# Patient Record
Sex: MALE | Race: WHITE | Employment: OTHER | URBAN - METROPOLITAN AREA
[De-identification: names, ages, dates, MRNs, and addresses within clinical notes are randomized per-mention and may not be internally consistent; named-entity substitution may affect disease eponyms.]

---

## 2017-01-16 RX ORDER — CITALOPRAM 20 MG/1
TABLET ORAL
Qty: 20 TABLET | Refills: 1 | Status: SHIPPED | OUTPATIENT
Start: 2017-01-16 | End: 2017-03-24 | Stop reason: SDUPTHER

## 2017-01-16 RX ORDER — TRAZODONE HYDROCHLORIDE 100 MG/1
TABLET ORAL
Qty: 30 TABLET | Refills: 1 | Status: SHIPPED | OUTPATIENT
Start: 2017-01-16 | End: 2017-03-24 | Stop reason: SDUPTHER

## 2017-01-24 ENCOUNTER — OFFICE VISIT (OUTPATIENT)
Dept: INTERNAL MEDICINE | Age: 72
End: 2017-01-24

## 2017-01-24 VITALS
TEMPERATURE: 97.7 F | DIASTOLIC BLOOD PRESSURE: 88 MMHG | WEIGHT: 214.8 LBS | BODY MASS INDEX: 30.75 KG/M2 | RESPIRATION RATE: 12 BRPM | SYSTOLIC BLOOD PRESSURE: 142 MMHG | HEIGHT: 70 IN | HEART RATE: 67 BPM | OXYGEN SATURATION: 96 %

## 2017-01-24 DIAGNOSIS — Z01.818 PRE-OP EVALUATION: Primary | ICD-10-CM

## 2017-01-24 DIAGNOSIS — Z23 NEEDS FLU SHOT: ICD-10-CM

## 2017-01-24 DIAGNOSIS — I10 ESSENTIAL HYPERTENSION: ICD-10-CM

## 2017-01-24 DIAGNOSIS — Z72.0 TOBACCO ABUSE: ICD-10-CM

## 2017-01-24 PROCEDURE — G8427 DOCREV CUR MEDS BY ELIG CLIN: HCPCS | Performed by: FAMILY MEDICINE

## 2017-01-24 PROCEDURE — 3017F COLORECTAL CA SCREEN DOC REV: CPT | Performed by: FAMILY MEDICINE

## 2017-01-24 PROCEDURE — G8419 CALC BMI OUT NRM PARAM NOF/U: HCPCS | Performed by: FAMILY MEDICINE

## 2017-01-24 PROCEDURE — G8484 FLU IMMUNIZE NO ADMIN: HCPCS | Performed by: FAMILY MEDICINE

## 2017-01-24 PROCEDURE — 4004F PT TOBACCO SCREEN RCVD TLK: CPT | Performed by: FAMILY MEDICINE

## 2017-01-24 PROCEDURE — 90662 IIV NO PRSV INCREASED AG IM: CPT | Performed by: FAMILY MEDICINE

## 2017-01-24 PROCEDURE — G0008 ADMIN INFLUENZA VIRUS VAC: HCPCS | Performed by: FAMILY MEDICINE

## 2017-01-24 PROCEDURE — 99213 OFFICE O/P EST LOW 20 MIN: CPT | Performed by: FAMILY MEDICINE

## 2017-01-24 PROCEDURE — 4040F PNEUMOC VAC/ADMIN/RCVD: CPT | Performed by: FAMILY MEDICINE

## 2017-01-24 PROCEDURE — 1123F ACP DISCUSS/DSCN MKR DOCD: CPT | Performed by: FAMILY MEDICINE

## 2017-01-24 RX ORDER — LANOLIN ALCOHOL/MO/W.PET/CERES
2 CREAM (GRAM) TOPICAL
COMMUNITY
End: 2017-01-24 | Stop reason: DRUGHIGH

## 2017-01-24 RX ORDER — NITROGLYCERIN 2.5 MG/D
PATCH TRANSDERMAL
COMMUNITY
End: 2017-01-24 | Stop reason: DRUGHIGH

## 2017-03-08 DIAGNOSIS — N40.1 BENIGN PROSTATIC HYPERPLASIA WITH LOWER URINARY TRACT SYMPTOMS, UNSPECIFIED MORPHOLOGY: ICD-10-CM

## 2017-03-08 DIAGNOSIS — N40.1 BENIGN PROSTATIC HYPERPLASIA WITH LOWER URINARY TRACT SYMPTOMS, UNSPECIFIED MORPHOLOGY: Primary | ICD-10-CM

## 2017-03-08 LAB — PROSTATE SPECIFIC ANTIGEN: 7.32 NG/ML (ref 0–6.22)

## 2017-03-17 ENCOUNTER — OFFICE VISIT (OUTPATIENT)
Dept: UROLOGY | Age: 72
End: 2017-03-17

## 2017-03-17 VITALS
WEIGHT: 206 LBS | SYSTOLIC BLOOD PRESSURE: 122 MMHG | BODY MASS INDEX: 29.49 KG/M2 | HEART RATE: 61 BPM | DIASTOLIC BLOOD PRESSURE: 70 MMHG | HEIGHT: 70 IN

## 2017-03-17 DIAGNOSIS — R97.20 ELEVATED PSA: Primary | ICD-10-CM

## 2017-03-17 PROCEDURE — G8484 FLU IMMUNIZE NO ADMIN: HCPCS | Performed by: UROLOGY

## 2017-03-17 PROCEDURE — 99212 OFFICE O/P EST SF 10 MIN: CPT | Performed by: UROLOGY

## 2017-03-17 PROCEDURE — 4040F PNEUMOC VAC/ADMIN/RCVD: CPT | Performed by: UROLOGY

## 2017-03-17 PROCEDURE — 1123F ACP DISCUSS/DSCN MKR DOCD: CPT | Performed by: UROLOGY

## 2017-03-17 PROCEDURE — G8427 DOCREV CUR MEDS BY ELIG CLIN: HCPCS | Performed by: UROLOGY

## 2017-03-17 PROCEDURE — G8420 CALC BMI NORM PARAMETERS: HCPCS | Performed by: UROLOGY

## 2017-03-17 PROCEDURE — 3017F COLORECTAL CA SCREEN DOC REV: CPT | Performed by: UROLOGY

## 2017-03-17 PROCEDURE — 4004F PT TOBACCO SCREEN RCVD TLK: CPT | Performed by: UROLOGY

## 2017-03-17 RX ORDER — GABAPENTIN 300 MG/1
CAPSULE ORAL
COMMUNITY
Start: 2017-03-08 | End: 2017-08-08 | Stop reason: ALTCHOICE

## 2017-03-24 RX ORDER — TRAZODONE HYDROCHLORIDE 100 MG/1
100 TABLET ORAL NIGHTLY
Qty: 30 TABLET | Refills: 3 | Status: SHIPPED | OUTPATIENT
Start: 2017-03-24 | End: 2017-09-05 | Stop reason: SDUPTHER

## 2017-03-24 RX ORDER — CITALOPRAM 20 MG/1
20 TABLET ORAL DAILY
Qty: 30 TABLET | Refills: 3 | Status: SHIPPED | OUTPATIENT
Start: 2017-03-24 | End: 2017-09-05 | Stop reason: SDUPTHER

## 2017-04-25 PROBLEM — E61.1 IRON DEFICIENCY: Status: ACTIVE | Noted: 2017-04-25

## 2017-04-25 PROBLEM — I48.91 ATRIAL FIBRILLATION (HCC): Status: ACTIVE | Noted: 2017-04-25

## 2017-04-25 PROBLEM — N40.0 BENIGN PROSTATIC HYPERPLASIA WITHOUT LOWER URINARY TRACT SYMPTOMS: Status: ACTIVE | Noted: 2017-04-25

## 2017-04-25 PROBLEM — I10 ESSENTIAL HYPERTENSION: Status: ACTIVE | Noted: 2017-04-25

## 2017-06-10 ENCOUNTER — HOSPITAL ENCOUNTER (OUTPATIENT)
Dept: MRI IMAGING | Age: 72
Discharge: HOME | End: 2017-06-10
Payer: MEDICARE

## 2017-06-10 DIAGNOSIS — M51.36 DDD (DEGENERATIVE DISC DISEASE), LUMBAR: ICD-10-CM

## 2017-06-10 DIAGNOSIS — M54.16 LUMBAR RADICULOPATHY: ICD-10-CM

## 2017-06-10 DIAGNOSIS — R52 PAIN: ICD-10-CM

## 2017-06-10 LAB
GFR AFRICAN AMERICAN: >60
GFR NON-AFRICAN AMERICAN: >60
PERFORMED ON: NORMAL
POC CREATININE: 0.9 MG/DL (ref 0.8–1.3)
POC SAMPLE TYPE: NORMAL

## 2017-06-10 PROCEDURE — 72158 MRI LUMBAR SPINE W/O & W/DYE: CPT

## 2017-06-10 PROCEDURE — A9579 GAD-BASE MR CONTRAST NOS,1ML: HCPCS | Performed by: ORTHOPAEDIC SURGERY

## 2017-06-10 PROCEDURE — 6360000004 HC RX CONTRAST MEDICATION: Performed by: ORTHOPAEDIC SURGERY

## 2017-06-10 RX ADMIN — GADOVERSETAMIDE 20 ML: 0.5 INJECTION, SOLUTION INTRAVENOUS at 08:25

## 2017-06-12 DIAGNOSIS — R97.20 ELEVATED PSA: ICD-10-CM

## 2017-06-12 LAB — PROSTATE SPECIFIC ANTIGEN: 7.2 NG/ML (ref 0–6.22)

## 2017-06-19 ENCOUNTER — OFFICE VISIT (OUTPATIENT)
Dept: UROLOGY | Age: 72
End: 2017-06-19

## 2017-06-19 VITALS
SYSTOLIC BLOOD PRESSURE: 100 MMHG | HEART RATE: 75 BPM | DIASTOLIC BLOOD PRESSURE: 72 MMHG | HEIGHT: 71 IN | WEIGHT: 209 LBS | BODY MASS INDEX: 29.26 KG/M2

## 2017-06-19 DIAGNOSIS — N40.0 BENIGN NON-NODULAR PROSTATIC HYPERPLASIA WITHOUT LOWER URINARY TRACT SYMPTOMS: Primary | ICD-10-CM

## 2017-06-19 PROCEDURE — G8427 DOCREV CUR MEDS BY ELIG CLIN: HCPCS | Performed by: UROLOGY

## 2017-06-19 PROCEDURE — 3017F COLORECTAL CA SCREEN DOC REV: CPT | Performed by: UROLOGY

## 2017-06-19 PROCEDURE — G8417 CALC BMI ABV UP PARAM F/U: HCPCS | Performed by: UROLOGY

## 2017-06-19 PROCEDURE — 1123F ACP DISCUSS/DSCN MKR DOCD: CPT | Performed by: UROLOGY

## 2017-06-19 PROCEDURE — 99213 OFFICE O/P EST LOW 20 MIN: CPT | Performed by: UROLOGY

## 2017-06-19 PROCEDURE — 4040F PNEUMOC VAC/ADMIN/RCVD: CPT | Performed by: UROLOGY

## 2017-06-19 PROCEDURE — 4004F PT TOBACCO SCREEN RCVD TLK: CPT | Performed by: UROLOGY

## 2017-06-19 RX ORDER — FINASTERIDE 5 MG/1
5 TABLET, FILM COATED ORAL DAILY
Qty: 90 TABLET | Refills: 3 | Status: SHIPPED | OUTPATIENT
Start: 2017-06-19 | End: 2017-08-08 | Stop reason: SINTOL

## 2017-07-06 ENCOUNTER — TELEPHONE (OUTPATIENT)
Dept: UROLOGY | Age: 72
End: 2017-07-06

## 2017-08-08 ENCOUNTER — OFFICE VISIT (OUTPATIENT)
Dept: FAMILY MEDICINE CLINIC | Age: 72
End: 2017-08-08

## 2017-08-08 VITALS
SYSTOLIC BLOOD PRESSURE: 138 MMHG | OXYGEN SATURATION: 96 % | TEMPERATURE: 97.9 F | BODY MASS INDEX: 30.64 KG/M2 | WEIGHT: 214 LBS | DIASTOLIC BLOOD PRESSURE: 74 MMHG | HEART RATE: 80 BPM | HEIGHT: 70 IN | RESPIRATION RATE: 16 BRPM

## 2017-08-08 DIAGNOSIS — K43.9 ABDOMINAL WALL HERNIA: ICD-10-CM

## 2017-08-08 DIAGNOSIS — F33.0 MILD EPISODE OF RECURRENT MAJOR DEPRESSIVE DISORDER (HCC): Primary | ICD-10-CM

## 2017-08-08 PROCEDURE — G8417 CALC BMI ABV UP PARAM F/U: HCPCS | Performed by: FAMILY MEDICINE

## 2017-08-08 PROCEDURE — 3017F COLORECTAL CA SCREEN DOC REV: CPT | Performed by: FAMILY MEDICINE

## 2017-08-08 PROCEDURE — 4040F PNEUMOC VAC/ADMIN/RCVD: CPT | Performed by: FAMILY MEDICINE

## 2017-08-08 PROCEDURE — 4004F PT TOBACCO SCREEN RCVD TLK: CPT | Performed by: FAMILY MEDICINE

## 2017-08-08 PROCEDURE — 99213 OFFICE O/P EST LOW 20 MIN: CPT | Performed by: FAMILY MEDICINE

## 2017-08-08 PROCEDURE — 1123F ACP DISCUSS/DSCN MKR DOCD: CPT | Performed by: FAMILY MEDICINE

## 2017-08-08 PROCEDURE — G8427 DOCREV CUR MEDS BY ELIG CLIN: HCPCS | Performed by: FAMILY MEDICINE

## 2017-08-08 RX ORDER — CARBIDOPA, LEVODOPA AND ENTACAPONE 25; 200; 100 MG/1; MG/1; MG/1
TABLET, FILM COATED ORAL
Refills: 3 | COMMUNITY
Start: 2017-07-26 | End: 2020-01-08 | Stop reason: SDUPTHER

## 2017-08-08 RX ORDER — ISOSORBIDE MONONITRATE 60 MG/1
30 TABLET, EXTENDED RELEASE ORAL DAILY
COMMUNITY

## 2017-08-08 ASSESSMENT — ENCOUNTER SYMPTOMS
SHORTNESS OF BREATH: 0
ABDOMINAL PAIN: 0

## 2017-09-05 RX ORDER — CITALOPRAM 20 MG/1
20 TABLET ORAL DAILY
Qty: 30 TABLET | Refills: 3 | Status: SHIPPED | OUTPATIENT
Start: 2017-09-05 | End: 2018-01-08 | Stop reason: SDUPTHER

## 2017-09-05 RX ORDER — TRAZODONE HYDROCHLORIDE 100 MG/1
100 TABLET ORAL NIGHTLY
Qty: 30 TABLET | Refills: 3 | Status: SHIPPED | OUTPATIENT
Start: 2017-09-05 | End: 2018-01-08 | Stop reason: SDUPTHER

## 2017-09-19 ENCOUNTER — OFFICE VISIT (OUTPATIENT)
Dept: UROLOGY | Age: 72
End: 2017-09-19

## 2017-09-19 VITALS
HEART RATE: 71 BPM | HEIGHT: 71 IN | WEIGHT: 210.8 LBS | BODY MASS INDEX: 29.51 KG/M2 | SYSTOLIC BLOOD PRESSURE: 138 MMHG | DIASTOLIC BLOOD PRESSURE: 76 MMHG

## 2017-09-19 DIAGNOSIS — N40.0 BENIGN NON-NODULAR PROSTATIC HYPERPLASIA WITHOUT LOWER URINARY TRACT SYMPTOMS: Primary | ICD-10-CM

## 2017-09-19 PROCEDURE — 1123F ACP DISCUSS/DSCN MKR DOCD: CPT | Performed by: UROLOGY

## 2017-09-19 PROCEDURE — 99212 OFFICE O/P EST SF 10 MIN: CPT | Performed by: UROLOGY

## 2017-09-19 PROCEDURE — 4040F PNEUMOC VAC/ADMIN/RCVD: CPT | Performed by: UROLOGY

## 2017-09-19 PROCEDURE — 3017F COLORECTAL CA SCREEN DOC REV: CPT | Performed by: UROLOGY

## 2017-09-19 PROCEDURE — G8427 DOCREV CUR MEDS BY ELIG CLIN: HCPCS | Performed by: UROLOGY

## 2017-09-19 PROCEDURE — 4004F PT TOBACCO SCREEN RCVD TLK: CPT | Performed by: UROLOGY

## 2017-09-19 PROCEDURE — G8417 CALC BMI ABV UP PARAM F/U: HCPCS | Performed by: UROLOGY

## 2017-09-26 ENCOUNTER — TELEPHONE (OUTPATIENT)
Dept: FAMILY MEDICINE CLINIC | Age: 72
End: 2017-09-26

## 2017-10-04 ENCOUNTER — OFFICE VISIT (OUTPATIENT)
Dept: FAMILY MEDICINE CLINIC | Age: 72
End: 2017-10-04

## 2017-10-04 VITALS
BODY MASS INDEX: 29.49 KG/M2 | SYSTOLIC BLOOD PRESSURE: 146 MMHG | RESPIRATION RATE: 12 BRPM | HEIGHT: 70 IN | TEMPERATURE: 97.4 F | DIASTOLIC BLOOD PRESSURE: 90 MMHG | WEIGHT: 206 LBS | HEART RATE: 80 BPM

## 2017-10-04 DIAGNOSIS — Z23 NEEDS FLU SHOT: ICD-10-CM

## 2017-10-04 DIAGNOSIS — R19.7 DIARRHEA, UNSPECIFIED TYPE: Primary | ICD-10-CM

## 2017-10-04 PROCEDURE — 3017F COLORECTAL CA SCREEN DOC REV: CPT | Performed by: FAMILY MEDICINE

## 2017-10-04 PROCEDURE — 4040F PNEUMOC VAC/ADMIN/RCVD: CPT | Performed by: FAMILY MEDICINE

## 2017-10-04 PROCEDURE — 1123F ACP DISCUSS/DSCN MKR DOCD: CPT | Performed by: FAMILY MEDICINE

## 2017-10-04 PROCEDURE — G8484 FLU IMMUNIZE NO ADMIN: HCPCS | Performed by: FAMILY MEDICINE

## 2017-10-04 PROCEDURE — G8417 CALC BMI ABV UP PARAM F/U: HCPCS | Performed by: FAMILY MEDICINE

## 2017-10-04 PROCEDURE — G8427 DOCREV CUR MEDS BY ELIG CLIN: HCPCS | Performed by: FAMILY MEDICINE

## 2017-10-04 PROCEDURE — 4004F PT TOBACCO SCREEN RCVD TLK: CPT | Performed by: FAMILY MEDICINE

## 2017-10-04 PROCEDURE — G0008 ADMIN INFLUENZA VIRUS VAC: HCPCS | Performed by: FAMILY MEDICINE

## 2017-10-04 PROCEDURE — 90662 IIV NO PRSV INCREASED AG IM: CPT | Performed by: FAMILY MEDICINE

## 2017-10-04 PROCEDURE — 99213 OFFICE O/P EST LOW 20 MIN: CPT | Performed by: FAMILY MEDICINE

## 2017-10-04 RX ORDER — METRONIDAZOLE 500 MG/1
500 TABLET ORAL 4 TIMES DAILY
Qty: 40 TABLET | Refills: 0 | Status: SHIPPED | OUTPATIENT
Start: 2017-10-04 | End: 2017-10-04 | Stop reason: SDUPTHER

## 2017-10-04 RX ORDER — METRONIDAZOLE 500 MG/1
500 TABLET ORAL 4 TIMES DAILY
Qty: 40 TABLET | Refills: 0 | Status: SHIPPED | OUTPATIENT
Start: 2017-10-04 | End: 2017-10-14

## 2017-10-04 ASSESSMENT — ENCOUNTER SYMPTOMS
DIARRHEA: 1
BLOATING: 1
FLATUS: 1
COUGH: 0
VOMITING: 0
ABDOMINAL PAIN: 0

## 2017-10-04 NOTE — PROGRESS NOTES
INFLUENZA, HIGH DOSE, 65 YRS +, IM, PF, PREFILL SYR, 0.5ML (FLUZONE HD)    Pancreatic Elastase, Fecal     Standing Status:   Future     Standing Expiration Date:   10/4/2018     Orders Placed This Encounter   Medications    DISCONTD: metroNIDAZOLE (FLAGYL) 500 MG tablet     Sig: Take 1 tablet by mouth 4 times daily for 10 days     Dispense:  40 tablet     Refill:  0    metroNIDAZOLE (FLAGYL) 500 MG tablet     Sig: Take 1 tablet by mouth 4 times daily for 10 days     Dispense:  40 tablet     Refill:  0     Medications Discontinued During This Encounter   Medication Reason    metroNIDAZOLE (FLAGYL) 500 MG tablet Reorder   start metamucil 4-6 wafers daily    Ready to quit: Not Answered  Counseling given: Not Answered      Return if symptoms worsen or fail to improve.     Rey Tran, DO

## 2017-10-05 DIAGNOSIS — R19.7 DIARRHEA, UNSPECIFIED TYPE: ICD-10-CM

## 2017-10-06 LAB
CLOSTRIDIUM DIFFICILE DNA AMPLIFICATION: NORMAL
GI BACTERIAL PATHOGENS BY PCR: NORMAL

## 2017-10-08 LAB — PANCREATIC ELASTASE, FECAL: 406 UG/G

## 2017-10-10 LAB
OVA AND PARASITE TRICHROME: NEGATIVE
OVA AND PARASITE WET MOUNT: NEGATIVE

## 2017-10-23 ENCOUNTER — OFFICE VISIT (OUTPATIENT)
Dept: FAMILY MEDICINE CLINIC | Age: 72
End: 2017-10-23

## 2017-10-23 VITALS
OXYGEN SATURATION: 95 % | TEMPERATURE: 97.3 F | BODY MASS INDEX: 29.2 KG/M2 | HEIGHT: 70 IN | HEART RATE: 56 BPM | SYSTOLIC BLOOD PRESSURE: 112 MMHG | RESPIRATION RATE: 16 BRPM | WEIGHT: 204 LBS | DIASTOLIC BLOOD PRESSURE: 78 MMHG

## 2017-10-23 DIAGNOSIS — Z23 NEED FOR PNEUMOCOCCAL VACCINATION: ICD-10-CM

## 2017-10-23 DIAGNOSIS — R19.7 DIARRHEA, UNSPECIFIED TYPE: Primary | ICD-10-CM

## 2017-10-23 PROCEDURE — 4004F PT TOBACCO SCREEN RCVD TLK: CPT | Performed by: FAMILY MEDICINE

## 2017-10-23 PROCEDURE — 3017F COLORECTAL CA SCREEN DOC REV: CPT | Performed by: FAMILY MEDICINE

## 2017-10-23 PROCEDURE — 99213 OFFICE O/P EST LOW 20 MIN: CPT | Performed by: FAMILY MEDICINE

## 2017-10-23 PROCEDURE — G8427 DOCREV CUR MEDS BY ELIG CLIN: HCPCS | Performed by: FAMILY MEDICINE

## 2017-10-23 PROCEDURE — 4040F PNEUMOC VAC/ADMIN/RCVD: CPT | Performed by: FAMILY MEDICINE

## 2017-10-23 PROCEDURE — 1123F ACP DISCUSS/DSCN MKR DOCD: CPT | Performed by: FAMILY MEDICINE

## 2017-10-23 PROCEDURE — G8484 FLU IMMUNIZE NO ADMIN: HCPCS | Performed by: FAMILY MEDICINE

## 2017-10-23 PROCEDURE — G8417 CALC BMI ABV UP PARAM F/U: HCPCS | Performed by: FAMILY MEDICINE

## 2017-10-23 RX ORDER — DIPHENOXYLATE HYDROCHLORIDE AND ATROPINE SULFATE 2.5; .025 MG/1; MG/1
1 TABLET ORAL 4 TIMES DAILY PRN
Qty: 40 TABLET | Refills: 0 | Status: SHIPPED | OUTPATIENT
Start: 2017-10-23 | End: 2017-11-02

## 2017-10-23 ASSESSMENT — ENCOUNTER SYMPTOMS
VOMITING: 0
ABDOMINAL DISTENTION: 0
SHORTNESS OF BREATH: 0
BLOOD IN STOOL: 0
ABDOMINAL PAIN: 0
ALLERGIC/IMMUNOLOGIC NEGATIVE: 1
RESPIRATORY NEGATIVE: 1
NAUSEA: 1
EYES NEGATIVE: 1

## 2017-10-23 NOTE — PROGRESS NOTES
status: Current Every Day Smoker     Packs/day: 1.00     Years: 58.00     Types: Cigarettes    Smokeless tobacco: Never Used    Alcohol use 1.8 oz/week     3 Shots of liquor per week      Comment: once weekly     Drug use: No    Sexual activity: No     Other Topics Concern    None     Social History Narrative    None     Current Outpatient Prescriptions   Medication Sig Dispense Refill    diphenoxylate-atropine (DIPHENATOL) 2.5-0.025 MG per tablet Take 1 tablet by mouth 4 times daily as needed for Diarrhea 40 tablet 0    citalopram (CELEXA) 20 MG tablet Take 1 tablet by mouth daily 30 tablet 3    traZODone (DESYREL) 100 MG tablet Take 1 tablet by mouth nightly 30 tablet 3    carbidopa-levodopa-entacapone (STALEVO 100) -200 MG TABS TAKE 1 TABLET BY MOUTH at 3 (THREE) IN THE EVENING  3    isosorbide mononitrate (IMDUR) 60 MG extended release tablet Take 60 mg by mouth daily      Handicap Placard MISC by Does not apply route Good for 5 years. Good from 1/31/2017 through 1/31/2022. 1 each 0    XARELTO 20 MG TABS tablet       primidone (MYSOLINE) 250 MG tablet Take 250 mg by mouth 4 times daily      lisinopril (PRINIVIL;ZESTRIL) 20 MG tablet Take 20 mg by mouth daily.  metoprolol (LOPRESSOR) 50 MG tablet Take 50 mg by mouth 2 times daily.  atorvastatin (LIPITOR) 80 MG tablet Take 80 mg by mouth daily.  aspirin 81 MG tablet Take 81 mg by mouth daily.  Omega-3 Fatty Acids (FISH OIL) 1200 MG CPDR Take  by mouth.  nitroGLYCERIN (NITROSTAT) 0.4 MG SL tablet Place 0.4 mg under the tongue every 5 minutes as needed for Chest pain. No current facility-administered medications for this visit.       Current Outpatient Prescriptions on File Prior to Visit   Medication Sig Dispense Refill    citalopram (CELEXA) 20 MG tablet Take 1 tablet by mouth daily 30 tablet 3    traZODone (DESYREL) 100 MG tablet Take 1 tablet by mouth nightly 30 tablet 3    carbidopa-levodopa-entacapone Negative. Allergic/Immunologic: Negative. Neurological: Negative. Hematological: Negative. Psychiatric/Behavioral: Negative. Physical Exam  Vitals:    10/23/17 1326   BP: 112/78   Site: Left Arm   Position: Sitting   Cuff Size: Medium Adult   Pulse: 56   Resp: 16   Temp: 97.3 °F (36.3 °C)   TempSrc: Tympanic   SpO2: 95%   Weight: 204 lb (92.5 kg)   Height: 5' 10\" (1.778 m)       Physical Exam   Constitutional: He appears well-developed and well-nourished. HENT:   Right Ear: External ear normal.   Left Ear: External ear normal.   Eyes: Conjunctivae are normal. Pupils are equal, round, and reactive to light. Neck: Normal range of motion. Neck supple. No thyromegaly present. Cardiovascular: Normal rate, regular rhythm and normal heart sounds. No murmur heard. Pulmonary/Chest: Effort normal and breath sounds normal.   Abdominal: Soft. Bowel sounds are normal. He exhibits no distension and no mass. There is no tenderness. There is no rebound and no guarding. Musculoskeletal: Normal range of motion. He exhibits no edema or tenderness. Lymphadenopathy:     He has no cervical adenopathy. Assessment  1. Diarrhea, unspecified type     2. Need for pneumococcal vaccination  CANCELED: Pneumococcal polysaccharide vaccine 23-valent >= 3yo subcutaneous/IM (PNEUMOVAX 23)     Problem List     None          Plan  Will have him stop the fiber call if not better in three weeks. Orders Placed This Encounter   Medications    diphenoxylate-atropine (DIPHENATOL) 2.5-0.025 MG per tablet     Sig: Take 1 tablet by mouth 4 times daily as needed for Diarrhea     Dispense:  40 tablet     Refill:  0     No Follow-up on file.   Carole Brown MD

## 2017-11-09 ENCOUNTER — ANESTHESIA (OUTPATIENT)
Dept: ENDOSCOPY | Age: 72
End: 2017-11-09
Payer: MEDICARE

## 2017-11-09 ENCOUNTER — HOSPITAL ENCOUNTER (OUTPATIENT)
Age: 72
Setting detail: OUTPATIENT SURGERY
Discharge: HOME | End: 2017-11-09
Attending: SPECIALIST | Admitting: SPECIALIST
Payer: MEDICARE

## 2017-11-09 ENCOUNTER — ANESTHESIA EVENT (OUTPATIENT)
Dept: ENDOSCOPY | Age: 72
End: 2017-11-09
Payer: MEDICARE

## 2017-11-09 VITALS
DIASTOLIC BLOOD PRESSURE: 73 MMHG | HEART RATE: 70 BPM | WEIGHT: 204 LBS | OXYGEN SATURATION: 98 % | RESPIRATION RATE: 16 BRPM | HEIGHT: 70 IN | BODY MASS INDEX: 29.2 KG/M2 | SYSTOLIC BLOOD PRESSURE: 140 MMHG | TEMPERATURE: 98.1 F

## 2017-11-09 VITALS
OXYGEN SATURATION: 98 % | SYSTOLIC BLOOD PRESSURE: 126 MMHG | DIASTOLIC BLOOD PRESSURE: 62 MMHG | RESPIRATION RATE: 17 BRPM

## 2017-11-09 LAB
IGA: 142 MG/DL (ref 68–408)
TISSUE TRANSGLUTAMINASE IGA: 0 U/ML (ref 0–3)

## 2017-11-09 PROCEDURE — 6360000002 HC RX W HCPCS: Performed by: NURSE ANESTHETIST, CERTIFIED REGISTERED

## 2017-11-09 PROCEDURE — 3700000000 HC ANESTHESIA ATTENDED CARE: Performed by: SPECIALIST

## 2017-11-09 PROCEDURE — 3700000001 HC ADD 15 MINUTES (ANESTHESIA): Performed by: SPECIALIST

## 2017-11-09 PROCEDURE — 2580000003 HC RX 258: Performed by: SPECIALIST

## 2017-11-09 PROCEDURE — 3609027000 HC COLONOSCOPY: Performed by: SPECIALIST

## 2017-11-09 PROCEDURE — 88305 TISSUE EXAM BY PATHOLOGIST: CPT

## 2017-11-09 PROCEDURE — 7100000010 HC PHASE II RECOVERY - FIRST 15 MIN: Performed by: SPECIALIST

## 2017-11-09 RX ORDER — PROPOFOL 10 MG/ML
INJECTION, EMULSION INTRAVENOUS PRN
Status: DISCONTINUED | OUTPATIENT
Start: 2017-11-09 | End: 2017-11-09 | Stop reason: SDUPTHER

## 2017-11-09 RX ORDER — SODIUM CHLORIDE 0.9 % (FLUSH) 0.9 %
10 SYRINGE (ML) INJECTION EVERY 12 HOURS SCHEDULED
Status: DISCONTINUED | OUTPATIENT
Start: 2017-11-09 | End: 2017-11-09 | Stop reason: HOSPADM

## 2017-11-09 RX ORDER — SODIUM CHLORIDE 9 MG/ML
INJECTION, SOLUTION INTRAVENOUS CONTINUOUS
Status: DISCONTINUED | OUTPATIENT
Start: 2017-11-09 | End: 2017-11-09 | Stop reason: HOSPADM

## 2017-11-09 RX ORDER — ONDANSETRON 2 MG/ML
4 INJECTION INTRAMUSCULAR; INTRAVENOUS
Status: DISCONTINUED | OUTPATIENT
Start: 2017-11-09 | End: 2017-11-09 | Stop reason: HOSPADM

## 2017-11-09 RX ORDER — SODIUM CHLORIDE 0.9 % (FLUSH) 0.9 %
10 SYRINGE (ML) INJECTION PRN
Status: DISCONTINUED | OUTPATIENT
Start: 2017-11-09 | End: 2017-11-09 | Stop reason: HOSPADM

## 2017-11-09 RX ORDER — LIDOCAINE HYDROCHLORIDE 10 MG/ML
1 INJECTION, SOLUTION EPIDURAL; INFILTRATION; INTRACAUDAL; PERINEURAL
Status: DISCONTINUED | OUTPATIENT
Start: 2017-11-09 | End: 2017-11-09 | Stop reason: HOSPADM

## 2017-11-09 RX ADMIN — PROPOFOL 20 MG: 10 INJECTION, EMULSION INTRAVENOUS at 11:35

## 2017-11-09 RX ADMIN — PROPOFOL 20 MG: 10 INJECTION, EMULSION INTRAVENOUS at 11:45

## 2017-11-09 RX ADMIN — PROPOFOL 20 MG: 10 INJECTION, EMULSION INTRAVENOUS at 11:37

## 2017-11-09 RX ADMIN — SODIUM CHLORIDE: 900 INJECTION, SOLUTION INTRAVENOUS at 11:45

## 2017-11-09 RX ADMIN — PROPOFOL 20 MG: 10 INJECTION, EMULSION INTRAVENOUS at 11:43

## 2017-11-09 RX ADMIN — PROPOFOL 20 MG: 10 INJECTION, EMULSION INTRAVENOUS at 11:39

## 2017-11-09 RX ADMIN — PROPOFOL 20 MG: 10 INJECTION, EMULSION INTRAVENOUS at 11:33

## 2017-11-09 RX ADMIN — SODIUM CHLORIDE: 900 INJECTION, SOLUTION INTRAVENOUS at 10:47

## 2017-11-09 RX ADMIN — PROPOFOL 100 MG: 10 INJECTION, EMULSION INTRAVENOUS at 11:32

## 2017-11-09 RX ADMIN — PROPOFOL 20 MG: 10 INJECTION, EMULSION INTRAVENOUS at 11:41

## 2017-11-09 ASSESSMENT — PAIN - FUNCTIONAL ASSESSMENT: PAIN_FUNCTIONAL_ASSESSMENT: 0-10

## 2017-11-09 NOTE — ANESTHESIA PRE PROCEDURE
Department of Anesthesiology  Preprocedure Note       Name:  Author Oliver   Age:  67 y.o.  :  1945                                          MRN:  [id number]         Date:  2017      Surgeon: Jaylon Ledbetter):  Rodrigo Sarkar MD    Procedure: Procedure(s):  COLONOSCOPY    Medications prior to admission:   Prior to Admission medications    Medication Sig Start Date End Date Taking? Authorizing Provider   citalopram (CELEXA) 20 MG tablet Take 1 tablet by mouth daily 17  Yes Paramjit Arizmendi MD   traZODone (DESYREL) 100 MG tablet Take 1 tablet by mouth nightly 17  Yes Paramjit Arizmendi MD   carbidopa-levodopa-entacapone (STALEVO 100) -200 MG TABS TAKE 1 TABLET BY MOUTH at 3 (THREE) IN THE EVENING 17  Yes Historical Provider, MD   isosorbide mononitrate (IMDUR) 60 MG extended release tablet Take 60 mg by mouth daily   Yes Historical Provider, MD   primidone (MYSOLINE) 250 MG tablet Take 250 mg by mouth 4 times daily   Yes Historical Provider, MD   lisinopril (PRINIVIL;ZESTRIL) 20 MG tablet Take 20 mg by mouth daily. Yes Historical Provider, MD   metoprolol (LOPRESSOR) 50 MG tablet Take 50 mg by mouth 2 times daily. Yes Historical Provider, MD   atorvastatin (LIPITOR) 80 MG tablet Take 80 mg by mouth daily. Yes Historical Provider, MD   aspirin 81 MG tablet Take 81 mg by mouth daily. Yes Historical Provider, MD   Omega-3 Fatty Acids (FISH OIL) 1200 MG CPDR Take  by mouth. Yes Historical Provider, MD   Handicap Placard MISC by Does not apply route Good for 5 years. Good from 2017 through 2022. 17   [place] Figures, MD   XARELTO 20 MG TABS tablet  11/18/15   Historical Provider, MD   nitroGLYCERIN (NITROSTAT) 0.4 MG SL tablet Place 0.4 mg under the tongue every 5 minutes as needed for Chest pain.     Historical Provider, MD       Current medications:    Current Facility-Administered Medications   Medication Dose Route Frequency Provider Last Rate Last Dose    0.9 % sodium 1.4 09/09/2016    APTT 31.7 09/09/2016       HCG (If Applicable): No results found for: PREGTESTUR, PREGSERUM, HCG, HCGQUANT     ABGs: No results found for: PHART, PO2ART, TIX3QRW, JRC4JMF, BEART, K9DALRNT     Type & Screen (If Applicable):  No results found for: LABABO, [address]    Anesthesia Evaluation  Patient summary reviewed and Nursing notes reviewed  Airway: Mallampati: II  TM distance: >3 FB   Neck ROM: full  Mouth opening: > = 3 FB Dental: normal exam         Pulmonary:negative ROS and normal exam                               Cardiovascular:negative ROS    (+) hypertension:,                   Neuro/Psych:   {neg ROS              GI/Hepatic/Renal: neg ROS            Endo/Other: negative ROS                    Abdominal:           Vascular:                                      Anesthesia Plan      MAC     ASA 2             Anesthetic plan and risks discussed with patient. Plan discussed with CRNA.                   Say Neal CRNA   11/9/2017

## 2017-11-21 ENCOUNTER — OFFICE VISIT (OUTPATIENT)
Dept: FAMILY MEDICINE CLINIC | Age: 72
End: 2017-11-21

## 2017-11-21 VITALS
BODY MASS INDEX: 27.16 KG/M2 | RESPIRATION RATE: 16 BRPM | TEMPERATURE: 98.1 F | WEIGHT: 194 LBS | DIASTOLIC BLOOD PRESSURE: 70 MMHG | SYSTOLIC BLOOD PRESSURE: 138 MMHG | HEIGHT: 71 IN | HEART RATE: 78 BPM

## 2017-11-21 DIAGNOSIS — K52.832 LYMPHOCYTIC COLITIS: Primary | ICD-10-CM

## 2017-11-21 DIAGNOSIS — R19.7 DIARRHEA, UNSPECIFIED TYPE: ICD-10-CM

## 2017-11-21 PROCEDURE — G8484 FLU IMMUNIZE NO ADMIN: HCPCS | Performed by: FAMILY MEDICINE

## 2017-11-21 PROCEDURE — 99213 OFFICE O/P EST LOW 20 MIN: CPT | Performed by: FAMILY MEDICINE

## 2017-11-21 PROCEDURE — 4040F PNEUMOC VAC/ADMIN/RCVD: CPT | Performed by: FAMILY MEDICINE

## 2017-11-21 PROCEDURE — G8417 CALC BMI ABV UP PARAM F/U: HCPCS | Performed by: FAMILY MEDICINE

## 2017-11-21 PROCEDURE — 3017F COLORECTAL CA SCREEN DOC REV: CPT | Performed by: FAMILY MEDICINE

## 2017-11-21 PROCEDURE — G8427 DOCREV CUR MEDS BY ELIG CLIN: HCPCS | Performed by: FAMILY MEDICINE

## 2017-11-21 PROCEDURE — 4004F PT TOBACCO SCREEN RCVD TLK: CPT | Performed by: FAMILY MEDICINE

## 2017-11-21 PROCEDURE — 1123F ACP DISCUSS/DSCN MKR DOCD: CPT | Performed by: FAMILY MEDICINE

## 2017-11-21 ASSESSMENT — ENCOUNTER SYMPTOMS
VOMITING: 0
ABDOMINAL PAIN: 0

## 2018-01-19 LAB
ALBUMIN SERPL-MCNC: 4.5 G/DL (ref 3.9–4.9)
ALP BLD-CCNC: 111 U/L (ref 35–104)
ALT SERPL-CCNC: 13 U/L (ref 0–41)
ANION GAP SERPL CALCULATED.3IONS-SCNC: 14 MEQ/L (ref 7–13)
AST SERPL-CCNC: 23 U/L (ref 0–40)
BILIRUB SERPL-MCNC: 0.4 MG/DL (ref 0–1.2)
BUN BLDV-MCNC: 15 MG/DL (ref 8–23)
CALCIUM SERPL-MCNC: 9.7 MG/DL (ref 8.6–10.2)
CHLORIDE BLD-SCNC: 92 MEQ/L (ref 98–107)
CHOLESTEROL, TOTAL: 184 MG/DL (ref 0–199)
CO2: 29 MEQ/L (ref 22–29)
CREAT SERPL-MCNC: 1.02 MG/DL (ref 0.7–1.2)
GFR AFRICAN AMERICAN: >60
GFR NON-AFRICAN AMERICAN: >60
GLOBULIN: 2.9 G/DL (ref 2.3–3.5)
GLUCOSE BLD-MCNC: 99 MG/DL (ref 74–109)
HDLC SERPL-MCNC: 71 MG/DL (ref 40–59)
LDL CHOLESTEROL CALCULATED: 95 MG/DL (ref 0–129)
POTASSIUM SERPL-SCNC: 5 MEQ/L (ref 3.5–5.1)
SODIUM BLD-SCNC: 135 MEQ/L (ref 132–144)
TOTAL PROTEIN: 7.4 G/DL (ref 6.4–8.1)
TRIGL SERPL-MCNC: 90 MG/DL (ref 0–200)

## 2018-02-06 ENCOUNTER — HOSPITAL ENCOUNTER (OUTPATIENT)
Dept: MRI IMAGING | Age: 73
Discharge: HOME | End: 2018-02-08
Payer: MEDICARE

## 2018-02-06 DIAGNOSIS — M75.81 TENDINITIS OF RIGHT ROTATOR CUFF: ICD-10-CM

## 2018-02-06 DIAGNOSIS — M75.40 IMPINGEMENT SYNDROME, SHOULDER, UNSPECIFIED LATERALITY: ICD-10-CM

## 2018-02-06 DIAGNOSIS — M25.611 STIFFNESS OF RIGHT SHOULDER JOINT: ICD-10-CM

## 2018-02-06 PROCEDURE — 73221 MRI JOINT UPR EXTREM W/O DYE: CPT

## 2018-02-19 RX ORDER — TRAZODONE HYDROCHLORIDE 100 MG/1
TABLET ORAL
Qty: 90 TABLET | Refills: 0 | Status: SHIPPED | OUTPATIENT
Start: 2018-02-19 | End: 2018-05-21 | Stop reason: SDUPTHER

## 2018-02-19 RX ORDER — CITALOPRAM 20 MG/1
TABLET ORAL
Qty: 90 TABLET | Refills: 0 | Status: SHIPPED | OUTPATIENT
Start: 2018-02-19 | End: 2018-05-21 | Stop reason: SDUPTHER

## 2018-03-13 ENCOUNTER — OFFICE VISIT (OUTPATIENT)
Dept: FAMILY MEDICINE CLINIC | Age: 73
End: 2018-03-13
Payer: MEDICARE

## 2018-03-13 VITALS
DIASTOLIC BLOOD PRESSURE: 80 MMHG | HEART RATE: 60 BPM | RESPIRATION RATE: 15 BRPM | WEIGHT: 206 LBS | SYSTOLIC BLOOD PRESSURE: 132 MMHG | TEMPERATURE: 96.8 F | BODY MASS INDEX: 28.84 KG/M2 | HEIGHT: 71 IN

## 2018-03-13 DIAGNOSIS — F33.41 RECURRENT MAJOR DEPRESSIVE DISORDER, IN PARTIAL REMISSION (HCC): Primary | ICD-10-CM

## 2018-03-13 DIAGNOSIS — Z72.0 TOBACCO ABUSE: ICD-10-CM

## 2018-03-13 DIAGNOSIS — D64.9 ANEMIA, UNSPECIFIED TYPE: ICD-10-CM

## 2018-03-13 DIAGNOSIS — I48.0 PAF (PAROXYSMAL ATRIAL FIBRILLATION) (HCC): ICD-10-CM

## 2018-03-13 PROCEDURE — 3017F COLORECTAL CA SCREEN DOC REV: CPT | Performed by: FAMILY MEDICINE

## 2018-03-13 PROCEDURE — G8417 CALC BMI ABV UP PARAM F/U: HCPCS | Performed by: FAMILY MEDICINE

## 2018-03-13 PROCEDURE — G8482 FLU IMMUNIZE ORDER/ADMIN: HCPCS | Performed by: FAMILY MEDICINE

## 2018-03-13 PROCEDURE — 4004F PT TOBACCO SCREEN RCVD TLK: CPT | Performed by: FAMILY MEDICINE

## 2018-03-13 PROCEDURE — 1123F ACP DISCUSS/DSCN MKR DOCD: CPT | Performed by: FAMILY MEDICINE

## 2018-03-13 PROCEDURE — G8427 DOCREV CUR MEDS BY ELIG CLIN: HCPCS | Performed by: FAMILY MEDICINE

## 2018-03-13 PROCEDURE — 99213 OFFICE O/P EST LOW 20 MIN: CPT | Performed by: FAMILY MEDICINE

## 2018-03-13 PROCEDURE — 4040F PNEUMOC VAC/ADMIN/RCVD: CPT | Performed by: FAMILY MEDICINE

## 2018-03-13 NOTE — PROGRESS NOTES
Subjective:      Patient ID: Kaitlynn Chapman is a 67 y.o. male    HPI  Here in follow up for depression. Depression stable with current medications. Diarrhea doing much better since seeing gi. Stools more formed. Does see cardiology and a fib stable     Review of Systems  Reviewed allergy, medical, social, surgical, family and med list changes and updated   Files  Social History     Social History    Marital status:      Spouse name: N/A    Number of children: N/A    Years of education: N/A     Social History Main Topics    Smoking status: Current Every Day Smoker     Packs/day: 1.00     Years: 58.00     Types: Cigarettes    Smokeless tobacco: Never Used    Alcohol use 1.8 oz/week     3 Shots of liquor per week      Comment: once weekly     Drug use: No    Sexual activity: No     Other Topics Concern    None     Social History Narrative    None     Current Outpatient Prescriptions   Medication Sig Dispense Refill    citalopram (CELEXA) 20 MG tablet TAKE 1 TABLET DAILY 90 tablet 0    traZODone (DESYREL) 100 MG tablet TAKE 1 TABLET NIGHTLY 90 tablet 0    carbidopa-levodopa-entacapone (STALEVO 100) -200 MG TABS TAKE 1 TABLET BY MOUTH at 3 (THREE) IN THE EVENING  3    isosorbide mononitrate (IMDUR) 60 MG extended release tablet Take 60 mg by mouth daily      Handicap Placard MISC by Does not apply route Good for 5 years. Good from 1/31/2017 through 1/31/2022. 1 each 0    XARELTO 20 MG TABS tablet       primidone (MYSOLINE) 250 MG tablet Take 250 mg by mouth 4 times daily      lisinopril (PRINIVIL;ZESTRIL) 20 MG tablet Take 20 mg by mouth daily.  metoprolol (LOPRESSOR) 50 MG tablet Take 50 mg by mouth 2 times daily.  atorvastatin (LIPITOR) 80 MG tablet Take 80 mg by mouth daily.  aspirin 81 MG tablet Take 81 mg by mouth daily.  Omega-3 Fatty Acids (FISH OIL) 1200 MG CPDR Take  by mouth.       nitroGLYCERIN (NITROSTAT) 0.4 MG SL tablet Place 0.4 mg under the tongue

## 2018-04-04 LAB
CLOSTRIDIUM DIFFICILE DNA AMPLIFICATION: NORMAL
CRYPTOSPORIDIUM ANTIGEN STOOL: NORMAL
GI BACTERIAL PATHOGENS BY PCR: NORMAL
GIARDIA ANTIGEN STOOL: NORMAL

## 2018-04-05 LAB
FECAL NEUTRAL FAT: NORMAL
FECAL PH: 7 (ref 5–8.5)
FECAL SPLIT FATS: NORMAL
REDUCING SUBSTANCES, STOOL: NEGATIVE

## 2018-04-06 LAB — PANCREATIC ELASTASE, FECAL: >500 UG/G

## 2018-04-18 ENCOUNTER — TELEPHONE (OUTPATIENT)
Dept: UROLOGY | Age: 73
End: 2018-04-18

## 2018-04-18 ENCOUNTER — NURSE ONLY (OUTPATIENT)
Dept: UROLOGY | Age: 73
End: 2018-04-18
Payer: MEDICARE

## 2018-04-18 DIAGNOSIS — R31.0 GROSS HEMATURIA: Primary | ICD-10-CM

## 2018-04-18 LAB
BILIRUBIN, POC: ABNORMAL
BLOOD URINE, POC: ABNORMAL
CLARITY, POC: CLEAR
COLOR, POC: ABNORMAL
GLUCOSE URINE, POC: ABNORMAL
KETONES, POC: ABNORMAL
LEUKOCYTE EST, POC: ABNORMAL
NITRITE, POC: ABNORMAL
PH, POC: 5.5
PROTEIN, POC: ABNORMAL
SPECIFIC GRAVITY, POC: 1.01
UROBILINOGEN, POC: 0.2

## 2018-04-18 PROCEDURE — 81003 URINALYSIS AUTO W/O SCOPE: CPT | Performed by: UROLOGY

## 2018-04-22 LAB
ORGANISM: ABNORMAL
URINE CULTURE, ROUTINE: ABNORMAL
URINE CULTURE, ROUTINE: ABNORMAL

## 2018-04-23 RX ORDER — SULFAMETHOXAZOLE AND TRIMETHOPRIM 800; 160 MG/1; MG/1
1 TABLET ORAL 2 TIMES DAILY
Qty: 20 TABLET | Refills: 0 | Status: SHIPPED | OUTPATIENT
Start: 2018-04-23 | End: 2018-05-03

## 2018-05-21 RX ORDER — CITALOPRAM 20 MG/1
TABLET ORAL
Qty: 90 TABLET | Refills: 0 | Status: SHIPPED | OUTPATIENT
Start: 2018-05-21 | End: 2018-08-18 | Stop reason: SDUPTHER

## 2018-05-21 RX ORDER — TRAZODONE HYDROCHLORIDE 100 MG/1
TABLET ORAL
Qty: 90 TABLET | Refills: 0 | Status: SHIPPED | OUTPATIENT
Start: 2018-05-21 | End: 2018-08-18 | Stop reason: SDUPTHER

## 2018-07-21 LAB
ALBUMIN SERPL-MCNC: 4.3 G/DL (ref 3.9–4.9)
ALP BLD-CCNC: 100 U/L (ref 35–104)
ALT SERPL-CCNC: 14 U/L (ref 0–41)
ANION GAP SERPL CALCULATED.3IONS-SCNC: 14 MEQ/L (ref 7–13)
AST SERPL-CCNC: 20 U/L (ref 0–40)
BILIRUB SERPL-MCNC: 0.4 MG/DL (ref 0–1.2)
BUN BLDV-MCNC: 18 MG/DL (ref 8–23)
CALCIUM SERPL-MCNC: 9.4 MG/DL (ref 8.6–10.2)
CHLORIDE BLD-SCNC: 99 MEQ/L (ref 98–107)
CHOLESTEROL, TOTAL: 143 MG/DL (ref 0–199)
CO2: 28 MEQ/L (ref 22–29)
CREAT SERPL-MCNC: 0.93 MG/DL (ref 0.7–1.2)
GFR AFRICAN AMERICAN: >60
GFR NON-AFRICAN AMERICAN: >60
GLOBULIN: 2.8 G/DL (ref 2.3–3.5)
GLUCOSE BLD-MCNC: 88 MG/DL (ref 74–109)
HDLC SERPL-MCNC: 53 MG/DL (ref 40–59)
LDL CHOLESTEROL CALCULATED: 76 MG/DL (ref 0–129)
POTASSIUM SERPL-SCNC: 4.8 MEQ/L (ref 3.5–5.1)
SODIUM BLD-SCNC: 141 MEQ/L (ref 132–144)
TOTAL PROTEIN: 7.1 G/DL (ref 6.4–8.1)
TRIGL SERPL-MCNC: 68 MG/DL (ref 0–200)

## 2018-08-21 RX ORDER — TRAZODONE HYDROCHLORIDE 100 MG/1
TABLET ORAL
Qty: 90 TABLET | Refills: 0 | Status: SHIPPED | OUTPATIENT
Start: 2018-08-21 | End: 2018-11-17 | Stop reason: SDUPTHER

## 2018-08-21 RX ORDER — CITALOPRAM 20 MG/1
TABLET ORAL
Qty: 90 TABLET | Refills: 0 | Status: SHIPPED | OUTPATIENT
Start: 2018-08-21 | End: 2018-11-17 | Stop reason: SDUPTHER

## 2018-09-20 ENCOUNTER — OFFICE VISIT (OUTPATIENT)
Dept: FAMILY MEDICINE CLINIC | Age: 73
End: 2018-09-20
Payer: MEDICARE

## 2018-09-20 VITALS
HEART RATE: 81 BPM | BODY MASS INDEX: 28.8 KG/M2 | TEMPERATURE: 97.9 F | WEIGHT: 205.7 LBS | RESPIRATION RATE: 14 BRPM | SYSTOLIC BLOOD PRESSURE: 144 MMHG | OXYGEN SATURATION: 98 % | DIASTOLIC BLOOD PRESSURE: 82 MMHG | HEIGHT: 71 IN

## 2018-09-20 DIAGNOSIS — F33.41 RECURRENT MAJOR DEPRESSIVE DISORDER, IN PARTIAL REMISSION (HCC): Primary | ICD-10-CM

## 2018-09-20 PROCEDURE — 1101F PT FALLS ASSESS-DOCD LE1/YR: CPT | Performed by: FAMILY MEDICINE

## 2018-09-20 PROCEDURE — 90662 IIV NO PRSV INCREASED AG IM: CPT | Performed by: FAMILY MEDICINE

## 2018-09-20 PROCEDURE — G8427 DOCREV CUR MEDS BY ELIG CLIN: HCPCS | Performed by: FAMILY MEDICINE

## 2018-09-20 PROCEDURE — G0008 ADMIN INFLUENZA VIRUS VAC: HCPCS | Performed by: FAMILY MEDICINE

## 2018-09-20 PROCEDURE — 99213 OFFICE O/P EST LOW 20 MIN: CPT | Performed by: FAMILY MEDICINE

## 2018-09-20 PROCEDURE — 1123F ACP DISCUSS/DSCN MKR DOCD: CPT | Performed by: FAMILY MEDICINE

## 2018-09-20 PROCEDURE — 3017F COLORECTAL CA SCREEN DOC REV: CPT | Performed by: FAMILY MEDICINE

## 2018-09-20 PROCEDURE — G8510 SCR DEP NEG, NO PLAN REQD: HCPCS | Performed by: FAMILY MEDICINE

## 2018-09-20 PROCEDURE — 4004F PT TOBACCO SCREEN RCVD TLK: CPT | Performed by: FAMILY MEDICINE

## 2018-09-20 PROCEDURE — 4040F PNEUMOC VAC/ADMIN/RCVD: CPT | Performed by: FAMILY MEDICINE

## 2018-09-20 PROCEDURE — G8417 CALC BMI ABV UP PARAM F/U: HCPCS | Performed by: FAMILY MEDICINE

## 2018-09-20 ASSESSMENT — PATIENT HEALTH QUESTIONNAIRE - PHQ9
SUM OF ALL RESPONSES TO PHQ QUESTIONS 1-9: 0
SUM OF ALL RESPONSES TO PHQ9 QUESTIONS 1 & 2: 0
1. LITTLE INTEREST OR PLEASURE IN DOING THINGS: 0
2. FEELING DOWN, DEPRESSED OR HOPELESS: 0
SUM OF ALL RESPONSES TO PHQ QUESTIONS 1-9: 0

## 2018-09-20 ASSESSMENT — ENCOUNTER SYMPTOMS
SHORTNESS OF BREATH: 0
ABDOMINAL PAIN: 0

## 2018-09-20 NOTE — PROGRESS NOTES
Subjective:      Patient ID: Artemisa Collet is a 75 y.o. male    HPI  Here in follow up for depression which is stable with medication. Going for surgery next week for carotid surgery at [facility]. Has already seen cardiology and cleared for surgery. Review of Systems   Constitutional: Negative for activity change, appetite change and unexpected weight change. Respiratory: Negative for shortness of breath. Cardiovascular: Negative for chest pain and leg swelling. Gastrointestinal: Negative for abdominal pain. Neurological: Negative for dizziness. Reviewed allergy, medical, social, surgical, family and med list changes and updated   Files  Social History     Social History    Marital status:      Spouse name: N/A    Number of children: N/A    Years of education: N/A     Social History Main Topics    Smoking status: Current Every Day Smoker     Packs/day: 1.00     Years: 58.00     Types: Cigarettes    Smokeless tobacco: Never Used    Alcohol use 1.8 oz/week     3 Shots of liquor per week      Comment: once weekly     Drug use: No    Sexual activity: No     Other Topics Concern    None     Social History Narrative    None     Current Outpatient Prescriptions   Medication Sig Dispense Refill    traZODone (DESYREL) 100 MG tablet TAKE 1 TABLET NIGHTLY 90 tablet 0    citalopram (CELEXA) 20 MG tablet TAKE 1 TABLET DAILY (NO FURTHER REFILLS UNTIL FOLLOW UP DONE) 90 tablet 0    carbidopa-levodopa-entacapone (STALEVO 100) -200 MG TABS TAKE 1 TABLET BY MOUTH at 3 (THREE) IN THE EVENING  3    isosorbide mononitrate (IMDUR) 60 MG extended release tablet Take 60 mg by mouth daily      Handicap Placard MISC by Does not apply route Good for 5 years.   Good from 1/31/2017 through 1/31/2022. 1 each 0    XARELTO 20 MG TABS tablet 20 mg daily (with breakfast)       primidone (MYSOLINE) 250 MG tablet Take 250 mg by mouth 4 times daily      lisinopril (PRINIVIL;ZESTRIL) 20 MG tablet Take 20 mg by

## 2018-09-21 ENCOUNTER — HOSPITAL ENCOUNTER (OUTPATIENT)
Dept: PREADMISSION TESTING | Age: 73
Discharge: HOME | End: 2018-09-25
Payer: MEDICARE

## 2018-09-21 VITALS
WEIGHT: 200.2 LBS | HEART RATE: 57 BPM | OXYGEN SATURATION: 95 % | TEMPERATURE: 97.9 F | HEIGHT: 70 IN | SYSTOLIC BLOOD PRESSURE: 144 MMHG | RESPIRATION RATE: 16 BRPM | DIASTOLIC BLOOD PRESSURE: 70 MMHG | BODY MASS INDEX: 28.66 KG/M2

## 2018-09-21 PROBLEM — I65.29 CAROTID STENOSIS: Status: ACTIVE | Noted: 2018-09-21

## 2018-09-21 LAB
ABO/RH: NORMAL
ALBUMIN SERPL-MCNC: 4.1 G/DL (ref 3.9–4.9)
ALP BLD-CCNC: 101 U/L (ref 35–104)
ALT SERPL-CCNC: 19 U/L (ref 0–41)
ANION GAP SERPL CALCULATED.3IONS-SCNC: 12 MEQ/L (ref 7–13)
ANTIBODY SCREEN: NORMAL
AST SERPL-CCNC: 22 U/L (ref 0–40)
BASE EXCESS ARTERIAL: 6 (ref -3–3)
BILIRUB SERPL-MCNC: 0.4 MG/DL (ref 0–1.2)
BUN BLDV-MCNC: 13 MG/DL (ref 8–23)
CALCIUM IONIZED: 1.24 MMOL/L (ref 1.12–1.32)
CALCIUM SERPL-MCNC: 9.6 MG/DL (ref 8.6–10.2)
CHLORIDE BLD-SCNC: 97 MEQ/L (ref 98–107)
CO2: 31 MEQ/L (ref 22–29)
CREAT SERPL-MCNC: 0.83 MG/DL (ref 0.7–1.2)
EKG ATRIAL RATE: 58 BPM
EKG P AXIS: 12 DEGREES
EKG P-R INTERVAL: 154 MS
EKG Q-T INTERVAL: 450 MS
EKG QRS DURATION: 80 MS
EKG QTC CALCULATION (BAZETT): 441 MS
EKG R AXIS: -53 DEGREES
EKG T AXIS: -87 DEGREES
EKG VENTRICULAR RATE: 58 BPM
GFR AFRICAN AMERICAN: >60
GFR AFRICAN AMERICAN: >60
GFR NON-AFRICAN AMERICAN: >60
GFR NON-AFRICAN AMERICAN: >60
GLOBULIN: 2.8 G/DL (ref 2.3–3.5)
GLUCOSE BLD-MCNC: 92 MG/DL (ref 74–109)
GLUCOSE BLD-MCNC: 95 MG/DL (ref 60–115)
HCO3 ARTERIAL: 30.7 MMOL/L (ref 21–29)
HCT VFR BLD CALC: 44.4 % (ref 42–52)
HEMOGLOBIN: 14.1 GM/DL (ref 13.5–17.5)
HEMOGLOBIN: 14.9 G/DL (ref 14–18)
LACTATE: 0.79 MMOL/L (ref 0.4–2)
MCH RBC QN AUTO: 28.9 PG (ref 27–31.3)
MCHC RBC AUTO-ENTMCNC: 33.5 % (ref 33–37)
MCV RBC AUTO: 86.3 FL (ref 80–100)
O2 SAT, ARTERIAL: 94 % (ref 93–100)
PCO2 ARTERIAL: 52 MM HG (ref 35–45)
PDW BLD-RTO: 16.2 % (ref 11.5–14.5)
PERFORMED ON: ABNORMAL
PH ARTERIAL: 7.38 (ref 7.35–7.45)
PLATELET # BLD: 128 K/UL (ref 130–400)
PO2 ARTERIAL: 73 MM HG (ref 75–108)
POC CHLORIDE: 100 MEQ/L (ref 99–110)
POC CREATININE: 1 MG/DL (ref 0.8–1.3)
POC HEMATOCRIT: 41 % (ref 41–53)
POC POTASSIUM: 4.1 MEQ/L (ref 3.5–5.1)
POC SAMPLE TYPE: ABNORMAL
POC SODIUM: 138 MEQ/L (ref 136–145)
POTASSIUM SERPL-SCNC: 4.4 MEQ/L (ref 3.5–5.1)
RBC # BLD: 5.15 M/UL (ref 4.7–6.1)
SODIUM BLD-SCNC: 140 MEQ/L (ref 132–144)
TCO2 ARTERIAL: 32 (ref 22–29)
TOTAL PROTEIN: 6.9 G/DL (ref 6.4–8.1)
WBC # BLD: 7.8 K/UL (ref 4.8–10.8)

## 2018-09-21 PROCEDURE — 85027 COMPLETE CBC AUTOMATED: CPT

## 2018-09-21 PROCEDURE — 86850 RBC ANTIBODY SCREEN: CPT

## 2018-09-21 PROCEDURE — 84132 ASSAY OF SERUM POTASSIUM: CPT

## 2018-09-21 PROCEDURE — 85014 HEMATOCRIT: CPT

## 2018-09-21 PROCEDURE — 36600 WITHDRAWAL OF ARTERIAL BLOOD: CPT

## 2018-09-21 PROCEDURE — 82435 ASSAY OF BLOOD CHLORIDE: CPT

## 2018-09-21 PROCEDURE — 86901 BLOOD TYPING SEROLOGIC RH(D): CPT

## 2018-09-21 PROCEDURE — 83605 ASSAY OF LACTIC ACID: CPT

## 2018-09-21 PROCEDURE — 80053 COMPREHEN METABOLIC PANEL: CPT

## 2018-09-21 PROCEDURE — 82565 ASSAY OF CREATININE: CPT

## 2018-09-21 PROCEDURE — 82803 BLOOD GASES ANY COMBINATION: CPT

## 2018-09-21 PROCEDURE — 84295 ASSAY OF SERUM SODIUM: CPT

## 2018-09-21 PROCEDURE — 93005 ELECTROCARDIOGRAM TRACING: CPT

## 2018-09-21 PROCEDURE — 86900 BLOOD TYPING SEROLOGIC ABO: CPT

## 2018-09-21 PROCEDURE — 82330 ASSAY OF CALCIUM: CPT

## 2018-09-21 RX ORDER — LIDOCAINE HYDROCHLORIDE 10 MG/ML
1 INJECTION, SOLUTION EPIDURAL; INFILTRATION; INTRACAUDAL; PERINEURAL
Status: CANCELLED | OUTPATIENT
Start: 2018-09-25 | End: 2018-09-25

## 2018-09-21 RX ORDER — DEXAMETHASONE SODIUM PHOSPHATE 4 MG/ML
4 INJECTION, SOLUTION INTRA-ARTICULAR; INTRALESIONAL; INTRAMUSCULAR; INTRAVENOUS; SOFT TISSUE ONCE
Status: CANCELLED | OUTPATIENT
Start: 2018-09-25 | End: 2018-09-25

## 2018-09-21 RX ORDER — SODIUM CHLORIDE, SODIUM LACTATE, POTASSIUM CHLORIDE, CALCIUM CHLORIDE 600; 310; 30; 20 MG/100ML; MG/100ML; MG/100ML; MG/100ML
INJECTION, SOLUTION INTRAVENOUS CONTINUOUS
Status: CANCELLED | OUTPATIENT
Start: 2018-09-25

## 2018-09-21 RX ORDER — LOSARTAN POTASSIUM 50 MG/1
75 TABLET ORAL DAILY
Status: ON HOLD | COMMUNITY
End: 2020-06-19

## 2018-09-21 RX ORDER — BUDESONIDE 3 MG/1
6 CAPSULE, COATED PELLETS ORAL EVERY MORNING
COMMUNITY
End: 2019-03-11 | Stop reason: SDUPTHER

## 2018-09-21 RX ORDER — SODIUM CHLORIDE 0.9 % (FLUSH) 0.9 %
10 SYRINGE (ML) INJECTION PRN
Status: CANCELLED | OUTPATIENT
Start: 2018-09-25

## 2018-09-21 RX ORDER — SODIUM CHLORIDE 0.9 % (FLUSH) 0.9 %
10 SYRINGE (ML) INJECTION EVERY 12 HOURS SCHEDULED
Status: CANCELLED | OUTPATIENT
Start: 2018-09-25

## 2018-09-24 ENCOUNTER — OFFICE VISIT (OUTPATIENT)
Dept: UROLOGY | Age: 73
End: 2018-09-24
Payer: MEDICARE

## 2018-09-24 ENCOUNTER — ANESTHESIA EVENT (OUTPATIENT)
Dept: OPERATING ROOM | Age: 73
DRG: 039 | End: 2018-09-24
Payer: MEDICARE

## 2018-09-24 VITALS
WEIGHT: 202 LBS | DIASTOLIC BLOOD PRESSURE: 58 MMHG | SYSTOLIC BLOOD PRESSURE: 128 MMHG | HEART RATE: 78 BPM | BODY MASS INDEX: 28.92 KG/M2 | HEIGHT: 70 IN

## 2018-09-24 DIAGNOSIS — R97.20 RISING PSA LEVEL: Primary | ICD-10-CM

## 2018-09-24 PROCEDURE — 99214 OFFICE O/P EST MOD 30 MIN: CPT | Performed by: UROLOGY

## 2018-09-24 PROCEDURE — G8598 ASA/ANTIPLAT THER USED: HCPCS | Performed by: UROLOGY

## 2018-09-24 PROCEDURE — G8428 CUR MEDS NOT DOCUMENT: HCPCS | Performed by: UROLOGY

## 2018-09-24 PROCEDURE — 4040F PNEUMOC VAC/ADMIN/RCVD: CPT | Performed by: UROLOGY

## 2018-09-24 PROCEDURE — 4004F PT TOBACCO SCREEN RCVD TLK: CPT | Performed by: UROLOGY

## 2018-09-24 PROCEDURE — 1101F PT FALLS ASSESS-DOCD LE1/YR: CPT | Performed by: UROLOGY

## 2018-09-24 PROCEDURE — G8417 CALC BMI ABV UP PARAM F/U: HCPCS | Performed by: UROLOGY

## 2018-09-24 PROCEDURE — 3017F COLORECTAL CA SCREEN DOC REV: CPT | Performed by: UROLOGY

## 2018-09-24 PROCEDURE — 1123F ACP DISCUSS/DSCN MKR DOCD: CPT | Performed by: UROLOGY

## 2018-09-24 ASSESSMENT — LIFESTYLE VARIABLES: SMOKING_STATUS: 1

## 2018-09-24 NOTE — ANESTHESIA PRE PROCEDURE
pain.   Yes Historical Provider, MD       Current medications:    Current Facility-Administered Medications   Medication Dose Route Frequency Provider Last Rate Last Dose    lactated ringers infusion   Intravenous Continuous LAYA De - CNP        lidocaine PF 1 % injection 1 mL  1 mL Intradermal Once PRN Opal Abbott, APRN - CNP        sodium chloride flush 0.9 % injection 10 mL  10 mL Intravenous 2 times per day [employer], APRN - CNP        sodium chloride flush 0.9 % injection 10 mL  10 mL Intravenous PRN Opal Abbott, APRN - CNP        dexamethasone (DECADRON) injection 4 mg  4 mg Intravenous Once [employer], APRN - CNP        vancomycin 1000 mg IVPB in 250 mL D5W addavial  1,000 mg Intravenous Once [employer], APRN - CNP        0.9 % sodium chloride bolus  500 mL Intravenous Once Hieu Lee, DO           Allergies:     Allergies   Allergen Reactions    Adhesive Tape Other (See Comments)     Bandaids, water blisters    Finasteride Swelling    Mom [Magnesium Hydroxide] Swelling    Pcn [Penicillins] Swelling       Problem List:    Patient Active Problem List   Diagnosis Code    Benign prostatic hyperplasia without lower urinary tract symptoms N40.0    Essential hypertension I10    Atrial fibrillation (HCC) I48.91    Iron deficiency E61.1    Carotid stenosis I65.29       Past Medical History:        Diagnosis Date    CAD (coronary artery disease)     has 16 cardiac stents    Encephalopathy     Gross hematuria     History of heart attack     has had 4 heart attacks in the past     Hydrocephalus     Hyperlipidemia     on meds > 20 yrs    Hypertension     on meds > 20 yrs    Seizures ([facility] [address]     Tremors of nervous system        Past Surgical History:        Procedure Laterality Date    APPENDECTOMY  2008    ARM SURGERY Right 1997    pins input     BACK SURGERY  02/2017    lumbar disckectomy 2009   [address]  2008,
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
## 2018-09-25 ENCOUNTER — HOSPITAL ENCOUNTER (INPATIENT)
Age: 73
LOS: 1 days | Discharge: HOME | DRG: 039 | End: 2018-09-26
Attending: THORACIC SURGERY (CARDIOTHORACIC VASCULAR SURGERY) | Admitting: THORACIC SURGERY (CARDIOTHORACIC VASCULAR SURGERY)
Payer: MEDICARE

## 2018-09-25 ENCOUNTER — ANESTHESIA (OUTPATIENT)
Dept: OPERATING ROOM | Age: 73
DRG: 039 | End: 2018-09-25
Payer: MEDICARE

## 2018-09-25 ENCOUNTER — APPOINTMENT (OUTPATIENT)
Dept: GENERAL RADIOLOGY | Age: 73
DRG: 039 | End: 2018-09-25
Attending: THORACIC SURGERY (CARDIOTHORACIC VASCULAR SURGERY)
Payer: MEDICARE

## 2018-09-25 VITALS — SYSTOLIC BLOOD PRESSURE: 161 MMHG | DIASTOLIC BLOOD PRESSURE: 80 MMHG | TEMPERATURE: 97.5 F | OXYGEN SATURATION: 100 %

## 2018-09-25 PROBLEM — I65.21 CAROTID STENOSIS, RIGHT: Status: ACTIVE | Noted: 2018-09-25

## 2018-09-25 LAB
BASE EXCESS ARTERIAL: 5 (ref -3–3)
GLUCOSE BLD-MCNC: 122 MG/DL (ref 60–115)
HCO3 ARTERIAL: 29.8 MMOL/L (ref 21–29)
HEMOGLOBIN: 13.1 GM/DL (ref 13.5–17.5)
O2 SAT, ARTERIAL: 100 % (ref 93–100)
PCO2 ARTERIAL: 50 MM HG (ref 35–45)
PERFORMED ON: ABNORMAL
PERFORMED ON: ABNORMAL
PH ARTERIAL: 7.38 (ref 7.35–7.45)
PO2 ARTERIAL: 585 MM HG (ref 75–108)
POC HEMATOCRIT: 38 % (ref 41–53)
POC SAMPLE TYPE: ABNORMAL
TCO2 ARTERIAL: 31 (ref 22–29)

## 2018-09-25 PROCEDURE — 2580000003 HC RX 258: Performed by: STUDENT IN AN ORGANIZED HEALTH CARE EDUCATION/TRAINING PROGRAM

## 2018-09-25 PROCEDURE — 82803 BLOOD GASES ANY COMBINATION: CPT

## 2018-09-25 PROCEDURE — 82948 REAGENT STRIP/BLOOD GLUCOSE: CPT

## 2018-09-25 PROCEDURE — 6360000002 HC RX W HCPCS: Performed by: NURSE PRACTITIONER

## 2018-09-25 PROCEDURE — 2709999900 HC NON-CHARGEABLE SUPPLY: Performed by: THORACIC SURGERY (CARDIOTHORACIC VASCULAR SURGERY)

## 2018-09-25 PROCEDURE — 2000000000 HC ICU R&B

## 2018-09-25 PROCEDURE — 3700000000 HC ANESTHESIA ATTENDED CARE: Performed by: THORACIC SURGERY (CARDIOTHORACIC VASCULAR SURGERY)

## 2018-09-25 PROCEDURE — 2580000003 HC RX 258: Performed by: NURSE ANESTHETIST, CERTIFIED REGISTERED

## 2018-09-25 PROCEDURE — 03UH0JZ SUPPLEMENT RIGHT COMMON CAROTID ARTERY WITH SYNTHETIC SUBSTITUTE, OPEN APPROACH: ICD-10-PCS | Performed by: THORACIC SURGERY (CARDIOTHORACIC VASCULAR SURGERY)

## 2018-09-25 PROCEDURE — 2580000003 HC RX 258: Performed by: THORACIC SURGERY (CARDIOTHORACIC VASCULAR SURGERY)

## 2018-09-25 PROCEDURE — 6370000000 HC RX 637 (ALT 250 FOR IP): Performed by: THORACIC SURGERY (CARDIOTHORACIC VASCULAR SURGERY)

## 2018-09-25 PROCEDURE — 3600000005 HC SURGERY LEVEL 5 BASE: Performed by: THORACIC SURGERY (CARDIOTHORACIC VASCULAR SURGERY)

## 2018-09-25 PROCEDURE — 6360000002 HC RX W HCPCS: Performed by: NURSE ANESTHETIST, CERTIFIED REGISTERED

## 2018-09-25 PROCEDURE — 3700000001 HC ADD 15 MINUTES (ANESTHESIA): Performed by: THORACIC SURGERY (CARDIOTHORACIC VASCULAR SURGERY)

## 2018-09-25 PROCEDURE — 6370000000 HC RX 637 (ALT 250 FOR IP): Performed by: NURSE PRACTITIONER

## 2018-09-25 PROCEDURE — 3600000015 HC SURGERY LEVEL 5 ADDTL 15MIN: Performed by: THORACIC SURGERY (CARDIOTHORACIC VASCULAR SURGERY)

## 2018-09-25 PROCEDURE — 7100000001 HC PACU RECOVERY - ADDTL 15 MIN: Performed by: THORACIC SURGERY (CARDIOTHORACIC VASCULAR SURGERY)

## 2018-09-25 PROCEDURE — 2500000003 HC RX 250 WO HCPCS: Performed by: THORACIC SURGERY (CARDIOTHORACIC VASCULAR SURGERY)

## 2018-09-25 PROCEDURE — 6360000002 HC RX W HCPCS: Performed by: THORACIC SURGERY (CARDIOTHORACIC VASCULAR SURGERY)

## 2018-09-25 PROCEDURE — 88311 DECALCIFY TISSUE: CPT

## 2018-09-25 PROCEDURE — L8670 VASCULAR GRAFT, SYNTHETIC: HCPCS | Performed by: THORACIC SURGERY (CARDIOTHORACIC VASCULAR SURGERY)

## 2018-09-25 PROCEDURE — 2580000003 HC RX 258: Performed by: NURSE PRACTITIONER

## 2018-09-25 PROCEDURE — 76000 FLUOROSCOPY <1 HR PHYS/QHP: CPT

## 2018-09-25 PROCEDURE — 6360000004 HC RX CONTRAST MEDICATION: Performed by: THORACIC SURGERY (CARDIOTHORACIC VASCULAR SURGERY)

## 2018-09-25 PROCEDURE — 2500000003 HC RX 250 WO HCPCS: Performed by: NURSE ANESTHETIST, CERTIFIED REGISTERED

## 2018-09-25 PROCEDURE — A4648 IMPLANTABLE TISSUE MARKER: HCPCS | Performed by: THORACIC SURGERY (CARDIOTHORACIC VASCULAR SURGERY)

## 2018-09-25 PROCEDURE — 2720000010 HC SURG SUPPLY STERILE: Performed by: THORACIC SURGERY (CARDIOTHORACIC VASCULAR SURGERY)

## 2018-09-25 PROCEDURE — 85014 HEMATOCRIT: CPT

## 2018-09-25 PROCEDURE — 88304 TISSUE EXAM BY PATHOLOGIST: CPT

## 2018-09-25 PROCEDURE — 03CH0ZZ EXTIRPATION OF MATTER FROM RIGHT COMMON CAROTID ARTERY, OPEN APPROACH: ICD-10-PCS | Performed by: THORACIC SURGERY (CARDIOTHORACIC VASCULAR SURGERY)

## 2018-09-25 PROCEDURE — 7100000000 HC PACU RECOVERY - FIRST 15 MIN: Performed by: THORACIC SURGERY (CARDIOTHORACIC VASCULAR SURGERY)

## 2018-09-25 DEVICE — GRAFT VSCLR L3XW1N THCKNSS 76MM TPRD END KNTTD DBLE VLR ST I: Type: IMPLANTABLE DEVICE | Site: NECK | Status: FUNCTIONAL

## 2018-09-25 RX ORDER — NITROGLYCERIN 20 MG/100ML
INJECTION INTRAVENOUS PRN
Status: DISCONTINUED | OUTPATIENT
Start: 2018-09-25 | End: 2018-09-25 | Stop reason: SDUPTHER

## 2018-09-25 RX ORDER — DEXAMETHASONE SODIUM PHOSPHATE 4 MG/ML
4 INJECTION, SOLUTION INTRA-ARTICULAR; INTRALESIONAL; INTRAMUSCULAR; INTRAVENOUS; SOFT TISSUE ONCE
Status: COMPLETED | OUTPATIENT
Start: 2018-09-25 | End: 2018-09-25

## 2018-09-25 RX ORDER — NITROGLYCERIN 20 MG/100ML
INJECTION INTRAVENOUS CONTINUOUS PRN
Status: DISCONTINUED | OUTPATIENT
Start: 2018-09-25 | End: 2018-09-25 | Stop reason: SDUPTHER

## 2018-09-25 RX ORDER — BUDESONIDE 3 MG/1
6 CAPSULE, COATED PELLETS ORAL EVERY MORNING
Status: DISCONTINUED | OUTPATIENT
Start: 2018-09-26 | End: 2018-09-26 | Stop reason: HOSPADM

## 2018-09-25 RX ORDER — HYDROCODONE BITARTRATE AND ACETAMINOPHEN 5; 325 MG/1; MG/1
1 TABLET ORAL PRN
Status: DISCONTINUED | OUTPATIENT
Start: 2018-09-25 | End: 2018-09-25 | Stop reason: HOSPADM

## 2018-09-25 RX ORDER — HEPARIN SODIUM 5000 [USP'U]/ML
INJECTION, SOLUTION INTRAVENOUS; SUBCUTANEOUS PRN
Status: DISCONTINUED | OUTPATIENT
Start: 2018-09-25 | End: 2018-09-25 | Stop reason: SDUPTHER

## 2018-09-25 RX ORDER — ONDANSETRON 2 MG/ML
4 INJECTION INTRAMUSCULAR; INTRAVENOUS
Status: DISCONTINUED | OUTPATIENT
Start: 2018-09-25 | End: 2018-09-25 | Stop reason: HOSPADM

## 2018-09-25 RX ORDER — TRAMADOL HYDROCHLORIDE 50 MG/1
50 TABLET ORAL EVERY 6 HOURS PRN
Status: DISCONTINUED | OUTPATIENT
Start: 2018-09-25 | End: 2018-09-26 | Stop reason: HOSPADM

## 2018-09-25 RX ORDER — MEPERIDINE HYDROCHLORIDE 25 MG/ML
12.5 INJECTION INTRAMUSCULAR; INTRAVENOUS; SUBCUTANEOUS EVERY 5 MIN PRN
Status: DISCONTINUED | OUTPATIENT
Start: 2018-09-25 | End: 2018-09-25 | Stop reason: HOSPADM

## 2018-09-25 RX ORDER — ISOSORBIDE MONONITRATE 60 MG/1
60 TABLET, EXTENDED RELEASE ORAL DAILY
Status: DISCONTINUED | OUTPATIENT
Start: 2018-09-26 | End: 2018-09-26 | Stop reason: HOSPADM

## 2018-09-25 RX ORDER — GLYCOPYRROLATE 1 MG/5 ML
SYRINGE (ML) INTRAVENOUS PRN
Status: DISCONTINUED | OUTPATIENT
Start: 2018-09-25 | End: 2018-09-25 | Stop reason: SDUPTHER

## 2018-09-25 RX ORDER — CARBIDOPA, LEVODOPA AND ENTACAPONE 25; 200; 100 MG/1; MG/1; MG/1
1 TABLET, FILM COATED ORAL DAILY
Status: DISCONTINUED | OUTPATIENT
Start: 2018-09-25 | End: 2018-09-26 | Stop reason: HOSPADM

## 2018-09-25 RX ORDER — TRAZODONE HYDROCHLORIDE 100 MG/1
100 TABLET ORAL NIGHTLY
Status: DISCONTINUED | OUTPATIENT
Start: 2018-09-25 | End: 2018-09-26 | Stop reason: HOSPADM

## 2018-09-25 RX ORDER — ATORVASTATIN CALCIUM 80 MG/1
80 TABLET, FILM COATED ORAL NIGHTLY
Status: DISCONTINUED | OUTPATIENT
Start: 2018-09-25 | End: 2018-09-26 | Stop reason: HOSPADM

## 2018-09-25 RX ORDER — SODIUM CHLORIDE, SODIUM LACTATE, POTASSIUM CHLORIDE, CALCIUM CHLORIDE 600; 310; 30; 20 MG/100ML; MG/100ML; MG/100ML; MG/100ML
INJECTION, SOLUTION INTRAVENOUS CONTINUOUS
Status: CANCELLED | OUTPATIENT
Start: 2018-09-25

## 2018-09-25 RX ORDER — FENTANYL CITRATE 50 UG/ML
INJECTION, SOLUTION INTRAMUSCULAR; INTRAVENOUS PRN
Status: DISCONTINUED | OUTPATIENT
Start: 2018-09-25 | End: 2018-09-25 | Stop reason: SDUPTHER

## 2018-09-25 RX ORDER — SODIUM CHLORIDE 0.9 % (FLUSH) 0.9 %
10 SYRINGE (ML) INJECTION PRN
Status: DISCONTINUED | OUTPATIENT
Start: 2018-09-25 | End: 2018-09-25 | Stop reason: HOSPADM

## 2018-09-25 RX ORDER — MAGNESIUM HYDROXIDE 1200 MG/15ML
LIQUID ORAL CONTINUOUS PRN
Status: COMPLETED | OUTPATIENT
Start: 2018-09-25 | End: 2018-09-25

## 2018-09-25 RX ORDER — ASPIRIN 81 MG/1
81 TABLET ORAL DAILY
Status: DISCONTINUED | OUTPATIENT
Start: 2018-09-25 | End: 2018-09-26 | Stop reason: HOSPADM

## 2018-09-25 RX ORDER — METOPROLOL TARTRATE 50 MG/1
50 TABLET, FILM COATED ORAL 2 TIMES DAILY
Status: DISCONTINUED | OUTPATIENT
Start: 2018-09-25 | End: 2018-09-26 | Stop reason: HOSPADM

## 2018-09-25 RX ORDER — CITALOPRAM 20 MG/1
20 TABLET ORAL DAILY
Status: DISCONTINUED | OUTPATIENT
Start: 2018-09-25 | End: 2018-09-26 | Stop reason: HOSPADM

## 2018-09-25 RX ORDER — METOCLOPRAMIDE HYDROCHLORIDE 5 MG/ML
10 INJECTION INTRAMUSCULAR; INTRAVENOUS
Status: DISCONTINUED | OUTPATIENT
Start: 2018-09-25 | End: 2018-09-25 | Stop reason: HOSPADM

## 2018-09-25 RX ORDER — ROCURONIUM BROMIDE 10 MG/ML
INJECTION, SOLUTION INTRAVENOUS PRN
Status: DISCONTINUED | OUTPATIENT
Start: 2018-09-25 | End: 2018-09-25 | Stop reason: SDUPTHER

## 2018-09-25 RX ORDER — HYDROCODONE BITARTRATE AND ACETAMINOPHEN 5; 325 MG/1; MG/1
2 TABLET ORAL PRN
Status: DISCONTINUED | OUTPATIENT
Start: 2018-09-25 | End: 2018-09-25 | Stop reason: HOSPADM

## 2018-09-25 RX ORDER — PROPOFOL 10 MG/ML
INJECTION, EMULSION INTRAVENOUS PRN
Status: DISCONTINUED | OUTPATIENT
Start: 2018-09-25 | End: 2018-09-25 | Stop reason: SDUPTHER

## 2018-09-25 RX ORDER — KETOROLAC TROMETHAMINE 15 MG/ML
15 INJECTION, SOLUTION INTRAMUSCULAR; INTRAVENOUS EVERY 6 HOURS
Status: DISCONTINUED | OUTPATIENT
Start: 2018-09-25 | End: 2018-09-26 | Stop reason: HOSPADM

## 2018-09-25 RX ORDER — SODIUM CHLORIDE 0.9 % (FLUSH) 0.9 %
10 SYRINGE (ML) INJECTION EVERY 12 HOURS SCHEDULED
Status: DISCONTINUED | OUTPATIENT
Start: 2018-09-25 | End: 2018-09-25 | Stop reason: HOSPADM

## 2018-09-25 RX ORDER — PRIMIDONE 250 MG/1
250 TABLET ORAL 4 TIMES DAILY
Status: DISCONTINUED | OUTPATIENT
Start: 2018-09-25 | End: 2018-09-26 | Stop reason: HOSPADM

## 2018-09-25 RX ORDER — 0.9 % SODIUM CHLORIDE 0.9 %
500 INTRAVENOUS SOLUTION INTRAVENOUS ONCE
Status: COMPLETED | OUTPATIENT
Start: 2018-09-25 | End: 2018-09-25

## 2018-09-25 RX ORDER — ONDANSETRON 2 MG/ML
INJECTION INTRAMUSCULAR; INTRAVENOUS PRN
Status: DISCONTINUED | OUTPATIENT
Start: 2018-09-25 | End: 2018-09-25 | Stop reason: SDUPTHER

## 2018-09-25 RX ORDER — TRAMADOL HYDROCHLORIDE 50 MG/1
100 TABLET ORAL EVERY 6 HOURS PRN
Status: DISCONTINUED | OUTPATIENT
Start: 2018-09-25 | End: 2018-09-26 | Stop reason: HOSPADM

## 2018-09-25 RX ORDER — SODIUM CHLORIDE, SODIUM LACTATE, POTASSIUM CHLORIDE, CALCIUM CHLORIDE 600; 310; 30; 20 MG/100ML; MG/100ML; MG/100ML; MG/100ML
INJECTION, SOLUTION INTRAVENOUS CONTINUOUS
Status: DISCONTINUED | OUTPATIENT
Start: 2018-09-25 | End: 2018-09-25

## 2018-09-25 RX ORDER — LIDOCAINE HYDROCHLORIDE 10 MG/ML
1 INJECTION, SOLUTION EPIDURAL; INFILTRATION; INTRACAUDAL; PERINEURAL
Status: DISCONTINUED | OUTPATIENT
Start: 2018-09-25 | End: 2018-09-25 | Stop reason: HOSPADM

## 2018-09-25 RX ORDER — SODIUM CHLORIDE 450 MG/100ML
INJECTION, SOLUTION INTRAVENOUS CONTINUOUS
Status: DISPENSED | OUTPATIENT
Start: 2018-09-25 | End: 2018-09-26

## 2018-09-25 RX ORDER — LOSARTAN POTASSIUM 50 MG/1
50 TABLET ORAL DAILY
Status: DISCONTINUED | OUTPATIENT
Start: 2018-09-26 | End: 2018-09-26 | Stop reason: HOSPADM

## 2018-09-25 RX ORDER — HYDRALAZINE HYDROCHLORIDE 20 MG/ML
INJECTION INTRAMUSCULAR; INTRAVENOUS PRN
Status: DISCONTINUED | OUTPATIENT
Start: 2018-09-25 | End: 2018-09-25 | Stop reason: SDUPTHER

## 2018-09-25 RX ORDER — DIPHENHYDRAMINE HYDROCHLORIDE 50 MG/ML
12.5 INJECTION INTRAMUSCULAR; INTRAVENOUS
Status: DISCONTINUED | OUTPATIENT
Start: 2018-09-25 | End: 2018-09-25 | Stop reason: HOSPADM

## 2018-09-25 RX ORDER — FENTANYL CITRATE 50 UG/ML
50 INJECTION, SOLUTION INTRAMUSCULAR; INTRAVENOUS EVERY 10 MIN PRN
Status: DISCONTINUED | OUTPATIENT
Start: 2018-09-25 | End: 2018-09-25 | Stop reason: HOSPADM

## 2018-09-25 RX ORDER — LIDOCAINE HYDROCHLORIDE 20 MG/ML
INJECTION, SOLUTION INFILTRATION; PERINEURAL PRN
Status: DISCONTINUED | OUTPATIENT
Start: 2018-09-25 | End: 2018-09-25 | Stop reason: SDUPTHER

## 2018-09-25 RX ORDER — SODIUM CHLORIDE, SODIUM LACTATE, POTASSIUM CHLORIDE, CALCIUM CHLORIDE 600; 310; 30; 20 MG/100ML; MG/100ML; MG/100ML; MG/100ML
INJECTION, SOLUTION INTRAVENOUS CONTINUOUS PRN
Status: DISCONTINUED | OUTPATIENT
Start: 2018-09-25 | End: 2018-09-25 | Stop reason: SDUPTHER

## 2018-09-25 RX ADMIN — Medication 0.2 MG: at 08:44

## 2018-09-25 RX ADMIN — TRAMADOL HYDROCHLORIDE 50 MG: 50 TABLET, FILM COATED ORAL at 15:01

## 2018-09-25 RX ADMIN — DEXAMETHASONE SODIUM PHOSPHATE 4 MG: 4 INJECTION, SOLUTION INTRAMUSCULAR; INTRAVENOUS at 08:12

## 2018-09-25 RX ADMIN — FENTANYL CITRATE 25 MCG: 50 INJECTION, SOLUTION INTRAMUSCULAR; INTRAVENOUS at 10:49

## 2018-09-25 RX ADMIN — SUGAMMADEX 200 MG: 100 INJECTION, SOLUTION INTRAVENOUS at 10:41

## 2018-09-25 RX ADMIN — NITROGLYCERIN 100 MCG: 20 INJECTION INTRAVENOUS at 08:53

## 2018-09-25 RX ADMIN — PHENYLEPHRINE HYDROCHLORIDE 100 MCG: 10 INJECTION INTRAVENOUS at 09:32

## 2018-09-25 RX ADMIN — FENTANYL CITRATE 50 MCG: 50 INJECTION, SOLUTION INTRAMUSCULAR; INTRAVENOUS at 09:40

## 2018-09-25 RX ADMIN — PROPOFOL 150 MG: 10 INJECTION, EMULSION INTRAVENOUS at 08:36

## 2018-09-25 RX ADMIN — HYDRALAZINE HYDROCHLORIDE 10 MG: 20 INJECTION INTRAMUSCULAR; INTRAVENOUS at 08:59

## 2018-09-25 RX ADMIN — SODIUM CHLORIDE, POTASSIUM CHLORIDE, SODIUM LACTATE AND CALCIUM CHLORIDE: 600; 310; 30; 20 INJECTION, SOLUTION INTRAVENOUS at 10:23

## 2018-09-25 RX ADMIN — PHENYLEPHRINE HYDROCHLORIDE 30 MCG/MIN: 10 INJECTION INTRAVENOUS at 09:32

## 2018-09-25 RX ADMIN — HEPARIN SODIUM 7500 UNITS: 5000 INJECTION, SOLUTION INTRAVENOUS; SUBCUTANEOUS at 09:17

## 2018-09-25 RX ADMIN — FENTANYL CITRATE 50 MCG: 50 INJECTION, SOLUTION INTRAMUSCULAR; INTRAVENOUS at 08:36

## 2018-09-25 RX ADMIN — ROCURONIUM BROMIDE 50 MG: 10 INJECTION INTRAVENOUS at 08:36

## 2018-09-25 RX ADMIN — CITALOPRAM HYDROBROMIDE 20 MG: 20 TABLET ORAL at 17:37

## 2018-09-25 RX ADMIN — ATORVASTATIN CALCIUM 80 MG: 80 TABLET, FILM COATED ORAL at 22:10

## 2018-09-25 RX ADMIN — ONDANSETRON HYDROCHLORIDE 4 MG: 2 INJECTION, SOLUTION INTRAMUSCULAR; INTRAVENOUS at 10:16

## 2018-09-25 RX ADMIN — FENTANYL CITRATE 25 MCG: 50 INJECTION, SOLUTION INTRAMUSCULAR; INTRAVENOUS at 10:26

## 2018-09-25 RX ADMIN — SODIUM CHLORIDE: 4.5 INJECTION, SOLUTION INTRAVENOUS at 13:38

## 2018-09-25 RX ADMIN — SODIUM CHLORIDE, POTASSIUM CHLORIDE, SODIUM LACTATE AND CALCIUM CHLORIDE 1000 ML: 600; 310; 30; 20 INJECTION, SOLUTION INTRAVENOUS at 07:34

## 2018-09-25 RX ADMIN — CARBIDOPA, LEVODOPA, AND ENTACAPONE 1 TABLET: 25; 100; 200 TABLET, FILM COATED ORAL at 22:10

## 2018-09-25 RX ADMIN — Medication 0.3 MG: at 09:33

## 2018-09-25 RX ADMIN — SODIUM CHLORIDE 500 ML: 9 INJECTION, SOLUTION INTRAVENOUS at 08:17

## 2018-09-25 RX ADMIN — LIDOCAINE HYDROCHLORIDE 80 MG: 20 INJECTION, SOLUTION INFILTRATION; PERINEURAL at 08:36

## 2018-09-25 RX ADMIN — NITROGLYCERIN 50 MCG: 20 INJECTION INTRAVENOUS at 08:59

## 2018-09-25 RX ADMIN — ASPIRIN 81 MG: 81 TABLET, COATED ORAL at 17:01

## 2018-09-25 RX ADMIN — PRIMIDONE 250 MG: 250 TABLET ORAL at 22:10

## 2018-09-25 RX ADMIN — SODIUM CHLORIDE, POTASSIUM CHLORIDE, SODIUM LACTATE AND CALCIUM CHLORIDE: 600; 310; 30; 20 INJECTION, SOLUTION INTRAVENOUS at 07:34

## 2018-09-25 RX ADMIN — SODIUM CHLORIDE, POTASSIUM CHLORIDE, SODIUM LACTATE AND CALCIUM CHLORIDE 1000 ML: 600; 310; 30; 20 INJECTION, SOLUTION INTRAVENOUS at 07:37

## 2018-09-25 RX ADMIN — PHENYLEPHRINE HYDROCHLORIDE 100 MCG: 10 INJECTION INTRAVENOUS at 10:18

## 2018-09-25 RX ADMIN — TRAZODONE HYDROCHLORIDE 100 MG: 100 TABLET ORAL at 22:10

## 2018-09-25 RX ADMIN — FENTANYL CITRATE 50 MCG: 50 INJECTION, SOLUTION INTRAMUSCULAR; INTRAVENOUS at 08:32

## 2018-09-25 RX ADMIN — ROCURONIUM BROMIDE 20 MG: 10 INJECTION INTRAVENOUS at 09:32

## 2018-09-25 RX ADMIN — NITROGLYCERIN 100 MCG: 20 INJECTION INTRAVENOUS at 08:44

## 2018-09-25 RX ADMIN — NITROGLYCERIN 30 MCG/MIN: 20 INJECTION INTRAVENOUS at 08:44

## 2018-09-25 RX ADMIN — KETOROLAC TROMETHAMINE 15 MG: 15 INJECTION, SOLUTION INTRAMUSCULAR; INTRAVENOUS at 21:10

## 2018-09-25 RX ADMIN — TRAMADOL HYDROCHLORIDE 50 MG: 50 TABLET, FILM COATED ORAL at 22:15

## 2018-09-25 RX ADMIN — METOPROLOL TARTRATE 50 MG: 50 TABLET ORAL at 22:10

## 2018-09-25 RX ADMIN — KETOROLAC TROMETHAMINE 15 MG: 15 INJECTION, SOLUTION INTRAMUSCULAR; INTRAVENOUS at 14:30

## 2018-09-25 RX ADMIN — VANCOMYCIN HYDROCHLORIDE 1 G: 1 INJECTION, POWDER, LYOPHILIZED, FOR SOLUTION INTRAVENOUS at 08:10

## 2018-09-25 ASSESSMENT — PULMONARY FUNCTION TESTS
PIF_VALUE: 24
PIF_VALUE: 28
PIF_VALUE: 27
PIF_VALUE: 29
PIF_VALUE: 24
PIF_VALUE: 21
PIF_VALUE: 24
PIF_VALUE: 25
PIF_VALUE: 29
PIF_VALUE: 25
PIF_VALUE: 29
PIF_VALUE: 25
PIF_VALUE: 27
PIF_VALUE: 24
PIF_VALUE: 2
PIF_VALUE: 24
PIF_VALUE: 25
PIF_VALUE: 29
PIF_VALUE: 24
PIF_VALUE: 26
PIF_VALUE: 24
PIF_VALUE: 25
PIF_VALUE: 24
PIF_VALUE: 1
PIF_VALUE: 25
PIF_VALUE: 2
PIF_VALUE: 24
PIF_VALUE: 28
PIF_VALUE: 24
PIF_VALUE: 25
PIF_VALUE: 24
PIF_VALUE: 25
PIF_VALUE: 24
PIF_VALUE: 28
PIF_VALUE: 27
PIF_VALUE: 0
PIF_VALUE: 27
PIF_VALUE: 20
PIF_VALUE: 24
PIF_VALUE: 25
PIF_VALUE: 28
PIF_VALUE: 24
PIF_VALUE: 22
PIF_VALUE: 26
PIF_VALUE: 25
PIF_VALUE: 24
PIF_VALUE: 29
PIF_VALUE: 24
PIF_VALUE: 25
PIF_VALUE: 1
PIF_VALUE: 24
PIF_VALUE: 28
PIF_VALUE: 24
PIF_VALUE: 28
PIF_VALUE: 0
PIF_VALUE: 26
PIF_VALUE: 27
PIF_VALUE: 24
PIF_VALUE: 25
PIF_VALUE: 24
PIF_VALUE: 25
PIF_VALUE: 30
PIF_VALUE: 23
PIF_VALUE: 24
PIF_VALUE: 27
PIF_VALUE: 24
PIF_VALUE: 25
PIF_VALUE: 24
PIF_VALUE: 25
PIF_VALUE: 24
PIF_VALUE: 25
PIF_VALUE: 25
PIF_VALUE: 24
PIF_VALUE: 28
PIF_VALUE: 25
PIF_VALUE: 24
PIF_VALUE: 24
PIF_VALUE: 32
PIF_VALUE: 25
PIF_VALUE: 24
PIF_VALUE: 24
PIF_VALUE: 25
PIF_VALUE: 24
PIF_VALUE: 24
PIF_VALUE: 25
PIF_VALUE: 24
PIF_VALUE: 24
PIF_VALUE: 28
PIF_VALUE: 25
PIF_VALUE: 24
PIF_VALUE: 25
PIF_VALUE: 24
PIF_VALUE: 26
PIF_VALUE: 29
PIF_VALUE: 24
PIF_VALUE: 9
PIF_VALUE: 24
PIF_VALUE: 24
PIF_VALUE: 28

## 2018-09-25 ASSESSMENT — PAIN SCALES - GENERAL
PAINLEVEL_OUTOF10: 4
PAINLEVEL_OUTOF10: 4
PAINLEVEL_OUTOF10: 5
PAINLEVEL_OUTOF10: 0
PAINLEVEL_OUTOF10: 5

## 2018-09-25 NOTE — H&P
has allergies to PENICILLIN and FINASTERIDE, as well as MILK  OF MAGNESIA. FAMILY HISTORY:  He has a negative history of CVA in his parents. SOCIAL HISTORY:  He is , lives with his wife. No biological  children. He is retired from [employer]. He has smoked one-pack of cigarettes a  day for 60 years. We have discussed tobacco cessation. He denies illicit  drug use and he occasionally uses ethanol socially. PHYSICAL EXAMINATION:  VITAL SIGNS:  He is 5 feet 10 inches tall, 204 pounds with a BMI of over  30. His pulse is 60. He is afebrile. Right arm sitting blood pressure is  124/68, left is 130/70. GENERAL:  He is alert and oriented to person, place and time. He does not  appear to be in any acute distress and is breathing comfortably. He is  cooperative and pleasant for the examination. HEENT:  He is normocephalic. NECK:  He has a supple neck with no significant jugular venous distention. He has a right soft carotid bruit. He has no audible bruit on the left. CHEST:  His thorax, his breath sounds are clear from apex to base  bilaterally. CARDIOVASCULAR:  S1, S2 intact. No murmur. ABDOMEN:  Soft to manual exam.  GENITALIA:  Normal for age and sex. RECTAL:  Exam is deferred to Dr. Freddie Oliveira, his FMD.  EXTREMITIES:  He has no ankle edema. No finger clubbing. NEURO:  The patient's neuro evaluation features appropriate mentation and  there are no motor deficits to the upper or lower extremities. We have reviewed with the patient and his wife the involvement of carotid  endarterectomy surgery and the fact that he is not a candidate for stenting  because of the severe calcium. We have readied him for surgery which will  be done today, Tuesday, 09/25/2018 and he has requested first case in the  morning. We have indicated his perioperative risk of CVA is 2%. His  additional risks include, but are not limited to hemorrhage and infection  with all preparation placed.   In an effort to protect his privacy and  comply with HIPAA regulations, he has authorized us to discuss the outcome  of surgery with his wife, Gerhard Melendez         Quang Vance MD    D: 09/24/2018 17:46:03       T: 09/24/2018 17:49:38     [id number]  Job#: [id number]     Doc#: [id number]    CC:

## 2018-09-25 NOTE — ANESTHESIA PROCEDURE NOTES
Arterial Line:    An arterial line was placed using ultrasound guidance and surface landmarks, in the pre-op for the following indication(s): continuous blood pressure monitoring and blood sampling needed. A 20 gauge (size), 10 cm (length), Arrow (type) catheter was placed, Seldinger technique used, into the left brachial artery, secured by tape. Anesthesia type: Local  Local infiltration: Injection    Events:  patient tolerated procedure well with no complications.   9/25/2018 7:56 AM9/25/2018 8:04 AM  Anesthesiologist: Kalani Jason  Performed: Anesthesiologist   Preanesthetic Checklist  Completed: patient identified, site marked, surgical consent, pre-op evaluation, timeout performed, IV checked, risks and benefits discussed, monitors and equipment checked, anesthesia consent given, oxygen available and patient being monitored

## 2018-09-25 NOTE — CONSULTS
portion of small intestine    UPPER GASTROINTESTINAL ENDOSCOPY  4/29/13    DR. CAPPS    VENTRAL HERNIA REPAIR N/A 11/17/2016    LAPAROSCOPIC VENTRAL HERNIA REPAIR WITH MESH POSS OPEN , PAT [facility]  performed by Roberta Dietrich MD at [facility]       Prior to Admission medications    Medication Sig Start Date End Date Taking? Authorizing Provider   budesonide (ENTOCORT EC) 3 MG extended release capsule Take 6 mg by mouth every morning   Yes Historical Provider, MD   losartan (COZAAR) 50 MG tablet Take 50 mg by mouth daily   Yes Historical Provider, MD   traZODone (DESYREL) 100 MG tablet TAKE 1 TABLET NIGHTLY 8/21/18  Yes Aimee Chaparro MD   citalopram (CELEXA) 20 MG tablet TAKE 1 TABLET DAILY (NO FURTHER REFILLS UNTIL FOLLOW UP DONE) 8/21/18  Yes Aimee Chaparro MD   carbidopa-levodopa-entacapone (STALEVO 100) -200 MG TABS TAKE 1 TABLET BY MOUTH at 3 (THREE) IN THE EVENING 7/26/17  Yes Historical Provider, MD   isosorbide mononitrate (IMDUR) 60 MG extended release tablet Take 60 mg by mouth daily   Yes Historical Provider, MD   Handicap Placard MISC by Does not apply route Good for 5 years. Good from 1/31/2017 through 1/31/2022. 1/30/17  Yes Nelia Charles MD   XARELTO 20 MG TABS tablet 20 mg daily (with breakfast)  11/18/15  Yes Historical Provider, MD   primidone (MYSOLINE) 250 MG tablet Take 250 mg by mouth 4 times daily   Yes Historical Provider, MD   metoprolol (LOPRESSOR) 50 MG tablet Take 50 mg by mouth 2 times daily. Yes Historical Provider, MD   atorvastatin (LIPITOR) 80 MG tablet Take 80 mg by mouth daily. Yes Historical Provider, MD   aspirin 81 MG tablet Take 81 mg by mouth daily. Yes Historical Provider, MD   Omega-3 Fatty Acids (FISH OIL) 1200 MG CPDR Take  by mouth. Yes Historical Provider, MD   nitroGLYCERIN (NITROSTAT) 0.4 MG SL tablet Place 0.4 mg under the tongue every 5 minutes as needed for Chest pain.    Yes Historical Provider, MD       Scheduled Meds:   ketorolac  15 pain  GASTROINTESTINAL:  negative for nausea, vomiting and diarrhea  GENITOURINARY:  negative for frequency and dysuria  INTEGUMENT/BREAST:  negative for rash  MUSCULOSKELETAL:  negative for  myalgias  NEUROLOGICAL:  negative for headaches and dizziness    Physical Exam:  Vitals:    09/25/18 1300 09/25/18 1330 09/25/18 1400 09/25/18 1600   BP: (!) 162/61 (!) 152/78 (!) 176/85 (!) 149/65   Pulse: 64 78 85 64   Resp: 15 18 20 14   Temp: 97.9 °F (36.6 °C)      TempSrc: Oral      SpO2:    97%   Weight:       Height:           CONSTITUTIONAL:  awake, alert, cooperative, no apparent distress, and appears stated age  EYES:  Lids and lashes normal, pupils equal, round and reactive to light, extra ocular muscles intact, sclera clear, conjunctiva normal  ENT:  Normocephalic, without obvious abnormality, atraumatic, sinuses nontender on palpation, external ears without lesions, oral pharynx with moist mucus membranes, tonsils without erythema or exudates, gums normal and good dentition. NECK:  Incision to right side of neck with penrose drain in place with large amount of bloody drainage to dressing   LUNGS:  No increased work of breathing, good air exchange, clear to auscultation bilaterally, no crackles or wheezing  CARDIOVASCULAR:  Normal apical impulse, regular rate and rhythm, normal S1 and S2, no S3 or S4, and no murmur noted  ABDOMEN:  No scars, normal bowel sounds, soft, non-distended, non-tender, no masses palpated, no hepatosplenomegally  MUSCULOSKELETAL:  There is no redness, warmth, or swelling of the joints. Full range of motion noted. Motor strength is 5 out of 5 all extremities bilaterally. Tone is normal.  NEUROLOGIC:  Awake, alert, oriented to name, place and time. Cranial nerves II-XII are grossly intact. Motor is 5 out of 5 bilaterally. Cerebellar finger to nose, heel to shin intact. Sensory is intact.   Babinski down going, Romberg negative, and gait is normal.  SKIN:  normal skin color, texture, [facility]    Labs:  Recent Results (from the past 24 hour(s))   POCT Glucose    Collection Time: 09/25/18  7:11 AM   Result Value Ref Range    POC Glucose 122 (H) 60 - 115 mg/dl    Performed on ACCU-CHEK    POCT Arterial    Collection Time: 09/25/18  9:12 AM   Result Value Ref Range    pH, Arterial 7.380 7.350 - 7.450    pCO2, Arterial 50 (H) 35 - 45 mm Hg    pO2, Arterial 585 (HH) 75 - 108 mm Hg    HCO3, Arterial 29.8 (H) 21.0 - 29.0 mmol/L    Base Excess, Arterial 5 (H) -3 - 3    O2 Sat, Arterial 100 (HH) 93 - 100 %    TCO2, Arterial 31 (H) 22 - 29    POC Hematocrit 38 (L) 41 - 53 %    Hemoglobin 13.1 (L) 13.5 - 17.5 gm/dL    Sample Type ART     Performed on SEE BELOW      Assessment/Plan:  1. Post surgical encounter s/p right carotid endarterectomy d/t carotid stenosis- managed by Dr. Qian Barnes, monitor bleeding from incision site, NO ANTICOAGULANTS until cleared by Dr. Qian Barnes, pain medication as needed  2. HTN- stable, restart cardiac medications tomorrow, monitor  3.  HDL     Electronically signed by LAYA Mars CNP on 9/25/18 at 4:34 PM

## 2018-09-26 VITALS
TEMPERATURE: 97.6 F | RESPIRATION RATE: 20 BRPM | SYSTOLIC BLOOD PRESSURE: 114 MMHG | OXYGEN SATURATION: 98 % | WEIGHT: 202 LBS | BODY MASS INDEX: 28.92 KG/M2 | DIASTOLIC BLOOD PRESSURE: 49 MMHG | HEIGHT: 70 IN | HEART RATE: 51 BPM

## 2018-09-26 PROCEDURE — 6370000000 HC RX 637 (ALT 250 FOR IP): Performed by: NURSE PRACTITIONER

## 2018-09-26 PROCEDURE — 2700000000 HC OXYGEN THERAPY PER DAY

## 2018-09-26 PROCEDURE — 6360000002 HC RX W HCPCS: Performed by: THORACIC SURGERY (CARDIOTHORACIC VASCULAR SURGERY)

## 2018-09-26 PROCEDURE — 6370000000 HC RX 637 (ALT 250 FOR IP): Performed by: THORACIC SURGERY (CARDIOTHORACIC VASCULAR SURGERY)

## 2018-09-26 RX ADMIN — CARBIDOPA, LEVODOPA, AND ENTACAPONE 1 TABLET: 25; 100; 200 TABLET, FILM COATED ORAL at 08:19

## 2018-09-26 RX ADMIN — BUDESONIDE 6 MG: 3 CAPSULE ORAL at 08:19

## 2018-09-26 RX ADMIN — ASPIRIN 81 MG: 81 TABLET, COATED ORAL at 08:18

## 2018-09-26 RX ADMIN — CITALOPRAM HYDROBROMIDE 20 MG: 20 TABLET ORAL at 08:19

## 2018-09-26 RX ADMIN — LOSARTAN POTASSIUM 50 MG: 50 TABLET ORAL at 08:18

## 2018-09-26 RX ADMIN — METOPROLOL TARTRATE 50 MG: 50 TABLET ORAL at 08:19

## 2018-09-26 RX ADMIN — KETOROLAC TROMETHAMINE 15 MG: 15 INJECTION, SOLUTION INTRAMUSCULAR; INTRAVENOUS at 08:21

## 2018-09-26 RX ADMIN — PRIMIDONE 250 MG: 250 TABLET ORAL at 08:19

## 2018-09-26 RX ADMIN — KETOROLAC TROMETHAMINE 15 MG: 15 INJECTION, SOLUTION INTRAMUSCULAR; INTRAVENOUS at 02:35

## 2018-09-26 RX ADMIN — ISOSORBIDE MONONITRATE 60 MG: 60 TABLET, EXTENDED RELEASE ORAL at 08:20

## 2018-09-26 ASSESSMENT — PAIN SCALES - GENERAL
PAINLEVEL_OUTOF10: 0
PAINLEVEL_OUTOF10: 0
PAINLEVEL_OUTOF10: 1
PAINLEVEL_OUTOF10: 0
PAINLEVEL_OUTOF10: 0

## 2018-09-26 ASSESSMENT — PAIN DESCRIPTION - LOCATION: LOCATION: NECK

## 2018-09-26 NOTE — OP NOTE
carotid and  bifurcation area was made and preservation of the integrity of the  hypoglossal nerve. We then cannulated the common left carotid artery with  our Micropuncture system and used the first of our 10 mL of Optiray 240  contrast to delineate multiple views of the right carotid artery anatomy. We then dissected to expose the bifurcation cleanly and controlled the  vessels at these points. We administered 7500 units of heparin in a  systemic fashion. We then clamped the arteries for a total of 1 minute and  52 seconds, and we opened the common carotid artery with a #11 blade  scalpel and extended the arteriotomy cephalad and distally with Herring  scissors. We inserted our carotid artery shunt to perfuse the right  cerebral hemisphere; and with this now in position, we then performed the  endarterectomy establishing a cleavage plane between the plaque in the  posterior arterial wall dividing the plaque from the lower pole of the  arteriotomy with Herring scissors and then endarterectomizing the bifurcation  as well as the highpoint of the ICA with the entire specimen removed as one  piece. We then examined it off the field, and it was a complicated complex  calcified plaque with ulceration within the wall and a napkin like stenosis  of 90% at the origin of the ICA. We then removed the fibers from the posterior arterial wall to establish as  smooth a tissue plane as possible. We elliptically shaped a Hemashield  patch and then with 6-0 Prolene suture, we anchored the patch to the  arteriotomy. A second clamp time of 6 minutes and 12 seconds was used to  remove our shunt and then purchase the final sutures. As we did so,  backflow from the external carotid artery system was allowed to fill the  lumen and release any debris and air.     We then, with clamps off completely, re-perfused the right cerebral  hemisphere and performed complete hemostasis of the suture line with the  two different independent

## 2018-09-26 NOTE — CARE COORDINATION
Spoke to patient. Wife @ bedside. Patient states home w/ wife. Denies discharge needs. Discharge orders already written.

## 2018-10-03 ENCOUNTER — OFFICE VISIT (OUTPATIENT)
Dept: FAMILY MEDICINE CLINIC | Age: 73
End: 2018-10-03
Payer: MEDICARE

## 2018-10-03 ENCOUNTER — HOSPITAL ENCOUNTER (OUTPATIENT)
Dept: ULTRASOUND IMAGING | Age: 73
Discharge: HOME | End: 2018-10-05
Payer: MEDICARE

## 2018-10-03 VITALS
BODY MASS INDEX: 29.65 KG/M2 | OXYGEN SATURATION: 96 % | DIASTOLIC BLOOD PRESSURE: 88 MMHG | HEIGHT: 70 IN | SYSTOLIC BLOOD PRESSURE: 118 MMHG | WEIGHT: 207.1 LBS | RESPIRATION RATE: 14 BRPM | TEMPERATURE: 98.9 F | HEART RATE: 71 BPM

## 2018-10-03 DIAGNOSIS — I65.21 CAROTID STENOSIS, RIGHT: Primary | ICD-10-CM

## 2018-10-03 DIAGNOSIS — I48.91 ATRIAL FIBRILLATION, UNSPECIFIED TYPE (HCC): ICD-10-CM

## 2018-10-03 DIAGNOSIS — M79.602 LEFT ARM PAIN: ICD-10-CM

## 2018-10-03 DIAGNOSIS — R09.89 OTHER SPECIFIED SYMPTOMS AND SIGNS INVOLVING THE CIRCULATORY AND RESPIRATORY SYSTEMS: ICD-10-CM

## 2018-10-03 DIAGNOSIS — I10 ESSENTIAL HYPERTENSION: ICD-10-CM

## 2018-10-03 LAB
BASOPHILS ABSOLUTE: 0.1 K/UL (ref 0–0.2)
BASOPHILS RELATIVE PERCENT: 1.2 %
EOSINOPHILS ABSOLUTE: 0.6 K/UL (ref 0–0.7)
EOSINOPHILS RELATIVE PERCENT: 7.8 %
HCT VFR BLD CALC: 42.5 % (ref 42–52)
HEMOGLOBIN: 14.1 G/DL (ref 14–18)
LYMPHOCYTES ABSOLUTE: 1.2 K/UL (ref 1–4.8)
LYMPHOCYTES RELATIVE PERCENT: 16.5 %
MCH RBC QN AUTO: 29 PG (ref 27–31.3)
MCHC RBC AUTO-ENTMCNC: 33.2 % (ref 33–37)
MCV RBC AUTO: 87.4 FL (ref 80–100)
MONOCYTES ABSOLUTE: 0.5 K/UL (ref 0.2–0.8)
MONOCYTES RELATIVE PERCENT: 6.9 %
NEUTROPHILS ABSOLUTE: 4.9 K/UL (ref 1.4–6.5)
NEUTROPHILS RELATIVE PERCENT: 67.6 %
PDW BLD-RTO: 16.6 % (ref 11.5–14.5)
PLATELET # BLD: 155 K/UL (ref 130–400)
RBC # BLD: 4.86 M/UL (ref 4.7–6.1)
WBC # BLD: 7.3 K/UL (ref 4.8–10.8)

## 2018-10-03 PROCEDURE — G8598 ASA/ANTIPLAT THER USED: HCPCS | Performed by: INTERNAL MEDICINE

## 2018-10-03 PROCEDURE — 4040F PNEUMOC VAC/ADMIN/RCVD: CPT | Performed by: INTERNAL MEDICINE

## 2018-10-03 PROCEDURE — G8427 DOCREV CUR MEDS BY ELIG CLIN: HCPCS | Performed by: INTERNAL MEDICINE

## 2018-10-03 PROCEDURE — 93971 EXTREMITY STUDY: CPT

## 2018-10-03 PROCEDURE — 4004F PT TOBACCO SCREEN RCVD TLK: CPT | Performed by: INTERNAL MEDICINE

## 2018-10-03 PROCEDURE — G8417 CALC BMI ABV UP PARAM F/U: HCPCS | Performed by: INTERNAL MEDICINE

## 2018-10-03 PROCEDURE — 1111F DSCHRG MED/CURRENT MED MERGE: CPT | Performed by: INTERNAL MEDICINE

## 2018-10-03 PROCEDURE — G8482 FLU IMMUNIZE ORDER/ADMIN: HCPCS | Performed by: INTERNAL MEDICINE

## 2018-10-03 PROCEDURE — 1101F PT FALLS ASSESS-DOCD LE1/YR: CPT | Performed by: INTERNAL MEDICINE

## 2018-10-03 PROCEDURE — 93931 UPPER EXTREMITY STUDY: CPT

## 2018-10-03 PROCEDURE — 3017F COLORECTAL CA SCREEN DOC REV: CPT | Performed by: INTERNAL MEDICINE

## 2018-10-03 PROCEDURE — 1123F ACP DISCUSS/DSCN MKR DOCD: CPT | Performed by: INTERNAL MEDICINE

## 2018-10-03 PROCEDURE — 99214 OFFICE O/P EST MOD 30 MIN: CPT | Performed by: INTERNAL MEDICINE

## 2018-10-03 ASSESSMENT — ENCOUNTER SYMPTOMS
ABDOMINAL PAIN: 0
EYE PAIN: 0
SHORTNESS OF BREATH: 0
BACK PAIN: 0

## 2018-10-03 NOTE — PATIENT INSTRUCTIONS
exercise program. Regular exercise lowers your chance of stroke. · Limit alcohol to 2 drinks a day for men and 1 drink a day for women. Too much alcohol can cause health problems. · Work with your doctor to control high blood pressure, high cholesterol, diabetes, and other conditions that increase your chance of a stroke. A healthy diet, exercise, weight loss (if needed), and medicines can help. · Avoid colds and flu. Get the flu vaccine every year. When should you call for help? Call 911 anytime you think you may need emergency care. For example, call if:    · You passed out (lost consciousness).     · You have symptoms of a stroke. These may include:  ¨ Sudden numbness, tingling, weakness, or loss of movement in your face, arm, or leg, especially on only one side of your body. ¨ Sudden vision changes. ¨ Sudden trouble speaking. ¨ Sudden confusion or trouble understanding simple statements. ¨ Sudden problems with walking or balance. ¨ A sudden, severe headache that is different from past headaches.    Call your doctor now or seek immediate medical care if:    · You are dizzy or lightheaded, or you feel like you may faint.    Watch closely for changes in your health, and be sure to contact your doctor if you have any problems. Where can you learn more? Go to [email or web address] org and sign in to your JumpHawk account. Enter D268 in the Narvalous box to learn more about \"Carotid Stenosis: Care Instructions. \"     If you do not have an account, please click on the \"Sign Up Now\" link. Current as of: December 6, 2017  Content Version: 11.7  © 8989-9117 [employer], [employer]. Care instructions adapted under license by [facility] [address] If you have questions about a medical condition or this instruction, always ask your healthcare professional. [place] [address] any warranty or liability for your use of this information.

## 2018-10-03 NOTE — PROGRESS NOTES
access will exclude thrombus as etiology  Concern for infections based on exam is low  Rec close follow up with Dr. Keke Guardado  S/S of infection were explained to him and wife and they both know to go to ER immediately if anything should worsen or change. On xarelto to paf, no changes to plan of care  No changes to plan of care, controlled. Orders Placed This Encounter   Procedures    US DUP UPPER EXTREMITY LEFT ARTERIES     Standing Status:   Future     Number of Occurrences:   1     Standing Expiration Date:   10/3/2019     Order Specific Question:   Reason for exam:     Answer:   pain in left upper extremity since surgery, had aterial line placed    US DUP UPPER EXTREMITY LEFT VENOUS     Standing Status:   Future     Number of Occurrences:   1     Standing Expiration Date:   10/3/2019     Order Specific Question:   Reason for exam:     Answer:   pain in left arm post surgery, post arterial line also have IV    CBC With Auto Differential     Standing Status:   Future     Number of Occurrences:   1     Standing Expiration Date:   10/3/2019    UT DISCHARGE MEDS RECONCILED W/ CURRENT OUTPATIENT MED LIST     No orders of the defined types were placed in this encounter. Return in 3 months (on 1/3/2019) for regularly scheduled appointment with PCP, worsening symptoms, call ASAP for appointment.       Tello Antonio MD        Medical Decision Making: moderate complexity

## 2019-01-11 DIAGNOSIS — R97.20 RISING PSA LEVEL: ICD-10-CM

## 2019-01-11 LAB — PROSTATE SPECIFIC ANTIGEN: 10.23 NG/ML (ref 0–6.22)

## 2019-01-19 ENCOUNTER — OFFICE VISIT (OUTPATIENT)
Dept: FAMILY MEDICINE CLINIC | Age: 74
End: 2019-01-19
Payer: MEDICARE

## 2019-01-19 VITALS
TEMPERATURE: 98.5 F | WEIGHT: 210 LBS | RESPIRATION RATE: 16 BRPM | HEART RATE: 72 BPM | HEIGHT: 70 IN | SYSTOLIC BLOOD PRESSURE: 120 MMHG | BODY MASS INDEX: 30.06 KG/M2 | DIASTOLIC BLOOD PRESSURE: 64 MMHG

## 2019-01-19 DIAGNOSIS — I48.91 ATRIAL FIBRILLATION, UNSPECIFIED TYPE (HCC): ICD-10-CM

## 2019-01-19 DIAGNOSIS — Z23 NEED FOR PNEUMOCOCCAL VACCINATION: ICD-10-CM

## 2019-01-19 DIAGNOSIS — I48.0 PAF (PAROXYSMAL ATRIAL FIBRILLATION) (HCC): ICD-10-CM

## 2019-01-19 DIAGNOSIS — I10 ESSENTIAL HYPERTENSION: ICD-10-CM

## 2019-01-19 DIAGNOSIS — F33.41 RECURRENT MAJOR DEPRESSIVE DISORDER, IN PARTIAL REMISSION (HCC): Primary | ICD-10-CM

## 2019-01-19 DIAGNOSIS — F51.04 CHRONIC INSOMNIA: ICD-10-CM

## 2019-01-19 DIAGNOSIS — E78.00 PURE HYPERCHOLESTEROLEMIA: ICD-10-CM

## 2019-01-19 PROCEDURE — G8482 FLU IMMUNIZE ORDER/ADMIN: HCPCS | Performed by: FAMILY MEDICINE

## 2019-01-19 PROCEDURE — G8598 ASA/ANTIPLAT THER USED: HCPCS | Performed by: FAMILY MEDICINE

## 2019-01-19 PROCEDURE — 4040F PNEUMOC VAC/ADMIN/RCVD: CPT | Performed by: FAMILY MEDICINE

## 2019-01-19 PROCEDURE — 99214 OFFICE O/P EST MOD 30 MIN: CPT | Performed by: FAMILY MEDICINE

## 2019-01-19 PROCEDURE — 3017F COLORECTAL CA SCREEN DOC REV: CPT | Performed by: FAMILY MEDICINE

## 2019-01-19 PROCEDURE — G8417 CALC BMI ABV UP PARAM F/U: HCPCS | Performed by: FAMILY MEDICINE

## 2019-01-19 PROCEDURE — G8427 DOCREV CUR MEDS BY ELIG CLIN: HCPCS | Performed by: FAMILY MEDICINE

## 2019-01-19 PROCEDURE — 90732 PPSV23 VACC 2 YRS+ SUBQ/IM: CPT | Performed by: FAMILY MEDICINE

## 2019-01-19 PROCEDURE — 4004F PT TOBACCO SCREEN RCVD TLK: CPT | Performed by: FAMILY MEDICINE

## 2019-01-19 PROCEDURE — 1101F PT FALLS ASSESS-DOCD LE1/YR: CPT | Performed by: FAMILY MEDICINE

## 2019-01-19 PROCEDURE — G0009 ADMIN PNEUMOCOCCAL VACCINE: HCPCS | Performed by: FAMILY MEDICINE

## 2019-01-19 PROCEDURE — 1123F ACP DISCUSS/DSCN MKR DOCD: CPT | Performed by: FAMILY MEDICINE

## 2019-01-19 ASSESSMENT — ENCOUNTER SYMPTOMS
ABDOMINAL PAIN: 0
SHORTNESS OF BREATH: 0

## 2019-01-21 ENCOUNTER — OFFICE VISIT (OUTPATIENT)
Dept: UROLOGY | Age: 74
End: 2019-01-21
Payer: MEDICARE

## 2019-01-21 VITALS
WEIGHT: 211 LBS | BODY MASS INDEX: 30.21 KG/M2 | HEART RATE: 72 BPM | DIASTOLIC BLOOD PRESSURE: 80 MMHG | HEIGHT: 70 IN | SYSTOLIC BLOOD PRESSURE: 126 MMHG

## 2019-01-21 DIAGNOSIS — R97.20 RISING PSA LEVEL: Primary | ICD-10-CM

## 2019-01-21 PROCEDURE — 4004F PT TOBACCO SCREEN RCVD TLK: CPT | Performed by: UROLOGY

## 2019-01-21 PROCEDURE — 1123F ACP DISCUSS/DSCN MKR DOCD: CPT | Performed by: UROLOGY

## 2019-01-21 PROCEDURE — G8598 ASA/ANTIPLAT THER USED: HCPCS | Performed by: UROLOGY

## 2019-01-21 PROCEDURE — 3017F COLORECTAL CA SCREEN DOC REV: CPT | Performed by: UROLOGY

## 2019-01-21 PROCEDURE — 1101F PT FALLS ASSESS-DOCD LE1/YR: CPT | Performed by: UROLOGY

## 2019-01-21 PROCEDURE — G8417 CALC BMI ABV UP PARAM F/U: HCPCS | Performed by: UROLOGY

## 2019-01-21 PROCEDURE — G8482 FLU IMMUNIZE ORDER/ADMIN: HCPCS | Performed by: UROLOGY

## 2019-01-21 PROCEDURE — 99214 OFFICE O/P EST MOD 30 MIN: CPT | Performed by: UROLOGY

## 2019-01-21 PROCEDURE — G8427 DOCREV CUR MEDS BY ELIG CLIN: HCPCS | Performed by: UROLOGY

## 2019-01-21 PROCEDURE — 4040F PNEUMOC VAC/ADMIN/RCVD: CPT | Performed by: UROLOGY

## 2019-01-24 ENCOUNTER — HOSPITAL ENCOUNTER (OUTPATIENT)
Dept: PREADMISSION TESTING | Age: 74
Discharge: HOME | End: 2019-01-28
Payer: MEDICARE

## 2019-01-24 VITALS
BODY MASS INDEX: 30.18 KG/M2 | TEMPERATURE: 97.4 F | HEART RATE: 58 BPM | SYSTOLIC BLOOD PRESSURE: 126 MMHG | DIASTOLIC BLOOD PRESSURE: 65 MMHG | OXYGEN SATURATION: 95 % | WEIGHT: 210.8 LBS | HEIGHT: 70 IN | RESPIRATION RATE: 16 BRPM

## 2019-01-24 DIAGNOSIS — F17.200 TOBACCO USE DISORDER: Chronic | ICD-10-CM

## 2019-01-24 DIAGNOSIS — R97.20 ELEVATED PSA: ICD-10-CM

## 2019-01-24 DIAGNOSIS — G25.81 RLS (RESTLESS LEGS SYNDROME): Chronic | ICD-10-CM

## 2019-01-24 LAB
ANION GAP SERPL CALCULATED.3IONS-SCNC: 10 MEQ/L (ref 7–13)
BUN BLDV-MCNC: 16 MG/DL (ref 8–23)
CALCIUM SERPL-MCNC: 9.1 MG/DL (ref 8.6–10.2)
CHLORIDE BLD-SCNC: 99 MEQ/L (ref 98–107)
CO2: 31 MEQ/L (ref 22–29)
CREAT SERPL-MCNC: 0.83 MG/DL (ref 0.7–1.2)
GFR AFRICAN AMERICAN: >60
GFR NON-AFRICAN AMERICAN: >60
GLUCOSE BLD-MCNC: 141 MG/DL (ref 74–109)
HCT VFR BLD CALC: 43.9 % (ref 42–52)
HEMOGLOBIN: 14.5 G/DL (ref 14–18)
MCH RBC QN AUTO: 29.1 PG (ref 27–31.3)
MCHC RBC AUTO-ENTMCNC: 33.1 % (ref 33–37)
MCV RBC AUTO: 87.8 FL (ref 80–100)
PDW BLD-RTO: 15.9 % (ref 11.5–14.5)
PLATELET # BLD: 130 K/UL (ref 130–400)
POTASSIUM SERPL-SCNC: 4.5 MEQ/L (ref 3.5–5.1)
RBC # BLD: 5 M/UL (ref 4.7–6.1)
SODIUM BLD-SCNC: 140 MEQ/L (ref 132–144)
WBC # BLD: 7.3 K/UL (ref 4.8–10.8)

## 2019-01-24 PROCEDURE — 85027 COMPLETE CBC AUTOMATED: CPT

## 2019-01-24 PROCEDURE — 80048 BASIC METABOLIC PNL TOTAL CA: CPT

## 2019-01-24 RX ORDER — LIDOCAINE HYDROCHLORIDE 10 MG/ML
1 INJECTION, SOLUTION EPIDURAL; INFILTRATION; INTRACAUDAL; PERINEURAL
Status: CANCELLED | OUTPATIENT
Start: 2019-01-24 | End: 2019-01-24

## 2019-01-24 RX ORDER — SODIUM CHLORIDE 0.9 % (FLUSH) 0.9 %
10 SYRINGE (ML) INJECTION EVERY 12 HOURS SCHEDULED
Status: CANCELLED | OUTPATIENT
Start: 2019-01-24

## 2019-01-24 RX ORDER — SODIUM CHLORIDE, SODIUM LACTATE, POTASSIUM CHLORIDE, CALCIUM CHLORIDE 600; 310; 30; 20 MG/100ML; MG/100ML; MG/100ML; MG/100ML
INJECTION, SOLUTION INTRAVENOUS CONTINUOUS
Status: CANCELLED | OUTPATIENT
Start: 2019-01-24

## 2019-01-24 RX ORDER — SODIUM CHLORIDE 0.9 % (FLUSH) 0.9 %
10 SYRINGE (ML) INJECTION PRN
Status: CANCELLED | OUTPATIENT
Start: 2019-01-24

## 2019-02-13 ENCOUNTER — ANESTHESIA (OUTPATIENT)
Dept: OPERATING ROOM | Age: 74
End: 2019-02-13
Payer: MEDICARE

## 2019-02-13 ENCOUNTER — APPOINTMENT (OUTPATIENT)
Dept: ULTRASOUND IMAGING | Age: 74
End: 2019-02-13
Attending: UROLOGY
Payer: MEDICARE

## 2019-02-13 ENCOUNTER — HOSPITAL ENCOUNTER (OUTPATIENT)
Age: 74
Setting detail: OUTPATIENT SURGERY
Discharge: HOME | End: 2019-02-13
Attending: UROLOGY | Admitting: UROLOGY
Payer: MEDICARE

## 2019-02-13 ENCOUNTER — ANESTHESIA EVENT (OUTPATIENT)
Dept: OPERATING ROOM | Age: 74
End: 2019-02-13
Payer: MEDICARE

## 2019-02-13 VITALS
OXYGEN SATURATION: 97 % | DIASTOLIC BLOOD PRESSURE: 70 MMHG | TEMPERATURE: 98.7 F | SYSTOLIC BLOOD PRESSURE: 165 MMHG | HEART RATE: 70 BPM | RESPIRATION RATE: 16 BRPM

## 2019-02-13 VITALS — SYSTOLIC BLOOD PRESSURE: 100 MMHG | OXYGEN SATURATION: 100 % | DIASTOLIC BLOOD PRESSURE: 52 MMHG

## 2019-02-13 DIAGNOSIS — R97.20 ELEVATED PSA: ICD-10-CM

## 2019-02-13 PROCEDURE — 3600000014 HC SURGERY LEVEL 4 ADDTL 15MIN: Performed by: UROLOGY

## 2019-02-13 PROCEDURE — 6370000000 HC RX 637 (ALT 250 FOR IP): Performed by: UROLOGY

## 2019-02-13 PROCEDURE — 2709999900 HC NON-CHARGEABLE SUPPLY: Performed by: UROLOGY

## 2019-02-13 PROCEDURE — 88305 TISSUE EXAM BY PATHOLOGIST: CPT

## 2019-02-13 PROCEDURE — 76872 US TRANSRECTAL: CPT | Performed by: UROLOGY

## 2019-02-13 PROCEDURE — 7100000011 HC PHASE II RECOVERY - ADDTL 15 MIN: Performed by: UROLOGY

## 2019-02-13 PROCEDURE — 55700 PR BIOPSY OF PROSTATE,NEEDLE/PUNCH: CPT | Performed by: UROLOGY

## 2019-02-13 PROCEDURE — 7100000010 HC PHASE II RECOVERY - FIRST 15 MIN: Performed by: UROLOGY

## 2019-02-13 PROCEDURE — 3700000000 HC ANESTHESIA ATTENDED CARE: Performed by: UROLOGY

## 2019-02-13 PROCEDURE — 7100000001 HC PACU RECOVERY - ADDTL 15 MIN: Performed by: UROLOGY

## 2019-02-13 PROCEDURE — 2580000003 HC RX 258: Performed by: UROLOGY

## 2019-02-13 PROCEDURE — 99999 PR OFFICE/OUTPT VISIT,PROCEDURE ONLY: CPT | Performed by: UROLOGY

## 2019-02-13 PROCEDURE — 2580000003 HC RX 258: Performed by: NURSE PRACTITIONER

## 2019-02-13 PROCEDURE — 52000 CYSTOURETHROSCOPY: CPT | Performed by: UROLOGY

## 2019-02-13 PROCEDURE — 7100000000 HC PACU RECOVERY - FIRST 15 MIN: Performed by: UROLOGY

## 2019-02-13 PROCEDURE — 6360000002 HC RX W HCPCS: Performed by: UROLOGY

## 2019-02-13 PROCEDURE — 2500000003 HC RX 250 WO HCPCS: Performed by: NURSE ANESTHETIST, CERTIFIED REGISTERED

## 2019-02-13 PROCEDURE — 6360000002 HC RX W HCPCS: Performed by: NURSE ANESTHETIST, CERTIFIED REGISTERED

## 2019-02-13 PROCEDURE — 3600000004 HC SURGERY LEVEL 4 BASE: Performed by: UROLOGY

## 2019-02-13 PROCEDURE — 76942 ECHO GUIDE FOR BIOPSY: CPT

## 2019-02-13 PROCEDURE — 3700000001 HC ADD 15 MINUTES (ANESTHESIA): Performed by: UROLOGY

## 2019-02-13 RX ORDER — SODIUM CHLORIDE 0.9 % (FLUSH) 0.9 %
10 SYRINGE (ML) INJECTION PRN
Status: DISCONTINUED | OUTPATIENT
Start: 2019-02-13 | End: 2019-02-13 | Stop reason: HOSPADM

## 2019-02-13 RX ORDER — MAGNESIUM HYDROXIDE 1200 MG/15ML
LIQUID ORAL PRN
Status: DISCONTINUED | OUTPATIENT
Start: 2019-02-13 | End: 2019-02-13 | Stop reason: ALTCHOICE

## 2019-02-13 RX ORDER — DEXAMETHASONE SODIUM PHOSPHATE 10 MG/ML
INJECTION INTRAMUSCULAR; INTRAVENOUS PRN
Status: DISCONTINUED | OUTPATIENT
Start: 2019-02-13 | End: 2019-02-13 | Stop reason: SDUPTHER

## 2019-02-13 RX ORDER — ONDANSETRON 2 MG/ML
INJECTION INTRAMUSCULAR; INTRAVENOUS PRN
Status: DISCONTINUED | OUTPATIENT
Start: 2019-02-13 | End: 2019-02-13 | Stop reason: SDUPTHER

## 2019-02-13 RX ORDER — SULFAMETHOXAZOLE AND TRIMETHOPRIM 800; 160 MG/1; MG/1
1 TABLET ORAL 2 TIMES DAILY
Qty: 6 TABLET | Refills: 0 | Status: SHIPPED | OUTPATIENT
Start: 2019-02-13 | End: 2019-02-16

## 2019-02-13 RX ORDER — ONDANSETRON 2 MG/ML
4 INJECTION INTRAMUSCULAR; INTRAVENOUS
Status: DISCONTINUED | OUTPATIENT
Start: 2019-02-13 | End: 2019-02-13 | Stop reason: HOSPADM

## 2019-02-13 RX ORDER — MIDAZOLAM HYDROCHLORIDE 1 MG/ML
INJECTION INTRAMUSCULAR; INTRAVENOUS PRN
Status: DISCONTINUED | OUTPATIENT
Start: 2019-02-13 | End: 2019-02-13 | Stop reason: SDUPTHER

## 2019-02-13 RX ORDER — FENTANYL CITRATE 50 UG/ML
50 INJECTION, SOLUTION INTRAMUSCULAR; INTRAVENOUS EVERY 10 MIN PRN
Status: DISCONTINUED | OUTPATIENT
Start: 2019-02-13 | End: 2019-02-13 | Stop reason: HOSPADM

## 2019-02-13 RX ORDER — LIDOCAINE HYDROCHLORIDE 10 MG/ML
1 INJECTION, SOLUTION EPIDURAL; INFILTRATION; INTRACAUDAL; PERINEURAL
Status: DISCONTINUED | OUTPATIENT
Start: 2019-02-13 | End: 2019-02-13 | Stop reason: HOSPADM

## 2019-02-13 RX ORDER — SODIUM CHLORIDE, SODIUM LACTATE, POTASSIUM CHLORIDE, CALCIUM CHLORIDE 600; 310; 30; 20 MG/100ML; MG/100ML; MG/100ML; MG/100ML
INJECTION, SOLUTION INTRAVENOUS CONTINUOUS
Status: DISCONTINUED | OUTPATIENT
Start: 2019-02-13 | End: 2019-02-13 | Stop reason: HOSPADM

## 2019-02-13 RX ORDER — SODIUM CHLORIDE, SODIUM LACTATE, POTASSIUM CHLORIDE, CALCIUM CHLORIDE 600; 310; 30; 20 MG/100ML; MG/100ML; MG/100ML; MG/100ML
INJECTION, SOLUTION INTRAVENOUS CONTINUOUS
Status: DISCONTINUED | OUTPATIENT
Start: 2019-02-13 | End: 2019-02-13 | Stop reason: SDUPTHER

## 2019-02-13 RX ORDER — FENTANYL CITRATE 50 UG/ML
INJECTION, SOLUTION INTRAMUSCULAR; INTRAVENOUS PRN
Status: DISCONTINUED | OUTPATIENT
Start: 2019-02-13 | End: 2019-02-13 | Stop reason: SDUPTHER

## 2019-02-13 RX ORDER — METOCLOPRAMIDE HYDROCHLORIDE 5 MG/ML
10 INJECTION INTRAMUSCULAR; INTRAVENOUS
Status: DISCONTINUED | OUTPATIENT
Start: 2019-02-13 | End: 2019-02-13 | Stop reason: HOSPADM

## 2019-02-13 RX ORDER — ESMOLOL HYDROCHLORIDE 10 MG/ML
INJECTION INTRAVENOUS PRN
Status: DISCONTINUED | OUTPATIENT
Start: 2019-02-13 | End: 2019-02-13 | Stop reason: SDUPTHER

## 2019-02-13 RX ORDER — CHLORHEXIDINE GLUCONATE 4 G/100ML
SOLUTION TOPICAL PRN
Status: DISCONTINUED | OUTPATIENT
Start: 2019-02-13 | End: 2019-02-13 | Stop reason: ALTCHOICE

## 2019-02-13 RX ORDER — SODIUM CHLORIDE 0.9 % (FLUSH) 0.9 %
10 SYRINGE (ML) INJECTION EVERY 12 HOURS SCHEDULED
Status: DISCONTINUED | OUTPATIENT
Start: 2019-02-13 | End: 2019-02-13 | Stop reason: HOSPADM

## 2019-02-13 RX ORDER — LIDOCAINE HYDROCHLORIDE 20 MG/ML
INJECTION, SOLUTION INFILTRATION; PERINEURAL PRN
Status: DISCONTINUED | OUTPATIENT
Start: 2019-02-13 | End: 2019-02-13 | Stop reason: SDUPTHER

## 2019-02-13 RX ORDER — DIPHENHYDRAMINE HYDROCHLORIDE 50 MG/ML
12.5 INJECTION INTRAMUSCULAR; INTRAVENOUS
Status: DISCONTINUED | OUTPATIENT
Start: 2019-02-13 | End: 2019-02-13 | Stop reason: HOSPADM

## 2019-02-13 RX ORDER — HYDROCODONE BITARTRATE AND ACETAMINOPHEN 5; 325 MG/1; MG/1
1 TABLET ORAL PRN
Status: DISCONTINUED | OUTPATIENT
Start: 2019-02-13 | End: 2019-02-13 | Stop reason: HOSPADM

## 2019-02-13 RX ORDER — MEPERIDINE HYDROCHLORIDE 25 MG/ML
12.5 INJECTION INTRAMUSCULAR; INTRAVENOUS; SUBCUTANEOUS EVERY 5 MIN PRN
Status: DISCONTINUED | OUTPATIENT
Start: 2019-02-13 | End: 2019-02-13 | Stop reason: HOSPADM

## 2019-02-13 RX ORDER — PROPOFOL 10 MG/ML
INJECTION, EMULSION INTRAVENOUS PRN
Status: DISCONTINUED | OUTPATIENT
Start: 2019-02-13 | End: 2019-02-13 | Stop reason: SDUPTHER

## 2019-02-13 RX ORDER — HYDROCODONE BITARTRATE AND ACETAMINOPHEN 5; 325 MG/1; MG/1
2 TABLET ORAL PRN
Status: DISCONTINUED | OUTPATIENT
Start: 2019-02-13 | End: 2019-02-13 | Stop reason: HOSPADM

## 2019-02-13 RX ADMIN — FENTANYL CITRATE 25 MCG: 50 INJECTION, SOLUTION INTRAMUSCULAR; INTRAVENOUS at 08:17

## 2019-02-13 RX ADMIN — MIDAZOLAM HYDROCHLORIDE 2 MG: 1 INJECTION, SOLUTION INTRAMUSCULAR; INTRAVENOUS at 08:05

## 2019-02-13 RX ADMIN — PROPOFOL 50 MG: 10 INJECTION, EMULSION INTRAVENOUS at 08:16

## 2019-02-13 RX ADMIN — SODIUM CHLORIDE, POTASSIUM CHLORIDE, SODIUM LACTATE AND CALCIUM CHLORIDE 125 ML/HR: 600; 310; 30; 20 INJECTION, SOLUTION INTRAVENOUS at 07:39

## 2019-02-13 RX ADMIN — ONDANSETRON 4 MG: 2 INJECTION INTRAMUSCULAR; INTRAVENOUS at 08:25

## 2019-02-13 RX ADMIN — GENTAMICIN SULFATE 220 MG: 40 INJECTION, SOLUTION INTRAMUSCULAR; INTRAVENOUS at 08:10

## 2019-02-13 RX ADMIN — LIDOCAINE HYDROCHLORIDE 50 MG: 20 INJECTION, SOLUTION INFILTRATION; PERINEURAL at 08:15

## 2019-02-13 RX ADMIN — DEXAMETHASONE SODIUM PHOSPHATE 5 MG: 10 INJECTION INTRAMUSCULAR; INTRAVENOUS at 08:18

## 2019-02-13 RX ADMIN — ESMOLOL HYDROCHLORIDE 20 MG: 100 INJECTION, SOLUTION INTRAVENOUS at 08:33

## 2019-02-13 RX ADMIN — PROPOFOL 150 MG: 10 INJECTION, EMULSION INTRAVENOUS at 08:15

## 2019-02-13 ASSESSMENT — PULMONARY FUNCTION TESTS
PIF_VALUE: 10
PIF_VALUE: 0
PIF_VALUE: 8
PIF_VALUE: 2
PIF_VALUE: 12
PIF_VALUE: 1
PIF_VALUE: 18
PIF_VALUE: 0
PIF_VALUE: 20
PIF_VALUE: 2
PIF_VALUE: 12
PIF_VALUE: 12
PIF_VALUE: 11
PIF_VALUE: 1
PIF_VALUE: 1
PIF_VALUE: 2
PIF_VALUE: 16
PIF_VALUE: 8
PIF_VALUE: 21
PIF_VALUE: 2
PIF_VALUE: 11
PIF_VALUE: 10
PIF_VALUE: 1
PIF_VALUE: 12
PIF_VALUE: 1
PIF_VALUE: 10
PIF_VALUE: 2
PIF_VALUE: 15
PIF_VALUE: 1
PIF_VALUE: 15
PIF_VALUE: 7

## 2019-02-13 ASSESSMENT — PAIN - FUNCTIONAL ASSESSMENT: PAIN_FUNCTIONAL_ASSESSMENT: 0-10

## 2019-02-13 ASSESSMENT — PAIN SCALES - GENERAL: PAINLEVEL_OUTOF10: 0

## 2019-02-15 RX ORDER — TRAZODONE HYDROCHLORIDE 100 MG/1
TABLET ORAL
Qty: 90 TABLET | Refills: 0 | Status: SHIPPED | OUTPATIENT
Start: 2019-02-15 | End: 2019-05-18 | Stop reason: SDUPTHER

## 2019-02-18 RX ORDER — CITALOPRAM 20 MG/1
TABLET ORAL
Qty: 90 TABLET | Refills: 0 | Status: SHIPPED | OUTPATIENT
Start: 2019-02-18 | End: 2019-05-16 | Stop reason: SDUPTHER

## 2019-02-19 ENCOUNTER — OFFICE VISIT (OUTPATIENT)
Dept: UROLOGY | Age: 74
End: 2019-02-19
Payer: MEDICARE

## 2019-02-19 VITALS
HEIGHT: 70 IN | WEIGHT: 210 LBS | BODY MASS INDEX: 30.06 KG/M2 | DIASTOLIC BLOOD PRESSURE: 68 MMHG | SYSTOLIC BLOOD PRESSURE: 120 MMHG | HEART RATE: 61 BPM

## 2019-02-19 DIAGNOSIS — C61 PROSTATE CA (HCC): Primary | ICD-10-CM

## 2019-02-19 PROCEDURE — 3017F COLORECTAL CA SCREEN DOC REV: CPT | Performed by: UROLOGY

## 2019-02-19 PROCEDURE — G8417 CALC BMI ABV UP PARAM F/U: HCPCS | Performed by: UROLOGY

## 2019-02-19 PROCEDURE — 4040F PNEUMOC VAC/ADMIN/RCVD: CPT | Performed by: UROLOGY

## 2019-02-19 PROCEDURE — G8482 FLU IMMUNIZE ORDER/ADMIN: HCPCS | Performed by: UROLOGY

## 2019-02-19 PROCEDURE — G8427 DOCREV CUR MEDS BY ELIG CLIN: HCPCS | Performed by: UROLOGY

## 2019-02-19 PROCEDURE — 99214 OFFICE O/P EST MOD 30 MIN: CPT | Performed by: UROLOGY

## 2019-02-19 PROCEDURE — 1101F PT FALLS ASSESS-DOCD LE1/YR: CPT | Performed by: UROLOGY

## 2019-02-19 PROCEDURE — G8598 ASA/ANTIPLAT THER USED: HCPCS | Performed by: UROLOGY

## 2019-02-19 PROCEDURE — 1123F ACP DISCUSS/DSCN MKR DOCD: CPT | Performed by: UROLOGY

## 2019-02-19 PROCEDURE — 4004F PT TOBACCO SCREEN RCVD TLK: CPT | Performed by: UROLOGY

## 2019-02-21 ENCOUNTER — HOSPITAL ENCOUNTER (OUTPATIENT)
Dept: NUCLEAR MEDICINE | Age: 74
Discharge: HOME | End: 2019-02-23
Payer: MEDICARE

## 2019-02-21 ENCOUNTER — HOSPITAL ENCOUNTER (OUTPATIENT)
Dept: CT IMAGING | Age: 74
Discharge: HOME | End: 2019-02-23
Payer: MEDICARE

## 2019-02-21 VITALS
SYSTOLIC BLOOD PRESSURE: 140 MMHG | HEIGHT: 70 IN | DIASTOLIC BLOOD PRESSURE: 80 MMHG | HEART RATE: 74 BPM | RESPIRATION RATE: 16 BRPM | WEIGHT: 210 LBS | BODY MASS INDEX: 30.06 KG/M2

## 2019-02-21 DIAGNOSIS — C61 PROSTATE CA (HCC): ICD-10-CM

## 2019-02-21 PROCEDURE — 3430000000 HC RX DIAGNOSTIC RADIOPHARMACEUTICAL: Performed by: UROLOGY

## 2019-02-21 PROCEDURE — A9503 TC99M MEDRONATE: HCPCS | Performed by: UROLOGY

## 2019-02-21 PROCEDURE — 72192 CT PELVIS W/O DYE: CPT

## 2019-02-21 PROCEDURE — 78306 BONE IMAGING WHOLE BODY: CPT

## 2019-02-21 RX ORDER — TC 99M MEDRONATE 20 MG/10ML
25 INJECTION, POWDER, LYOPHILIZED, FOR SOLUTION INTRAVENOUS
Status: COMPLETED | OUTPATIENT
Start: 2019-02-21 | End: 2019-02-21

## 2019-02-21 RX ADMIN — TC 99M MEDRONATE 29.9 MILLICURIE: 20 INJECTION, POWDER, LYOPHILIZED, FOR SOLUTION INTRAVENOUS at 10:23

## 2019-02-28 ENCOUNTER — OFFICE VISIT (OUTPATIENT)
Dept: UROLOGY | Age: 74
End: 2019-02-28
Payer: MEDICARE

## 2019-02-28 VITALS
HEART RATE: 64 BPM | HEIGHT: 70 IN | BODY MASS INDEX: 30.06 KG/M2 | SYSTOLIC BLOOD PRESSURE: 122 MMHG | WEIGHT: 210 LBS | DIASTOLIC BLOOD PRESSURE: 64 MMHG

## 2019-02-28 DIAGNOSIS — C61 PROSTATE CA (HCC): Primary | ICD-10-CM

## 2019-02-28 PROCEDURE — G8598 ASA/ANTIPLAT THER USED: HCPCS | Performed by: UROLOGY

## 2019-02-28 PROCEDURE — 4004F PT TOBACCO SCREEN RCVD TLK: CPT | Performed by: UROLOGY

## 2019-02-28 PROCEDURE — G8427 DOCREV CUR MEDS BY ELIG CLIN: HCPCS | Performed by: UROLOGY

## 2019-02-28 PROCEDURE — G8417 CALC BMI ABV UP PARAM F/U: HCPCS | Performed by: UROLOGY

## 2019-02-28 PROCEDURE — G8482 FLU IMMUNIZE ORDER/ADMIN: HCPCS | Performed by: UROLOGY

## 2019-02-28 PROCEDURE — 4040F PNEUMOC VAC/ADMIN/RCVD: CPT | Performed by: UROLOGY

## 2019-02-28 PROCEDURE — 3017F COLORECTAL CA SCREEN DOC REV: CPT | Performed by: UROLOGY

## 2019-02-28 PROCEDURE — 1101F PT FALLS ASSESS-DOCD LE1/YR: CPT | Performed by: UROLOGY

## 2019-02-28 PROCEDURE — 99214 OFFICE O/P EST MOD 30 MIN: CPT | Performed by: UROLOGY

## 2019-02-28 PROCEDURE — 1123F ACP DISCUSS/DSCN MKR DOCD: CPT | Performed by: UROLOGY

## 2019-03-11 ENCOUNTER — NURSE ONLY (OUTPATIENT)
Dept: UROLOGY | Age: 74
End: 2019-03-11

## 2019-03-11 ENCOUNTER — TELEPHONE (OUTPATIENT)
Dept: UROLOGY | Age: 74
End: 2019-03-11

## 2019-03-11 DIAGNOSIS — R31.0 GROSS HEMATURIA: Primary | ICD-10-CM

## 2019-03-11 DIAGNOSIS — R31.0 GROSS HEMATURIA: ICD-10-CM

## 2019-03-11 RX ORDER — BUDESONIDE 3 MG/1
6 CAPSULE, COATED PELLETS ORAL EVERY MORNING
Qty: 180 CAPSULE | Refills: 2 | Status: SHIPPED | OUTPATIENT
Start: 2019-03-11 | End: 2019-11-19 | Stop reason: SDUPTHER

## 2019-03-11 RX ORDER — SULFAMETHOXAZOLE AND TRIMETHOPRIM 800; 160 MG/1; MG/1
1 TABLET ORAL 2 TIMES DAILY
Qty: 14 TABLET | Refills: 0 | Status: SHIPPED | OUTPATIENT
Start: 2019-03-11 | End: 2019-03-18

## 2019-03-13 LAB — URINE CULTURE, ROUTINE: NORMAL

## 2019-03-20 ENCOUNTER — TELEPHONE (OUTPATIENT)
Dept: UROLOGY | Age: 74
End: 2019-03-20

## 2019-04-04 ENCOUNTER — OFFICE VISIT (OUTPATIENT)
Dept: UROLOGY | Age: 74
End: 2019-04-04
Payer: MEDICARE

## 2019-04-04 VITALS
SYSTOLIC BLOOD PRESSURE: 136 MMHG | BODY MASS INDEX: 30.92 KG/M2 | HEART RATE: 85 BPM | WEIGHT: 216 LBS | DIASTOLIC BLOOD PRESSURE: 82 MMHG | HEIGHT: 70 IN

## 2019-04-04 DIAGNOSIS — C61 PROSTATE CANCER (HCC): Primary | ICD-10-CM

## 2019-04-04 PROCEDURE — 4040F PNEUMOC VAC/ADMIN/RCVD: CPT | Performed by: UROLOGY

## 2019-04-04 PROCEDURE — G8598 ASA/ANTIPLAT THER USED: HCPCS | Performed by: UROLOGY

## 2019-04-04 PROCEDURE — 3017F COLORECTAL CA SCREEN DOC REV: CPT | Performed by: UROLOGY

## 2019-04-04 PROCEDURE — 4004F PT TOBACCO SCREEN RCVD TLK: CPT | Performed by: UROLOGY

## 2019-04-04 PROCEDURE — 99214 OFFICE O/P EST MOD 30 MIN: CPT | Performed by: UROLOGY

## 2019-04-04 PROCEDURE — G8417 CALC BMI ABV UP PARAM F/U: HCPCS | Performed by: UROLOGY

## 2019-04-04 PROCEDURE — G8427 DOCREV CUR MEDS BY ELIG CLIN: HCPCS | Performed by: UROLOGY

## 2019-04-04 PROCEDURE — 1123F ACP DISCUSS/DSCN MKR DOCD: CPT | Performed by: UROLOGY
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
## 2019-04-17 ENCOUNTER — HOSPITAL ENCOUNTER (OUTPATIENT)
Dept: RADIATION ONCOLOGY | Age: 74
Discharge: HOME | End: 2019-04-17
Payer: MEDICARE

## 2019-04-17 VITALS
TEMPERATURE: 97.3 F | OXYGEN SATURATION: 94 % | HEART RATE: 64 BPM | HEIGHT: 70 IN | SYSTOLIC BLOOD PRESSURE: 160 MMHG | WEIGHT: 216 LBS | DIASTOLIC BLOOD PRESSURE: 87 MMHG | BODY MASS INDEX: 30.92 KG/M2 | RESPIRATION RATE: 16 BRPM

## 2019-04-17 DIAGNOSIS — C61 PROSTATE CANCER (HCC): ICD-10-CM

## 2019-04-17 PROCEDURE — 99214 OFFICE O/P EST MOD 30 MIN: CPT | Performed by: RADIOLOGY

## 2019-04-17 RX ORDER — BICALUTAMIDE 50 MG/1
50 TABLET, FILM COATED ORAL DAILY
Qty: 120 TABLET | Refills: 0 | Status: SHIPPED | OUTPATIENT
Start: 2019-04-17 | End: 2019-11-22 | Stop reason: ALTCHOICE
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
## 2019-04-22 LAB
ALBUMIN SERPL-MCNC: 4.1 G/DL (ref 3.5–4.6)
ALP BLD-CCNC: 101 U/L (ref 35–104)
ALT SERPL-CCNC: 10 U/L (ref 0–41)
AST SERPL-CCNC: 19 U/L (ref 0–40)
BASOPHILS ABSOLUTE: 0.1 K/UL (ref 0–0.2)
BASOPHILS RELATIVE PERCENT: 1.1 %
BILIRUB SERPL-MCNC: 0.3 MG/DL (ref 0.2–0.7)
BILIRUBIN DIRECT: <0.2 MG/DL (ref 0–0.4)
BILIRUBIN, INDIRECT: NORMAL MG/DL (ref 0–0.6)
EOSINOPHILS ABSOLUTE: 0.6 K/UL (ref 0–0.7)
EOSINOPHILS RELATIVE PERCENT: 8 %
HCT VFR BLD CALC: 46 % (ref 42–52)
HEMOGLOBIN: 15.2 G/DL (ref 14–18)
LYMPHOCYTES ABSOLUTE: 1.2 K/UL (ref 1–4.8)
LYMPHOCYTES RELATIVE PERCENT: 16.8 %
MCH RBC QN AUTO: 29.4 PG (ref 27–31.3)
MCHC RBC AUTO-ENTMCNC: 33.1 % (ref 33–37)
MCV RBC AUTO: 88.9 FL (ref 80–100)
MONOCYTES ABSOLUTE: 0.4 K/UL (ref 0.2–0.8)
MONOCYTES RELATIVE PERCENT: 5.8 %
NEUTROPHILS ABSOLUTE: 5 K/UL (ref 1.4–6.5)
NEUTROPHILS RELATIVE PERCENT: 68.3 %
PDW BLD-RTO: 15.2 % (ref 11.5–14.5)
PLATELET # BLD: 130 K/UL (ref 130–400)
PROSTATE SPECIFIC ANTIGEN: 12.07 NG/ML (ref 0–6.22)
RBC # BLD: 5.18 M/UL (ref 4.7–6.1)
TOTAL PROTEIN: 7.1 G/DL (ref 6.3–8)
WBC # BLD: 7.4 K/UL (ref 4.8–10.8)

## 2019-04-23 ENCOUNTER — OFFICE VISIT (OUTPATIENT)
Dept: UROLOGY | Age: 74
End: 2019-04-23

## 2019-04-23 DIAGNOSIS — C61 PROSTATE CANCER (HCC): Primary | ICD-10-CM

## 2019-04-23 LAB — TESTOSTERONE TOTAL-MALE: 503 NG/DL (ref 300–720)

## 2019-04-23 PROCEDURE — 99024 POSTOP FOLLOW-UP VISIT: CPT | Performed by: UROLOGY

## 2019-04-30 ENCOUNTER — OFFICE VISIT (OUTPATIENT)
Dept: UROLOGY | Age: 74
End: 2019-04-30
Payer: MEDICARE

## 2019-04-30 VITALS
BODY MASS INDEX: 30.77 KG/M2 | WEIGHT: 214.9 LBS | DIASTOLIC BLOOD PRESSURE: 50 MMHG | SYSTOLIC BLOOD PRESSURE: 100 MMHG | HEART RATE: 64 BPM | HEIGHT: 70 IN

## 2019-04-30 DIAGNOSIS — C61 PROSTATE CANCER (HCC): Primary | ICD-10-CM

## 2019-04-30 PROCEDURE — G8427 DOCREV CUR MEDS BY ELIG CLIN: HCPCS | Performed by: UROLOGY

## 2019-04-30 PROCEDURE — 4004F PT TOBACCO SCREEN RCVD TLK: CPT | Performed by: UROLOGY

## 2019-04-30 PROCEDURE — 96402 CHEMO HORMON ANTINEOPL SQ/IM: CPT | Performed by: UROLOGY

## 2019-04-30 PROCEDURE — G8417 CALC BMI ABV UP PARAM F/U: HCPCS | Performed by: UROLOGY

## 2019-04-30 PROCEDURE — 4040F PNEUMOC VAC/ADMIN/RCVD: CPT | Performed by: UROLOGY

## 2019-04-30 PROCEDURE — 1123F ACP DISCUSS/DSCN MKR DOCD: CPT | Performed by: UROLOGY

## 2019-04-30 PROCEDURE — 99213 OFFICE O/P EST LOW 20 MIN: CPT | Performed by: UROLOGY

## 2019-04-30 PROCEDURE — 3017F COLORECTAL CA SCREEN DOC REV: CPT | Performed by: UROLOGY

## 2019-04-30 PROCEDURE — G8598 ASA/ANTIPLAT THER USED: HCPCS | Performed by: UROLOGY
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
## 2019-05-09 ENCOUNTER — OFFICE VISIT (OUTPATIENT)
Dept: UROLOGY | Age: 74
End: 2019-05-09

## 2019-05-09 VITALS
WEIGHT: 212 LBS | BODY MASS INDEX: 30.35 KG/M2 | HEIGHT: 70 IN | HEART RATE: 61 BPM | DIASTOLIC BLOOD PRESSURE: 74 MMHG | SYSTOLIC BLOOD PRESSURE: 128 MMHG

## 2019-05-09 DIAGNOSIS — C61 PROSTATE CANCER (HCC): Primary | ICD-10-CM

## 2019-05-09 PROCEDURE — 99024 POSTOP FOLLOW-UP VISIT: CPT | Performed by: UROLOGY

## 2019-05-09 NOTE — PROGRESS NOTES
Urology  Patient received Lupron injection last week  No issues with injection site  Normal postop check  Follow-up 6 months with no PSA planned at this time  Patient has follow-up for 6 month injection of Lupron  Dr Corinna Perez

## 2019-05-16 RX ORDER — CITALOPRAM 20 MG/1
TABLET ORAL
Qty: 90 TABLET | Refills: 0 | Status: SHIPPED | OUTPATIENT
Start: 2019-05-16 | End: 2019-09-09 | Stop reason: SDUPTHER

## 2019-05-20 RX ORDER — TRAZODONE HYDROCHLORIDE 100 MG/1
TABLET ORAL
Qty: 90 TABLET | Refills: 0 | Status: SHIPPED | OUTPATIENT
Start: 2019-05-20 | End: 2019-08-14 | Stop reason: SDUPTHER

## 2019-06-07 ENCOUNTER — HOSPITAL ENCOUNTER (OUTPATIENT)
Dept: RADIATION ONCOLOGY | Age: 74
Discharge: HOME | End: 2019-06-07
Payer: MEDICARE

## 2019-06-07 DIAGNOSIS — C61 PROSTATE CANCER (HCC): Primary | ICD-10-CM

## 2019-06-07 PROCEDURE — 99213 OFFICE O/P EST LOW 20 MIN: CPT | Performed by: RADIOLOGY

## 2019-06-07 PROCEDURE — 55876 PLACE RT DEVICE/MARKER PROS: CPT | Performed by: RADIOLOGY

## 2019-06-07 PROCEDURE — 55874 TPRNL PLMT BIODEGRDABL MATRL: CPT | Performed by: RADIOLOGY

## 2019-06-07 PROCEDURE — 76942 ECHO GUIDE FOR BIOPSY: CPT | Performed by: RADIOLOGY

## 2019-06-07 PROCEDURE — A4648 IMPLANTABLE TISSUE MARKER: HCPCS

## 2019-06-07 RX ORDER — CIPROFLOXACIN 500 MG/1
500 TABLET, FILM COATED ORAL 2 TIMES DAILY
COMMUNITY
Start: 2019-06-07 | End: 2019-06-14

## 2019-06-07 NOTE — ONCOLOGY
6/7/2019        Referring MD: Carl Matos MD                [facility]      RADIATION ONCOLOGY PROCEDURE NOTE: Prostate fiducial marker and [id number] placement    Patient Name:  Ray Bonilla Record #: [id number]  YOB: 1945    DIAGNOSIS: High risk prostate cancer, [id number], [place] 4+5=9 with PNI, 6/12 cores+ all from left side, PSA 12.07. Highest grade & PNI at L apex, L mid. 80% percentages at those locations also. 78cc volume at biopsy. Hx green light prostate procedure for BPH in past.    PROCEDURE: Xeralto and ASA halted 1 week prior to procedure. The patient was placed in the dorsal lithotomy position on the examination table. The genitalia were mobilized out of the way, and I placed topical Emla cream over the perineum to reduce skin sensitivity. We then placed a rectal probe and positioned it so the prostate was easily visualized. Multiple calcifications and cysts were seen. Volume was consistent with the 78cc biopsy estimate. The perineum was then sterilized with Betadine. I anesthetized the perineum with 2% Lidocaine for injection with Epinephrine injecting a total of 20cc of that solution along the expected needle tracts and near the pudendal nerves. I then placed a total of four fiducial markers in the prostate using stranded fiducial sets (2 markers in a single needle joined with a 2cm absorbable suture). One strand was placed on the left (markers at the apex and base), and similarly 2 markers were placed on the right. An effort was made to place them at least 2cm apart as allowed by prostate anatomy, and not near the capsule or urethra. At this point a needle was positioned in the enma-prostatic fat between the prostate and rectum, and 7-8cc of sterile normal saline was injected to separate the tissue planes, done under sagittal and axial imaging.   After verifying positioning of the needle tip at the midpoint of the prostate, 10cc of SpaceOAR reagents were injected over a 10 second time span, while observing under sagittal ultrasound. Fiducial marker placement and the distribution of gel was inspected by ultrasound after completion. The patient had no difficulties with the procedure, minimal if any pain. . When finished, the ultrasound probe was removed and we cleaned the perineum and released the patient. I placed him on Cipro prophylactically. Jeraline Mooring and ASA to be resumed tomorrow. In one week we plan to do the CT simulation and MRI, allowing sufficient time for the fiducials to scar in place and avoid potential migration between the time of simulation and treatment.         THIS REPORT HAS BEEN ELECTRONICALLY SIGNED:  6/7/2019  3:59 PM    Mayo Renner MD

## 2019-06-20 ENCOUNTER — HOSPITAL ENCOUNTER (OUTPATIENT)
Dept: MRI IMAGING | Age: 74
Discharge: HOME | End: 2019-06-22
Payer: MEDICARE

## 2019-06-20 DIAGNOSIS — C61 PROSTATE CANCER (HCC): ICD-10-CM

## 2019-06-20 LAB
GFR AFRICAN AMERICAN: >60
GFR NON-AFRICAN AMERICAN: >60
PERFORMED ON: NORMAL
POC CREATININE: 1 MG/DL (ref 0.8–1.3)
POC SAMPLE TYPE: NORMAL

## 2019-06-20 PROCEDURE — 77399 UNLISTED PX MED RADJ PHYSICS: CPT | Performed by: RADIOLOGY

## 2019-06-20 PROCEDURE — 77290 THER RAD SIMULAJ FIELD CPLX: CPT | Performed by: RADIOLOGY

## 2019-06-20 PROCEDURE — 72197 MRI PELVIS W/O & W/DYE: CPT

## 2019-06-20 PROCEDURE — 99214 OFFICE O/P EST MOD 30 MIN: CPT | Performed by: RADIOLOGY

## 2019-06-20 PROCEDURE — 6360000004 HC RX CONTRAST MEDICATION: Performed by: RADIOLOGY

## 2019-06-20 PROCEDURE — A9579 GAD-BASE MR CONTRAST NOS,1ML: HCPCS | Performed by: RADIOLOGY

## 2019-06-20 PROCEDURE — 77334 RADIATION TREATMENT AID(S): CPT | Performed by: RADIOLOGY

## 2019-06-20 RX ORDER — 0.9 % SODIUM CHLORIDE 0.9 %
10 VIAL (ML) INJECTION ONCE
Status: DISCONTINUED | OUTPATIENT
Start: 2019-06-20 | End: 2019-06-23 | Stop reason: HOSPADM

## 2019-06-20 RX ADMIN — GADOTERIDOL 20 ML: 279.3 INJECTION, SOLUTION INTRAVENOUS at 12:55

## 2019-06-27 PROCEDURE — 77300 RADIATION THERAPY DOSE PLAN: CPT | Performed by: RADIOLOGY

## 2019-06-27 PROCEDURE — 77338 DESIGN MLC DEVICE FOR IMRT: CPT | Performed by: RADIOLOGY

## 2019-06-27 PROCEDURE — 77301 RADIOTHERAPY DOSE PLAN IMRT: CPT | Performed by: RADIOLOGY

## 2019-07-01 ENCOUNTER — HOSPITAL ENCOUNTER (OUTPATIENT)
Dept: RADIATION ONCOLOGY | Age: 74
Discharge: HOME | End: 2019-07-01
Payer: MEDICARE

## 2019-07-01 DIAGNOSIS — C61 PROSTATE CANCER (HCC): Primary | ICD-10-CM

## 2019-07-01 PROCEDURE — 77280 THER RAD SIMULAJ FIELD SMPL: CPT

## 2019-07-01 PROCEDURE — 77387 GUIDANCE FOR RADJ TX DLVR: CPT | Performed by: RADIOLOGY

## 2019-07-02 PROCEDURE — 99211 OFF/OP EST MAY X REQ PHY/QHP: CPT | Performed by: RADIOLOGY

## 2019-07-02 PROCEDURE — 77385 HC NTSTY MODUL RAD TX DLVR SMPL: CPT | Performed by: RADIOLOGY

## 2019-07-03 PROCEDURE — 77385 HC NTSTY MODUL RAD TX DLVR SMPL: CPT | Performed by: RADIOLOGY

## 2019-07-05 PROCEDURE — 77385 HC NTSTY MODUL RAD TX DLVR SMPL: CPT | Performed by: RADIOLOGY

## 2019-07-05 PROCEDURE — 77300 RADIATION THERAPY DOSE PLAN: CPT | Performed by: RADIOLOGY

## 2019-07-08 PROCEDURE — 77385 HC NTSTY MODUL RAD TX DLVR SMPL: CPT | Performed by: RADIOLOGY

## 2019-07-09 PROCEDURE — 77385 HC NTSTY MODUL RAD TX DLVR SMPL: CPT | Performed by: RADIOLOGY

## 2019-07-09 PROCEDURE — 77336 RADIATION PHYSICS CONSULT: CPT | Performed by: RADIOLOGY

## 2019-07-09 PROCEDURE — 99212 OFFICE O/P EST SF 10 MIN: CPT | Performed by: RADIOLOGY

## 2019-07-10 PROCEDURE — 77385 HC NTSTY MODUL RAD TX DLVR SMPL: CPT | Performed by: RADIOLOGY

## 2019-07-11 DIAGNOSIS — I10 ESSENTIAL HYPERTENSION: ICD-10-CM

## 2019-07-11 DIAGNOSIS — E78.00 PURE HYPERCHOLESTEROLEMIA: ICD-10-CM

## 2019-07-11 LAB
ALBUMIN SERPL-MCNC: 4.3 G/DL (ref 3.5–4.6)
ALP BLD-CCNC: 93 U/L (ref 35–104)
ALT SERPL-CCNC: 17 U/L (ref 0–41)
ANION GAP SERPL CALCULATED.3IONS-SCNC: 14 MEQ/L (ref 9–15)
AST SERPL-CCNC: 21 U/L (ref 0–40)
BASOPHILS ABSOLUTE: 0.1 K/UL (ref 0–0.2)
BASOPHILS RELATIVE PERCENT: 1 %
BILIRUB SERPL-MCNC: 0.4 MG/DL (ref 0.2–0.7)
BUN BLDV-MCNC: 11 MG/DL (ref 8–23)
CALCIUM SERPL-MCNC: 9.2 MG/DL (ref 8.5–9.9)
CHLORIDE BLD-SCNC: 100 MEQ/L (ref 95–107)
CHOLESTEROL, TOTAL: 124 MG/DL (ref 0–199)
CO2: 28 MEQ/L (ref 20–31)
CREAT SERPL-MCNC: 0.8 MG/DL (ref 0.7–1.2)
EOSINOPHILS ABSOLUTE: 0.3 K/UL (ref 0–0.7)
EOSINOPHILS RELATIVE PERCENT: 5.1 %
GFR AFRICAN AMERICAN: >60
GFR NON-AFRICAN AMERICAN: >60
GLOBULIN: 2.6 G/DL (ref 2.3–3.5)
GLUCOSE BLD-MCNC: 128 MG/DL (ref 70–99)
HCT VFR BLD CALC: 41.3 % (ref 42–52)
HDLC SERPL-MCNC: 45 MG/DL (ref 40–59)
HEMOGLOBIN: 14.3 G/DL (ref 14–18)
LDL CHOLESTEROL CALCULATED: 64 MG/DL (ref 0–129)
LYMPHOCYTES ABSOLUTE: 0.5 K/UL (ref 1–4.8)
LYMPHOCYTES RELATIVE PERCENT: 10.5 %
MCH RBC QN AUTO: 31 PG (ref 27–31.3)
MCHC RBC AUTO-ENTMCNC: 34.7 % (ref 33–37)
MCV RBC AUTO: 89.2 FL (ref 80–100)
MONOCYTES ABSOLUTE: 0.3 K/UL (ref 0.2–0.8)
MONOCYTES RELATIVE PERCENT: 6.6 %
NEUTROPHILS ABSOLUTE: 3.8 K/UL (ref 1.4–6.5)
NEUTROPHILS RELATIVE PERCENT: 76.8 %
PDW BLD-RTO: 14.8 % (ref 11.5–14.5)
PLATELET # BLD: 126 K/UL (ref 130–400)
POTASSIUM SERPL-SCNC: 4.2 MEQ/L (ref 3.4–4.9)
RBC # BLD: 4.62 M/UL (ref 4.7–6.1)
SODIUM BLD-SCNC: 142 MEQ/L (ref 135–144)
TOTAL PROTEIN: 6.9 G/DL (ref 6.3–8)
TRIGL SERPL-MCNC: 74 MG/DL (ref 0–150)
WBC # BLD: 4.9 K/UL (ref 4.8–10.8)

## 2019-07-11 PROCEDURE — 77385 HC NTSTY MODUL RAD TX DLVR SMPL: CPT | Performed by: RADIOLOGY

## 2019-07-12 PROCEDURE — 77385 HC NTSTY MODUL RAD TX DLVR SMPL: CPT | Performed by: RADIOLOGY

## 2019-07-15 PROCEDURE — 77385 HC NTSTY MODUL RAD TX DLVR SMPL: CPT | Performed by: RADIOLOGY

## 2019-07-16 PROCEDURE — 77385 HC NTSTY MODUL RAD TX DLVR SMPL: CPT | Performed by: RADIOLOGY

## 2019-07-16 PROCEDURE — 99212 OFFICE O/P EST SF 10 MIN: CPT | Performed by: RADIOLOGY

## 2019-07-16 PROCEDURE — 77336 RADIATION PHYSICS CONSULT: CPT | Performed by: RADIOLOGY

## 2019-07-17 PROCEDURE — 77385 HC NTSTY MODUL RAD TX DLVR SMPL: CPT | Performed by: RADIOLOGY

## 2019-07-18 PROCEDURE — 77385 HC NTSTY MODUL RAD TX DLVR SMPL: CPT | Performed by: RADIOLOGY

## 2019-07-19 PROCEDURE — 77385 HC NTSTY MODUL RAD TX DLVR SMPL: CPT | Performed by: RADIOLOGY

## 2019-07-20 DIAGNOSIS — R73.09 ABNORMAL BLOOD SUGAR: ICD-10-CM

## 2019-07-20 LAB — HBA1C MFR BLD: 6.7 % (ref 4.8–5.9)

## 2019-07-22 PROCEDURE — 77385 HC NTSTY MODUL RAD TX DLVR SMPL: CPT | Performed by: RADIOLOGY

## 2019-07-23 VITALS
HEART RATE: 63 BPM | OXYGEN SATURATION: 96 % | DIASTOLIC BLOOD PRESSURE: 65 MMHG | WEIGHT: 205 LBS | SYSTOLIC BLOOD PRESSURE: 137 MMHG | RESPIRATION RATE: 16 BRPM | TEMPERATURE: 97.3 F | BODY MASS INDEX: 29.41 KG/M2

## 2019-07-23 PROCEDURE — 77336 RADIATION PHYSICS CONSULT: CPT | Performed by: RADIOLOGY

## 2019-07-23 PROCEDURE — 77385 HC NTSTY MODUL RAD TX DLVR SMPL: CPT | Performed by: RADIOLOGY

## 2019-07-23 PROCEDURE — 99212 OFFICE O/P EST SF 10 MIN: CPT | Performed by: RADIOLOGY

## 2019-07-24 PROCEDURE — 77385 HC NTSTY MODUL RAD TX DLVR SMPL: CPT | Performed by: RADIOLOGY

## 2019-07-25 PROCEDURE — 77385 HC NTSTY MODUL RAD TX DLVR SMPL: CPT | Performed by: RADIOLOGY

## 2019-07-26 ENCOUNTER — OFFICE VISIT (OUTPATIENT)
Dept: FAMILY MEDICINE CLINIC | Age: 74
End: 2019-07-26
Payer: MEDICARE

## 2019-07-26 VITALS
HEART RATE: 56 BPM | WEIGHT: 203 LBS | HEIGHT: 70 IN | OXYGEN SATURATION: 96 % | RESPIRATION RATE: 10 BRPM | BODY MASS INDEX: 29.06 KG/M2 | DIASTOLIC BLOOD PRESSURE: 60 MMHG | TEMPERATURE: 95.4 F | SYSTOLIC BLOOD PRESSURE: 118 MMHG

## 2019-07-26 DIAGNOSIS — F51.04 CHRONIC INSOMNIA: ICD-10-CM

## 2019-07-26 DIAGNOSIS — E11.9 NEW ONSET TYPE 2 DIABETES MELLITUS (HCC): ICD-10-CM

## 2019-07-26 DIAGNOSIS — F33.41 RECURRENT MAJOR DEPRESSIVE DISORDER, IN PARTIAL REMISSION (HCC): Primary | ICD-10-CM

## 2019-07-26 DIAGNOSIS — T16.2XXA EAR FOREIGN BODY, LEFT, INITIAL ENCOUNTER: ICD-10-CM

## 2019-07-26 DIAGNOSIS — E78.00 PURE HYPERCHOLESTEROLEMIA: ICD-10-CM

## 2019-07-26 DIAGNOSIS — S90.861A INSECT BITE OF RIGHT FOOT, INITIAL ENCOUNTER: ICD-10-CM
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
## 2019-07-26 PROCEDURE — 77385 HC NTSTY MODUL RAD TX DLVR SMPL: CPT | Performed by: RADIOLOGY

## 2019-07-26 PROCEDURE — G8427 DOCREV CUR MEDS BY ELIG CLIN: HCPCS | Performed by: FAMILY MEDICINE

## 2019-07-26 PROCEDURE — 2022F DILAT RTA XM EVC RTNOPTHY: CPT | Performed by: FAMILY MEDICINE

## 2019-07-26 PROCEDURE — G8417 CALC BMI ABV UP PARAM F/U: HCPCS | Performed by: FAMILY MEDICINE

## 2019-07-26 PROCEDURE — G8598 ASA/ANTIPLAT THER USED: HCPCS | Performed by: FAMILY MEDICINE

## 2019-07-26 PROCEDURE — 4004F PT TOBACCO SCREEN RCVD TLK: CPT | Performed by: FAMILY MEDICINE

## 2019-07-26 PROCEDURE — 3017F COLORECTAL CA SCREEN DOC REV: CPT | Performed by: FAMILY MEDICINE

## 2019-07-26 PROCEDURE — 99214 OFFICE O/P EST MOD 30 MIN: CPT | Performed by: FAMILY MEDICINE

## 2019-07-26 PROCEDURE — 1123F ACP DISCUSS/DSCN MKR DOCD: CPT | Performed by: FAMILY MEDICINE

## 2019-07-26 PROCEDURE — 4040F PNEUMOC VAC/ADMIN/RCVD: CPT | Performed by: FAMILY MEDICINE

## 2019-07-26 PROCEDURE — 3044F HG A1C LEVEL LT 7.0%: CPT | Performed by: FAMILY MEDICINE

## 2019-07-26 RX ORDER — LANCETS 30 GAUGE
1 EACH MISCELLANEOUS DAILY
Qty: 300 EACH | Refills: 1 | Status: SHIPPED | OUTPATIENT
Start: 2019-07-26

## 2019-07-26 RX ORDER — GLUCOSAMINE HCL/CHONDROITIN SU 500-400 MG
CAPSULE ORAL
Qty: 300 STRIP | Refills: 0 | Status: SHIPPED | OUTPATIENT
Start: 2019-07-26 | End: 2019-08-02 | Stop reason: SDUPTHER

## 2019-07-26 ASSESSMENT — ENCOUNTER SYMPTOMS: ABDOMINAL PAIN: 0

## 2019-07-26 NOTE — PROGRESS NOTES
foot, initial encounter     6. Ear foreign body, left, initial encounter           Plan:         Orders Placed This Encounter   Procedures   [facility] Diabetic Education, [place]     Referral Priority:   Routine     Referral Type:   Eval and Treat     Referral Reason:   Specialty Services Required     Number of Visits Requested:   1     Orders Placed This Encounter   Medications    blood glucose monitor kit and supplies     Sig: Test one time a day & as needed for symptoms of irregular blood glucose. Dispense:  1 kit     Refill:  0     Brand per patient preference. May round up to next available package size.  blood glucose monitor strips     Sig: Test one time a day & as needed for symptoms of irregular blood glucose. Dispense:  300 strip     Refill:  0     Brand per patient preference. May round up to next available package size.  Lancets MISC     Si each by Does not apply route daily     Dispense:  300 each     Refill:  1   will work on diet and exercise    Observation for ear as few tiny hairs in canal   claritin daily x 2 weeks.    Continue current meds   Non fasting blood work in 3 months and f/u after done --sooner if needed

## 2019-07-29 PROCEDURE — 77385 HC NTSTY MODUL RAD TX DLVR SMPL: CPT | Performed by: RADIOLOGY

## 2019-07-30 PROCEDURE — 99212 OFFICE O/P EST SF 10 MIN: CPT | Performed by: RADIOLOGY

## 2019-07-30 PROCEDURE — 77336 RADIATION PHYSICS CONSULT: CPT | Performed by: RADIOLOGY

## 2019-07-30 PROCEDURE — 77385 HC NTSTY MODUL RAD TX DLVR SMPL: CPT | Performed by: RADIOLOGY

## 2019-07-31 PROCEDURE — 77385 HC NTSTY MODUL RAD TX DLVR SMPL: CPT | Performed by: RADIOLOGY

## 2019-08-01 ENCOUNTER — HOSPITAL ENCOUNTER (OUTPATIENT)
Dept: RADIATION ONCOLOGY | Age: 74
Discharge: HOME | End: 2019-08-01
Payer: MEDICARE

## 2019-08-01 DIAGNOSIS — R31.0 GROSS HEMATURIA: Primary | ICD-10-CM

## 2019-08-01 LAB
BACTERIA: NEGATIVE /HPF
BILIRUBIN URINE: ABNORMAL
BLOOD, URINE: ABNORMAL
CASTS: ABNORMAL /LPF
CLARITY: ABNORMAL
COLOR: ABNORMAL
EPITHELIAL CELLS, UA: ABNORMAL /HPF (ref 0–5)
GLUCOSE URINE: NEGATIVE MG/DL
HYALINE CASTS: ABNORMAL /HPF (ref 0–5)
KETONES, URINE: NEGATIVE MG/DL
LEUKOCYTE ESTERASE, URINE: ABNORMAL
NITRITE, URINE: POSITIVE
PH UA: 5 (ref 5–9)
PROTEIN UA: >=300 MG/DL
RBC UA: >100 /HPF (ref 0–5)
SPECIFIC GRAVITY UA: 1.01 (ref 1–1.03)
URINE REFLEX TO CULTURE: YES
UROBILINOGEN, URINE: 0.2 E.U./DL
WBC UA: ABNORMAL /HPF (ref 0–5)

## 2019-08-01 RX ORDER — CIPROFLOXACIN 500 MG/1
500 TABLET, FILM COATED ORAL 2 TIMES DAILY
COMMUNITY
End: 2019-08-20 | Stop reason: ALTCHOICE

## 2019-08-02 RX ORDER — GLUCOSAMINE HCL/CHONDROITIN SU 500-400 MG
CAPSULE ORAL
Qty: 300 STRIP | Refills: 0 | Status: SHIPPED | OUTPATIENT
Start: 2019-08-02 | End: 2020-10-27 | Stop reason: SDUPTHER

## 2019-08-03 LAB — URINE CULTURE, ROUTINE: NORMAL

## 2019-08-05 PROCEDURE — 77385 HC NTSTY MODUL RAD TX DLVR SMPL: CPT | Performed by: RADIOLOGY

## 2019-08-05 PROCEDURE — 99213 OFFICE O/P EST LOW 20 MIN: CPT | Performed by: RADIOLOGY

## 2019-08-06 LAB
HCT VFR BLD CALC: 38.7 % (ref 42–52)
HEMOGLOBIN: 13.5 G/DL (ref 14–18)
MCH RBC QN AUTO: 31.3 PG (ref 27–31.3)
MCHC RBC AUTO-ENTMCNC: 34.9 % (ref 33–37)
MCV RBC AUTO: 89.6 FL (ref 80–100)
PDW BLD-RTO: 15.8 % (ref 11.5–14.5)
PLATELET # BLD: 133 K/UL (ref 130–400)
RBC # BLD: 4.32 M/UL (ref 4.7–6.1)
WBC # BLD: 7.5 K/UL (ref 4.8–10.8)

## 2019-08-06 PROCEDURE — 77385 HC NTSTY MODUL RAD TX DLVR SMPL: CPT | Performed by: RADIOLOGY

## 2019-08-07 ENCOUNTER — TELEPHONE (OUTPATIENT)
Dept: FAMILY MEDICINE CLINIC | Age: 74
End: 2019-08-07

## 2019-08-07 DIAGNOSIS — E11.9 NEW ONSET TYPE 2 DIABETES MELLITUS (HCC): Primary | ICD-10-CM

## 2019-08-07 PROCEDURE — 77385 HC NTSTY MODUL RAD TX DLVR SMPL: CPT | Performed by: RADIOLOGY

## 2019-08-07 RX ORDER — LANCETS
EACH MISCELLANEOUS
Qty: 102 EACH | Refills: 5 | Status: ON HOLD | OUTPATIENT
Start: 2019-08-07 | End: 2020-06-19

## 2019-08-08 PROCEDURE — 77336 RADIATION PHYSICS CONSULT: CPT | Performed by: RADIOLOGY

## 2019-08-08 PROCEDURE — 77385 HC NTSTY MODUL RAD TX DLVR SMPL: CPT | Performed by: RADIOLOGY

## 2019-08-09 PROCEDURE — 77385 HC NTSTY MODUL RAD TX DLVR SMPL: CPT | Performed by: RADIOLOGY

## 2019-08-12 PROCEDURE — 77385 HC NTSTY MODUL RAD TX DLVR SMPL: CPT | Performed by: RADIOLOGY

## 2019-08-13 PROCEDURE — 77385 HC NTSTY MODUL RAD TX DLVR SMPL: CPT | Performed by: RADIOLOGY

## 2019-08-13 PROCEDURE — 99212 OFFICE O/P EST SF 10 MIN: CPT | Performed by: RADIOLOGY

## 2019-08-14 PROCEDURE — 77385 HC NTSTY MODUL RAD TX DLVR SMPL: CPT | Performed by: RADIOLOGY

## 2019-08-14 RX ORDER — TRAZODONE HYDROCHLORIDE 100 MG/1
TABLET ORAL
Qty: 90 TABLET | Refills: 0 | Status: SHIPPED | OUTPATIENT
Start: 2019-08-14 | End: 2019-12-12 | Stop reason: SDUPTHER

## 2019-08-15 PROCEDURE — 77385 HC NTSTY MODUL RAD TX DLVR SMPL: CPT | Performed by: RADIOLOGY

## 2019-08-15 PROCEDURE — 77336 RADIATION PHYSICS CONSULT: CPT | Performed by: RADIOLOGY

## 2019-08-16 PROCEDURE — 77385 HC NTSTY MODUL RAD TX DLVR SMPL: CPT | Performed by: RADIOLOGY

## 2019-08-19 PROCEDURE — 77385 HC NTSTY MODUL RAD TX DLVR SMPL: CPT | Performed by: RADIOLOGY

## 2019-08-20 PROCEDURE — 99212 OFFICE O/P EST SF 10 MIN: CPT | Performed by: RADIOLOGY

## 2019-08-20 PROCEDURE — 77385 HC NTSTY MODUL RAD TX DLVR SMPL: CPT | Performed by: RADIOLOGY

## 2019-08-21 PROCEDURE — 77385 HC NTSTY MODUL RAD TX DLVR SMPL: CPT | Performed by: RADIOLOGY

## 2019-08-22 PROCEDURE — 77336 RADIATION PHYSICS CONSULT: CPT | Performed by: RADIOLOGY

## 2019-08-22 PROCEDURE — 77385 HC NTSTY MODUL RAD TX DLVR SMPL: CPT | Performed by: RADIOLOGY

## 2019-08-23 PROCEDURE — 77385 HC NTSTY MODUL RAD TX DLVR SMPL: CPT | Performed by: RADIOLOGY

## 2019-08-26 PROCEDURE — 77385 HC NTSTY MODUL RAD TX DLVR SMPL: CPT | Performed by: RADIOLOGY

## 2019-08-27 PROCEDURE — 99212 OFFICE O/P EST SF 10 MIN: CPT | Performed by: RADIOLOGY

## 2019-08-27 PROCEDURE — 77385 HC NTSTY MODUL RAD TX DLVR SMPL: CPT | Performed by: RADIOLOGY

## 2019-08-28 PROCEDURE — 77385 HC NTSTY MODUL RAD TX DLVR SMPL: CPT | Performed by: RADIOLOGY

## 2019-09-05 LAB
ALBUMIN SERPL-MCNC: 4.1 G/DL (ref 3.5–4.6)
ALP BLD-CCNC: 105 U/L (ref 35–104)
ALT SERPL-CCNC: 6 U/L (ref 0–41)
ANION GAP SERPL CALCULATED.3IONS-SCNC: 14 MEQ/L (ref 9–15)
AST SERPL-CCNC: 17 U/L (ref 0–40)
BILIRUB SERPL-MCNC: 0.3 MG/DL (ref 0.2–0.7)
BUN BLDV-MCNC: 18 MG/DL (ref 8–23)
CALCIUM SERPL-MCNC: 9.3 MG/DL (ref 8.5–9.9)
CHLORIDE BLD-SCNC: 103 MEQ/L (ref 95–107)
CHOLESTEROL, FASTING: 140 MG/DL (ref 0–199)
CO2: 26 MEQ/L (ref 20–31)
CREAT SERPL-MCNC: 0.9 MG/DL (ref 0.7–1.2)
GFR AFRICAN AMERICAN: >60
GFR NON-AFRICAN AMERICAN: >60
GLOBULIN: 3.1 G/DL (ref 2.3–3.5)
GLUCOSE FASTING: 146 MG/DL (ref 70–99)
HDLC SERPL-MCNC: 47 MG/DL (ref 40–59)
LDL CHOLESTEROL CALCULATED: 73 MG/DL (ref 0–129)
POTASSIUM SERPL-SCNC: 4.3 MEQ/L (ref 3.4–4.9)
SODIUM BLD-SCNC: 143 MEQ/L (ref 135–144)
TOTAL PROTEIN: 7.2 G/DL (ref 6.3–8)
TRIGLYCERIDE, FASTING: 100 MG/DL (ref 0–150)

## 2019-09-09 RX ORDER — CITALOPRAM 20 MG/1
TABLET ORAL
Qty: 90 TABLET | Refills: 0 | Status: SHIPPED | OUTPATIENT
Start: 2019-09-09 | End: 2019-12-08 | Stop reason: SDUPTHER

## 2019-10-18 DIAGNOSIS — E11.9 NEW ONSET TYPE 2 DIABETES MELLITUS (HCC): ICD-10-CM

## 2019-10-18 LAB
GLUCOSE BLD-MCNC: 126 MG/DL (ref 70–99)
HBA1C MFR BLD: 6.2 % (ref 4.8–5.9)

## 2019-10-28 ENCOUNTER — OFFICE VISIT (OUTPATIENT)
Dept: FAMILY MEDICINE CLINIC | Age: 74
End: 2019-10-28
Payer: MEDICARE

## 2019-10-28 VITALS
OXYGEN SATURATION: 96 % | RESPIRATION RATE: 15 BRPM | BODY MASS INDEX: 28.92 KG/M2 | HEART RATE: 65 BPM | DIASTOLIC BLOOD PRESSURE: 60 MMHG | SYSTOLIC BLOOD PRESSURE: 112 MMHG | TEMPERATURE: 97.6 F | HEIGHT: 70 IN | WEIGHT: 202 LBS

## 2019-10-28 DIAGNOSIS — E11.9 NEW ONSET TYPE 2 DIABETES MELLITUS (HCC): Primary | ICD-10-CM

## 2019-10-28 DIAGNOSIS — Z23 NEED FOR INFLUENZA VACCINATION: ICD-10-CM

## 2019-10-28 DIAGNOSIS — F33.41 RECURRENT MAJOR DEPRESSIVE DISORDER, IN PARTIAL REMISSION (HCC): ICD-10-CM

## 2019-10-28 DIAGNOSIS — F51.04 CHRONIC INSOMNIA: ICD-10-CM

## 2019-10-28 PROCEDURE — 4004F PT TOBACCO SCREEN RCVD TLK: CPT | Performed by: FAMILY MEDICINE

## 2019-10-28 PROCEDURE — 90653 IIV ADJUVANT VACCINE IM: CPT | Performed by: FAMILY MEDICINE

## 2019-10-28 PROCEDURE — 2022F DILAT RTA XM EVC RTNOPTHY: CPT | Performed by: FAMILY MEDICINE

## 2019-10-28 PROCEDURE — G8427 DOCREV CUR MEDS BY ELIG CLIN: HCPCS | Performed by: FAMILY MEDICINE

## 2019-10-28 PROCEDURE — 3044F HG A1C LEVEL LT 7.0%: CPT | Performed by: FAMILY MEDICINE

## 2019-10-28 PROCEDURE — G0008 ADMIN INFLUENZA VIRUS VAC: HCPCS | Performed by: FAMILY MEDICINE

## 2019-10-28 PROCEDURE — 99213 OFFICE O/P EST LOW 20 MIN: CPT | Performed by: FAMILY MEDICINE

## 2019-10-28 PROCEDURE — G8417 CALC BMI ABV UP PARAM F/U: HCPCS | Performed by: FAMILY MEDICINE

## 2019-10-28 PROCEDURE — G8482 FLU IMMUNIZE ORDER/ADMIN: HCPCS | Performed by: FAMILY MEDICINE

## 2019-10-28 PROCEDURE — 4040F PNEUMOC VAC/ADMIN/RCVD: CPT | Performed by: FAMILY MEDICINE

## 2019-10-28 PROCEDURE — G8598 ASA/ANTIPLAT THER USED: HCPCS | Performed by: FAMILY MEDICINE

## 2019-10-28 PROCEDURE — 3017F COLORECTAL CA SCREEN DOC REV: CPT | Performed by: FAMILY MEDICINE

## 2019-10-28 PROCEDURE — 1123F ACP DISCUSS/DSCN MKR DOCD: CPT | Performed by: FAMILY MEDICINE

## 2019-10-31 ENCOUNTER — OFFICE VISIT (OUTPATIENT)
Dept: UROLOGY | Age: 74
End: 2019-10-31
Payer: MEDICARE

## 2019-10-31 VITALS
DIASTOLIC BLOOD PRESSURE: 60 MMHG | BODY MASS INDEX: 28.92 KG/M2 | WEIGHT: 202 LBS | HEIGHT: 70 IN | HEART RATE: 64 BPM | SYSTOLIC BLOOD PRESSURE: 108 MMHG

## 2019-10-31 DIAGNOSIS — C61 PROSTATE CANCER (HCC): Primary | ICD-10-CM

## 2019-10-31 DIAGNOSIS — R33.9 URINARY RETENTION: ICD-10-CM

## 2019-10-31 LAB — POST VOID RESIDUAL (PVR): 34 ML

## 2019-10-31 PROCEDURE — 3017F COLORECTAL CA SCREEN DOC REV: CPT | Performed by: UROLOGY

## 2019-10-31 PROCEDURE — G8598 ASA/ANTIPLAT THER USED: HCPCS | Performed by: UROLOGY

## 2019-10-31 PROCEDURE — 1123F ACP DISCUSS/DSCN MKR DOCD: CPT | Performed by: UROLOGY

## 2019-10-31 PROCEDURE — 51798 US URINE CAPACITY MEASURE: CPT | Performed by: UROLOGY

## 2019-10-31 PROCEDURE — 99214 OFFICE O/P EST MOD 30 MIN: CPT | Performed by: UROLOGY

## 2019-10-31 PROCEDURE — 96402 CHEMO HORMON ANTINEOPL SQ/IM: CPT | Performed by: UROLOGY

## 2019-10-31 PROCEDURE — G8417 CALC BMI ABV UP PARAM F/U: HCPCS | Performed by: UROLOGY

## 2019-10-31 PROCEDURE — G8427 DOCREV CUR MEDS BY ELIG CLIN: HCPCS | Performed by: UROLOGY

## 2019-10-31 PROCEDURE — 4040F PNEUMOC VAC/ADMIN/RCVD: CPT | Performed by: UROLOGY

## 2019-10-31 PROCEDURE — G8482 FLU IMMUNIZE ORDER/ADMIN: HCPCS | Performed by: UROLOGY

## 2019-10-31 PROCEDURE — 4004F PT TOBACCO SCREEN RCVD TLK: CPT | Performed by: UROLOGY

## 2019-11-19 RX ORDER — BUDESONIDE 3 MG/1
CAPSULE, COATED PELLETS ORAL
Qty: 180 CAPSULE | Refills: 4 | Status: ON HOLD | OUTPATIENT
Start: 2019-11-19 | End: 2020-06-20 | Stop reason: HOSPADM

## 2019-11-22 ENCOUNTER — APPOINTMENT (OUTPATIENT)
Dept: RADIATION ONCOLOGY | Age: 74
End: 2019-11-22
Payer: MEDICARE

## 2019-11-22 ENCOUNTER — HOSPITAL ENCOUNTER (OUTPATIENT)
Dept: RADIATION ONCOLOGY | Age: 74
Discharge: HOME | End: 2019-11-22
Payer: MEDICARE

## 2019-11-22 VITALS
HEART RATE: 58 BPM | SYSTOLIC BLOOD PRESSURE: 98 MMHG | RESPIRATION RATE: 16 BRPM | WEIGHT: 201 LBS | BODY MASS INDEX: 28.84 KG/M2 | OXYGEN SATURATION: 95 % | DIASTOLIC BLOOD PRESSURE: 55 MMHG | TEMPERATURE: 98.7 F

## 2019-11-22 DIAGNOSIS — C61 PROSTATE CANCER (HCC): Primary | ICD-10-CM

## 2019-11-22 PROCEDURE — 99213 OFFICE O/P EST LOW 20 MIN: CPT | Performed by: RADIOLOGY

## 2019-11-22 RX ORDER — TAMSULOSIN HYDROCHLORIDE 0.4 MG/1
0.4 CAPSULE ORAL DAILY
Qty: 90 CAPSULE | Refills: 1 | Status: ON HOLD | OUTPATIENT
Start: 2019-11-22 | End: 2020-06-19

## 2019-11-24 DIAGNOSIS — C61 PROSTATE CANCER (HCC): ICD-10-CM

## 2019-11-24 LAB
BACTERIA: NEGATIVE /HPF
BILIRUBIN URINE: ABNORMAL
BLOOD, URINE: ABNORMAL
CLARITY: ABNORMAL
COLOR: ABNORMAL
CRYSTALS, UA: ABNORMAL
EPITHELIAL CELLS, UA: ABNORMAL /HPF (ref 0–5)
GLUCOSE URINE: NEGATIVE MG/DL
HYALINE CASTS: ABNORMAL /HPF (ref 0–5)
KETONES, URINE: ABNORMAL MG/DL
LEUKOCYTE ESTERASE, URINE: ABNORMAL
NITRITE, URINE: NEGATIVE
PH UA: 5.5 (ref 5–9)
PROTEIN UA: 30 MG/DL
RBC UA: ABNORMAL /HPF (ref 0–2)
SPECIFIC GRAVITY UA: 1.02 (ref 1–1.03)
URINE REFLEX TO CULTURE: YES
UROBILINOGEN, URINE: 0.2 E.U./DL
WBC UA: ABNORMAL /HPF (ref 0–5)

## 2019-11-26 LAB — URINE CULTURE, ROUTINE: NORMAL

## 2019-12-12 ENCOUNTER — OFFICE VISIT (OUTPATIENT)
Dept: NEUROLOGY | Age: 74
End: 2019-12-12
Payer: MEDICARE

## 2019-12-12 VITALS
BODY MASS INDEX: 28.92 KG/M2 | DIASTOLIC BLOOD PRESSURE: 63 MMHG | HEIGHT: 70 IN | WEIGHT: 202 LBS | HEART RATE: 79 BPM | SYSTOLIC BLOOD PRESSURE: 143 MMHG

## 2019-12-12 DIAGNOSIS — F51.01 PRIMARY INSOMNIA: ICD-10-CM

## 2019-12-12 DIAGNOSIS — G25.81 RESTLESS LEG SYNDROME: ICD-10-CM

## 2019-12-12 DIAGNOSIS — G25.0 ESSENTIAL TREMOR: ICD-10-CM

## 2019-12-12 PROCEDURE — 99213 OFFICE O/P EST LOW 20 MIN: CPT | Performed by: PSYCHIATRY & NEUROLOGY

## 2019-12-12 RX ORDER — TRAZODONE HYDROCHLORIDE 100 MG/1
TABLET ORAL
Qty: 90 TABLET | Refills: 0 | Status: SHIPPED | OUTPATIENT
Start: 2019-12-12 | End: 2020-02-24

## 2019-12-13 ASSESSMENT — ENCOUNTER SYMPTOMS
CHOKING: 0
PHOTOPHOBIA: 0
TROUBLE SWALLOWING: 0
VOMITING: 0
SHORTNESS OF BREATH: 0
NAUSEA: 0
BACK PAIN: 0

## 2019-12-17 LAB
ANION GAP SERPL CALCULATED.3IONS-SCNC: 13 MEQ/L (ref 9–15)
BUN BLDV-MCNC: 16 MG/DL (ref 8–23)
CALCIUM SERPL-MCNC: 9 MG/DL (ref 8.5–9.9)
CHLORIDE BLD-SCNC: 100 MEQ/L (ref 95–107)
CO2: 25 MEQ/L (ref 20–31)
CREAT SERPL-MCNC: 0.84 MG/DL (ref 0.7–1.2)
GFR AFRICAN AMERICAN: >60
GFR NON-AFRICAN AMERICAN: >60
GLUCOSE BLD-MCNC: 129 MG/DL (ref 70–99)
POTASSIUM SERPL-SCNC: 4.9 MEQ/L (ref 3.4–4.9)
SODIUM BLD-SCNC: 138 MEQ/L (ref 135–144)

## 2019-12-19 ENCOUNTER — HOSPITAL ENCOUNTER (OUTPATIENT)
Dept: RADIATION ONCOLOGY | Age: 74
Discharge: HOME | End: 2019-12-19
Payer: MEDICARE

## 2019-12-19 VITALS
WEIGHT: 202 LBS | RESPIRATION RATE: 16 BRPM | DIASTOLIC BLOOD PRESSURE: 86 MMHG | HEART RATE: 88 BPM | TEMPERATURE: 98.8 F | BODY MASS INDEX: 28.92 KG/M2 | HEIGHT: 70 IN | SYSTOLIC BLOOD PRESSURE: 154 MMHG

## 2019-12-19 DIAGNOSIS — C61 PROSTATE CANCER (HCC): Primary | ICD-10-CM

## 2019-12-19 PROCEDURE — 99211 OFF/OP EST MAY X REQ PHY/QHP: CPT | Performed by: RADIOLOGY

## 2019-12-19 RX ORDER — HYDROCHLOROTHIAZIDE 12.5 MG/1
12.5 CAPSULE, GELATIN COATED ORAL EVERY OTHER DAY
Status: ON HOLD | COMMUNITY
End: 2020-06-22 | Stop reason: HOSPADM

## 2019-12-19 RX ORDER — METHYLPREDNISOLONE 4 MG/1
TABLET ORAL
Qty: 1 KIT | Refills: 0 | Status: SHIPPED | OUTPATIENT
Start: 2019-12-19 | End: 2019-12-25

## 2019-12-19 RX ORDER — CIPROFLOXACIN 500 MG/1
500 TABLET, FILM COATED ORAL 2 TIMES DAILY
Qty: 20 TABLET | Refills: 0 | Status: SHIPPED | OUTPATIENT
Start: 2019-12-19 | End: 2019-12-29

## 2020-01-08 RX ORDER — CARBIDOPA, LEVODOPA AND ENTACAPONE 25; 200; 100 MG/1; MG/1; MG/1
TABLET, FILM COATED ORAL
Qty: 90 TABLET | Refills: 3 | Status: ON HOLD | OUTPATIENT
Start: 2020-01-08 | End: 2020-06-19

## 2020-01-21 ENCOUNTER — TELEPHONE (OUTPATIENT)
Dept: FAMILY MEDICINE CLINIC | Age: 75
End: 2020-01-21

## 2020-01-21 NOTE — TELEPHONE ENCOUNTER
Patient is calling stating that he was exposed to his daughter who has the flu. Her doctor recommended speaking to primary care provider about getting a booster shot so that he does not get the flu.

## 2020-01-24 RX ORDER — OSELTAMIVIR PHOSPHATE 75 MG/1
75 CAPSULE ORAL 2 TIMES DAILY
Qty: 10 CAPSULE | Refills: 0
Start: 2020-01-24 | End: 2020-01-29

## 2020-02-24 RX ORDER — TRAZODONE HYDROCHLORIDE 100 MG/1
TABLET ORAL
Qty: 90 TABLET | Refills: 0 | Status: SHIPPED | OUTPATIENT
Start: 2020-02-24 | End: 2020-06-24 | Stop reason: SDUPTHER

## 2020-02-24 NOTE — TELEPHONE ENCOUNTER
Rx requested:  Requested Prescriptions     Pending Prescriptions Disp Refills    traZODone (DESYREL) 100 MG tablet [Pharmacy Med Name: TRAZODONE HCL TABS 100MG] 90 tablet 0     Sig: TAKE 1 TABLET NIGHTLY       Last Office Visit:   10/28/2019      Next Visit Date:  Future Appointments   Date Time Provider Elizabeth Avila   3/20/2020  8:00 AM MD PAUL WellsOZ [facility]   4/27/2020  8:15 AM Josiane Yip MD [facility] [place]   5/4/2020  8:00 AM Curly Eckert MD [facility]   6/22/2020  1:00 PM Hyacinth Genao MD [facility]

## 2020-03-05 NOTE — PROGRESS NOTES
Chaperone for Intimate Exam    1. Was chaperone offered as part of the rooming process? offered, declined   2. If Chaperone is declined by patient, NA: chaperone was available and exam completed   3.  Chaperone is n/a Expected Date Of Service: 03/05/2020

## 2020-03-17 ENCOUNTER — TELEPHONE (OUTPATIENT)
Dept: UROLOGY | Age: 75
End: 2020-03-17

## 2020-04-24 ENCOUNTER — TELEPHONE (OUTPATIENT)
Dept: FAMILY MEDICINE CLINIC | Age: 75
End: 2020-04-24

## 2020-04-27 ENCOUNTER — VIRTUAL VISIT (OUTPATIENT)
Dept: FAMILY MEDICINE CLINIC | Age: 75
End: 2020-04-27
Payer: MEDICARE

## 2020-04-27 PROBLEM — E11.59 TYPE 2 DIABETES MELLITUS WITH OTHER CIRCULATORY COMPLICATIONS (HCC): Status: ACTIVE | Noted: 2020-04-27

## 2020-04-27 PROBLEM — F33.41 RECURRENT MAJOR DEPRESSIVE DISORDER, IN PARTIAL REMISSION (HCC): Status: ACTIVE | Noted: 2020-04-27

## 2020-04-27 PROCEDURE — 99443 PR PHYS/QHP TELEPHONE EVALUATION 21-30 MIN: CPT | Performed by: FAMILY MEDICINE

## 2020-04-27 ASSESSMENT — PATIENT HEALTH QUESTIONNAIRE - PHQ9
SUM OF ALL RESPONSES TO PHQ QUESTIONS 1-9: 1
SUM OF ALL RESPONSES TO PHQ9 QUESTIONS 1 & 2: 1
SUM OF ALL RESPONSES TO PHQ QUESTIONS 1-9: 1
1. LITTLE INTEREST OR PLEASURE IN DOING THINGS: 1
2. FEELING DOWN, DEPRESSED OR HOPELESS: 0

## 2020-04-27 ASSESSMENT — ENCOUNTER SYMPTOMS
COUGH: 0
SHORTNESS OF BREATH: 1
DIARRHEA: 1
VOMITING: 0

## 2020-04-27 NOTE — PROGRESS NOTES
Subjective:      Patient ID: Akilah Baker is a 75 y.o. male    Mental Health Problem   This is a chronic problem. The onset of the illness is precipitated by emotional stress. He has not already injured self. He does not contemplate injuring another person. Risk factors that are present for mental illness include neurological disease. Diabetes   Pertinent negatives for diabetes include no chest pain and no weakness. Here in follow up for diabetes and insomnia and depression. Not testing blood sugars. Has lost about 10 pounds over the winter-has modified diet over the winter. .  Chronic insomnia stable with trazadone  celexa working well for depression. Fasting blood sugar around 136. Temp 98.9  bp 122/78  Pulse  60    Review of Systems   Constitutional: Negative for chills and fever. Respiratory: Positive for shortness of breath. Negative for cough. Cardiovascular: Negative for chest pain. Gastrointestinal: Positive for diarrhea. Negative for vomiting. Skin: Negative for rash. Neurological: Negative for weakness. Reviewed allergy, medical, social, surgical, family and med list changes and updated   Files     Social History     Socioeconomic History    Marital status:      Spouse name: Not on file    Number of children: Not on file    Years of education: Not on file    Highest education level: Not on file   Occupational History    Not on file   Social Needs    Financial resource strain: Not on file    Food insecurity     Worry: Not on file     Inability: Not on file    Transportation needs     Medical: Not on file     Non-medical: Not on file   Tobacco Use    Smoking status: Current Every Day Smoker     Packs/day: 1.00     Years: 58.00     Pack years: 58.00     Types: Cigarettes     Start date: 9/21/1958    Smokeless tobacco: Never Used   Substance and Sexual Activity    Alcohol use:  Yes     Alcohol/week: 3.0 standard drinks     Types: 3 Shots of liquor per week

## 2020-05-01 ENCOUNTER — TELEPHONE (OUTPATIENT)
Dept: FAMILY MEDICINE CLINIC | Age: 75
End: 2020-05-01

## 2020-05-01 ENCOUNTER — TELEPHONE (OUTPATIENT)
Dept: UROLOGY | Age: 75
End: 2020-05-01

## 2020-05-01 NOTE — TELEPHONE ENCOUNTER
May want to take him to flu clinic where testing can be done to find out reason for fever.   We cant order covid testing although flu clinic can order this if appropriate      dont think related to radiation

## 2020-05-01 NOTE — TELEPHONE ENCOUNTER
Patient's wife, Jose Pacheco, called and stated that Juan David Hill had a video visit with Dr. Severo Giles on 4/27. She said since then, he's been spiking a low grade temp since the appt. Tuesday it 99.6. She gave him Tylenol and it went down. Wednesday it was back up to 99.6 again. Juan David Hill said he felt hot. She gave him Tylenol on Wednesday again and brought it down 98.7. Thursday it was 99.3 - in morning and 100.2 in the evening, and this morning it's 99.6. Mohan's temp is normally 98.7. She states that he also has diarrhea and has had it all week. She isn't sure if this is     She is very nervous and concerned about the fever. I provided the flu clinic number to her because of the fever and diarrhea symptoms. However, patient's wife also indicated that he has recently been in radiation and finished that last fall, and she's not sure if this has anything to do with his radiation. They both have not left the house since the beginning of March, and she is concerned about taking him out of the house. Is looking for some direction on what she should do.      Patient's wife may be reached at [phone]

## 2020-05-01 NOTE — TELEPHONE ENCOUNTER
Patient has low grade fever and uncontrolled diarhea, has appt for Monday for Lupron inj on Monday. Advised to call PCP regarding fever. Should he postpone his injection?

## 2020-05-08 ENCOUNTER — TELEPHONE (OUTPATIENT)
Dept: UROLOGY | Age: 75
End: 2020-05-08

## 2020-05-18 RX ORDER — PRIMIDONE 250 MG/1
TABLET ORAL
Qty: 90 TABLET | Refills: 3 | Status: SHIPPED | OUTPATIENT
Start: 2020-05-18 | End: 2021-04-21 | Stop reason: SDUPTHER

## 2020-05-26 ENCOUNTER — VIRTUAL VISIT (OUTPATIENT)
Dept: FAMILY MEDICINE CLINIC | Age: 75
End: 2020-05-26
Payer: MEDICARE

## 2020-05-26 PROCEDURE — 99442 PR PHYS/QHP TELEPHONE EVALUATION 11-20 MIN: CPT | Performed by: FAMILY MEDICINE

## 2020-05-26 ASSESSMENT — ENCOUNTER SYMPTOMS
SHORTNESS OF BREATH: 0
ABDOMINAL PAIN: 0

## 2020-05-26 NOTE — PROGRESS NOTES
partner: None     Emotionally abused: None     Physically abused: None     Forced sexual activity: None   Other Topics Concern    None   Social History Narrative    None     Current Outpatient Medications   Medication Sig Dispense Refill    primidone (MYSOLINE) 250 MG tablet TAKE 1 TABLET AT BEDTIME 90 tablet 3    traZODone (DESYREL) 100 MG tablet TAKE 1 TABLET NIGHTLY 90 tablet 0    carbidopa-levodopa-entacapone (STALEVO 100) -200 MG TABS Take 1 Tablet PO at 3pm 90 tablet 3    hydrochlorothiazide (MICROZIDE) 12.5 MG capsule Take 12.5 mg by mouth every other day      citalopram (CELEXA) 20 MG tablet TAKE 1 TABLET DAILY 90 tablet 1    tamsulosin (FLOMAX) 0.4 MG capsule Take 1 capsule by mouth daily Best taken 1/2 hour after last meal of the day. 90 capsule 1    budesonide (ENTOCORT EC) 3 MG extended release capsule TAKE 2 CAPSULES EVERY MORNING 180 capsule 4    ACCU-CHEK FASTCLIX LANCETS MISC Test one time a day & as needed for symptoms of irregular blood glucose. 102 each 5    blood glucose monitor kit and supplies Test one time a day & as needed for symptoms of irregular blood glucose. 1 kit 0    blood glucose monitor strips Test one time a day & as needed for symptoms of irregular blood glucose. 300 strip 0    Lancets MISC 1 each by Does not apply route daily 300 each 1    losartan (COZAAR) 50 MG tablet Take 75 mg by mouth daily       isosorbide mononitrate (IMDUR) 60 MG extended release tablet Take 60 mg by mouth daily      Handicap Placard MISC by Does not apply route Good for 5 years. Good from 1/31/2017 through 1/31/2022. 1 each 0    XARELTO 20 MG TABS tablet 20 mg daily (with breakfast)       metoprolol (LOPRESSOR) 50 MG tablet Take 50 mg by mouth 2 times daily.  atorvastatin (LIPITOR) 80 MG tablet Take 80 mg by mouth daily.  aspirin 81 MG tablet Take 81 mg by mouth daily.       Omega-3 Fatty Acids (FISH OIL) 1200 MG CPDR Take by mouth daily       nitroGLYCERIN (NITROSTAT) 11-20 minutes    Note: not billable if this call serves to triage the patient into an appointment for the relevant concern      Felton Barone   Objective: There were no vitals taken for this visit. Physical Exam  No exam done   Assessment:       Diagnosis Orders   1. Fever, unspecified fever cause  COVID-19 Ambulatory   2. Weight loss     3. Pure hypercholesterolemia     4. Prostate cancer ([facility]     5.  Type 2 diabetes mellitus with other circulatory complications ([facility]           Plan:      Orders Placed This Encounter   Procedures    COVID-19 Ambulatory     Standing Status:   Future     Standing Expiration Date:   5/26/2021     Scheduling Instructions:      Saline media preferred given current shortage of viral transport media but both acceptable   if above neg with need ct abdomen/pelvis and chest and fasting blood work   F/u dependent on blood work

## 2020-05-27 ENCOUNTER — NURSE ONLY (OUTPATIENT)
Dept: PRIMARY CARE CLINIC | Age: 75
End: 2020-05-27

## 2020-05-27 DIAGNOSIS — R50.9 FEVER, UNSPECIFIED FEVER CAUSE: ICD-10-CM

## 2020-05-29 LAB
SARS-COV-2: NOT DETECTED
SOURCE: 2

## 2020-06-05 RX ORDER — CITALOPRAM 20 MG/1
TABLET ORAL
Qty: 90 TABLET | Refills: 0 | Status: SHIPPED | OUTPATIENT
Start: 2020-06-05 | End: 2020-09-03

## 2020-06-11 ENCOUNTER — VIRTUAL VISIT (OUTPATIENT)
Dept: FAMILY MEDICINE CLINIC | Age: 75
End: 2020-06-11
Payer: MEDICARE

## 2020-06-11 PROCEDURE — 4040F PNEUMOC VAC/ADMIN/RCVD: CPT | Performed by: NURSE PRACTITIONER

## 2020-06-11 PROCEDURE — G0438 PPPS, INITIAL VISIT: HCPCS | Performed by: NURSE PRACTITIONER

## 2020-06-11 PROCEDURE — 1123F ACP DISCUSS/DSCN MKR DOCD: CPT | Performed by: NURSE PRACTITIONER

## 2020-06-11 ASSESSMENT — PATIENT HEALTH QUESTIONNAIRE - PHQ9: SUM OF ALL RESPONSES TO PHQ QUESTIONS 1-9: 21

## 2020-06-11 ASSESSMENT — LIFESTYLE VARIABLES: HOW OFTEN DO YOU HAVE A DRINK CONTAINING ALCOHOL: 0

## 2020-06-11 NOTE — Clinical Note
I spoke to patient for AWV. His wife is very concerned about the patient's mental health. She notes that since November when his daughter got diagnosed with terminal illness he has sat in his recliner and has stopped living his life and has shut everyone out. His daughter passed away in February and wife notes that the patient will not do anything at all except sit in the recliner. She is having a hard time coping due to his mental health because she has been shut out. I have recommended referral to psych however they want to hold off at this time.
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
## 2020-06-11 NOTE — PATIENT INSTRUCTIONS
Personalized Preventive Plan for Remington Castro - 6/11/2020  Medicare offers a range of preventive health benefits. Some of the tests and screenings are paid in full while other may be subject to a deductible, co-insurance, and/or copay. Some of these benefits include a comprehensive review of your medical history including lifestyle, illnesses that may run in your family, and various assessments and screenings as appropriate. After reviewing your medical record and screening and assessments performed today your provider may have ordered immunizations, labs, imaging, and/or referrals for you. A list of these orders (if applicable) as well as your Preventive Care list are included within your After Visit Summary for your review. Other Preventive Recommendations:    · A preventive eye exam performed by an eye specialist is recommended every 1-2 years to screen for glaucoma; cataracts, macular degeneration, and other eye disorders. · A preventive dental visit is recommended every 6 months. · Try to get at least 150 minutes of exercise per week or 10,000 steps per day on a pedometer . · Order or download the FREE \"Exercise & Physical Activity: Your Everyday Guide\" from [employer]. Call 4-493.818.4529 or search [employer] online. · You need 5168-0725 mg of calcium and 7578-2505 IU of vitamin D per day. It is possible to meet your calcium requirement with diet alone, but a vitamin D supplement is usually necessary to meet this goal.  · When exposed to the sun, use a sunscreen that protects against both UVA and UVB radiation with an SPF of 30 or greater. Reapply every 2 to 3 hours or after sweating, drying off with a towel, or swimming. · Always wear a seat belt when traveling in a car. Always wear a helmet when riding a bicycle or motorcycle.

## 2020-06-13 ENCOUNTER — HOSPITAL ENCOUNTER (OUTPATIENT)
Dept: CT IMAGING | Age: 75
Discharge: HOME | End: 2020-06-15
Payer: MEDICARE

## 2020-06-13 VITALS — SYSTOLIC BLOOD PRESSURE: 84 MMHG | RESPIRATION RATE: 16 BRPM | DIASTOLIC BLOOD PRESSURE: 49 MMHG | HEART RATE: 79 BPM

## 2020-06-13 LAB
GFR AFRICAN AMERICAN: >60
GFR NON-AFRICAN AMERICAN: >60
PERFORMED ON: ABNORMAL
POC CREATININE: 0.6 MG/DL (ref 0.8–1.3)
POC SAMPLE TYPE: ABNORMAL

## 2020-06-13 PROCEDURE — 71260 CT THORAX DX C+: CPT

## 2020-06-13 PROCEDURE — 6360000004 HC RX CONTRAST MEDICATION: Performed by: FAMILY MEDICINE

## 2020-06-13 PROCEDURE — 74177 CT ABD & PELVIS W/CONTRAST: CPT

## 2020-06-13 RX ORDER — SODIUM CHLORIDE 0.9 % (FLUSH) 0.9 %
10 SYRINGE (ML) INJECTION
Status: DISPENSED | OUTPATIENT
Start: 2020-06-13 | End: 2020-06-13

## 2020-06-13 RX ADMIN — IOPAMIDOL 100 ML: 612 INJECTION, SOLUTION INTRAVENOUS at 12:52

## 2020-06-18 ENCOUNTER — HOSPITAL ENCOUNTER (INPATIENT)
Age: 75
LOS: 3 days | Discharge: HOME | DRG: 281 | End: 2020-06-22
Attending: INTERNAL MEDICINE | Admitting: INTERNAL MEDICINE
Payer: MEDICARE

## 2020-06-18 LAB
ALBUMIN SERPL-MCNC: 2.3 G/DL (ref 3.5–4.6)
ALP BLD-CCNC: 117 U/L (ref 35–104)
ALT SERPL-CCNC: 28 U/L (ref 0–41)
ANION GAP SERPL CALCULATED.3IONS-SCNC: 10 MEQ/L (ref 9–15)
ANISOCYTOSIS: ABNORMAL
APTT: 37.8 SEC (ref 24.4–36.8)
AST SERPL-CCNC: 23 U/L (ref 0–40)
BASOPHILS ABSOLUTE: 0 K/UL (ref 0–0.2)
BASOPHILS RELATIVE PERCENT: 0.7 %
BILIRUB SERPL-MCNC: 0.3 MG/DL (ref 0.2–0.7)
BILIRUBIN URINE: NEGATIVE
BLOOD, URINE: NEGATIVE
BUN BLDV-MCNC: 9 MG/DL (ref 8–23)
BURR CELLS: ABNORMAL
CALCIUM SERPL-MCNC: 7.7 MG/DL (ref 8.5–9.9)
CHLORIDE BLD-SCNC: 97 MEQ/L (ref 95–107)
CLARITY: CLEAR
CO2: 27 MEQ/L (ref 20–31)
COLOR: YELLOW
CREAT SERPL-MCNC: 0.55 MG/DL (ref 0.7–1.2)
EOSINOPHILS ABSOLUTE: 0.1 K/UL (ref 0–0.7)
EOSINOPHILS RELATIVE PERCENT: 1 %
GFR AFRICAN AMERICAN: >60
GFR NON-AFRICAN AMERICAN: >60
GLOBULIN: 3 G/DL (ref 2.3–3.5)
GLUCOSE BLD-MCNC: 196 MG/DL (ref 70–99)
GLUCOSE URINE: NEGATIVE MG/DL
HCT VFR BLD CALC: 29 % (ref 42–52)
HEMOGLOBIN: 9.2 G/DL (ref 14–18)
INR BLD: 1.4
KETONES, URINE: NEGATIVE MG/DL
LEUKOCYTE ESTERASE, URINE: NEGATIVE
LYMPHOCYTES ABSOLUTE: 0.4 K/UL (ref 1–4.8)
LYMPHOCYTES RELATIVE PERCENT: 6.1 %
MCH RBC QN AUTO: 23 PG (ref 27–31.3)
MCHC RBC AUTO-ENTMCNC: 31.7 % (ref 33–37)
MCV RBC AUTO: 72.5 FL (ref 80–100)
MICROCYTES: ABNORMAL
MONOCYTES ABSOLUTE: 0.5 K/UL (ref 0.2–0.8)
MONOCYTES RELATIVE PERCENT: 7 %
NEUTROPHILS ABSOLUTE: 5.9 K/UL (ref 1.4–6.5)
NEUTROPHILS RELATIVE PERCENT: 85.2 %
NITRITE, URINE: NEGATIVE
OVALOCYTES: ABNORMAL
PDW BLD-RTO: 19.8 % (ref 11.5–14.5)
PH UA: 5.5 (ref 5–9)
PLATELET # BLD: 250 K/UL (ref 130–400)
POIKILOCYTES: ABNORMAL
POTASSIUM SERPL-SCNC: 3.7 MEQ/L (ref 3.4–4.9)
PROTEIN UA: ABNORMAL MG/DL
PROTHROMBIN TIME: 17 SEC (ref 12.3–14.9)
RBC # BLD: 4 M/UL (ref 4.7–6.1)
SODIUM BLD-SCNC: 134 MEQ/L (ref 135–144)
SPECIFIC GRAVITY UA: 1.02 (ref 1–1.03)
TOTAL CK: 39 U/L (ref 0–190)
TOTAL PROTEIN: 5.3 G/DL (ref 6.3–8)
TROPONIN: <0.01 NG/ML (ref 0–0.01)
URINE REFLEX TO CULTURE: ABNORMAL
UROBILINOGEN, URINE: 1 E.U./DL
WBC # BLD: 6.9 K/UL (ref 4.8–10.8)

## 2020-06-18 PROCEDURE — 93005 ELECTROCARDIOGRAM TRACING: CPT | Performed by: PHYSICIAN ASSISTANT

## 2020-06-18 PROCEDURE — 87040 BLOOD CULTURE FOR BACTERIA: CPT

## 2020-06-18 PROCEDURE — 85025 COMPLETE CBC W/AUTO DIFF WBC: CPT

## 2020-06-18 PROCEDURE — 85610 PROTHROMBIN TIME: CPT

## 2020-06-18 PROCEDURE — 85730 THROMBOPLASTIN TIME PARTIAL: CPT

## 2020-06-18 PROCEDURE — 83605 ASSAY OF LACTIC ACID: CPT

## 2020-06-18 PROCEDURE — 81003 URINALYSIS AUTO W/O SCOPE: CPT

## 2020-06-18 PROCEDURE — 99285 EMERGENCY DEPT VISIT HI MDM: CPT

## 2020-06-18 PROCEDURE — 82550 ASSAY OF CK (CPK): CPT

## 2020-06-18 PROCEDURE — 80053 COMPREHEN METABOLIC PANEL: CPT

## 2020-06-18 PROCEDURE — 36415 COLL VENOUS BLD VENIPUNCTURE: CPT

## 2020-06-18 PROCEDURE — 84484 ASSAY OF TROPONIN QUANT: CPT

## 2020-06-18 ASSESSMENT — PAIN DESCRIPTION - LOCATION: LOCATION: CHEST

## 2020-06-18 ASSESSMENT — PAIN DESCRIPTION - DESCRIPTORS: DESCRIPTORS: TIGHTNESS;HEAVINESS

## 2020-06-18 ASSESSMENT — PAIN SCALES - GENERAL: PAINLEVEL_OUTOF10: 1

## 2020-06-18 ASSESSMENT — PAIN DESCRIPTION - FREQUENCY: FREQUENCY: CONTINUOUS

## 2020-06-18 ASSESSMENT — PAIN DESCRIPTION - ORIENTATION: ORIENTATION: MID

## 2020-06-18 ASSESSMENT — PAIN DESCRIPTION - PAIN TYPE: TYPE: ACUTE PAIN

## 2020-06-19 ENCOUNTER — APPOINTMENT (OUTPATIENT)
Dept: CARDIAC CATH/INVASIVE PROCEDURES | Age: 75
DRG: 281 | End: 2020-06-19
Payer: MEDICARE

## 2020-06-19 ENCOUNTER — APPOINTMENT (OUTPATIENT)
Dept: GENERAL RADIOLOGY | Age: 75
DRG: 281 | End: 2020-06-19
Payer: MEDICARE

## 2020-06-19 PROBLEM — I21.4 NSTEMI (NON-ST ELEVATED MYOCARDIAL INFARCTION) (HCC): Status: ACTIVE | Noted: 2020-06-19

## 2020-06-19 PROBLEM — R07.9 CHEST PAIN: Status: ACTIVE | Noted: 2020-06-19

## 2020-06-19 LAB
ANION GAP SERPL CALCULATED.3IONS-SCNC: 8 MEQ/L (ref 9–15)
BUN BLDV-MCNC: 8 MG/DL (ref 8–23)
CALCIUM SERPL-MCNC: 8.4 MG/DL (ref 8.5–9.9)
CHLORIDE BLD-SCNC: 101 MEQ/L (ref 95–107)
CO2: 27 MEQ/L (ref 20–31)
CREAT SERPL-MCNC: 0.61 MG/DL (ref 0.7–1.2)
GFR AFRICAN AMERICAN: >60
GFR NON-AFRICAN AMERICAN: >60
GLUCOSE BLD-MCNC: 131 MG/DL (ref 60–115)
GLUCOSE BLD-MCNC: 175 MG/DL (ref 60–115)
GLUCOSE BLD-MCNC: 294 MG/DL (ref 60–115)
GLUCOSE BLD-MCNC: 312 MG/DL (ref 70–99)
LACTIC ACID: 2.1 MMOL/L (ref 0.5–2.2)
LV EF: 65 %
LVEF MODALITY: NORMAL
PERFORMED ON: ABNORMAL
POTASSIUM REFLEX MAGNESIUM: 3.9 MEQ/L (ref 3.4–4.9)
SODIUM BLD-SCNC: 136 MEQ/L (ref 135–144)
TROPONIN: 0.03 NG/ML (ref 0–0.01)
TROPONIN: 0.07 NG/ML (ref 0–0.01)

## 2020-06-19 PROCEDURE — 4A023N7 MEASUREMENT OF CARDIAC SAMPLING AND PRESSURE, LEFT HEART, PERCUTANEOUS APPROACH: ICD-10-PCS | Performed by: INTERNAL MEDICINE

## 2020-06-19 PROCEDURE — 6370000000 HC RX 637 (ALT 250 FOR IP): Performed by: INTERNAL MEDICINE

## 2020-06-19 PROCEDURE — 93306 TTE W/DOPPLER COMPLETE: CPT

## 2020-06-19 PROCEDURE — 2500000003 HC RX 250 WO HCPCS

## 2020-06-19 PROCEDURE — 36415 COLL VENOUS BLD VENIPUNCTURE: CPT

## 2020-06-19 PROCEDURE — 93458 L HRT ARTERY/VENTRICLE ANGIO: CPT | Performed by: INTERNAL MEDICINE

## 2020-06-19 PROCEDURE — C1769 GUIDE WIRE: HCPCS

## 2020-06-19 PROCEDURE — 2580000003 HC RX 258: Performed by: NURSE PRACTITIONER

## 2020-06-19 PROCEDURE — C1713 ANCHOR/SCREW BN/BN,TIS/BN: HCPCS

## 2020-06-19 PROCEDURE — 6360000002 HC RX W HCPCS

## 2020-06-19 PROCEDURE — 71045 X-RAY EXAM CHEST 1 VIEW: CPT

## 2020-06-19 PROCEDURE — 2060000000 HC ICU INTERMEDIATE R&B

## 2020-06-19 PROCEDURE — C1894 INTRO/SHEATH, NON-LASER: HCPCS

## 2020-06-19 PROCEDURE — 6370000000 HC RX 637 (ALT 250 FOR IP): Performed by: PHYSICIAN ASSISTANT

## 2020-06-19 PROCEDURE — B2151ZZ FLUOROSCOPY OF LEFT HEART USING LOW OSMOLAR CONTRAST: ICD-10-PCS | Performed by: INTERNAL MEDICINE

## 2020-06-19 PROCEDURE — B41F1ZZ FLUOROSCOPY OF RIGHT LOWER EXTREMITY ARTERIES USING LOW OSMOLAR CONTRAST: ICD-10-PCS | Performed by: INTERNAL MEDICINE

## 2020-06-19 PROCEDURE — 84484 ASSAY OF TROPONIN QUANT: CPT

## 2020-06-19 PROCEDURE — 80048 BASIC METABOLIC PNL TOTAL CA: CPT

## 2020-06-19 PROCEDURE — 2500000003 HC RX 250 WO HCPCS: Performed by: INTERNAL MEDICINE

## 2020-06-19 PROCEDURE — 2580000003 HC RX 258: Performed by: INTERNAL MEDICINE

## 2020-06-19 PROCEDURE — 2709999900 HC NON-CHARGEABLE SUPPLY

## 2020-06-19 PROCEDURE — 6360000004 HC RX CONTRAST MEDICATION: Performed by: INTERNAL MEDICINE

## 2020-06-19 PROCEDURE — 2580000003 HC RX 258

## 2020-06-19 PROCEDURE — B2111ZZ FLUOROSCOPY OF MULTIPLE CORONARY ARTERIES USING LOW OSMOLAR CONTRAST: ICD-10-PCS | Performed by: INTERNAL MEDICINE

## 2020-06-19 PROCEDURE — C1760 CLOSURE DEV, VASC: HCPCS

## 2020-06-19 RX ORDER — TRAZODONE HYDROCHLORIDE 100 MG/1
100 TABLET ORAL NIGHTLY
Status: DISCONTINUED | OUTPATIENT
Start: 2020-06-19 | End: 2020-06-22 | Stop reason: HOSPADM

## 2020-06-19 RX ORDER — SODIUM CHLORIDE 0.9 % (FLUSH) 0.9 %
10 SYRINGE (ML) INJECTION EVERY 12 HOURS SCHEDULED
Status: DISCONTINUED | OUTPATIENT
Start: 2020-06-19 | End: 2020-06-22

## 2020-06-19 RX ORDER — PRIMIDONE 50 MG/1
250 TABLET ORAL NIGHTLY
Status: DISCONTINUED | OUTPATIENT
Start: 2020-06-19 | End: 2020-06-20

## 2020-06-19 RX ORDER — LOSARTAN POTASSIUM 50 MG/1
50 TABLET ORAL DAILY
Status: DISCONTINUED | OUTPATIENT
Start: 2020-06-19 | End: 2020-06-22 | Stop reason: HOSPADM

## 2020-06-19 RX ORDER — ATORVASTATIN CALCIUM 80 MG/1
80 TABLET, FILM COATED ORAL NIGHTLY
Status: DISCONTINUED | OUTPATIENT
Start: 2020-06-19 | End: 2020-06-22 | Stop reason: HOSPADM

## 2020-06-19 RX ORDER — CARBIDOPA, LEVODOPA AND ENTACAPONE 25; 200; 100 MG/1; MG/1; MG/1
1 TABLET, FILM COATED ORAL DAILY
Status: DISCONTINUED | OUTPATIENT
Start: 2020-06-19 | End: 2020-06-19 | Stop reason: SDUPTHER

## 2020-06-19 RX ORDER — SODIUM CHLORIDE 0.9 % (FLUSH) 0.9 %
10 SYRINGE (ML) INJECTION PRN
Status: DISCONTINUED | OUTPATIENT
Start: 2020-06-19 | End: 2020-06-22 | Stop reason: HOSPADM

## 2020-06-19 RX ORDER — ISOSORBIDE MONONITRATE 60 MG/1
60 TABLET, EXTENDED RELEASE ORAL DAILY
Status: DISCONTINUED | OUTPATIENT
Start: 2020-06-19 | End: 2020-06-19 | Stop reason: SDUPTHER

## 2020-06-19 RX ORDER — PROMETHAZINE HYDROCHLORIDE 12.5 MG/1
12.5 TABLET ORAL EVERY 6 HOURS PRN
Status: DISCONTINUED | OUTPATIENT
Start: 2020-06-19 | End: 2020-06-22 | Stop reason: HOSPADM

## 2020-06-19 RX ORDER — CITALOPRAM 20 MG/1
20 TABLET ORAL DAILY
Status: DISCONTINUED | OUTPATIENT
Start: 2020-06-19 | End: 2020-06-19 | Stop reason: SDUPTHER

## 2020-06-19 RX ORDER — POLYETHYLENE GLYCOL 3350 17 G/17G
17 POWDER, FOR SOLUTION ORAL DAILY PRN
Status: DISCONTINUED | OUTPATIENT
Start: 2020-06-19 | End: 2020-06-22 | Stop reason: HOSPADM

## 2020-06-19 RX ORDER — TAMSULOSIN HYDROCHLORIDE 0.4 MG/1
0.4 CAPSULE ORAL DAILY
Status: DISCONTINUED | OUTPATIENT
Start: 2020-06-19 | End: 2020-06-22 | Stop reason: HOSPADM

## 2020-06-19 RX ORDER — ASPIRIN 81 MG/1
324 TABLET, CHEWABLE ORAL ONCE
Status: COMPLETED | OUTPATIENT
Start: 2020-06-19 | End: 2020-06-19

## 2020-06-19 RX ORDER — ATORVASTATIN CALCIUM 80 MG/1
80 TABLET, FILM COATED ORAL DAILY
Status: DISCONTINUED | OUTPATIENT
Start: 2020-06-19 | End: 2020-06-19 | Stop reason: SDUPTHER

## 2020-06-19 RX ORDER — METOPROLOL TARTRATE 5 MG/5ML
5 INJECTION INTRAVENOUS EVERY 6 HOURS PRN
Status: DISCONTINUED | OUTPATIENT
Start: 2020-06-19 | End: 2020-06-22 | Stop reason: HOSPADM

## 2020-06-19 RX ORDER — ACETAMINOPHEN 650 MG/1
650 SUPPOSITORY RECTAL EVERY 6 HOURS PRN
Status: DISCONTINUED | OUTPATIENT
Start: 2020-06-19 | End: 2020-06-22 | Stop reason: HOSPADM

## 2020-06-19 RX ORDER — HYDROCHLOROTHIAZIDE 12.5 MG/1
12.5 CAPSULE, GELATIN COATED ORAL EVERY OTHER DAY
Status: DISCONTINUED | OUTPATIENT
Start: 2020-06-19 | End: 2020-06-19 | Stop reason: SDUPTHER

## 2020-06-19 RX ORDER — METOPROLOL TARTRATE 50 MG/1
50 TABLET, FILM COATED ORAL 2 TIMES DAILY
Status: DISCONTINUED | OUTPATIENT
Start: 2020-06-19 | End: 2020-06-22

## 2020-06-19 RX ORDER — LOSARTAN POTASSIUM 25 MG/1
25 TABLET ORAL DAILY
Status: DISCONTINUED | OUTPATIENT
Start: 2020-06-19 | End: 2020-06-19 | Stop reason: SDUPTHER

## 2020-06-19 RX ORDER — DEXTROSE MONOHYDRATE 25 G/50ML
12.5 INJECTION, SOLUTION INTRAVENOUS PRN
Status: DISCONTINUED | OUTPATIENT
Start: 2020-06-19 | End: 2020-06-22 | Stop reason: HOSPADM

## 2020-06-19 RX ORDER — LANCETS 30 GAUGE
1 EACH MISCELLANEOUS DAILY
Status: DISCONTINUED | OUTPATIENT
Start: 2020-06-19 | End: 2020-06-19

## 2020-06-19 RX ORDER — ACETAMINOPHEN 325 MG/1
650 TABLET ORAL EVERY 6 HOURS PRN
Status: DISCONTINUED | OUTPATIENT
Start: 2020-06-19 | End: 2020-06-22 | Stop reason: HOSPADM

## 2020-06-19 RX ORDER — DEXTROSE MONOHYDRATE 50 MG/ML
100 INJECTION, SOLUTION INTRAVENOUS PRN
Status: DISCONTINUED | OUTPATIENT
Start: 2020-06-19 | End: 2020-06-22 | Stop reason: HOSPADM

## 2020-06-19 RX ORDER — ONDANSETRON 2 MG/ML
4 INJECTION INTRAMUSCULAR; INTRAVENOUS EVERY 6 HOURS PRN
Status: DISCONTINUED | OUTPATIENT
Start: 2020-06-19 | End: 2020-06-20

## 2020-06-19 RX ORDER — ASPIRIN 81 MG/1
81 TABLET, CHEWABLE ORAL DAILY
Status: DISCONTINUED | OUTPATIENT
Start: 2020-06-20 | End: 2020-06-19 | Stop reason: SDUPTHER

## 2020-06-19 RX ORDER — POTASSIUM CHLORIDE 7.45 MG/ML
10 INJECTION INTRAVENOUS PRN
Status: DISCONTINUED | OUTPATIENT
Start: 2020-06-19 | End: 2020-06-22 | Stop reason: HOSPADM

## 2020-06-19 RX ORDER — CARBIDOPA, LEVODOPA AND ENTACAPONE 25; 200; 100 MG/1; MG/1; MG/1
1 TABLET, FILM COATED ORAL NIGHTLY
COMMUNITY
End: 2020-12-10 | Stop reason: SDUPTHER

## 2020-06-19 RX ORDER — ISOSORBIDE MONONITRATE 60 MG/1
60 TABLET, EXTENDED RELEASE ORAL DAILY
Status: DISCONTINUED | OUTPATIENT
Start: 2020-06-19 | End: 2020-06-22 | Stop reason: HOSPADM

## 2020-06-19 RX ORDER — ASPIRIN 81 MG/1
81 TABLET, CHEWABLE ORAL DAILY
Status: DISCONTINUED | OUTPATIENT
Start: 2020-06-19 | End: 2020-06-22 | Stop reason: HOSPADM

## 2020-06-19 RX ORDER — CARBIDOPA, LEVODOPA AND ENTACAPONE 25; 200; 100 MG/1; MG/1; MG/1
1 TABLET, FILM COATED ORAL NIGHTLY
Status: DISCONTINUED | OUTPATIENT
Start: 2020-06-19 | End: 2020-06-22 | Stop reason: HOSPADM

## 2020-06-19 RX ORDER — SODIUM CHLORIDE 0.9 % (FLUSH) 0.9 %
10 SYRINGE (ML) INJECTION EVERY 12 HOURS SCHEDULED
Status: DISCONTINUED | OUTPATIENT
Start: 2020-06-19 | End: 2020-06-22 | Stop reason: HOSPADM

## 2020-06-19 RX ORDER — SODIUM CHLORIDE 9 MG/ML
INJECTION, SOLUTION INTRAVENOUS CONTINUOUS
Status: DISCONTINUED | OUTPATIENT
Start: 2020-06-19 | End: 2020-06-22 | Stop reason: HOSPADM

## 2020-06-19 RX ORDER — ACETAMINOPHEN 325 MG/1
650 TABLET ORAL EVERY 4 HOURS PRN
Status: DISCONTINUED | OUTPATIENT
Start: 2020-06-19 | End: 2020-06-22 | Stop reason: HOSPADM

## 2020-06-19 RX ORDER — METOPROLOL TARTRATE 50 MG/1
50 TABLET, FILM COATED ORAL 2 TIMES DAILY
Status: DISCONTINUED | OUTPATIENT
Start: 2020-06-19 | End: 2020-06-19 | Stop reason: SDUPTHER

## 2020-06-19 RX ORDER — HYDROCHLOROTHIAZIDE 12.5 MG/1
12.5 TABLET ORAL EVERY OTHER DAY
Status: DISCONTINUED | OUTPATIENT
Start: 2020-06-19 | End: 2020-06-22 | Stop reason: HOSPADM

## 2020-06-19 RX ORDER — CITALOPRAM 20 MG/1
20 TABLET ORAL DAILY
Status: DISCONTINUED | OUTPATIENT
Start: 2020-06-19 | End: 2020-06-22 | Stop reason: HOSPADM

## 2020-06-19 RX ORDER — LOSARTAN POTASSIUM 25 MG/1
25 TABLET ORAL DAILY
Status: ON HOLD | COMMUNITY
End: 2020-06-22 | Stop reason: HOSPADM

## 2020-06-19 RX ORDER — POTASSIUM CHLORIDE 20 MEQ/1
40 TABLET, EXTENDED RELEASE ORAL PRN
Status: DISCONTINUED | OUTPATIENT
Start: 2020-06-19 | End: 2020-06-22 | Stop reason: HOSPADM

## 2020-06-19 RX ORDER — NICOTINE POLACRILEX 4 MG
15 LOZENGE BUCCAL PRN
Status: DISCONTINUED | OUTPATIENT
Start: 2020-06-19 | End: 2020-06-22 | Stop reason: HOSPADM

## 2020-06-19 RX ADMIN — ISOSORBIDE MONONITRATE 60 MG: 60 TABLET, EXTENDED RELEASE ORAL at 09:13

## 2020-06-19 RX ADMIN — Medication 10 ML: at 09:13

## 2020-06-19 RX ADMIN — Medication 10 ML: at 21:19

## 2020-06-19 RX ADMIN — INSULIN LISPRO 3 UNITS: 100 INJECTION, SOLUTION INTRAVENOUS; SUBCUTANEOUS at 09:20

## 2020-06-19 RX ADMIN — TRAZODONE HYDROCHLORIDE 100 MG: 100 TABLET ORAL at 21:18

## 2020-06-19 RX ADMIN — HYDROCHLOROTHIAZIDE 12.5 MG: 12.5 TABLET ORAL at 09:13

## 2020-06-19 RX ADMIN — CARBIDOPA, LEVODOPA, AND ENTACAPONE 1 TABLET: 25; 100; 200 TABLET, FILM COATED ORAL at 21:17

## 2020-06-19 RX ADMIN — ASPIRIN 81 MG 324 MG: 81 TABLET ORAL at 01:18

## 2020-06-19 RX ADMIN — METOPROLOL TARTRATE 50 MG: 50 TABLET, FILM COATED ORAL at 09:13

## 2020-06-19 RX ADMIN — ASPIRIN 81 MG 81 MG: 81 TABLET ORAL at 21:17

## 2020-06-19 RX ADMIN — LOSARTAN POTASSIUM 50 MG: 50 TABLET, FILM COATED ORAL at 09:13

## 2020-06-19 RX ADMIN — SODIUM CHLORIDE: 9 INJECTION, SOLUTION INTRAVENOUS at 14:27

## 2020-06-19 RX ADMIN — PRIMIDONE 250 MG: 50 TABLET ORAL at 23:31

## 2020-06-19 RX ADMIN — ATORVASTATIN CALCIUM 80 MG: 80 TABLET, FILM COATED ORAL at 21:17

## 2020-06-19 RX ADMIN — CARBIDOPA, LEVODOPA, AND ENTACAPONE 1 TABLET: 25; 100; 200 TABLET, FILM COATED ORAL at 12:10

## 2020-06-19 RX ADMIN — CITALOPRAM HYDROBROMIDE 20 MG: 20 TABLET ORAL at 09:13

## 2020-06-19 RX ADMIN — TAMSULOSIN HYDROCHLORIDE 0.4 MG: 0.4 CAPSULE ORAL at 09:12

## 2020-06-19 RX ADMIN — METOPROLOL TARTRATE 5 MG: 5 INJECTION INTRAVENOUS at 09:13

## 2020-06-19 RX ADMIN — IOPAMIDOL 105 ML: 612 INJECTION, SOLUTION INTRAVENOUS at 16:32

## 2020-06-19 RX ADMIN — METOPROLOL TARTRATE 50 MG: 50 TABLET, FILM COATED ORAL at 21:17

## 2020-06-19 ASSESSMENT — PAIN DESCRIPTION - FREQUENCY: FREQUENCY: CONTINUOUS

## 2020-06-19 ASSESSMENT — PAIN DESCRIPTION - LOCATION: LOCATION: CHEST

## 2020-06-19 ASSESSMENT — PAIN DESCRIPTION - ORIENTATION: ORIENTATION: MID

## 2020-06-19 ASSESSMENT — PAIN DESCRIPTION - PAIN TYPE: TYPE: ACUTE PAIN

## 2020-06-19 ASSESSMENT — PAIN DESCRIPTION - PROGRESSION
CLINICAL_PROGRESSION: GRADUALLY IMPROVING

## 2020-06-19 ASSESSMENT — PAIN SCALES - GENERAL
PAINLEVEL_OUTOF10: 0

## 2020-06-19 ASSESSMENT — PAIN DESCRIPTION - DESCRIPTORS: DESCRIPTORS: TIGHTNESS;HEAVINESS

## 2020-06-19 NOTE — PROGRESS NOTES
Pt arrived from [facility] ER to pre/post Cath lab, vitals are stable, no chest pain or SOB, consents are signed. Will continue to monitor.
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
## 2020-06-19 NOTE — PROGRESS NOTES
Pt returned from procedure, no bleeding or hematoma at fem puncture site, vitals are stable, no chest pain or SOB. Will continue to monitor.

## 2020-06-19 NOTE — OP NOTE
valve.     Selective right common femoral angiography shows that the access is in the right common femoral artery above its bifurcation. There is moderate calcification of the right common femoral artery. HEMODYNAMIC / ANGIOGRAPHIC DATA:    1. Left ventricular end diastolic pressure was 5 mmHg. There is 74AZ Hg  systolic gradient across the aortic valve/LV outflow tract. 2. Left ventriculography revealed an EF around 40 to 45% there is significant wall motion abnormality involving the distal anterior wall the LV apex and the apical inferior wall. Possible that there is dyskinesis of the LV apex there is a 40 mm systolic gradient across the aortic valve, this gradient may actually be across the LV outflow tract because the anterior basal segment is hyper dynamic compared to the distal anterior wall and apex. 3. The left main coronary artery  has less than 10% stenosis. 4. The left anterior descending artery has patent stent in the proximal vessel, proximal LAD has less than 20% stenosis, mid vessel less than 20% stenosis distal vessel less than 20% stenosis  5. First diagonal branch is relatively large, has patent radiopaque stent, and about 30% proximal stenosis proximal to the stented segment. The first major diagonal branch runs parallel to the left anterior descending artery and gives off several secondary branches that course along the septal perforators. 6. First septal  has diffuse disease up to 80%, with calcification. Best treated medically. .  7. The left circumflex is codominant, with moderate diffuse calcification. First obtuse marginal branch is large with patent stent, 30% stenosis. Second obtuse marginal branch measures about 2.25 mm. There is patent radiopaque stent in the distal left circumflex. Third obtuse marginal branch measures about 2.25 mm. Further downstream there are terminal branches. Distal left circumflex artery has less than 20% stenosis.   8. The right

## 2020-06-19 NOTE — ED TRIAGE NOTES
Pt arrives via EMS from home for c/o chest tightness and SOB x 1 day. Pt was given SoluMedrol 125mg and 1DuoNeb by EMS prior to arrival in ED. Pt states symptoms are resolved at this time. Currently rates chest tightness \"1/10\", states he always has chest tightness and cough due to chronic smoking. Pt alert and oriented x 4. Skin pink, warm, dry. Respirations even and unlabored. No distress noted at this time.

## 2020-06-19 NOTE — CARE COORDINATION
[facility] Case Management Initial Discharge Assessment    Met with Patient to discuss discharge plan. PCP: Margie Ballard MD                                  Date of Last Visit: MAY    Discharge Planning    Living Arrangements: independently at home    Who do you live with? SPOUSE    Who helps you with your care:  self    If lives at home:     Do you have any barriers navigating in your home? no    Patient can perform ADL? Yes    Current Services (outpatient and in home) :  None    Dialysis: No    Is transportation available to get to your appointments? Yes    DME Equipment:  yes - WALKER PRN    Respiratory equipment: None    Respiratory provider:  no     Pharmacy:  yes - [address] with Medication Assistance Program?  No      Patient agreeable to [address]? Declined    Patient agreeable to SNF/Rehab? Declined    Other discharge needs identified? Cardiac Rehab    [place] of choice list provided with basic dialogue that supports the patient's individualized plan of care/goals and shares the quality data associated with the providers. Yes    Does Patient Have a High-Risk for Readmission Diagnosis (CHF, PN, MI, COPD)? No    The plan for Transition of Care is related to the following treatment goals: CP FREE    Initial Discharge Plan? (Note: please see concurrent daily documentation for any updates after initial note). MET WITH PATIENT, FROM HOME WITH SPOUSE, USES A WALKER PRN WHEN HE FEELS WEAK. PT DENIES HOME NEEDS. PT FOR CATH TODAY, PT QUESTIONING IF HE WILL CHANGE TO INPT-DR JORDAN NOTIFIED. PT ALSO REQUESTING CALL LIGHT FIXED,  Magdiel Escalona NOTIFIED.       The Patient and/or patient representative: PT was provided with choice of any post-acute providers for care and equipment and agrees with discharge plan  Yes    Electronically signed by Cande Francois RN on 6/19/2020 at 10:23 AM

## 2020-06-19 NOTE — PROGRESS NOTES
last year. Eats 2 meals daily, and a shake made with protein powder and milkj once daily  · Wound Type: None  · Current Nutrition Therapies:  · Oral Diet Orders: NPO   · Oral Diet intake: %(breakfast prior to NPO)  · Oral Nutrition Supplement (ONS) Orders: None  · Anthropometric Measures:  · Ht: 5' 10\" (177.8 cm)   · Current Body Wt: 181 lb (82.1 kg)(6/19-bedscale, ? accurate (this RD weighed pt (6/19) @ 172 lb)  · Admission Body Wt: 176 lb (79.8 kg)(stated)  · Usual Body Wt: 202 lb (91.6 kg)(12/2019-office visit)  · % Weight Change:  ,  21 lb1(0%) wt loss in 6 months  · Ideal Body Wt: 166 lb (75.3 kg), % Ideal Body > 100%  · BMI Classification: BMI 25.0 - 29.9 Overweight    Nutrition Interventions:   (Once able to resume po, recommend Carb Control 4, cardiac diet with high calorie ONS (Ensure vanilla) BID)  Continued Inpatient Monitoring, Education declined    Nutrition Evaluation:   · Evaluation: Goals set   · Goals: one po resumes, intake > 75% of meals and supplements.  Clarify actual wt    · Monitoring: Meal Intake, Supplement Intake, Weight, Pertinent Labs      Electronically signed by Johanne Rain RD, LD on 6/19/20 at 2:00 PM EDT
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
## 2020-06-19 NOTE — ED NOTES
Spoke with pt's wife, with pt's permission.   Wife's name Peg Kim [address] [facility] [place]  06/18/20 2644

## 2020-06-19 NOTE — ED PROVIDER NOTES
blood glucose. BLOOD GLUCOSE MONITOR STRIPS    Test one time a day & as needed for symptoms of irregular blood glucose. BUDESONIDE (ENTOCORT EC) 3 MG EXTENDED RELEASE CAPSULE    TAKE 2 CAPSULES EVERY MORNING    CARBIDOPA-LEVODOPA-ENTACAPONE (STALEVO 100) -200 MG TABS    Take 1 Tablet PO at 3pm    CITALOPRAM (CELEXA) 20 MG TABLET    TAKE 1 TABLET DAILY    HANDICAP PLACARD MISC    by Does not apply route Good for 5 years. Good from 1/31/2017 through 1/31/2022. HYDROCHLOROTHIAZIDE (MICROZIDE) 12.5 MG CAPSULE    Take 12.5 mg by mouth every other day    ISOSORBIDE MONONITRATE (IMDUR) 60 MG EXTENDED RELEASE TABLET    Take 60 mg by mouth daily    LANCETS MISC    1 each by Does not apply route daily    LOSARTAN (COZAAR) 50 MG TABLET    Take 75 mg by mouth daily     METOPROLOL (LOPRESSOR) 50 MG TABLET    Take 50 mg by mouth 2 times daily. NITROGLYCERIN (NITROSTAT) 0.4 MG SL TABLET    Place 0.4 mg under the tongue every 5 minutes as needed for Chest pain. OMEGA-3 FATTY ACIDS (FISH OIL) 1200 MG CPDR    Take by mouth daily     PRIMIDONE (MYSOLINE) 250 MG TABLET    TAKE 1 TABLET AT BEDTIME    TAMSULOSIN (FLOMAX) 0.4 MG CAPSULE    Take 1 capsule by mouth daily Best taken 1/2 hour after last meal of the day. TRAZODONE (DESYREL) 100 MG TABLET    TAKE 1 TABLET NIGHTLY    XARELTO 20 MG TABS TABLET    20 mg daily (with breakfast)        ALLERGIES     Adhesive tape;  Finasteride; Mom [magnesium hydroxide]; and Pcn [penicillins]    FAMILY HISTORY       Family History   Problem Relation Age of Onset    Other Mother         liver scerosis   Augusta Pires Other Father         did not have a father    Other Sister         does not know sister   Gaby Brasher         brain cancer    Other Son         unsure of medical problems    Other Daughter         unsure of medical problems          SOCIAL HISTORY       Social History     Socioeconomic History    Marital status:      Spouse name: None    Number of children: none     Procedures    FINAL IMPRESSION      1. Chest pain, unspecified type    2. Abnormal EKG    3. COPD exacerbation ([facility] [address]    4. Peripheral edema    5. History of prostate cancer          DISPOSITION/PLAN   DISPOSITION Decision To Admit 06/19/2020 12:59:22 AM      PATIENT REFERRED TO:  No follow-up provider specified.     DISCHARGE MEDICATIONS:  New Prescriptions    No medications on file          (Please note that portions of this note were completed with a voice recognition program.  Efforts were made to edit the dictations but occasionally words are mis-transcribed.)    Willow Galdamez PA-C (electronically signed)  Attending Emergency Physician       Willow Galdamez PA-C  07/01/20 [id number]

## 2020-06-20 LAB
ANION GAP SERPL CALCULATED.3IONS-SCNC: 7 MEQ/L (ref 9–15)
BUN BLDV-MCNC: 9 MG/DL (ref 8–23)
CALCIUM SERPL-MCNC: 8.2 MG/DL (ref 8.5–9.9)
CHLORIDE BLD-SCNC: 102 MEQ/L (ref 95–107)
CO2: 31 MEQ/L (ref 20–31)
CREAT SERPL-MCNC: 0.57 MG/DL (ref 0.7–1.2)
FERRITIN: 88.6 NG/ML (ref 30–400)
GFR AFRICAN AMERICAN: >60
GFR NON-AFRICAN AMERICAN: >60
GLUCOSE BLD-MCNC: 102 MG/DL (ref 70–99)
GLUCOSE BLD-MCNC: 114 MG/DL (ref 60–115)
GLUCOSE BLD-MCNC: 135 MG/DL (ref 60–115)
GLUCOSE BLD-MCNC: 254 MG/DL (ref 60–115)
GLUCOSE BLD-MCNC: 96 MG/DL (ref 60–115)
HCT VFR BLD CALC: 27.3 % (ref 42–52)
HEMOGLOBIN: 8.5 G/DL (ref 14–18)
IRON SATURATION: 10 % (ref 11–46)
IRON: 14 UG/DL (ref 59–158)
MCH RBC QN AUTO: 23 PG (ref 27–31.3)
MCHC RBC AUTO-ENTMCNC: 31.3 % (ref 33–37)
MCV RBC AUTO: 73.5 FL (ref 80–100)
PDW BLD-RTO: 19.4 % (ref 11.5–14.5)
PERFORMED ON: ABNORMAL
PERFORMED ON: ABNORMAL
PERFORMED ON: NORMAL
PERFORMED ON: NORMAL
PLATELET # BLD: 239 K/UL (ref 130–400)
POTASSIUM REFLEX MAGNESIUM: 4.1 MEQ/L (ref 3.4–4.9)
RBC # BLD: 3.71 M/UL (ref 4.7–6.1)
SODIUM BLD-SCNC: 140 MEQ/L (ref 135–144)
TOTAL IRON BINDING CAPACITY: 138 UG/DL (ref 178–450)
WBC # BLD: 5.5 K/UL (ref 4.8–10.8)

## 2020-06-20 PROCEDURE — 6370000000 HC RX 637 (ALT 250 FOR IP): Performed by: INTERNAL MEDICINE

## 2020-06-20 PROCEDURE — 2060000000 HC ICU INTERMEDIATE R&B

## 2020-06-20 PROCEDURE — 80048 BASIC METABOLIC PNL TOTAL CA: CPT

## 2020-06-20 PROCEDURE — 93005 ELECTROCARDIOGRAM TRACING: CPT | Performed by: INTERNAL MEDICINE

## 2020-06-20 PROCEDURE — 36415 COLL VENOUS BLD VENIPUNCTURE: CPT

## 2020-06-20 PROCEDURE — 85027 COMPLETE CBC AUTOMATED: CPT

## 2020-06-20 PROCEDURE — 99222 1ST HOSP IP/OBS MODERATE 55: CPT | Performed by: PSYCHIATRY & NEUROLOGY

## 2020-06-20 PROCEDURE — 83550 IRON BINDING TEST: CPT

## 2020-06-20 PROCEDURE — 2580000003 HC RX 258: Performed by: INTERNAL MEDICINE

## 2020-06-20 PROCEDURE — 82728 ASSAY OF FERRITIN: CPT

## 2020-06-20 PROCEDURE — 83540 ASSAY OF IRON: CPT

## 2020-06-20 RX ORDER — BUDESONIDE 3 MG/1
6 CAPSULE, COATED PELLETS ORAL DAILY
Status: DISCONTINUED | OUTPATIENT
Start: 2020-06-20 | End: 2020-06-22 | Stop reason: HOSPADM

## 2020-06-20 RX ORDER — LOPERAMIDE HYDROCHLORIDE 2 MG/1
2 CAPSULE ORAL 4 TIMES DAILY PRN
Status: DISCONTINUED | OUTPATIENT
Start: 2020-06-20 | End: 2020-06-22 | Stop reason: HOSPADM

## 2020-06-20 RX ORDER — BUDESONIDE AND FORMOTEROL FUMARATE DIHYDRATE 160; 4.5 UG/1; UG/1
2 AEROSOL RESPIRATORY (INHALATION) 2 TIMES DAILY
Qty: 1 INHALER | Refills: 0 | Status: ON HOLD | OUTPATIENT
Start: 2020-06-20 | End: 2020-09-08

## 2020-06-20 RX ORDER — SOTALOL HYDROCHLORIDE 80 MG/1
80 TABLET ORAL 2 TIMES DAILY
Status: DISCONTINUED | OUTPATIENT
Start: 2020-06-20 | End: 2020-06-22 | Stop reason: HOSPADM

## 2020-06-20 RX ORDER — ALBUTEROL SULFATE 90 UG/1
2 AEROSOL, METERED RESPIRATORY (INHALATION) 4 TIMES DAILY PRN
Qty: 3 INHALER | Refills: 1 | Status: SHIPPED | OUTPATIENT
Start: 2020-06-20

## 2020-06-20 RX ADMIN — LOSARTAN POTASSIUM 50 MG: 50 TABLET, FILM COATED ORAL at 08:01

## 2020-06-20 RX ADMIN — CITALOPRAM HYDROBROMIDE 20 MG: 20 TABLET ORAL at 08:01

## 2020-06-20 RX ADMIN — TRAZODONE HYDROCHLORIDE 100 MG: 100 TABLET ORAL at 22:01

## 2020-06-20 RX ADMIN — ASPIRIN 81 MG 81 MG: 81 TABLET ORAL at 08:01

## 2020-06-20 RX ADMIN — RIVAROXABAN 20 MG: 20 TABLET, FILM COATED ORAL at 16:28

## 2020-06-20 RX ADMIN — CARBIDOPA, LEVODOPA, AND ENTACAPONE 1 TABLET: 25; 100; 200 TABLET, FILM COATED ORAL at 22:02

## 2020-06-20 RX ADMIN — METOPROLOL TARTRATE 50 MG: 50 TABLET, FILM COATED ORAL at 08:01

## 2020-06-20 RX ADMIN — Medication 10 ML: at 22:04

## 2020-06-20 RX ADMIN — ATORVASTATIN CALCIUM 80 MG: 80 TABLET, FILM COATED ORAL at 22:02

## 2020-06-20 RX ADMIN — LOPERAMIDE HYDROCHLORIDE 2 MG: 2 CAPSULE ORAL at 22:02

## 2020-06-20 RX ADMIN — ISOSORBIDE MONONITRATE 60 MG: 60 TABLET, EXTENDED RELEASE ORAL at 08:01

## 2020-06-20 RX ADMIN — METOPROLOL TARTRATE 50 MG: 50 TABLET, FILM COATED ORAL at 22:02

## 2020-06-20 RX ADMIN — TAMSULOSIN HYDROCHLORIDE 0.4 MG: 0.4 CAPSULE ORAL at 08:01

## 2020-06-20 ASSESSMENT — PAIN DESCRIPTION - PROGRESSION
CLINICAL_PROGRESSION: GRADUALLY IMPROVING

## 2020-06-20 ASSESSMENT — PAIN DESCRIPTION - DESCRIPTORS
DESCRIPTORS: DULL;DISCOMFORT
DESCRIPTORS: DULL;DISCOMFORT

## 2020-06-20 ASSESSMENT — PAIN DESCRIPTION - FREQUENCY
FREQUENCY: INTERMITTENT
FREQUENCY: INTERMITTENT

## 2020-06-20 ASSESSMENT — ENCOUNTER SYMPTOMS
VOMITING: 0
PHOTOPHOBIA: 0
CHOKING: 0
BACK PAIN: 1
NAUSEA: 0
SHORTNESS OF BREATH: 0
TROUBLE SWALLOWING: 0
COLOR CHANGE: 0

## 2020-06-20 ASSESSMENT — PAIN SCALES - GENERAL
PAINLEVEL_OUTOF10: 0

## 2020-06-20 ASSESSMENT — PAIN DESCRIPTION - LOCATION
LOCATION: CHEST
LOCATION: CHEST

## 2020-06-20 ASSESSMENT — PAIN DESCRIPTION - PAIN TYPE
TYPE: ACUTE PAIN;CHRONIC PAIN
TYPE: ACUTE PAIN;CHRONIC PAIN
TYPE: ACUTE PAIN
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
## 2020-06-20 NOTE — PROGRESS NOTES
restless leg. Has not had thromboembolic events. Review of Systems:   12 point neg    6/19/2020 CATH  HEMODYNAMIC / ANGIOGRAPHIC DATA:    1. Left ventricular end diastolic pressure was 5 mmHg. There is 41VT Hg  systolic gradient across the aortic valve/LV outflow tract. 2. Left ventriculography revealed an EF around 40 to 45% there is significant wall motion abnormality involving the distal anterior wall the LV apex and the apical inferior wall. Possible that there is dyskinesis of the LV apex there is a 40 mm systolic gradient across the aortic valve, this gradient may actually be across the LV outflow tract because the anterior basal segment is hyper dynamic compared to the distal anterior wall and apex. 3. The left main coronary artery  has less than 10% stenosis. 4. The left anterior descending artery has patent stent in the proximal vessel, proximal LAD has less than 20% stenosis, mid vessel less than 20% stenosis distal vessel less than 20% stenosis  5. First diagonal branch is relatively large, has patent radiopaque stent, and about 30% proximal stenosis proximal to the stented segment. The first major diagonal branch runs parallel to the left anterior descending artery and gives off several secondary branches that course along the septal perforators. 6. First septal  has diffuse disease up to 80%, with calcification. Best treated medically. .  7. The left circumflex is codominant, with moderate diffuse calcification. First obtuse marginal branch is large with patent stent, 30% stenosis. Second obtuse marginal branch measures about 2.25 mm. There is patent radiopaque stent in the distal left circumflex. Third obtuse marginal branch measures about 2.25 mm. Further downstream there are terminal branches. Distal left circumflex artery has less than 20% stenosis. 8. The right coronary artery is codominant, with radiopaque stent in the proximal and distal segments.   Proximal RCA has rhonchi, normal air movement, no respiratory distress  Abdomen: soft, non-tender, non-distended, normal bowel sounds, no masses   Extremities: no cyanosis, clubbing or edema  Skin: warm and dry, no rash or erythema  Eyes: EOMI  Neck: supple and non-tender without mass, no thyromegaly   Neurological: alert, oriented, normal speech, no focal findings or movement disorder noted  Rt groin: small hematoma. No pain. Allergies:   Allergies   Allergen Reactions    Adhesive Tape Other (See Comments)     Bandaids, water blisters    Finasteride Swelling    Mom [Magnesium Hydroxide] Swelling    Pcn [Penicillins] Swelling        Medications:    sodium chloride flush  10 mL Intravenous 2 times per day    atorvastatin  80 mg Oral Nightly    losartan  50 mg Oral Daily    isosorbide mononitrate  60 mg Oral Daily    citalopram  20 mg Oral Daily    primidone  250 mg Oral Nightly    traZODone  100 mg Oral Nightly    hydroCHLOROthiazide  12.5 mg Oral Every Other Day    tamsulosin  0.4 mg Oral Daily    insulin lispro  0-6 Units Subcutaneous TID WC    insulin lispro  0-3 Units Subcutaneous Nightly    sodium chloride flush  10 mL Intravenous 2 times per day    rivaroxaban  20 mg Oral Daily    metoprolol tartrate  50 mg Oral BID    aspirin  81 mg Oral Daily    carbidopa-levodopa-entacapone  1 tablet Oral Nightly    sodium chloride flush  10 mL Intravenous 2 times per day      dextrose      sodium chloride 75 mL/hr at 06/19/20 1427     sodium chloride flush, 10 mL, PRN  acetaminophen, 650 mg, Q6H PRN    Or  acetaminophen, 650 mg, Q6H PRN  polyethylene glycol, 17 g, Daily PRN  promethazine, 12.5 mg, Q6H PRN    Or  ondansetron, 4 mg, Q6H PRN  potassium chloride, 40 mEq, PRN    Or  potassium alternative oral replacement, 40 mEq, PRN    Or  potassium chloride, 10 mEq, PRN  glucose, 15 g, PRN  dextrose, 12.5 g, PRN  glucagon (rDNA), 1 mg, PRN  dextrose, 100 mL/hr, PRN  metoprolol, 5 mg, Q6H PRN  sodium chloride flush, 10

## 2020-06-21 LAB
ANION GAP SERPL CALCULATED.3IONS-SCNC: 9 MEQ/L (ref 9–15)
BUN BLDV-MCNC: 11 MG/DL (ref 8–23)
CALCIUM SERPL-MCNC: 7.9 MG/DL (ref 8.5–9.9)
CHLORIDE BLD-SCNC: 101 MEQ/L (ref 95–107)
CO2: 30 MEQ/L (ref 20–31)
CREAT SERPL-MCNC: 0.55 MG/DL (ref 0.7–1.2)
GFR AFRICAN AMERICAN: >60
GFR NON-AFRICAN AMERICAN: >60
GLUCOSE BLD-MCNC: 107 MG/DL (ref 60–115)
GLUCOSE BLD-MCNC: 116 MG/DL (ref 70–99)
GLUCOSE BLD-MCNC: 129 MG/DL (ref 60–115)
GLUCOSE BLD-MCNC: 191 MG/DL (ref 60–115)
GLUCOSE BLD-MCNC: 234 MG/DL (ref 60–115)
MAGNESIUM: 2 MG/DL (ref 1.7–2.4)
PERFORMED ON: ABNORMAL
PERFORMED ON: NORMAL
POTASSIUM SERPL-SCNC: 3.9 MEQ/L (ref 3.4–4.9)
SODIUM BLD-SCNC: 140 MEQ/L (ref 135–144)

## 2020-06-21 PROCEDURE — 36415 COLL VENOUS BLD VENIPUNCTURE: CPT

## 2020-06-21 PROCEDURE — 99233 SBSQ HOSP IP/OBS HIGH 50: CPT | Performed by: PSYCHIATRY & NEUROLOGY

## 2020-06-21 PROCEDURE — 2580000003 HC RX 258: Performed by: INTERNAL MEDICINE

## 2020-06-21 PROCEDURE — 93005 ELECTROCARDIOGRAM TRACING: CPT | Performed by: INTERNAL MEDICINE

## 2020-06-21 PROCEDURE — 6370000000 HC RX 637 (ALT 250 FOR IP): Performed by: INTERNAL MEDICINE

## 2020-06-21 PROCEDURE — 2060000000 HC ICU INTERMEDIATE R&B

## 2020-06-21 PROCEDURE — 83735 ASSAY OF MAGNESIUM: CPT

## 2020-06-21 PROCEDURE — 80048 BASIC METABOLIC PNL TOTAL CA: CPT

## 2020-06-21 PROCEDURE — 93010 ELECTROCARDIOGRAM REPORT: CPT | Performed by: INTERNAL MEDICINE

## 2020-06-21 RX ORDER — WARFARIN SODIUM 5 MG/1
5 TABLET ORAL DAILY
Status: DISCONTINUED | OUTPATIENT
Start: 2020-06-21 | End: 2020-06-22 | Stop reason: HOSPADM

## 2020-06-21 RX ADMIN — Medication 10 ML: at 20:51

## 2020-06-21 RX ADMIN — TAMSULOSIN HYDROCHLORIDE 0.4 MG: 0.4 CAPSULE ORAL at 10:41

## 2020-06-21 RX ADMIN — Medication 10 ML: at 10:43

## 2020-06-21 RX ADMIN — METOPROLOL TARTRATE 50 MG: 50 TABLET, FILM COATED ORAL at 10:41

## 2020-06-21 RX ADMIN — METOPROLOL TARTRATE 50 MG: 50 TABLET, FILM COATED ORAL at 20:51

## 2020-06-21 RX ADMIN — ISOSORBIDE MONONITRATE 60 MG: 60 TABLET, EXTENDED RELEASE ORAL at 10:41

## 2020-06-21 RX ADMIN — HYDROCHLOROTHIAZIDE 12.5 MG: 12.5 TABLET ORAL at 10:41

## 2020-06-21 RX ADMIN — ASPIRIN 81 MG 81 MG: 81 TABLET ORAL at 10:41

## 2020-06-21 RX ADMIN — LOPERAMIDE HYDROCHLORIDE 2 MG: 2 CAPSULE ORAL at 10:41

## 2020-06-21 RX ADMIN — CITALOPRAM HYDROBROMIDE 20 MG: 20 TABLET ORAL at 10:42

## 2020-06-21 RX ADMIN — Medication 10 ML: at 10:42

## 2020-06-21 RX ADMIN — WARFARIN SODIUM 5 MG: 5 TABLET ORAL at 17:08

## 2020-06-21 RX ADMIN — SOTALOL HYDROCHLORIDE 80 MG: 80 TABLET ORAL at 12:16

## 2020-06-21 RX ADMIN — SOTALOL HYDROCHLORIDE 80 MG: 80 TABLET ORAL at 20:51

## 2020-06-21 RX ADMIN — ATORVASTATIN CALCIUM 80 MG: 80 TABLET, FILM COATED ORAL at 20:51

## 2020-06-21 RX ADMIN — TRAZODONE HYDROCHLORIDE 100 MG: 100 TABLET ORAL at 20:51

## 2020-06-21 RX ADMIN — CARBIDOPA, LEVODOPA, AND ENTACAPONE 1 TABLET: 25; 100; 200 TABLET, FILM COATED ORAL at 20:51

## 2020-06-21 RX ADMIN — INSULIN LISPRO 2 UNITS: 100 INJECTION, SOLUTION INTRAVENOUS; SUBCUTANEOUS at 12:24

## 2020-06-21 NOTE — PROGRESS NOTES
rash or erythema  Eyes: EOMI  Neck: supple and non-tender without mass, no thyromegaly   Neurological: alert, oriented, normal speech, no focal findings or movement disorder noted  Rt groin: small hematoma. No pain. Allergies:   Allergies   Allergen Reactions    Adhesive Tape Other (See Comments)     Bandaids, water blisters    Finasteride Swelling    Mom [Magnesium Hydroxide] Swelling    Pcn [Penicillins] Swelling        Medications:    [Held by provider] sotalol  80 mg Oral BID    budesonide  6 mg Oral Daily    sodium chloride flush  10 mL Intravenous 2 times per day    atorvastatin  80 mg Oral Nightly    losartan  50 mg Oral Daily    isosorbide mononitrate  60 mg Oral Daily    citalopram  20 mg Oral Daily    traZODone  100 mg Oral Nightly    hydroCHLOROthiazide  12.5 mg Oral Every Other Day    tamsulosin  0.4 mg Oral Daily    insulin lispro  0-6 Units Subcutaneous TID WC    insulin lispro  0-3 Units Subcutaneous Nightly    sodium chloride flush  10 mL Intravenous 2 times per day    rivaroxaban  20 mg Oral Daily    metoprolol tartrate  50 mg Oral BID    aspirin  81 mg Oral Daily    carbidopa-levodopa-entacapone  1 tablet Oral Nightly    sodium chloride flush  10 mL Intravenous 2 times per day      dextrose      sodium chloride 75 mL/hr at 20 1427     loperamide, 2 mg, 4x Daily PRN  sodium chloride flush, 10 mL, PRN  acetaminophen, 650 mg, Q6H PRN    Or  acetaminophen, 650 mg, Q6H PRN  polyethylene glycol, 17 g, Daily PRN  promethazine, 12.5 mg, Q6H PRN  potassium chloride, 40 mEq, PRN    Or  potassium alternative oral replacement, 40 mEq, PRN    Or  potassium chloride, 10 mEq, PRN  glucose, 15 g, PRN  dextrose, 12.5 g, PRN  glucagon (rDNA), 1 mg, PRN  dextrose, 100 mL/hr, PRN  metoprolol, 5 mg, Q6H PRN  sodium chloride flush, 10 mL, PRN  sodium chloride flush, 10 mL, PRN  acetaminophen, 650 mg, Q4H PRN    Drug interaction list  · XXarelto (Rivaroxaban)Primidone ([employer]

## 2020-06-21 NOTE — PROGRESS NOTES
Oral Daily Harmony Stanley MD   20 mg at 06/21/20 1042    traZODone (DESYREL) tablet 100 mg  100 mg Oral Nightly Harmony Stanley MD   100 mg at 06/20/20 2201    hydrochlorothiazide (HYDRODIURIL) tablet 12.5 mg  12.5 mg Oral Every Other Day Harmony Stanley MD   12.5 mg at 06/21/20 1041    tamsulosin (FLOMAX) capsule 0.4 mg  0.4 mg Oral Daily Harmony Stanley MD   0.4 mg at 06/21/20 1041    insulin lispro (HUMALOG) injection vial 0-6 Units  0-6 Units Subcutaneous TID WC Harmony Stanley MD   2 Units at 06/21/20 1224    insulin lispro (HUMALOG) injection vial 0-3 Units  0-3 Units Subcutaneous Nightly Harmony Stanley MD        glucose (GLUTOSE) 40 % oral gel 15 g  15 g Oral PRN Harmony Stanley MD        dextrose 50 % IV solution  12.5 g Intravenous PRN Harmony Stanley MD        glucagon (rDNA) injection 1 mg  1 mg Intramuscular PRN Harmony Stanley MD        dextrose 5 % solution  100 mL/hr Intravenous PRN Harmony Stanley MD        metoprolol (LOPRESSOR) injection 5 mg  5 mg Intravenous Q6H PRN Harmony Stanley MD   5 mg at 06/19/20 0913    0.9 % sodium chloride infusion   Intravenous Continuous Harmony Stanley MD 75 mL/hr at 06/19/20 1427      sodium chloride flush 0.9 % injection 10 mL  10 mL Intravenous 2 times per day Harmony Stanley MD   10 mL at 06/21/20 1043    sodium chloride flush 0.9 % injection 10 mL  10 mL Intravenous PRN Harmony Stanley MD        metoprolol tartrate (LOPRESSOR) tablet 50 mg  50 mg Oral BID Janie Richardson MD   50 mg at 06/21/20 1041    aspirin chewable tablet 81 mg  81 mg Oral Daily Harmony Stanley MD   81 mg at 06/21/20 1041    carbidopa-levodopa-entacapone (STALEVO 100) -200 MG per tablet 1 tablet  1 tablet Oral Nightly Harmony Stanley MD   1 tablet at 06/20/20 2202    sodium chloride flush 0.9 % injection 10 mL  10 mL Intravenous 2 times per day Harmony Stanley MD   10 mL at 06/21/20 1042    sodium chloride flush 0.9 % injection 10 mL  10 mL Intravenous PRN Harmony Stanley MD        acetaminophen with this is Coumadin even though there is some interaction now we can manage this as we can monitor this. We further discussed this with the patient as well. So patient is considerable restless leg syndrome and response to carbidopa levodopa. We will see how he does on the new medications and keep him on the same dose of carbidopa levodopa though we have room for increasing carbidopa levodopa at a later stage. Fran Cotton MD, [address], American Board of Psychiatry & Neurology  Board Certified in Vascular Neurology  Board Certified in Neuromuscular Medicine  Certified in . [address] ;

## 2020-06-22 VITALS
TEMPERATURE: 98.6 F | WEIGHT: 173.2 LBS | BODY MASS INDEX: 24.79 KG/M2 | HEART RATE: 87 BPM | SYSTOLIC BLOOD PRESSURE: 160 MMHG | RESPIRATION RATE: 23 BRPM | DIASTOLIC BLOOD PRESSURE: 128 MMHG | HEIGHT: 70 IN | OXYGEN SATURATION: 96 %

## 2020-06-22 LAB
ACANTHOCYTES: ABNORMAL
ANION GAP SERPL CALCULATED.3IONS-SCNC: 7 MEQ/L (ref 9–15)
ANISOCYTOSIS: ABNORMAL
BASOPHILS ABSOLUTE: 0.1 K/UL (ref 0–0.2)
BASOPHILS RELATIVE PERCENT: 0.9 %
BUN BLDV-MCNC: 11 MG/DL (ref 8–23)
CALCIUM SERPL-MCNC: 8.1 MG/DL (ref 8.5–9.9)
CHLORIDE BLD-SCNC: 101 MEQ/L (ref 95–107)
CO2: 30 MEQ/L (ref 20–31)
CREAT SERPL-MCNC: 0.44 MG/DL (ref 0.7–1.2)
EKG ATRIAL RATE: 111 BPM
EKG ATRIAL RATE: 65 BPM
EKG ATRIAL RATE: 68 BPM
EKG ATRIAL RATE: 69 BPM
EKG ATRIAL RATE: 72 BPM
EKG ATRIAL RATE: 74 BPM
EKG ATRIAL RATE: 78 BPM
EKG ATRIAL RATE: 80 BPM
EKG ATRIAL RATE: 99 BPM
EKG P AXIS: -14 DEGREES
EKG P AXIS: -5 DEGREES
EKG P AXIS: 56 DEGREES
EKG P AXIS: 61 DEGREES
EKG P AXIS: 65 DEGREES
EKG P AXIS: 71 DEGREES
EKG P AXIS: 76 DEGREES
EKG P-R INTERVAL: 138 MS
EKG P-R INTERVAL: 140 MS
EKG P-R INTERVAL: 144 MS
EKG P-R INTERVAL: 146 MS
EKG P-R INTERVAL: 150 MS
EKG P-R INTERVAL: 154 MS
EKG P-R INTERVAL: 154 MS
EKG P-R INTERVAL: 158 MS
EKG P-R INTERVAL: 164 MS
EKG Q-T INTERVAL: 376 MS
EKG Q-T INTERVAL: 386 MS
EKG Q-T INTERVAL: 420 MS
EKG Q-T INTERVAL: 424 MS
EKG Q-T INTERVAL: 436 MS
EKG Q-T INTERVAL: 444 MS
EKG Q-T INTERVAL: 466 MS
EKG Q-T INTERVAL: 466 MS
EKG Q-T INTERVAL: 478 MS
EKG QRS DURATION: 74 MS
EKG QRS DURATION: 76 MS
EKG QRS DURATION: 82 MS
EKG QRS DURATION: 84 MS
EKG QRS DURATION: 84 MS
EKG QRS DURATION: 86 MS
EKG QRS DURATION: 90 MS
EKG QTC CALCULATION (BAZETT): 478 MS
EKG QTC CALCULATION (BAZETT): 482 MS
EKG QTC CALCULATION (BAZETT): 483 MS
EKG QTC CALCULATION (BAZETT): 484 MS
EKG QTC CALCULATION (BAZETT): 486 MS
EKG QTC CALCULATION (BAZETT): 489 MS
EKG QTC CALCULATION (BAZETT): 499 MS
EKG QTC CALCULATION (BAZETT): 508 MS
EKG QTC CALCULATION (BAZETT): 524 MS
EKG R AXIS: -30 DEGREES
EKG R AXIS: -30 DEGREES
EKG R AXIS: -31 DEGREES
EKG R AXIS: -31 DEGREES
EKG R AXIS: -33 DEGREES
EKG R AXIS: -35 DEGREES
EKG R AXIS: -39 DEGREES
EKG T AXIS: 36 DEGREES
EKG T AXIS: 49 DEGREES
EKG T AXIS: 53 DEGREES
EKG T AXIS: 54 DEGREES
EKG T AXIS: 64 DEGREES
EKG T AXIS: 71 DEGREES
EKG T AXIS: 72 DEGREES
EKG T AXIS: 75 DEGREES
EKG T AXIS: 89 DEGREES
EKG VENTRICULAR RATE: 111 BPM
EKG VENTRICULAR RATE: 65 BPM
EKG VENTRICULAR RATE: 68 BPM
EKG VENTRICULAR RATE: 69 BPM
EKG VENTRICULAR RATE: 72 BPM
EKG VENTRICULAR RATE: 74 BPM
EKG VENTRICULAR RATE: 78 BPM
EKG VENTRICULAR RATE: 80 BPM
EKG VENTRICULAR RATE: 99 BPM
EOSINOPHILS ABSOLUTE: 0.1 K/UL (ref 0–0.7)
EOSINOPHILS RELATIVE PERCENT: 1.7 %
GFR AFRICAN AMERICAN: >60
GFR NON-AFRICAN AMERICAN: >60
GLUCOSE BLD-MCNC: 115 MG/DL (ref 70–99)
GLUCOSE BLD-MCNC: 126 MG/DL (ref 60–115)
GLUCOSE BLD-MCNC: 138 MG/DL (ref 60–115)
GLUCOSE BLD-MCNC: 259 MG/DL (ref 60–115)
HCT VFR BLD CALC: 27.9 % (ref 42–52)
HEMOGLOBIN: 8.6 G/DL (ref 14–18)
INR BLD: 1.3
LYMPHOCYTES ABSOLUTE: 0.4 K/UL (ref 1–4.8)
LYMPHOCYTES RELATIVE PERCENT: 5.2 %
MCH RBC QN AUTO: 22.9 PG (ref 27–31.3)
MCHC RBC AUTO-ENTMCNC: 30.8 % (ref 33–37)
MCV RBC AUTO: 74.6 FL (ref 80–100)
MICROCYTES: ABNORMAL
MONOCYTES ABSOLUTE: 0.5 K/UL (ref 0.2–0.8)
MONOCYTES RELATIVE PERCENT: 7 %
NEUTROPHILS ABSOLUTE: 6 K/UL (ref 1.4–6.5)
NEUTROPHILS RELATIVE PERCENT: 85.2 %
OVALOCYTES: ABNORMAL
PDW BLD-RTO: 19.8 % (ref 11.5–14.5)
PERFORMED ON: ABNORMAL
PLATELET # BLD: 240 K/UL (ref 130–400)
POIKILOCYTES: ABNORMAL
POTASSIUM SERPL-SCNC: 3.6 MEQ/L (ref 3.4–4.9)
PROTHROMBIN TIME: 16.1 SEC (ref 12.3–14.9)
RBC # BLD: 3.74 M/UL (ref 4.7–6.1)
SODIUM BLD-SCNC: 138 MEQ/L (ref 135–144)
WBC # BLD: 7 K/UL (ref 4.8–10.8)

## 2020-06-22 PROCEDURE — 6370000000 HC RX 637 (ALT 250 FOR IP): Performed by: INTERNAL MEDICINE

## 2020-06-22 PROCEDURE — 99233 SBSQ HOSP IP/OBS HIGH 50: CPT | Performed by: PSYCHIATRY & NEUROLOGY

## 2020-06-22 PROCEDURE — 93005 ELECTROCARDIOGRAM TRACING: CPT | Performed by: NURSE PRACTITIONER

## 2020-06-22 PROCEDURE — 93005 ELECTROCARDIOGRAM TRACING: CPT | Performed by: INTERNAL MEDICINE

## 2020-06-22 PROCEDURE — 85025 COMPLETE CBC W/AUTO DIFF WBC: CPT

## 2020-06-22 PROCEDURE — 85610 PROTHROMBIN TIME: CPT

## 2020-06-22 PROCEDURE — 2580000003 HC RX 258: Performed by: INTERNAL MEDICINE

## 2020-06-22 PROCEDURE — 36415 COLL VENOUS BLD VENIPUNCTURE: CPT

## 2020-06-22 PROCEDURE — APPSS30 APP SPLIT SHARED TIME 16-30 MINUTES: Performed by: NURSE PRACTITIONER

## 2020-06-22 PROCEDURE — 80048 BASIC METABOLIC PNL TOTAL CA: CPT

## 2020-06-22 RX ORDER — WARFARIN SODIUM 5 MG/1
TABLET ORAL
Qty: 30 TABLET | Refills: 3 | Status: SHIPPED | OUTPATIENT
Start: 2020-06-22 | End: 2020-10-09 | Stop reason: SDUPTHER

## 2020-06-22 RX ORDER — LOSARTAN POTASSIUM 50 MG/1
50 TABLET ORAL DAILY
Qty: 30 TABLET | Refills: 3 | Status: SHIPPED | OUTPATIENT
Start: 2020-06-23 | End: 2020-08-06 | Stop reason: ALTCHOICE

## 2020-06-22 RX ORDER — ALBUTEROL SULFATE 90 UG/1
2 AEROSOL, METERED RESPIRATORY (INHALATION) 4 TIMES DAILY PRN
Qty: 1 INHALER | Refills: 1 | Status: SHIPPED | OUTPATIENT
Start: 2020-06-22 | End: 2020-07-14 | Stop reason: SDUPTHER

## 2020-06-22 RX ORDER — LOPERAMIDE HYDROCHLORIDE 2 MG/1
2 CAPSULE ORAL 4 TIMES DAILY PRN
Qty: 30 CAPSULE | Refills: 1 | Status: SHIPPED | OUTPATIENT
Start: 2020-06-22 | End: 2020-07-02

## 2020-06-22 RX ORDER — SOTALOL HYDROCHLORIDE 80 MG/1
80 TABLET ORAL 2 TIMES DAILY
Qty: 60 TABLET | Refills: 3 | Status: SHIPPED | OUTPATIENT
Start: 2020-06-22 | End: 2021-01-01

## 2020-06-22 RX ORDER — BUDESONIDE 3 MG/1
6 CAPSULE, COATED PELLETS ORAL EVERY MORNING
Qty: 60 CAPSULE | Refills: 2 | Status: SHIPPED | OUTPATIENT
Start: 2020-06-22 | End: 2020-09-20

## 2020-06-22 RX ORDER — HYDROCHLOROTHIAZIDE 12.5 MG/1
12.5 TABLET ORAL EVERY OTHER DAY
Qty: 30 TABLET | Refills: 3 | Status: SHIPPED | OUTPATIENT
Start: 2020-06-23

## 2020-06-22 RX ADMIN — CITALOPRAM HYDROBROMIDE 20 MG: 20 TABLET ORAL at 08:03

## 2020-06-22 RX ADMIN — METOPROLOL TARTRATE 50 MG: 50 TABLET, FILM COATED ORAL at 08:03

## 2020-06-22 RX ADMIN — BUDESONIDE 6 MG: 3 CAPSULE, COATED PELLETS ORAL at 08:04

## 2020-06-22 RX ADMIN — SOTALOL HYDROCHLORIDE 80 MG: 80 TABLET ORAL at 08:03

## 2020-06-22 RX ADMIN — WARFARIN SODIUM 5 MG: 5 TABLET ORAL at 16:28

## 2020-06-22 RX ADMIN — Medication 10 ML: at 08:03

## 2020-06-22 RX ADMIN — TAMSULOSIN HYDROCHLORIDE 0.4 MG: 0.4 CAPSULE ORAL at 08:03

## 2020-06-22 RX ADMIN — ISOSORBIDE MONONITRATE 60 MG: 60 TABLET, EXTENDED RELEASE ORAL at 08:03

## 2020-06-22 RX ADMIN — ASPIRIN 81 MG 81 MG: 81 TABLET ORAL at 08:03

## 2020-06-22 RX ADMIN — LOSARTAN POTASSIUM 50 MG: 50 TABLET, FILM COATED ORAL at 08:03

## 2020-06-22 RX ADMIN — INSULIN LISPRO 3 UNITS: 100 INJECTION, SOLUTION INTRAVENOUS; SUBCUTANEOUS at 11:52

## 2020-06-22 ASSESSMENT — ENCOUNTER SYMPTOMS
NAUSEA: 0
WHEEZING: 0
VOMITING: 0
CHEST TIGHTNESS: 0
SHORTNESS OF BREATH: 0
TROUBLE SWALLOWING: 0
COLOR CHANGE: 0
COUGH: 0

## 2020-06-22 ASSESSMENT — PAIN SCALES - GENERAL
PAINLEVEL_OUTOF10: 0

## 2020-06-22 NOTE — PROGRESS NOTES
the aortic valve, this gradient may actually be across the LV outflow tract because the anterior basal segment is hyper dynamic compared to the distal anterior wall and apex. 3. The left main coronary artery  has less than 10% stenosis. 4. The left anterior descending artery has patent stent in the proximal vessel, proximal LAD has less than 20% stenosis, mid vessel less than 20% stenosis distal vessel less than 20% stenosis  5. First diagonal branch is relatively large, has patent radiopaque stent, and about 30% proximal stenosis proximal to the stented segment. The first major diagonal branch runs parallel to the left anterior descending artery and gives off several secondary branches that course along the septal perforators. 6. First septal  has diffuse disease up to 80%, with calcification. Best treated medically. .  7. The left circumflex is codominant, with moderate diffuse calcification. First obtuse marginal branch is large with patent stent, 30% stenosis. Second obtuse marginal branch measures about 2.25 mm. There is patent radiopaque stent in the distal left circumflex. Third obtuse marginal branch measures about 2.25 mm. Further downstream there are terminal branches. Distal left circumflex artery has less than 20% stenosis. 8. The right coronary artery is codominant, with radiopaque stent in the proximal and distal segments. Proximal RCA has tubular 50 to 70% stenosis within prior stent, unchanged from previous angiography from 2015. Moderately large PDA has 50 to 70% stenosis in the proximal mid segment, unchanged from prior angiography. .  9. Selective right common femoral angiography shows moderate diffuse calcification but no focal lesions. CARDIAC HISTORY:  Multiple prior stents with 4 previous infarcts. Bilateral carotid artery disease  Hypertension  Paroxysmal atrial fibrillation  Renal artery stenosis with remote PTA.   Moderate aortic stenosis    Past Medical History:   Diagnosis Date    CAD (coronary artery disease)     has 16 cardiac stents    [facility] ([facility]     COPD (chronic obstructive pulmonary disease) (HCC)     Encephalopathy     Essential tremor     Gross hematuria     History of heart attack     has had 4 heart attacks in the past     Hydrocephalus (HCC)     Hyperlipidemia     on meds > 20 yrs    Hypertension     on meds > 20 yrs    Insomnia     Restless leg syndrome     Seizures (HCC)     Tremors of nervous system     Type 2 diabetes mellitus with other circulatory complications ([facility] 7/79/1428             Objective:   Vitals:    06/21/20 0800 06/21/20 1039 06/21/20 1357 06/21/20 1959   BP:  (!) 117/58 (!) 87/47 (!) 109/57   Pulse: 91 97 74 77   Resp:   18    Temp:   98.8 °F (37.1 °C) 99.7 °F (37.6 °C)   TempSrc:   Oral    SpO2:   97% 97%   Weight:       Height:          Wt Readings from Last 3 Encounters:   06/20/20 173 lb 3.2 oz (78.6 kg)   12/19/19 202 lb (91.6 kg)   12/12/19 202 lb (91.6 kg)       TELEMETRY: Normal Sinus rhythm, No  Atrial Fibrillation       Physical Exam:  General Appearance: alert and oriented to person, place and time, in no acute distress  Cardiovascular: normal rate, regular rhythm, normal S1 and S2, no murmurs, rubs, clicks, or gallops,  no JVD  Pulmonary/Chest: clear to auscultation bilaterally- no wheezes, rales or rhonchi, normal air movement, no respiratory distress  Abdomen: soft, non-tender, non-distended, normal bowel sounds. Extremities: no cyanosis, clubbing or edema  Skin: warm and dry, no rash or erythema  Eyes: EOMI  Neurological: alert, oriented, normal speech, no focal findings or movement disorder noted    Allergies:   Allergies   Allergen Reactions    Adhesive Tape Other (See Comments)     Bandaids, water blisters    Finasteride Swelling    Mom [Magnesium Hydroxide] Swelling    Pcn [Penicillins] Swelling        Medications:    warfarin  5 mg Oral Daily    sotalol  80 mg Oral BID    budesonide  6 mg CLINICAL HISTORY: Sternal chest pain short of breath    COMPARISONS: September 9, 2016     FINDINGS:    Single  views of the chest is submitted. The cardiac silhouette is of normal size configuration. Pulmonary vascular attenuated. Small area of atelectasis right lower lobe. No focal infiltrates. No pneumothoraces. No effusions. Impression Right sided trachea. No focal infiltrates. No Pneumothoraces. IMPRESSION: NO ACUTE ACTIVE CARDIOPULMONARY PROCESS. RADIOGRAPHIC FINDINGS OF COPD         Lab Data:    Cardiac Enzymes:  Recent Labs     06/19/20  0903   TROPONINI 0.065*     ProBNP:   Lab Results   Component Value Date    PROBNP 241 09/09/2016       CBC:   Recent Labs     06/20/20  0538 06/22/20  0533   WBC 5.5 7.0   RBC 3.71* 3.74*   HGB 8.5* 8.6*   HCT 27.3* 27.9*    240       CMP:    Recent Labs     06/20/20  0538 06/21/20  0550 06/22/20  0533    140 138   K 4.1 3.9 3.6    101 101   CO2 31 30 30   BUN 9 11 11   CREATININE 0.57* 0.55* 0.44*   GFRAA >60.0 >60.0 >60.0   LABGLOM >60.0 >60.0 >60.0   GLUCOSE 102* 116* 115*   CALCIUM 8.2* 7.9* 8.1*       Hepatic Function Panel:    No results for input(s): ALKPHOS, ALT, AST, PROT, BILITOT, BILIDIR, IBILI, LABALBU in the last 72 hours. Magnesium:    Recent Labs     06/21/20  0550   MG 2.0       PT/INR:    Recent Labs     06/22/20  0533   PROTIME 16.1*   INR 1.3       Principal Problem:    NSTEMI (non-ST elevated myocardial infarction) (HCC)  Active Problems:    Restless leg syndrome, uncontrolled    Disabling essential tremor    Chest pain    Lumbar radiculopathy  Resolved Problems:    * No resolved hospital problems. *           Electronically signed by LAYA Gomes CNP on 6/22/2020 at 8:53 AM      I have personally interviewed and examined the patient. I have personally and independently reviewed labs and diagnostic testing.   I have personally verified the elements of the history and physical listed above and changes, if any, are noted. I have personally reviewed the assessment and plan as documented by Justus Eckert RN, CNP and concur with her. He feels well and is adamant about going home today. He is without any specific cardiovascular complaints. He does not have any angina pectoris or CHF symptoms. His examination is remarkable for an aortic stenosis murmur. His blood pressures are borderline low. His EKG shows NSR with QTc of less than 450 msec. He will be continued on sotalol. Discontinue metoprolol. EKG tomorrow in the office. Continue warfarin. Bridge with lovenox. Enrolled in the [facility] warfarin clinic. He will followup in about 1 weeks. PT/INR to be repeated tomorrow. Discharge this afternoon after a repeat EKG at 3 PM.     Ted R. Lucious Soulier, MD, [facility]

## 2020-06-22 NOTE — DISCHARGE SUMMARY
injection  Inject 0.8 mLs into the skin 2 times daily for 10 doses             Handicap Placard MISC  by Does not apply route Good for 5 years. Good from 1/31/2017 through 1/31/2022.             hydrochlorothiazide (HYDRODIURIL) 12.5 MG tablet  Take 1 tablet by mouth every other day             isosorbide mononitrate (IMDUR) 60 MG extended release tablet  Take 60 mg by mouth daily             Lancets MISC  1 each by Does not apply route daily             Leuprolide Acetate (LUPRON IJ)  Inject as directed every 6 months             loperamide (IMODIUM) 2 MG capsule  Take 1 capsule by mouth 4 times daily as needed for Diarrhea             losartan (COZAAR) 50 MG tablet  Take 1 tablet by mouth daily             nitroGLYCERIN (NITROSTAT) 0.4 MG SL tablet  Place 0.4 mg under the tongue every 5 minutes as needed for Chest pain. Omega-3 Fatty Acids (FISH OIL) 1200 MG CPDR  Take by mouth daily              primidone (MYSOLINE) 250 MG tablet  TAKE 1 TABLET AT BEDTIME             sotalol (BETAPACE) 80 MG tablet  Take 1 tablet by mouth 2 times daily             traZODone (DESYREL) 100 MG tablet  TAKE 1 TABLET NIGHTLY             warfarin (COUMADIN) 5 MG tablet  A FIB                 Disposition:   Discharged to Home. Any C needs that were indicated and/or required as been addressed and set up by Social Work. Condition at discharge: Pt was medically stable at the time of discharge. Activity: activity as tolerated    Total time taken for discharging this patient: 40 minutes. Greater than 70% of time was spent focused exclusively on this patient. Time was taken to review chart, discuss plans with consultants, reconciling medications, discussing plan answering questions with patient.      SignedIsadora Meyer  6/22/2020, 5:47 PM

## 2020-06-23 ENCOUNTER — CARE COORDINATION (OUTPATIENT)
Dept: CASE MANAGEMENT | Age: 75
End: 2020-06-23

## 2020-06-23 DIAGNOSIS — R63.4 WEIGHT LOSS: ICD-10-CM

## 2020-06-23 DIAGNOSIS — I48.91 ATRIAL FIBRILLATION, UNSPECIFIED TYPE (HCC): ICD-10-CM

## 2020-06-23 DIAGNOSIS — E78.00 PURE HYPERCHOLESTEROLEMIA: ICD-10-CM

## 2020-06-23 DIAGNOSIS — R50.9 FEVER, UNSPECIFIED FEVER CAUSE: ICD-10-CM

## 2020-06-23 DIAGNOSIS — R94.31 ABNORMAL EKG: ICD-10-CM

## 2020-06-23 DIAGNOSIS — E11.59 TYPE 2 DIABETES MELLITUS WITH OTHER CIRCULATORY COMPLICATIONS (HCC): ICD-10-CM

## 2020-06-23 PROBLEM — I65.29 CAROTID STENOSIS: Status: RESOLVED | Noted: 2018-09-21 | Resolved: 2020-06-23

## 2020-06-23 PROBLEM — R97.20 ELEVATED PSA: Status: RESOLVED | Noted: 2019-01-24 | Resolved: 2020-06-23

## 2020-06-23 LAB
ALBUMIN SERPL-MCNC: 2.6 G/DL (ref 3.5–4.6)
ALP BLD-CCNC: 116 U/L (ref 35–104)
ALT SERPL-CCNC: 19 U/L (ref 0–41)
ANION GAP SERPL CALCULATED.3IONS-SCNC: 8 MEQ/L (ref 9–15)
ANISOCYTOSIS: ABNORMAL
AST SERPL-CCNC: 19 U/L (ref 0–40)
BASOPHILS ABSOLUTE: 0.1 K/UL (ref 0–0.2)
BASOPHILS RELATIVE PERCENT: 0.8 %
BILIRUB SERPL-MCNC: 0.4 MG/DL (ref 0.2–0.7)
BUN BLDV-MCNC: 15 MG/DL (ref 8–23)
CALCIUM SERPL-MCNC: 8.6 MG/DL (ref 8.5–9.9)
CHLORIDE BLD-SCNC: 102 MEQ/L (ref 95–107)
CHOLESTEROL, TOTAL: 82 MG/DL (ref 0–199)
CO2: 29 MEQ/L (ref 20–31)
CREAT SERPL-MCNC: 0.61 MG/DL (ref 0.7–1.2)
EOSINOPHILS ABSOLUTE: 0.1 K/UL (ref 0–0.7)
EOSINOPHILS RELATIVE PERCENT: 1 %
GFR AFRICAN AMERICAN: >60
GFR NON-AFRICAN AMERICAN: >60
GLOBULIN: 2.9 G/DL (ref 2.3–3.5)
GLUCOSE BLD-MCNC: 129 MG/DL (ref 70–99)
HBA1C MFR BLD: 6.2 % (ref 4.8–5.9)
HCT VFR BLD CALC: 29.4 % (ref 42–52)
HDLC SERPL-MCNC: 38 MG/DL (ref 40–59)
HEMOGLOBIN: 9 G/DL (ref 14–18)
INR BLD: 1.4
LDL CHOLESTEROL CALCULATED: 30 MG/DL (ref 0–129)
LYMPHOCYTES ABSOLUTE: 0.3 K/UL (ref 1–4.8)
LYMPHOCYTES RELATIVE PERCENT: 3.6 %
MACROCYTES: 0
MCH RBC QN AUTO: 22.8 PG (ref 27–31.3)
MCHC RBC AUTO-ENTMCNC: 30.8 % (ref 33–37)
MCV RBC AUTO: 74.3 FL (ref 80–100)
MICROCYTES: ABNORMAL
MONOCYTES ABSOLUTE: 0.6 K/UL (ref 0.2–0.8)
MONOCYTES RELATIVE PERCENT: 6.3 %
NEUTROPHILS ABSOLUTE: 8.3 K/UL (ref 1.4–6.5)
NEUTROPHILS RELATIVE PERCENT: 88.3 %
OVALOCYTES: ABNORMAL
PDW BLD-RTO: 19.7 % (ref 11.5–14.5)
PLATELET # BLD: 298 K/UL (ref 130–400)
PLATELET SLIDE REVIEW: NORMAL
POIKILOCYTES: ABNORMAL
POLYCHROMASIA: ABNORMAL
POTASSIUM SERPL-SCNC: 4.7 MEQ/L (ref 3.4–4.9)
PROTHROMBIN TIME: 17.5 SEC (ref 12.3–14.9)
RBC # BLD: 3.96 M/UL (ref 4.7–6.1)
SEDIMENTATION RATE, ERYTHROCYTE: 41 MM (ref 0–20)
SODIUM BLD-SCNC: 139 MEQ/L (ref 135–144)
TOTAL PROTEIN: 5.5 G/DL (ref 6.3–8)
TRIGL SERPL-MCNC: 72 MG/DL (ref 0–150)
TSH SERPL DL<=0.05 MIU/L-ACNC: 3.96 UIU/ML (ref 0.44–3.86)
WBC # BLD: 9.4 K/UL (ref 4.8–10.8)

## 2020-06-23 PROCEDURE — 93010 ELECTROCARDIOGRAM REPORT: CPT | Performed by: INTERNAL MEDICINE

## 2020-06-24 ENCOUNTER — CARE COORDINATION (OUTPATIENT)
Dept: CASE MANAGEMENT | Age: 75
End: 2020-06-24

## 2020-06-24 ENCOUNTER — OFFICE VISIT (OUTPATIENT)
Dept: FAMILY MEDICINE CLINIC | Age: 75
End: 2020-06-24
Payer: MEDICARE

## 2020-06-24 VITALS
BODY MASS INDEX: 24.77 KG/M2 | SYSTOLIC BLOOD PRESSURE: 94 MMHG | WEIGHT: 173 LBS | TEMPERATURE: 97.4 F | DIASTOLIC BLOOD PRESSURE: 60 MMHG | OXYGEN SATURATION: 95 % | RESPIRATION RATE: 14 BRPM | HEART RATE: 53 BPM | HEIGHT: 70 IN

## 2020-06-24 LAB
BLOOD CULTURE, ROUTINE: NORMAL
CULTURE, BLOOD 2: NORMAL

## 2020-06-24 PROCEDURE — 1111F DSCHRG MED/CURRENT MED MERGE: CPT | Performed by: FAMILY MEDICINE

## 2020-06-24 PROCEDURE — 99495 TRANSJ CARE MGMT MOD F2F 14D: CPT | Performed by: FAMILY MEDICINE

## 2020-06-24 RX ORDER — TRAZODONE HYDROCHLORIDE 100 MG/1
TABLET ORAL
Qty: 90 TABLET | Refills: 0 | Status: SHIPPED | OUTPATIENT
Start: 2020-06-24 | End: 2020-09-23

## 2020-06-24 NOTE — CARE COORDINATION
providers office. If you are not able to wear a facemask (for example, because it causes trouble breathing), then people who live with you should not stay in the same room with you, or they should wear a facemask if they enter your room. Cover your coughs and sneezes  Cover your mouth and nose with a tissue when you cough or sneeze. Throw used tissues in a lined trash can. Immediately wash your hands with soap and water for at least 20 seconds or, if soap and water are not available, clean your hands with an alcohol-based hand  that contains at least 60% alcohol. Clean your hands often  Wash your hands often with soap and water for at least 20 seconds, especially after blowing your nose, coughing, or sneezing; going to the bathroom; and before eating or preparing food. If soap and water are not readily available, use an alcohol-based hand  with at least 60% alcohol, covering all surfaces of your hands and rubbing them together until they feel dry. Soap and water are the best option if hands are visibly dirty. Avoid touching your eyes, nose, and mouth with unwashed hands. Avoid sharing personal household items  You should not share dishes, drinking glasses, cups, eating utensils, towels, or bedding with other people or pets in your home. After using these items, they should be washed thoroughly with soap and water. Clean all high-touch surfaces everyday  High touch surfaces include counters, tabletops, doorknobs, bathroom fixtures, toilets, phones, keyboards, tablets, and bedside tables. Also, clean any surfaces that may have blood, stool, or body fluids on them. Use a household cleaning spray or wipe, according to the label instructions. Labels contain instructions for safe and effective use of the cleaning product including precautions you should take when applying the product, such as wearing gloves and making sure you have good ventilation during use of the product.   Monitor your symptoms  Seek prompt medical attention if your illness is worsening (e.g., difficulty breathing). Before seeking care, call your healthcare provider and tell them that you have, or are being evaluated for, COVID-19. Put on a facemask before you enter the facility. These steps will help the healthcare providers office to keep other people in the office or waiting room from getting infected or exposed. Ask your healthcare provider to call the local or state health department. Persons who are placed under active monitoring or facilitated self-monitoring should follow instructions provided by their local health department or occupational health professionals, as appropriate. When working with your local health department check their available hours. If you have a medical emergency and need to call 911, notify the dispatch personnel that you have, or are being evaluated for COVID-19. If possible, put on a facemask before emergency medical services arrive. Discontinuing home isolation  Patients with confirmed COVID-19 should remain under home isolation precautions until the risk of secondary transmission to others is thought to be low. The decision to discontinue home isolation precautions should be made on a case-by-case basis, in consultation with healthcare providers and state and local health departments.

## 2020-06-24 NOTE — PROGRESS NOTES
fib and sob. Feels slightly better currently     Inpatient course: Discharge summary reviewed- see chart. Interval history/Current status: stable    A comprehensive review of systems was negative except for: Constitutional: positive for fatigue    Vitals:    06/24/20 1258   BP: 94/60   Pulse: 53   Resp: 14   Temp: 97.4 °F (36.3 °C)   SpO2: 95%   Weight: 173 lb (78.5 kg)   Height: 5' 10\" (1.778 m)     Body mass index is 24.82 kg/m². Wt Readings from Last 3 Encounters:   06/24/20 173 lb (78.5 kg)   06/20/20 173 lb 3.2 oz (78.6 kg)   12/19/19 202 lb (91.6 kg)     BP Readings from Last 3 Encounters:   06/24/20 94/60   06/22/20 (!) 160/128   06/13/20 (!) 84/49        Physical Exam:  General Appearance: in no acute distress and alert  Skin: warm and dry, no rash or erythema  Pulmonary/Chest: clear to auscultation bilaterally- no wheezes, rales or rhonchi, normal air movement, no respiratory distress  Cardiovascular: intact distal pulses, irregularly irregular rhythm noted and systolic murmur present- 2/6 throughout the precordium  Extremities:  Trace edema of both legs --trace edema of left hand     Assessment/Plan:       Diagnosis Orders   1. Hospital discharge follow-up     2. Atrial fibrillation, unspecified type ([address]     3. Mass of left lung     4. Anemia, unspecified type     5. Elevated TSH     6. Essential hypertension     7. Recurrent major depressive disorder, in partial remission ([address]     8. Type 2 diabetes mellitus with other circulatory complications (HCC)     9. Chronic insomnia     10.  Prostate cancer ([address]       Orders Placed This Encounter   Procedures    TSH without Reflex     Standing Status:   Future     Standing Expiration Date:   6/24/2021    CBC Auto Differential     Standing Status:   Future     Standing Expiration Date:   12/24/2020   Tish Lizama MD, Pulmonology, [facility]     Referral Priority:   Routine     Referral Type:   Eval and Treat     Referral Reason:   Specialty Services Required     Referred to Provider:   Jermaine Avina MD     Requested Specialty:   Pulmonology     Number of Visits Requested:   1     Medical Decision Making: moderate complexity    Non fasting blood work in 4 weeks and f/u after done

## 2020-06-25 ENCOUNTER — HOSPITAL ENCOUNTER (OUTPATIENT)
Dept: PHARMACY | Age: 75
Setting detail: THERAPIES SERIES
Discharge: HOME | End: 2020-06-25
Payer: MEDICARE

## 2020-06-25 VITALS — TEMPERATURE: 96.9 F

## 2020-06-25 LAB — INTERNATIONAL NORMALIZATION RATIO, POC: 3.1

## 2020-06-25 PROCEDURE — 85610 PROTHROMBIN TIME: CPT

## 2020-06-25 PROCEDURE — 99211 OFF/OP EST MAY X REQ PHY/QHP: CPT

## 2020-06-30 ENCOUNTER — HOSPITAL ENCOUNTER (OUTPATIENT)
Dept: PHARMACY | Age: 75
Setting detail: THERAPIES SERIES
Discharge: HOME | End: 2020-06-30
Payer: MEDICARE

## 2020-06-30 LAB — INTERNATIONAL NORMALIZATION RATIO, POC: 2.7

## 2020-06-30 PROCEDURE — 85610 PROTHROMBIN TIME: CPT | Performed by: PHARMACIST

## 2020-06-30 PROCEDURE — 99211 OFF/OP EST MAY X REQ PHY/QHP: CPT | Performed by: PHARMACIST

## 2020-06-30 NOTE — PROGRESS NOTES
Mr. Gurvinder Oneal is a 67 y.o. y/o male with history of Afib who presents today for anticoagulation monitoring and adjustment. INR 2.7 is therapeutic for this patient (goal range 2-3) and is reflective of 22.5 mg TWD. Patient verifies current dosing regimen, patient able to verbally recall dose  Patient reports no  missed doses since last INR   Patient denies s/sx clotting and/or stroke  Patient denies hematuria, epistaxis, rectal bleeding or COVID issues. Patient denies changes in diet, alcohol, or tobacco use  Reviewed medication list and drug allergies with patient, updated any medication additions or modifications accordingly. Pt is having biopsy upcoming, not scheduled yet. Patient also denies any pending medical or dental procedures scheduled at this time  Patient was instructed to continue 22.5mg TWD and RTC 1 week    For Pharmacy Admin Tracking Only    PHSO: Yes  Total # of Interventions Recommended: 2  - Updated Order #: 1 Updated Order Reason(s):  Other  - Maintenance Safety Lab Monitoring #: 1  Total Interventions Accepted: 2  Time Spent (min): 15

## 2020-07-02 ENCOUNTER — TELEPHONE (OUTPATIENT)
Dept: UROLOGY | Age: 75
End: 2020-07-02

## 2020-07-07 ENCOUNTER — HOSPITAL ENCOUNTER (OUTPATIENT)
Dept: PHARMACY | Age: 75
Setting detail: THERAPIES SERIES
Discharge: HOME | End: 2020-07-07
Payer: MEDICARE

## 2020-07-07 ENCOUNTER — CARE COORDINATION (OUTPATIENT)
Dept: CASE MANAGEMENT | Age: 75
End: 2020-07-07

## 2020-07-07 LAB — INTERNATIONAL NORMALIZATION RATIO, POC: 2.9

## 2020-07-07 PROCEDURE — 85610 PROTHROMBIN TIME: CPT

## 2020-07-07 PROCEDURE — 99211 OFF/OP EST MAY X REQ PHY/QHP: CPT

## 2020-07-07 NOTE — CARE COORDINATION
-2020 [id number] IP NSTEMI  Previous 2020 CV-19 lab negative. Care Transition Nurse/ Ambulatory Care Manager contacted the patient by telephone to perform follow-up assessment. Verified name and  with patient as identifiers. Patient has following risk factors of: diabetes and CA hx, Smoker    Symptoms reviewed with patient who verbalized the following symptoms: None. Denies any chest pain/pressure events, palpitations, or dizziness. Denies any fever, chills, or flu-like symptoms. Reports having/taking his meds. Has some minor wheezes at times responsive to his rescue inhaler. Denies any home or DME needs. In good spirits. Due to no new or worsening symptoms encounter was not routed to provider for escalation. Education provided regarding infection prevention, and signs and symptoms of COVID-19 and when to seek medical attention with patient who verbalized understanding. Discussed exposure protocols and quarantine from [address] you at higher risk for severe illness  and given an opportunity for questions and concerns. The patient agrees to contact the COVID-19 hotline [phone] or PCP office for questions related to their healthcare. CTN/ACM provided contact information for future reference. From CDC: Are you at higher risk for severe illness?  Wash your hands often.  Avoid close contact (6 feet, which is about two arm lengths) with people who are sick.  Put distance between yourself and other people if COVID-19 is spreading in your community.  Clean and disinfect frequently touched surfaces.  Avoid all cruise travel and non-essential air travel.  Call your healthcare professional if you have concerns about COVID-19 and your underlying condition or if you are sick. For more information on steps you can take to protect yourself, see CDC's How to Protect Yourself      CTN s/o.

## 2020-07-07 NOTE — PROGRESS NOTES
Mr. Gurvinder Oneal is a 67 y.o. y/o male with history of Afib who presents today for anticoagulation monitoring and adjustment.   INR 2.9 is therapeutic for this patient (goal range 2-3) and is reflective of 22.5 mg TWD  Patient verifies current dosing regimen, patient able to verbally recall dose  Patient reports no  missed doses since last INR   Patient denies s/sx clotting and/or stroke  Patient denies hematuria, epistaxis, rectal bleeding  Patient denies changes in diet, alcohol, or tobacco use  Reviewed medication list and drug allergies with patient, updated any medication additions or modifications accordingly  Patient also denies any pending medical or dental procedures scheduled at this time  Patient was instructed to continue current regimen of 22.5 mg TWD and RTC 1 weeks (would be okay with 2 weeks but had Dr. Marletta Dance next week)    CLINICAL PHARMACY CONSULT: MED RECONCILIATION/REVIEW [address]  PHSO: Yes  Total # of Interventions Recommended: 1  - Maintenance Safety Lab Monitoring #: 1  Total Interventions Accepted: 1  Time Spent (min): 30    Felton Johnson, PharmD, BCPS  Staff Pharmacist  7/7/2020 11:24 AM

## 2020-07-14 ENCOUNTER — HOSPITAL ENCOUNTER (OUTPATIENT)
Dept: PHARMACY | Age: 75
Setting detail: THERAPIES SERIES
Discharge: HOME | End: 2020-07-14
Payer: MEDICARE

## 2020-07-14 ENCOUNTER — OFFICE VISIT (OUTPATIENT)
Dept: PULMONOLOGY | Age: 75
End: 2020-07-14
Payer: MEDICARE

## 2020-07-14 VITALS
HEIGHT: 70 IN | OXYGEN SATURATION: 98 % | TEMPERATURE: 97.7 F | RESPIRATION RATE: 16 BRPM | SYSTOLIC BLOOD PRESSURE: 104 MMHG | HEART RATE: 78 BPM | BODY MASS INDEX: 24.51 KG/M2 | WEIGHT: 171.2 LBS | DIASTOLIC BLOOD PRESSURE: 60 MMHG

## 2020-07-14 VITALS — TEMPERATURE: 97.7 F

## 2020-07-14 LAB — INTERNATIONAL NORMALIZATION RATIO, POC: 2.4

## 2020-07-14 PROCEDURE — 4040F PNEUMOC VAC/ADMIN/RCVD: CPT | Performed by: INTERNAL MEDICINE

## 2020-07-14 PROCEDURE — 85610 PROTHROMBIN TIME: CPT | Performed by: PHARMACIST

## 2020-07-14 PROCEDURE — G8420 CALC BMI NORM PARAMETERS: HCPCS | Performed by: INTERNAL MEDICINE

## 2020-07-14 PROCEDURE — 1123F ACP DISCUSS/DSCN MKR DOCD: CPT | Performed by: INTERNAL MEDICINE

## 2020-07-14 PROCEDURE — 1111F DSCHRG MED/CURRENT MED MERGE: CPT | Performed by: INTERNAL MEDICINE

## 2020-07-14 PROCEDURE — 99204 OFFICE O/P NEW MOD 45 MIN: CPT | Performed by: INTERNAL MEDICINE

## 2020-07-14 PROCEDURE — G8428 CUR MEDS NOT DOCUMENT: HCPCS | Performed by: INTERNAL MEDICINE

## 2020-07-14 PROCEDURE — 1036F TOBACCO NON-USER: CPT | Performed by: INTERNAL MEDICINE

## 2020-07-14 PROCEDURE — 3023F SPIROM DOC REV: CPT | Performed by: INTERNAL MEDICINE

## 2020-07-14 PROCEDURE — 99211 OFF/OP EST MAY X REQ PHY/QHP: CPT | Performed by: PHARMACIST

## 2020-07-14 PROCEDURE — G8926 SPIRO NO PERF OR DOC: HCPCS | Performed by: INTERNAL MEDICINE

## 2020-07-14 ASSESSMENT — ENCOUNTER SYMPTOMS
ABDOMINAL PAIN: 1
CHEST TIGHTNESS: 0
SORE THROAT: 0
VOMITING: 0
NAUSEA: 0
WHEEZING: 1
COUGH: 1
SHORTNESS OF BREATH: 1
RHINORRHEA: 0
SINUS PRESSURE: 0
BACK PAIN: 1
DIARRHEA: 0
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
## 2020-07-14 NOTE — PROGRESS NOTES
Mr. Gabriel Quintanilla is a 67 y.o. y/o male with history of Afib who presents today for anticoagulation monitoring and adjustment. INR 2.4 is therapeutic for this patient (goal range 2-3) and is reflective of 22.5 mg TWD  Patient verifies current dosing regimen, patient able to verbally recall dose  Patient reports no  missed doses since last INR   Patient denies s/sx clotting and/or stroke  Patient denies hematuria, epistaxis, rectal bleeding  Patient denies changes in diet, alcohol, or tobacco use  Reviewed medication list and drug allergies with patient, updated any medication additions or modifications accordingly  Patient also denies any pending medical or dental procedures scheduled at this time  Patient states he is scheduled for a PET scan in the near future and may have a lung biopsy following the SCAN  Patient was instructed to continue 22.5mg TWD and RTC in 2 weeks    CLINICAL PHARMACY CONSULT: MED RECONCILIATION/REVIEW [address]: Yes  Total # of Interventions Recommended: 0    - Maintenance Safety Lab Monitoring #: 1  Total Interventions Accepted: 0  Time Spent (min): 15    LEONEL Kam. Ph. 7/14/2020 12:52 PM

## 2020-07-17 ENCOUNTER — HOSPITAL ENCOUNTER (OUTPATIENT)
Dept: CT IMAGING | Age: 75
Discharge: HOME | End: 2020-07-19
Payer: MEDICARE

## 2020-07-17 PROCEDURE — 78815 PET IMAGE W/CT SKULL-THIGH: CPT

## 2020-07-17 PROCEDURE — 3430000000 HC RX DIAGNOSTIC RADIOPHARMACEUTICAL: Performed by: INTERNAL MEDICINE

## 2020-07-17 PROCEDURE — A9552 F18 FDG: HCPCS | Performed by: INTERNAL MEDICINE

## 2020-07-17 RX ORDER — FLUDEOXYGLUCOSE F 18 200 MCI/ML
17.35 INJECTION, SOLUTION INTRAVENOUS
Status: COMPLETED | OUTPATIENT
Start: 2020-07-17 | End: 2020-07-17

## 2020-07-17 RX ADMIN — FLUDEOXYGLUCOSE F 18 17.35 MILLICURIE: 200 INJECTION, SOLUTION INTRAVENOUS at 10:29

## 2020-07-20 ENCOUNTER — OFFICE VISIT (OUTPATIENT)
Dept: INTERVENTIONAL RADIOLOGY/VASCULAR | Age: 75
End: 2020-07-20
Payer: MEDICARE

## 2020-07-20 VITALS
HEART RATE: 80 BPM | SYSTOLIC BLOOD PRESSURE: 120 MMHG | DIASTOLIC BLOOD PRESSURE: 80 MMHG | BODY MASS INDEX: 24.39 KG/M2 | WEIGHT: 170 LBS

## 2020-07-20 DIAGNOSIS — C61 PROSTATE CA (HCC): ICD-10-CM

## 2020-07-20 LAB — PROSTATE SPECIFIC ANTIGEN: 0.06 NG/ML (ref 0–6.22)

## 2020-07-20 PROCEDURE — G8420 CALC BMI NORM PARAMETERS: HCPCS | Performed by: NURSE PRACTITIONER

## 2020-07-20 PROCEDURE — G8427 DOCREV CUR MEDS BY ELIG CLIN: HCPCS | Performed by: NURSE PRACTITIONER

## 2020-07-20 PROCEDURE — 99204 OFFICE O/P NEW MOD 45 MIN: CPT | Performed by: NURSE PRACTITIONER

## 2020-07-20 ASSESSMENT — ENCOUNTER SYMPTOMS
EYE ITCHING: 0
SORE THROAT: 0
EYE DISCHARGE: 0
ABDOMINAL DISTENTION: 0
NAUSEA: 0
EYE REDNESS: 0
SINUS PRESSURE: 0
ABDOMINAL PAIN: 0
DIARRHEA: 1
EYE PAIN: 0
COUGH: 1
WHEEZING: 0
TROUBLE SWALLOWING: 0
VOMITING: 0
SINUS PAIN: 0
SHORTNESS OF BREATH: 1
CONSTIPATION: 0
BACK PAIN: 0

## 2020-07-20 NOTE — PROGRESS NOTES
COLONOSCOPY performed by Omar Chiang MD at [address] [facility] ENDARTEC>1 MON Right 2018    RIGHT CAROTID ENDARTERECTOMY performed by Lukasz Ceron MD at [address] [facility]      bowel was obstructed, removed portion of small intestine    ULTRASOUND PROSTATE/TRANSRECTAL N/A 2019    PROSTATE TRANSRECTAL ULTRASOUND BIOPSY performed by Roel Motley MD at [address] [facility]  13    [address] HERNIA REPAIR N/A 2016    LAPAROSCOPIC VENTRAL HERNIA REPAIR WITH MESH POSS OPEN , PAT [facility]  performed by Sadiq Vincent MD at [facility]        Past Medical History:   Diagnosis Date    CAD (coronary artery disease)     has 16 cardiac stents    Cancer [facility] [address]     COPD (chronic obstructive pulmonary disease) ([facility] [address]     Encephalopathy     Essential tremor     Gross hematuria     History of heart attack     has had 4 heart attacks in the past     Hydrocephalus (HCC)     Hyperlipidemia     on meds > 20 yrs    Hypertension     on meds > 20 yrs    Insomnia     Restless leg syndrome     Seizures (HCC)     Tremors of nervous system     Type 2 diabetes mellitus with other circulatory complications ([address]        Social History     Socioeconomic History    Marital status:      Spouse name: Not on file    Number of children: Not on file    Years of education: Not on file    Highest education level: Not on file   Occupational History    Not on file   Social Needs    Financial resource strain: Not on file    Food insecurity     Worry: Not on file     Inability: Not on file    Transportation needs     Medical: Not on file     Non-medical: Not on file   Tobacco Use    Smoking status: Former Smoker     Packs/day: 1.00     Years: 58.00     Pack years: 58.00     Types: Cigarettes     Start date: 1958     Last attempt to quit: 3/14/2020     Years since quittin.3    Smokeless tobacco: Never Used   Substance and Sexual Activity    Alcohol use:  Yes     Alcohol/week: 3.0 standard drinks     Types: 3 Shots of liquor per week     Comment: once weekly  or more    Drug use: No    Sexual activity: Never   Lifestyle    Physical activity     Days per week: Not on file     Minutes per session: Not on file    Stress: Not on file   Relationships    Social connections     Talks on phone: Not on file     Gets together: Not on file     Attends Tenriism service: Not on file     Active member of club or organization: Not on file     Attends meetings of clubs or organizations: Not on file     Relationship status: Not on file    Intimate partner violence     Fear of current or ex partner: Not on file     Emotionally abused: Not on file     Physically abused: Not on file     Forced sexual activity: Not on file   Other Topics Concern    Not on file   Social History Narrative    Not on file       Allergies   Allergen Reactions    Adhesive Tape Other (See Comments)     Bandaids, water blisters    Finasteride Swelling    Mom [Magnesium Hydroxide] Swelling    Pcn [Penicillins] Swelling       Current Outpatient Medications on File Prior to Visit   Medication Sig Dispense Refill    traZODone (DESYREL) 100 MG tablet TAKE 1 TABLET NIGHTLY 90 tablet 0    warfarin (COUMADIN) 5 MG tablet A FIB 30 tablet 3    sotalol (BETAPACE) 80 MG tablet Take 1 tablet by mouth 2 times daily 60 tablet 3    hydrochlorothiazide (HYDRODIURIL) 12.5 MG tablet Take 1 tablet by mouth every other day 30 tablet 3    losartan (COZAAR) 50 MG tablet Take 1 tablet by mouth daily (Patient taking differently: Take 25 mg by mouth daily ) 30 tablet 3    budesonide (ENTOCORT EC) 3 MG extended release capsule Take 2 capsules by mouth every morning 60 capsule 2    budesonide-formoterol (SYMBICORT) 160-4.5 MCG/ACT AERO Inhale 2 puffs into the lungs 2 times daily 1 Inhaler 0    albuterol sulfate HFA (VENTOLIN HFA) 108 (90 Base) MCG/ACT inhaler Inhale 2 puffs into the lungs 4 times daily as needed for Wheezing 3 Inhaler 1    carbidopa-levodopa-entacapone (STALEVO 100) -200 MG TABS Take 1 tablet by mouth nightly      Leuprolide Acetate (LUPRON IJ) Inject as directed every 6 months      citalopram (CELEXA) 20 MG tablet TAKE 1 TABLET DAILY 90 tablet 0    primidone (MYSOLINE) 250 MG tablet TAKE 1 TABLET AT BEDTIME 90 tablet 3    blood glucose monitor kit and supplies Test one time a day & as needed for symptoms of irregular blood glucose. 1 kit 0    blood glucose monitor strips Test one time a day & as needed for symptoms of irregular blood glucose. 300 strip 0    Lancets MISC 1 each by Does not apply route daily 300 each 1    isosorbide mononitrate (IMDUR) 60 MG extended release tablet Take 60 mg by mouth daily      Handicap Placard MISC by Does not apply route Good for 5 years. Good from 1/31/2017 through 1/31/2022. 1 each 0    atorvastatin (LIPITOR) 80 MG tablet Take 80 mg by mouth daily.  aspirin 81 MG tablet Take 81 mg by mouth daily.  Omega-3 Fatty Acids (FISH OIL) 1200 MG CPDR Take by mouth daily       nitroGLYCERIN (NITROSTAT) 0.4 MG SL tablet Place 0.4 mg under the tongue every 5 minutes as needed for Chest pain. No current facility-administered medications on file prior to visit. Review of Systems   Constitutional: Positive for fatigue. Negative for appetite change, chills and fever. HENT: Negative for congestion, sinus pressure, sinus pain, sore throat and trouble swallowing. Eyes: Negative for pain, discharge, redness and itching. Respiratory: Positive for cough and shortness of breath. Negative for wheezing. Cardiovascular: Negative for chest pain, palpitations and leg swelling. Gastrointestinal: Positive for diarrhea. Negative for abdominal distention, abdominal pain, constipation, nausea and vomiting. Endocrine: Negative for cold intolerance and heat intolerance.    Genitourinary: Negative for difficulty urinating, frequency, hematuria and urgency. Musculoskeletal: Negative for back pain, neck pain and neck stiffness. Skin: Negative for rash and wound. Neurological: Positive for dizziness and weakness. Negative for light-headedness and headaches. Hematological: Bruises/bleeds easily. Psychiatric/Behavioral: The patient is not nervous/anxious. OBJECTIVE:  /80 (Site: Left Upper Arm, Position: Sitting, Cuff Size: Small Adult)   Pulse 80   Wt 170 lb (77.1 kg)   BMI 24.39 kg/m²     Physical Exam  Constitutional:       General: He is not in acute distress. Appearance: Normal appearance. HENT:      Head: Normocephalic and atraumatic. Nose: Nose normal. No congestion. Neck:      Musculoskeletal: Normal range of motion and neck supple. Cardiovascular:      Rate and Rhythm: Normal rate and regular rhythm. Heart sounds: Normal heart sounds. No murmur. Pulmonary:      Effort: Pulmonary effort is normal. No respiratory distress. Breath sounds: Examination of the right-lower field reveals decreased breath sounds. Examination of the left-lower field reveals decreased breath sounds. Decreased breath sounds present. No wheezing or rales. Abdominal:      General: Bowel sounds are normal. There is no distension. Tenderness: There is no abdominal tenderness. Musculoskeletal: Normal range of motion. General: No tenderness. Right lower leg: No edema. Left lower leg: No edema. Skin:     General: Skin is warm and dry. Capillary Refill: Capillary refill takes 2 to 3 seconds. Neurological:      Mental Status: He is alert and oriented to person, place, and time. Psychiatric:         Mood and Affect: Mood normal.         Behavior: Behavior normal.       ASSESSMENT AND PLAN:    Assessment:   1. LLL lung mass. Plan:   1. CT Guided Needle biopsy with conscious sedation.  Procedure with risks including infection, bleeding, pneumothorax,

## 2020-07-21 ENCOUNTER — HOSPITAL ENCOUNTER (OUTPATIENT)
Dept: PULMONOLOGY | Age: 75
Discharge: HOME | End: 2020-07-21
Payer: MEDICARE

## 2020-07-21 PROCEDURE — 94060 EVALUATION OF WHEEZING: CPT | Performed by: INTERNAL MEDICINE

## 2020-07-21 PROCEDURE — 94729 DIFFUSING CAPACITY: CPT

## 2020-07-21 PROCEDURE — 94729 DIFFUSING CAPACITY: CPT | Performed by: INTERNAL MEDICINE

## 2020-07-21 PROCEDURE — 94060 EVALUATION OF WHEEZING: CPT

## 2020-07-21 PROCEDURE — 94726 PLETHYSMOGRAPHY LUNG VOLUMES: CPT | Performed by: INTERNAL MEDICINE

## 2020-07-21 PROCEDURE — 94726 PLETHYSMOGRAPHY LUNG VOLUMES: CPT

## 2020-07-21 PROCEDURE — 6360000002 HC RX W HCPCS: Performed by: INTERNAL MEDICINE

## 2020-07-21 RX ORDER — ALBUTEROL SULFATE 2.5 MG/3ML
2.5 SOLUTION RESPIRATORY (INHALATION) ONCE
Status: COMPLETED | OUTPATIENT
Start: 2020-07-21 | End: 2020-07-21

## 2020-07-21 RX ADMIN — ALBUTEROL SULFATE 2.5 MG: 2.5 SOLUTION RESPIRATORY (INHALATION) at 13:50

## 2020-07-28 ENCOUNTER — PREP FOR PROCEDURE (OUTPATIENT)
Dept: INTERVENTIONAL RADIOLOGY/VASCULAR | Age: 75
End: 2020-07-28

## 2020-07-28 ENCOUNTER — HOSPITAL ENCOUNTER (OUTPATIENT)
Dept: PHARMACY | Age: 75
Setting detail: THERAPIES SERIES
Discharge: HOME | End: 2020-07-28
Payer: MEDICARE

## 2020-07-28 VITALS — TEMPERATURE: 97.3 F

## 2020-07-28 LAB — INTERNATIONAL NORMALIZATION RATIO, POC: 3.2

## 2020-07-28 PROCEDURE — 99211 OFF/OP EST MAY X REQ PHY/QHP: CPT | Performed by: PHARMACIST

## 2020-07-28 PROCEDURE — 85610 PROTHROMBIN TIME: CPT | Performed by: PHARMACIST

## 2020-07-28 RX ORDER — 0.9 % SODIUM CHLORIDE 0.9 %
250 INTRAVENOUS SOLUTION INTRAVENOUS
Status: CANCELLED | OUTPATIENT
Start: 2020-07-28

## 2020-07-28 RX ORDER — LIDOCAINE HYDROCHLORIDE 20 MG/ML
10 INJECTION, SOLUTION INFILTRATION; PERINEURAL
Status: CANCELLED | OUTPATIENT
Start: 2020-07-28

## 2020-07-28 RX ORDER — FENTANYL CITRATE 50 UG/ML
50 INJECTION, SOLUTION INTRAMUSCULAR; INTRAVENOUS
Status: CANCELLED | OUTPATIENT
Start: 2020-07-28

## 2020-07-28 RX ORDER — IBUPROFEN 200 MG
TABLET ORAL ONCE
Status: CANCELLED | OUTPATIENT
Start: 2020-07-28 | End: 2020-07-28

## 2020-07-28 RX ORDER — MIDAZOLAM HYDROCHLORIDE 1 MG/ML
0.5 INJECTION INTRAMUSCULAR; INTRAVENOUS
Status: CANCELLED | OUTPATIENT
Start: 2020-07-28

## 2020-07-28 NOTE — PROGRESS NOTES
Mr. Vaishali Montesinos is a 67 y.o. y/o male with history of Afib who presents today for anticoagulation monitoring and adjustment.   INR 3.2 is slightly supra-therapeutic for this patient (goal range 2-3) and is reflective of 22.5 mg TWD  Patient verifies current dosing regimen, patient able to verbally recall dose  Patient reports 0  missed doses since last INR   Patient denies s/sx clotting and/or stroke  Patient denies hematuria, epistaxis, rectal bleeding  Patient denies changes in diet, alcohol, or tobacco use  Reviewed medication list and drug allergies with patient, updated any medication additions or modifications accordingly  Patient also denies any pending medical or dental procedures scheduled at this time  Patient was instructed to increase greens in diet, continue 22.5 mg TWD and RTC 3 weeks    CLINICAL PHARMACY CONSULT: MED RECONCILIATION/REVIEW [address]: Yes  Total # of Interventions Recommended: 1    - Maintenance Safety Lab Monitoring #: 1  Total Interventions Accepted: 1  Time Spent (min): [address]

## 2020-07-30 ENCOUNTER — NURSE ONLY (OUTPATIENT)
Dept: PRIMARY CARE CLINIC | Age: 75
End: 2020-07-30

## 2020-07-30 PROCEDURE — U0003 INFECTIOUS AGENT DETECTION BY NUCLEIC ACID (DNA OR RNA); SEVERE ACUTE RESPIRATORY SYNDROME CORONAVIRUS 2 (SARS-COV-2) (CORONAVIRUS DISEASE [COVID-19]), AMPLIFIED PROBE TECHNIQUE, MAKING USE OF HIGH THROUGHPUT TECHNOLOGIES AS DESCRIBED BY CMS-2020-01-R: HCPCS

## 2020-07-31 NOTE — PROCEDURES
[address]                      [address], [address] [facility]                    PULMONARY FUNCTION  Julious Arnt   67 y.o.   male  Height 70 in  Weight 170 lb      Referring provider   Jessika Camarillo MD    Reading provider   Shamar Laurent    Test meets ATS criteria for acceptability and reproducibility Yes    Diagnosis: SZYMANSKI: Yes  Cough   Yes, wheezing No  Smoking   quit 6 years ago    Spirometry   FVC            3.03 L   72%  Post bronchodilator 3.35 L  80% Change 10 %  FEV1          1.74 L  57%   Post bronchodilator  1.88 L  62%   Change 7%  FEV1/FVC  57  %             Post bronchodilator  56 %  [id number]% 0.81 L  37%  Post bronchodilator  1.01 L  46%   Change 24%    Lung volume   SVC           2.85 L  67%   RV              4.24 L 164%   TLC            7.09  L 100%   RV/TLC      60 %    DLCO           61 %     Test interpretation     Spirometry meet status criteria for moderately severe obstructive airway disease with no response to bronchodilator  Lung volumes shows air trapping  Diffusion capacity is mildly reduced.        Clinical correlation is recommended     Phuong Canela [facility], 7/31/2020 2:05 PM

## 2020-08-01 LAB
SARS-COV-2: NOT DETECTED
SOURCE: NORMAL

## 2020-08-03 DIAGNOSIS — R79.89 ELEVATED TSH: ICD-10-CM

## 2020-08-03 DIAGNOSIS — E11.9 NEW ONSET TYPE 2 DIABETES MELLITUS (HCC): ICD-10-CM

## 2020-08-03 DIAGNOSIS — I48.0 PAROXYSMAL ATRIAL FIBRILLATION (HCC): ICD-10-CM

## 2020-08-03 DIAGNOSIS — D64.9 ANEMIA, UNSPECIFIED TYPE: ICD-10-CM

## 2020-08-03 LAB
ACANTHOCYTES: ABNORMAL
ANISOCYTOSIS: ABNORMAL
BASOPHILS ABSOLUTE: 0.1 K/UL (ref 0–0.2)
BASOPHILS RELATIVE PERCENT: 0.7 %
EOSINOPHILS ABSOLUTE: 0 K/UL (ref 0–0.7)
EOSINOPHILS RELATIVE PERCENT: 0.4 %
HBA1C MFR BLD: 5.9 % (ref 4.8–5.9)
HCT VFR BLD CALC: 28.9 % (ref 42–52)
HEMOGLOBIN: 8.8 G/DL (ref 14–18)
HYPOCHROMIA: ABNORMAL
INR BLD: 1.4
LYMPHOCYTES ABSOLUTE: 0.3 K/UL (ref 1–4.8)
LYMPHOCYTES RELATIVE PERCENT: 4.4 %
MCH RBC QN AUTO: 21.6 PG (ref 27–31.3)
MCHC RBC AUTO-ENTMCNC: 30.4 % (ref 33–37)
MCV RBC AUTO: 71.1 FL (ref 80–100)
MICROCYTES: ABNORMAL
MONOCYTES ABSOLUTE: 0.5 K/UL (ref 0.2–0.8)
MONOCYTES RELATIVE PERCENT: 6.6 %
NEUTROPHILS ABSOLUTE: 6.8 K/UL (ref 1.4–6.5)
NEUTROPHILS RELATIVE PERCENT: 87.9 %
OVALOCYTES: ABNORMAL
PDW BLD-RTO: 18.1 % (ref 11.5–14.5)
PLATELET # BLD: 284 K/UL (ref 130–400)
POIKILOCYTES: ABNORMAL
PROTHROMBIN TIME: 17.5 SEC (ref 12.3–14.9)
RBC # BLD: 4.06 M/UL (ref 4.7–6.1)
TSH SERPL DL<=0.05 MIU/L-ACNC: 1.55 UIU/ML (ref 0.44–3.86)
WBC # BLD: 7.7 K/UL (ref 4.8–10.8)

## 2020-08-04 ENCOUNTER — HOSPITAL ENCOUNTER (OUTPATIENT)
Dept: GENERAL RADIOLOGY | Age: 75
Discharge: HOME | End: 2020-08-06
Payer: MEDICARE

## 2020-08-04 PROCEDURE — 71046 X-RAY EXAM CHEST 2 VIEWS: CPT | Performed by: RADIOLOGY

## 2020-08-04 PROCEDURE — 71046 X-RAY EXAM CHEST 2 VIEWS: CPT

## 2020-08-05 ENCOUNTER — OFFICE VISIT (OUTPATIENT)
Dept: NEUROLOGY | Age: 75
End: 2020-08-05
Payer: MEDICARE

## 2020-08-05 ENCOUNTER — POST-OP TELEPHONE (OUTPATIENT)
Dept: GENERAL RADIOLOGY | Age: 75
End: 2020-08-05

## 2020-08-05 VITALS
WEIGHT: 168 LBS | SYSTOLIC BLOOD PRESSURE: 74 MMHG | DIASTOLIC BLOOD PRESSURE: 43 MMHG | BODY MASS INDEX: 24.11 KG/M2 | HEART RATE: 79 BPM

## 2020-08-05 PROBLEM — I95.0 IDIOPATHIC HYPOTENSION: Status: ACTIVE | Noted: 2020-08-05

## 2020-08-05 PROCEDURE — 4040F PNEUMOC VAC/ADMIN/RCVD: CPT | Performed by: PSYCHIATRY & NEUROLOGY

## 2020-08-05 PROCEDURE — 1123F ACP DISCUSS/DSCN MKR DOCD: CPT | Performed by: PSYCHIATRY & NEUROLOGY

## 2020-08-05 PROCEDURE — 1036F TOBACCO NON-USER: CPT | Performed by: PSYCHIATRY & NEUROLOGY

## 2020-08-05 PROCEDURE — 99214 OFFICE O/P EST MOD 30 MIN: CPT | Performed by: PSYCHIATRY & NEUROLOGY

## 2020-08-05 PROCEDURE — G8427 DOCREV CUR MEDS BY ELIG CLIN: HCPCS | Performed by: PSYCHIATRY & NEUROLOGY

## 2020-08-05 PROCEDURE — G8420 CALC BMI NORM PARAMETERS: HCPCS | Performed by: PSYCHIATRY & NEUROLOGY

## 2020-08-05 ASSESSMENT — ENCOUNTER SYMPTOMS
COLOR CHANGE: 0
BACK PAIN: 0
NAUSEA: 0
PHOTOPHOBIA: 0
CHOKING: 0
VOMITING: 0
SHORTNESS OF BREATH: 0
TROUBLE SWALLOWING: 0

## 2020-08-05 NOTE — PROGRESS NOTES
8/4/2020 1023 Spoke to the patient's wife s/p Lung biopsy procedure. She stated,\"He is doing great, no pain, chest tightness, shortness of breathe or bleeding. \"  She is aware to watch for signs of infection for the next 48 hours. Reassurance offered that the results will take about a week . Support offered.
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
## 2020-08-06 ENCOUNTER — OFFICE VISIT (OUTPATIENT)
Dept: UROLOGY | Age: 75
End: 2020-08-06
Payer: MEDICARE

## 2020-08-06 VITALS
BODY MASS INDEX: 23.91 KG/M2 | HEIGHT: 70 IN | HEART RATE: 77 BPM | SYSTOLIC BLOOD PRESSURE: 88 MMHG | WEIGHT: 167 LBS | DIASTOLIC BLOOD PRESSURE: 40 MMHG

## 2020-08-06 PROCEDURE — 1123F ACP DISCUSS/DSCN MKR DOCD: CPT | Performed by: UROLOGY

## 2020-08-06 PROCEDURE — G8427 DOCREV CUR MEDS BY ELIG CLIN: HCPCS | Performed by: UROLOGY

## 2020-08-06 PROCEDURE — 96402 CHEMO HORMON ANTINEOPL SQ/IM: CPT | Performed by: UROLOGY

## 2020-08-06 PROCEDURE — 99214 OFFICE O/P EST MOD 30 MIN: CPT | Performed by: UROLOGY

## 2020-08-06 PROCEDURE — 1036F TOBACCO NON-USER: CPT | Performed by: UROLOGY

## 2020-08-06 PROCEDURE — 4040F PNEUMOC VAC/ADMIN/RCVD: CPT | Performed by: UROLOGY

## 2020-08-06 PROCEDURE — G8420 CALC BMI NORM PARAMETERS: HCPCS | Performed by: UROLOGY
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
## 2020-08-07 ENCOUNTER — OFFICE VISIT (OUTPATIENT)
Dept: FAMILY MEDICINE CLINIC | Age: 75
End: 2020-08-07
Payer: MEDICARE

## 2020-08-07 VITALS
TEMPERATURE: 96.8 F | HEART RATE: 74 BPM | DIASTOLIC BLOOD PRESSURE: 60 MMHG | HEIGHT: 70 IN | SYSTOLIC BLOOD PRESSURE: 102 MMHG | BODY MASS INDEX: 23.91 KG/M2 | OXYGEN SATURATION: 98 % | WEIGHT: 167 LBS | RESPIRATION RATE: 14 BRPM

## 2020-08-07 PROCEDURE — G8427 DOCREV CUR MEDS BY ELIG CLIN: HCPCS | Performed by: FAMILY MEDICINE

## 2020-08-07 PROCEDURE — 1123F ACP DISCUSS/DSCN MKR DOCD: CPT | Performed by: FAMILY MEDICINE

## 2020-08-07 PROCEDURE — 99213 OFFICE O/P EST LOW 20 MIN: CPT | Performed by: FAMILY MEDICINE

## 2020-08-07 PROCEDURE — 1036F TOBACCO NON-USER: CPT | Performed by: FAMILY MEDICINE

## 2020-08-07 PROCEDURE — 4040F PNEUMOC VAC/ADMIN/RCVD: CPT | Performed by: FAMILY MEDICINE

## 2020-08-07 PROCEDURE — G8510 SCR DEP NEG, NO PLAN REQD: HCPCS | Performed by: FAMILY MEDICINE

## 2020-08-07 PROCEDURE — G8420 CALC BMI NORM PARAMETERS: HCPCS | Performed by: FAMILY MEDICINE

## 2020-08-07 ASSESSMENT — PATIENT HEALTH QUESTIONNAIRE - PHQ9
1. LITTLE INTEREST OR PLEASURE IN DOING THINGS: 0
SUM OF ALL RESPONSES TO PHQ9 QUESTIONS 1 & 2: 0
SUM OF ALL RESPONSES TO PHQ QUESTIONS 1-9: 0
2. FEELING DOWN, DEPRESSED OR HOPELESS: 0
SUM OF ALL RESPONSES TO PHQ QUESTIONS 1-9: 0

## 2020-08-07 ASSESSMENT — ENCOUNTER SYMPTOMS: BLOOD IN STOOL: 0

## 2020-08-07 NOTE — PROGRESS NOTES
Subjective:      Patient ID: Isacc Mcgovern is a 67 y.o. male    HPI  Here in follow up from recent blood work. No new complaints   Review of Systems   Gastrointestinal: Negative for blood in stool. Skin: Negative for rash. Neurological: Negative for dizziness and weakness. Reviewed allergy, medical, social, surgical, family and med list changes and updated   Files--reviewed blood work with iron deficiency of anemia      Social History     Socioeconomic History    Marital status:      Spouse name: None    Number of children: None    Years of education: None    Highest education level: None   Occupational History    None   Social Needs    Financial resource strain: None    Food insecurity     Worry: None     Inability: None    Transportation needs     Medical: None     Non-medical: None   Tobacco Use    Smoking status: Former Smoker     Packs/day: 1.00     Years: 58.00     Pack years: 58.00     Types: Cigarettes     Start date: 1958     Last attempt to quit: 3/14/2020     Years since quittin.4    Smokeless tobacco: Never Used   Substance and Sexual Activity    Alcohol use:  Yes     Alcohol/week: 3.0 standard drinks     Types: 3 Shots of liquor per week     Comment: once weekly  or more    Drug use: No    Sexual activity: Never   Lifestyle    Physical activity     Days per week: None     Minutes per session: None    Stress: None   Relationships    Social connections     Talks on phone: None     Gets together: None     Attends Orthodoxy service: None     Active member of club or organization: None     Attends meetings of clubs or organizations: None     Relationship status: None    Intimate partner violence     Fear of current or ex partner: None     Emotionally abused: None     Physically abused: None     Forced sexual activity: None   Other Topics Concern    None   Social History Narrative    None     Current Outpatient Medications   Medication Sig Dispense Refill    traZODone (DESYREL) 100 MG tablet TAKE 1 TABLET NIGHTLY 90 tablet 0    warfarin (COUMADIN) 5 MG tablet A FIB 30 tablet 3    sotalol (BETAPACE) 80 MG tablet Take 1 tablet by mouth 2 times daily 60 tablet 3    hydrochlorothiazide (HYDRODIURIL) 12.5 MG tablet Take 1 tablet by mouth every other day (Patient taking differently: Take 12.5 mg by mouth daily as needed (for edema) ) 30 tablet 3    budesonide (ENTOCORT EC) 3 MG extended release capsule Take 2 capsules by mouth every morning 60 capsule 2    budesonide-formoterol (SYMBICORT) 160-4.5 MCG/ACT AERO Inhale 2 puffs into the lungs 2 times daily 1 Inhaler 0    albuterol sulfate HFA (VENTOLIN HFA) 108 (90 Base) MCG/ACT inhaler Inhale 2 puffs into the lungs 4 times daily as needed for Wheezing 3 Inhaler 1    carbidopa-levodopa-entacapone (STALEVO 100) -200 MG TABS Take 1 tablet by mouth nightly      Leuprolide Acetate (LUPRON IJ) Inject as directed every 6 months      citalopram (CELEXA) 20 MG tablet TAKE 1 TABLET DAILY 90 tablet 0    primidone (MYSOLINE) 250 MG tablet TAKE 1 TABLET AT BEDTIME 90 tablet 3    blood glucose monitor kit and supplies Test one time a day & as needed for symptoms of irregular blood glucose. 1 kit 0    blood glucose monitor strips Test one time a day & as needed for symptoms of irregular blood glucose. 300 strip 0    Lancets MISC 1 each by Does not apply route daily 300 each 1    isosorbide mononitrate (IMDUR) 60 MG extended release tablet Take 30 mg by mouth daily       Handicap Placard MISC by Does not apply route Good for 5 years. Good from 1/31/2017 through 1/31/2022. 1 each 0    atorvastatin (LIPITOR) 80 MG tablet Take 80 mg by mouth daily.  aspirin 81 MG tablet Take 81 mg by mouth daily.  Omega-3 Fatty Acids (FISH OIL) 1200 MG CPDR Take by mouth daily       nitroGLYCERIN (NITROSTAT) 0.4 MG SL tablet Place 0.4 mg under the tongue every 5 minutes as needed for Chest pain.        No current facility-administered medications for this visit. Family History   Problem Relation Age of Onset    Other Mother         liver scerosis   Nani Exon Other Father         did not have a father    Other Sister         does not know sister   Sravan Rodriguez         brain cancer    Other Son         unsure of medical problems    Other Daughter         unsure of medical problems     Past Medical History:   Diagnosis Date    CAD (coronary artery disease)     has 16 cardiac stents    [facility]     COPD (chronic obstructive pulmonary disease) ([facility]     Encephalopathy     Essential tremor     Gross hematuria     History of heart attack     has had 4 heart attacks in the past     Hydrocephalus ([facility]     Hyperlipidemia     on meds > 20 yrs    Hypertension     on meds > 20 yrs    Insomnia     Restless leg syndrome     Seizures ([facility]     Tremors of nervous system     Type 2 diabetes mellitus with other circulatory complications ([facility] 4/15/6123     Objective:   /60   Pulse 74   Temp 96.8 °F (36 °C) (Tympanic)   Resp 14   Ht 5' 10\" (1.778 m)   Wt 167 lb (75.8 kg)   SpO2 98%   BMI 23.96 kg/m²     Physical Exam  No exam done -reviewed vitals   Assessment:       Diagnosis Orders   1. Iron deficiency anemia, unspecified iron deficiency anemia type  Tyrone Dooley MD, Prashant Strange   2.  Type 2 diabetes mellitus with other circulatory complications ([facility]           Plan:      Orders Placed This Encounter   Procedures   Tyrone Dooley MD, GastroenterologyElizabeth     Referral Priority:   Routine     Referral Type:   Eval and Treat     Referral Reason:   Specialty Services Required     Referred to Provider:   Tracie Moya MD     Requested Specialty:   Gastroenterology     Number of Visits Requested:   1   f/u 3 months after non fasting blood work

## 2020-08-12 ENCOUNTER — OFFICE VISIT (OUTPATIENT)
Dept: PULMONOLOGY | Age: 75
End: 2020-08-12
Payer: MEDICARE

## 2020-08-12 VITALS
HEIGHT: 70 IN | BODY MASS INDEX: 24.05 KG/M2 | SYSTOLIC BLOOD PRESSURE: 102 MMHG | RESPIRATION RATE: 16 BRPM | TEMPERATURE: 97.5 F | DIASTOLIC BLOOD PRESSURE: 60 MMHG | OXYGEN SATURATION: 98 % | WEIGHT: 168 LBS | HEART RATE: 66 BPM

## 2020-08-12 PROCEDURE — 1123F ACP DISCUSS/DSCN MKR DOCD: CPT | Performed by: INTERNAL MEDICINE

## 2020-08-12 PROCEDURE — G8926 SPIRO NO PERF OR DOC: HCPCS | Performed by: INTERNAL MEDICINE

## 2020-08-12 PROCEDURE — 3023F SPIROM DOC REV: CPT | Performed by: INTERNAL MEDICINE

## 2020-08-12 PROCEDURE — G8428 CUR MEDS NOT DOCUMENT: HCPCS | Performed by: INTERNAL MEDICINE

## 2020-08-12 PROCEDURE — 1036F TOBACCO NON-USER: CPT | Performed by: INTERNAL MEDICINE

## 2020-08-12 PROCEDURE — 99214 OFFICE O/P EST MOD 30 MIN: CPT | Performed by: INTERNAL MEDICINE

## 2020-08-12 PROCEDURE — 4040F PNEUMOC VAC/ADMIN/RCVD: CPT | Performed by: INTERNAL MEDICINE

## 2020-08-12 PROCEDURE — G8420 CALC BMI NORM PARAMETERS: HCPCS | Performed by: INTERNAL MEDICINE

## 2020-08-12 RX ORDER — IPRATROPIUM BROMIDE AND ALBUTEROL SULFATE 2.5; .5 MG/3ML; MG/3ML
1 SOLUTION RESPIRATORY (INHALATION) 4 TIMES DAILY
Qty: 360 ML | Refills: 5 | Status: SHIPPED | OUTPATIENT
Start: 2020-08-12 | End: 2020-08-17 | Stop reason: SDUPTHER

## 2020-08-12 RX ORDER — NEBULIZER ACCESSORIES
EACH MISCELLANEOUS
Qty: 1 EACH | Refills: 0 | Status: SHIPPED | OUTPATIENT
Start: 2020-08-12

## 2020-08-12 RX ORDER — PREDNISONE 10 MG/1
TABLET ORAL
Qty: 40 TABLET | Refills: 0 | Status: ON HOLD | OUTPATIENT
Start: 2020-08-12 | End: 2020-09-08 | Stop reason: ALTCHOICE

## 2020-08-12 ASSESSMENT — ENCOUNTER SYMPTOMS
NAUSEA: 0
SHORTNESS OF BREATH: 1
SORE THROAT: 0
WHEEZING: 0
SINUS PRESSURE: 0
COUGH: 1
DIARRHEA: 0
ABDOMINAL PAIN: 0
VOMITING: 0
RHINORRHEA: 0
CHEST TIGHTNESS: 0

## 2020-08-12 NOTE — PROGRESS NOTES
Subjective:     Lilian Quezada is a 67 y.o. male who complains today of:     Chief Complaint   Patient presents with    Follow-up     4 Week F/U for Lung Mass    Results     PET Scan and PFT       HPI  Patient is here for a follow-up visit regarding left lower lobe lung mass. Since initial visit patient had a PET scan done which confirmed hypermetabolic activity in the entire left lung mass but no evidence of any metastatic disease to the intrathoracic or extrathoracic structures. Patient has had continued weight loss. He is very weak having difficulty with ambulation, unsteady, continue to have increasing breathing difficulties with exertion as well. PFTs were also done and confirmed moderate severe obstructive disease with FEV1 of about 57% of predicted. His postbronchodilator FEV1 was about 1.8 L. CT-guided biopsy was done by Dr. Luis Alcantara, and confirmed Poorly differentiated squamous cell carcinoma. Allergies:  Adhesive tape;  Finasteride; Mom [magnesium hydroxide]; and Pcn [penicillins]  Past Medical History:   Diagnosis Date    CAD (coronary artery disease)     has 16 cardiac stents    Cancer ([address]     COPD (chronic obstructive pulmonary disease) ([address]     Encephalopathy     Essential tremor     Gross hematuria     History of heart attack     has had 4 heart attacks in the past     Hydrocephalus (HCC)     Hyperlipidemia     on meds > 20 yrs    Hypertension     on meds > 20 yrs    Insomnia     Restless leg syndrome     Seizures (HCC)     Tremors of nervous system     Type 2 diabetes mellitus with other circulatory complications ([address] 5/62/8481     Past Surgical History:   Procedure Laterality Date    APPENDECTOMY  2008    APPENDECTOMY  2008    repair post appendectomy incision    ARM SURGERY Right 1997    pins input     BACK SURGERY  02/2017    lumbar disckectomy 2009    BACK SURGERY  07/01/2009    lumbar diskectomy    CARDIAC SURGERY  2008, 2011, 2012 and 2013    stents input - file    Number of children: Not on file    Years of education: Not on file    Highest education level: Not on file   Occupational History    Not on file   Social Needs    Financial resource strain: Not on file    Food insecurity     Worry: Not on file     Inability: Not on file    Transportation needs     Medical: Not on file     Non-medical: Not on file   Tobacco Use    Smoking status: Former Smoker     Packs/day: 1.00     Years: 58.00     Pack years: 58.00     Types: Cigarettes     Start date: 1958     Last attempt to quit: 3/14/2020     Years since quittin.4    Smokeless tobacco: Never Used   Substance and Sexual Activity    Alcohol use: Yes     Alcohol/week: 3.0 standard drinks     Types: 3 Shots of liquor per week     Comment: once weekly  or more    Drug use: No    Sexual activity: Never   Lifestyle    Physical activity     Days per week: Not on file     Minutes per session: Not on file    Stress: Not on file   Relationships    Social connections     Talks on phone: Not on file     Gets together: Not on file     Attends Islam service: Not on file     Active member of club or organization: Not on file     Attends meetings of clubs or organizations: Not on file     Relationship status: Not on file    Intimate partner violence     Fear of current or ex partner: Not on file     Emotionally abused: Not on file     Physically abused: Not on file     Forced sexual activity: Not on file   Other Topics Concern    Not on file   Social History Narrative    Not on file         Review of Systems   Constitutional: Positive for fatigue and unexpected weight change (Continued weight loss, total of over 30 pounds in the last year). Negative for chills, diaphoresis and fever. HENT: Negative for congestion, postnasal drip, rhinorrhea, sinus pressure, sneezing and sore throat. Eyes: Negative for visual disturbance. Respiratory: Positive for cough and shortness of breath.  Negative for chest Non-small cell cancer of left lung ([facility]  MRI BRAIN W WO CONTRAST   2. Chronic obstructive pulmonary disease, unspecified COPD type ([facility]       Had long discussion with patient and his wife about the diagnosis, the findings on PET scan which indicate probable surgical stage, risk of surgical intervention, especially in light of the severity of COPD and his progressive debility and severe lower extremity weakness. I recommended an MRI of the brain to rule out CNS involvement contributing to his severe weakness, initiating aggressive treatment for COPD, and reevaluation within 2 weeks before being ready for thoracotomy and lobectomy. Patient has already discussed this with Dr. Sandra Jansen, who is also seen him for vascular disease, with whom, I,ve discussed the case at length today      Plan:     Orders Placed This Encounter   Procedures    MRI BRAIN W WO CONTRAST     Standing Status:   Future     Standing Expiration Date:   2021     Order Specific Question:   Reason for exam:     Answer:   Recent diagnosis of non-small cell lung cancer, progressive weakness     Orders Placed This Encounter   Medications    ipratropium-albuterol (DUONEB) 0.5-2.5 (3) MG/3ML SOLN nebulizer solution     Sig: Inhale 3 mLs into the lungs 4 times daily     Dispense:  360 mL     Refill:  5    Respiratory Therapy Supplies (NEBULIZER AIR TUBE/PLUGS) MISC     Sig: Nebulizer machine with tubing and supplies     Dispense:  1 each     Refill:  0    predniSONE (DELTASONE) 10 MG tablet     Si tablets daily for 4 days, 3 tablets daily for 4 days, 2 tablets daily for 4 days, then 1 tablet daily for 4 days     Dispense:  40 tablet     Refill:  0           Return in about 2 weeks (around 2020) for re-evaluation, review tests.       Sydney Rhodes MD

## 2020-08-14 ENCOUNTER — OFFICE VISIT (OUTPATIENT)
Dept: GASTROENTEROLOGY | Age: 75
End: 2020-08-14
Payer: MEDICARE

## 2020-08-14 VITALS
RESPIRATION RATE: 16 BRPM | HEIGHT: 70 IN | OXYGEN SATURATION: 88 % | HEART RATE: 68 BPM | WEIGHT: 167 LBS | BODY MASS INDEX: 23.91 KG/M2

## 2020-08-14 DIAGNOSIS — D64.9 ANEMIA, UNSPECIFIED TYPE: ICD-10-CM

## 2020-08-14 LAB
FOLATE: 4.2 NG/ML (ref 7.3–26.1)
IRON SATURATION: 12 % (ref 11–46)
IRON: 20 UG/DL (ref 59–158)
TOTAL IRON BINDING CAPACITY: 164 UG/DL (ref 178–450)
VITAMIN B-12: 300 PG/ML (ref 232–1245)

## 2020-08-14 PROCEDURE — 1123F ACP DISCUSS/DSCN MKR DOCD: CPT | Performed by: SPECIALIST

## 2020-08-14 PROCEDURE — 99213 OFFICE O/P EST LOW 20 MIN: CPT | Performed by: SPECIALIST

## 2020-08-14 PROCEDURE — G8420 CALC BMI NORM PARAMETERS: HCPCS | Performed by: SPECIALIST

## 2020-08-14 PROCEDURE — G8427 DOCREV CUR MEDS BY ELIG CLIN: HCPCS | Performed by: SPECIALIST

## 2020-08-14 PROCEDURE — 4040F PNEUMOC VAC/ADMIN/RCVD: CPT | Performed by: SPECIALIST

## 2020-08-14 PROCEDURE — 1036F TOBACCO NON-USER: CPT | Performed by: SPECIALIST

## 2020-08-14 ASSESSMENT — ENCOUNTER SYMPTOMS
NAUSEA: 0
RECTAL PAIN: 0
BLOOD IN STOOL: 0
GASTROINTESTINAL NEGATIVE: 1
ANAL BLEEDING: 0
ABDOMINAL PAIN: 0
VOMITING: 0
ABDOMINAL DISTENTION: 0
EYES NEGATIVE: 1
DIARRHEA: 0
CONSTIPATION: 0
SHORTNESS OF BREATH: 1
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
## 2020-08-17 ENCOUNTER — TELEPHONE (OUTPATIENT)
Dept: GASTROENTEROLOGY | Age: 75
End: 2020-08-17

## 2020-08-17 RX ORDER — FOLIC ACID 1 MG/1
1 TABLET ORAL DAILY
Qty: 90 TABLET | Refills: 1 | Status: SHIPPED | OUTPATIENT
Start: 2020-08-17

## 2020-08-17 RX ORDER — IPRATROPIUM BROMIDE AND ALBUTEROL SULFATE 2.5; .5 MG/3ML; MG/3ML
1 SOLUTION RESPIRATORY (INHALATION) 4 TIMES DAILY
Qty: 360 ML | Refills: 5 | Status: SHIPPED | OUTPATIENT
Start: 2020-08-17 | End: 2021-01-01 | Stop reason: SDUPTHER

## 2020-08-17 RX ORDER — FERROUS SULFATE 325(65) MG
325 TABLET ORAL 2 TIMES DAILY
Qty: 60 TABLET | Refills: 5 | Status: SHIPPED | OUTPATIENT
Start: 2020-08-17

## 2020-08-18 ENCOUNTER — HOSPITAL ENCOUNTER (OUTPATIENT)
Dept: PHARMACY | Age: 75
Setting detail: THERAPIES SERIES
Discharge: HOME | End: 2020-08-18
Payer: MEDICARE

## 2020-08-18 VITALS — TEMPERATURE: 97.4 F

## 2020-08-18 DIAGNOSIS — D64.9 ANEMIA, UNSPECIFIED TYPE: ICD-10-CM

## 2020-08-18 LAB
HEMOCCULT STL QL: ABNORMAL
INTERNATIONAL NORMALIZATION RATIO, POC: 2

## 2020-08-18 PROCEDURE — 85610 PROTHROMBIN TIME: CPT | Performed by: PHARMACIST

## 2020-08-18 PROCEDURE — 99211 OFF/OP EST MAY X REQ PHY/QHP: CPT | Performed by: PHARMACIST

## 2020-08-18 NOTE — PROGRESS NOTES
Mr. Guido Casillas is a 67 y.o. y/o male with history of Afib who presents today for anticoagulation monitoring and adjustment. INR 2.0 is therapeutic for this patient (goal range 2-3) and is reflective of 22.5 mg TWD  Patient verifies current dosing regimen, patient able to verbally recall dose  Patient reports 0 missed doses since last INR   Patient denies s/sx clotting and/or stroke  Patient denies hematuria, epistaxis, rectal bleeding  Patient denies changes in diet, alcohol, or tobacco use  Reviewed medication list and drug allergies with patient, updated any medication additions or modifications accordingly  Stopped taking the losartan, isosorbide dosage was cut in half, taking prednisone 10 mg tablets once daily started taking on Saturday, uses nebulizer now and taking an iron supplement for his anemia. Also received Lupron injections last week. Patient also denies any pending medical or dental procedures scheduled at this time  Patient expects to stay on prednisone and high protein diet until the beginning of September when the lung mass will be removed. Patient was instructed to continue current regimen of 22.5mg TWD and RTC 2 weeks    CLINICAL PHARMACY CONSULT: MED RECONCILIATION/REVIEW ADDENDUM  For Pharmacy Admin Tracking Only  PHSO: Yes  Total # of Interventions Recommended: 1  - Maintenance Safety Lab Monitoring #: 1  Total Interventions Accepted: 1  Time Spent (min): 30    LEONEL Kam. Ph. 8/18/2020 11:31 AM

## 2020-08-26 ENCOUNTER — HOSPITAL ENCOUNTER (OUTPATIENT)
Dept: MRI IMAGING | Age: 75
Discharge: HOME | End: 2020-08-28
Payer: MEDICARE

## 2020-08-26 PROCEDURE — 70553 MRI BRAIN STEM W/O & W/DYE: CPT

## 2020-08-26 PROCEDURE — 6360000004 HC RX CONTRAST MEDICATION: Performed by: INTERNAL MEDICINE

## 2020-08-26 PROCEDURE — A9577 INJ MULTIHANCE: HCPCS | Performed by: INTERNAL MEDICINE

## 2020-08-26 RX ADMIN — GADOBENATE DIMEGLUMINE 15 ML: 529 INJECTION, SOLUTION INTRAVENOUS at 10:07

## 2020-08-31 ENCOUNTER — OFFICE VISIT (OUTPATIENT)
Dept: PULMONOLOGY | Age: 75
End: 2020-08-31
Payer: MEDICARE

## 2020-08-31 VITALS
HEART RATE: 78 BPM | RESPIRATION RATE: 16 BRPM | WEIGHT: 167 LBS | DIASTOLIC BLOOD PRESSURE: 60 MMHG | TEMPERATURE: 97.6 F | BODY MASS INDEX: 23.91 KG/M2 | HEIGHT: 70 IN | SYSTOLIC BLOOD PRESSURE: 112 MMHG | OXYGEN SATURATION: 99 %

## 2020-08-31 PROCEDURE — 99214 OFFICE O/P EST MOD 30 MIN: CPT | Performed by: INTERNAL MEDICINE

## 2020-08-31 PROCEDURE — 4040F PNEUMOC VAC/ADMIN/RCVD: CPT | Performed by: INTERNAL MEDICINE

## 2020-08-31 PROCEDURE — 3023F SPIROM DOC REV: CPT | Performed by: INTERNAL MEDICINE

## 2020-08-31 PROCEDURE — G8427 DOCREV CUR MEDS BY ELIG CLIN: HCPCS | Performed by: INTERNAL MEDICINE

## 2020-08-31 PROCEDURE — G8926 SPIRO NO PERF OR DOC: HCPCS | Performed by: INTERNAL MEDICINE

## 2020-08-31 PROCEDURE — G8420 CALC BMI NORM PARAMETERS: HCPCS | Performed by: INTERNAL MEDICINE

## 2020-08-31 PROCEDURE — 1036F TOBACCO NON-USER: CPT | Performed by: INTERNAL MEDICINE

## 2020-08-31 PROCEDURE — 1123F ACP DISCUSS/DSCN MKR DOCD: CPT | Performed by: INTERNAL MEDICINE

## 2020-08-31 ASSESSMENT — ENCOUNTER SYMPTOMS
COUGH: 0
ABDOMINAL PAIN: 0
VOMITING: 0
DIARRHEA: 0
RHINORRHEA: 0
SHORTNESS OF BREATH: 1
NAUSEA: 0
WHEEZING: 1
SORE THROAT: 0
CHEST TIGHTNESS: 0
SINUS PRESSURE: 0
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
## 2020-09-01 ENCOUNTER — NURSE ONLY (OUTPATIENT)
Dept: PRIMARY CARE CLINIC | Age: 75
End: 2020-09-01

## 2020-09-02 ENCOUNTER — HOSPITAL ENCOUNTER (OUTPATIENT)
Dept: PHARMACY | Age: 75
Setting detail: THERAPIES SERIES
Discharge: HOME | End: 2020-09-02
Payer: MEDICARE

## 2020-09-02 VITALS — TEMPERATURE: 98.4 F

## 2020-09-02 LAB — INTERNATIONAL NORMALIZATION RATIO, POC: 1.3

## 2020-09-02 PROCEDURE — 85610 PROTHROMBIN TIME: CPT

## 2020-09-02 PROCEDURE — 99211 OFF/OP EST MAY X REQ PHY/QHP: CPT

## 2020-09-02 NOTE — PROGRESS NOTES
Mr. Tena Spain is a 67 y.o. y/o male with history of Afib who presents today for anticoagulation monitoring and adjustment. INR 1.3 is subtherapeutic for this patient (goal range 2.0-3.0) and is reflective of 22.5 mg TWD  Patient verifies current dosing regimen, patient able to verbally recall dose  Patient reports 0 missed doses since last INR   Patient denies s/sx clotting and/or stroke  Patient denies hematuria, epistaxis, rectal bleeding  Patient denies changes in diet, alcohol, or tobacco use  Reviewed medication list and drug allergies with patient, updated any medication additions or modifications accordingly  Patient also denies any pending medical or dental procedures scheduled at this time    EGD scheduled Tuesday to make sure no bleeding issue before, then will schedule surgery to have lung mass removed. Finished prednisone dose pack this morning. Unable to discern reason for today's subtherapeutic INR: Patient denies eating more vegetables/vitamin K rich foods, Wife ensures medications are taken, patient able to recall dosing regimen, no new medications/OTC/herbal, has been on primidone for years.      Patient was instructed to boost today to 7.5mg and tomorrow to 5mg, then return to normal regimen (22.5 TWD) and RTC 2 weeks    CLINICAL PHARMACY CONSULT: MED RECONCILIATION/REVIEW [address]: Yes  Total # of Interventions Recommended: 2  - Increased Dose #: 1  - Maintenance Safety Lab Monitoring #: 1  Total Interventions Accepted: 1  Time Spent (min): Rebekah Velasco.  9/2/2020

## 2020-09-04 ENCOUNTER — ANESTHESIA EVENT (OUTPATIENT)
Dept: ENDOSCOPY | Age: 75
End: 2020-09-04
Payer: MEDICARE
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
## 2020-09-08 ENCOUNTER — ANCILLARY PROCEDURE (OUTPATIENT)
Dept: ENDOSCOPY | Age: 75
End: 2020-09-08
Attending: SPECIALIST
Payer: MEDICARE

## 2020-09-08 ENCOUNTER — ANESTHESIA (OUTPATIENT)
Dept: ENDOSCOPY | Age: 75
End: 2020-09-08
Payer: MEDICARE

## 2020-09-08 ENCOUNTER — HOSPITAL ENCOUNTER (OUTPATIENT)
Age: 75
Setting detail: OUTPATIENT SURGERY
Discharge: HOME | End: 2020-09-08
Attending: SPECIALIST | Admitting: SPECIALIST
Payer: MEDICARE

## 2020-09-08 VITALS
OXYGEN SATURATION: 98 % | WEIGHT: 164 LBS | RESPIRATION RATE: 16 BRPM | SYSTOLIC BLOOD PRESSURE: 116 MMHG | TEMPERATURE: 98.1 F | HEIGHT: 70 IN | DIASTOLIC BLOOD PRESSURE: 60 MMHG | HEART RATE: 98 BPM | BODY MASS INDEX: 23.48 KG/M2

## 2020-09-08 VITALS
RESPIRATION RATE: 15 BRPM | DIASTOLIC BLOOD PRESSURE: 54 MMHG | SYSTOLIC BLOOD PRESSURE: 99 MMHG | OXYGEN SATURATION: 100 %

## 2020-09-08 LAB
GLUCOSE BLD-MCNC: 138 MG/DL (ref 60–115)
PERFORMED ON: ABNORMAL

## 2020-09-08 PROCEDURE — 2500000003 HC RX 250 WO HCPCS: Performed by: NURSE ANESTHETIST, CERTIFIED REGISTERED

## 2020-09-08 PROCEDURE — 7100000011 HC PHASE II RECOVERY - ADDTL 15 MIN: Performed by: SPECIALIST

## 2020-09-08 PROCEDURE — 2580000003 HC RX 258

## 2020-09-08 PROCEDURE — 43239 EGD BIOPSY SINGLE/MULTIPLE: CPT | Performed by: SPECIALIST

## 2020-09-08 PROCEDURE — 6360000002 HC RX W HCPCS: Performed by: NURSE ANESTHETIST, CERTIFIED REGISTERED

## 2020-09-08 PROCEDURE — 3609017100 HC EGD: Performed by: SPECIALIST

## 2020-09-08 PROCEDURE — 43251 EGD REMOVE LESION SNARE: CPT | Performed by: SPECIALIST

## 2020-09-08 PROCEDURE — 6370000000 HC RX 637 (ALT 250 FOR IP): Performed by: SPECIALIST

## 2020-09-08 PROCEDURE — 88305 TISSUE EXAM BY PATHOLOGIST: CPT

## 2020-09-08 PROCEDURE — 7100000010 HC PHASE II RECOVERY - FIRST 15 MIN: Performed by: SPECIALIST

## 2020-09-08 PROCEDURE — 3700000001 HC ADD 15 MINUTES (ANESTHESIA): Performed by: SPECIALIST

## 2020-09-08 PROCEDURE — 3700000000 HC ANESTHESIA ATTENDED CARE: Performed by: SPECIALIST

## 2020-09-08 PROCEDURE — 2709999900 HC NON-CHARGEABLE SUPPLY: Performed by: SPECIALIST

## 2020-09-08 PROCEDURE — 2580000003 HC RX 258: Performed by: SPECIALIST

## 2020-09-08 PROCEDURE — 88342 IMHCHEM/IMCYTCHM 1ST ANTB: CPT

## 2020-09-08 RX ORDER — PROPOFOL 10 MG/ML
INJECTION, EMULSION INTRAVENOUS PRN
Status: DISCONTINUED | OUTPATIENT
Start: 2020-09-08 | End: 2020-09-08 | Stop reason: SDUPTHER

## 2020-09-08 RX ORDER — SODIUM CHLORIDE 9 MG/ML
INJECTION, SOLUTION INTRAVENOUS CONTINUOUS
Status: DISCONTINUED | OUTPATIENT
Start: 2020-09-08 | End: 2020-09-08 | Stop reason: HOSPADM

## 2020-09-08 RX ORDER — SIMETHICONE 20 MG/.3ML
EMULSION ORAL PRN
Status: DISCONTINUED | OUTPATIENT
Start: 2020-09-08 | End: 2020-09-08 | Stop reason: ALTCHOICE

## 2020-09-08 RX ORDER — LOPERAMIDE HYDROCHLORIDE 2 MG/1
2 CAPSULE ORAL 4 TIMES DAILY PRN
COMMUNITY

## 2020-09-08 RX ORDER — LIDOCAINE HYDROCHLORIDE 20 MG/ML
INJECTION, SOLUTION INFILTRATION; PERINEURAL PRN
Status: DISCONTINUED | OUTPATIENT
Start: 2020-09-08 | End: 2020-09-08 | Stop reason: SDUPTHER

## 2020-09-08 RX ORDER — SODIUM CHLORIDE 9 MG/ML
INJECTION, SOLUTION INTRAVENOUS
Status: COMPLETED
Start: 2020-09-08 | End: 2020-09-08

## 2020-09-08 RX ORDER — MAGNESIUM HYDROXIDE 1200 MG/15ML
LIQUID ORAL PRN
Status: DISCONTINUED | OUTPATIENT
Start: 2020-09-08 | End: 2020-09-08 | Stop reason: ALTCHOICE

## 2020-09-08 RX ADMIN — LIDOCAINE HYDROCHLORIDE 60 MG: 20 INJECTION, SOLUTION INFILTRATION; PERINEURAL at 08:39

## 2020-09-08 RX ADMIN — SODIUM CHLORIDE 500 ML: 9 INJECTION, SOLUTION INTRAVENOUS at 08:21

## 2020-09-08 RX ADMIN — PROPOFOL 250 MG: 10 INJECTION, EMULSION INTRAVENOUS at 08:39

## 2020-09-08 NOTE — ANESTHESIA POSTPROCEDURE EVALUATION
Department of Anesthesiology  Postprocedure Note    Patient: Alison Mayer  MRN: [id number]  YOB: 1945  Date of evaluation: 9/8/2020  Time:  8:56 AM     Procedure Summary     Date:  09/08/20 Room / Location:  [address] [place] [address] Katie Campbell    Anesthesia Start:  5388 Anesthesia Stop:      Procedure:  EGD ESOPHAGOGASTRODUODENOSCOPY WITH POLYPECTOMY (N/A ) Diagnosis:  (iron deficiency anemia  D50.8)    Surgeon:  Darrick Gilliam MD Responsible Provider:  LAYA Campoverde CRNA    Anesthesia Type:  MAC ASA Status:  3          Anesthesia Type: MAC    Brent Phase I: Brent Score: 10    Brent Phase II:      Last vitals: Reviewed and per EMR flowsheets.        Anesthesia Post Evaluation    Patient location during evaluation: bedside  Patient participation: complete - patient participated  Level of consciousness: awake and awake and alert  Pain score: 0  Airway patency: patent  Nausea & Vomiting: no nausea and no vomiting  Complications: no  Cardiovascular status: blood pressure returned to baseline and hemodynamically stable  Respiratory status: acceptable and spontaneous ventilation  Hydration status: euvolemic
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
## 2020-09-09 LAB
SARS-COV-2: NOT DETECTED
SOURCE: NORMAL

## 2020-09-15 ENCOUNTER — OFFICE VISIT (OUTPATIENT)
Dept: GASTROENTEROLOGY | Age: 75
End: 2020-09-15
Payer: MEDICARE

## 2020-09-15 ENCOUNTER — HOSPITAL ENCOUNTER (OUTPATIENT)
Dept: PHARMACY | Age: 75
Setting detail: THERAPIES SERIES
Discharge: HOME | End: 2020-09-15
Payer: MEDICARE

## 2020-09-15 VITALS
SYSTOLIC BLOOD PRESSURE: 122 MMHG | DIASTOLIC BLOOD PRESSURE: 78 MMHG | HEIGHT: 70 IN | WEIGHT: 176 LBS | BODY MASS INDEX: 25.2 KG/M2 | HEART RATE: 91 BPM | OXYGEN SATURATION: 96 % | RESPIRATION RATE: 16 BRPM

## 2020-09-15 VITALS — TEMPERATURE: 97.8 F

## 2020-09-15 LAB — INTERNATIONAL NORMALIZATION RATIO, POC: 1.4

## 2020-09-15 PROCEDURE — G8417 CALC BMI ABV UP PARAM F/U: HCPCS | Performed by: SPECIALIST

## 2020-09-15 PROCEDURE — 1036F TOBACCO NON-USER: CPT | Performed by: SPECIALIST

## 2020-09-15 PROCEDURE — 99211 OFF/OP EST MAY X REQ PHY/QHP: CPT | Performed by: PHARMACIST

## 2020-09-15 PROCEDURE — 1123F ACP DISCUSS/DSCN MKR DOCD: CPT | Performed by: SPECIALIST

## 2020-09-15 PROCEDURE — 85610 PROTHROMBIN TIME: CPT | Performed by: PHARMACIST

## 2020-09-15 PROCEDURE — 99213 OFFICE O/P EST LOW 20 MIN: CPT | Performed by: SPECIALIST

## 2020-09-15 PROCEDURE — G8427 DOCREV CUR MEDS BY ELIG CLIN: HCPCS | Performed by: SPECIALIST

## 2020-09-15 PROCEDURE — 4040F PNEUMOC VAC/ADMIN/RCVD: CPT | Performed by: SPECIALIST

## 2020-09-15 RX ORDER — METRONIDAZOLE 500 MG/1
500 TABLET ORAL 2 TIMES DAILY
Qty: 28 TABLET | Refills: 0 | Status: SHIPPED | OUTPATIENT
Start: 2020-09-15 | End: 2020-09-29

## 2020-09-15 RX ORDER — PANTOPRAZOLE SODIUM 40 MG/1
40 TABLET, DELAYED RELEASE ORAL
Qty: 28 TABLET | Refills: 0 | Status: SHIPPED | OUTPATIENT
Start: 2020-09-15 | End: 2020-10-13

## 2020-09-15 RX ORDER — CLARITHROMYCIN 500 MG/1
500 TABLET, COATED ORAL 2 TIMES DAILY
Qty: 28 TABLET | Refills: 0 | Status: SHIPPED | OUTPATIENT
Start: 2020-09-15 | End: 2020-09-29

## 2020-09-15 ASSESSMENT — ENCOUNTER SYMPTOMS
SHORTNESS OF BREATH: 1
GASTROINTESTINAL NEGATIVE: 1
VOMITING: 0
ABDOMINAL DISTENTION: 0
BLOOD IN STOOL: 0
ANAL BLEEDING: 0
EYES NEGATIVE: 1
RECTAL PAIN: 0
NAUSEA: 0
CONSTIPATION: 0
ABDOMINAL PAIN: 0
DIARRHEA: 0

## 2020-09-15 NOTE — PROGRESS NOTES
Gastroenterology Clinic Follow up Visit    Margarita Hagan  [id number]  Chief Complaint   Patient presents with    Follow-up     No symptoms, weight gain       HPI and A/P at last visit summarized below:  Patient is here for follow-up. EGD showed gastritis and gastric polyp and biopsies showed H. pylori gastritis, stool for occult blood was positive, patient also had low serum iron and low folate and is currently on iron supplements and folic acid. Was investigated in the past for celiac disease and was negative, colonoscopy showed lymphocytic colitis. Patient reports no abdominal pain nausea vomiting, stool is normal in consistency, is undergoing evaluation for lung mass. Review of Systems   Constitutional: Negative. HENT: Negative. Eyes: Negative. Respiratory: Positive for shortness of breath. Gastrointestinal: Negative. Negative for abdominal distention, abdominal pain, anal bleeding, blood in stool, constipation, diarrhea, nausea, rectal pain and vomiting. No GI symptoms   Genitourinary: Negative. Musculoskeletal: Negative. Neurological: Negative. Psychiatric/Behavioral: Negative. Past medical history, past surgical history, medication list, social and familyhistory reviewed    Blood pressure 122/78, pulse 91, resp. rate 16, height 5' 10\" (1.778 m), weight 176 lb (79.8 kg), SpO2 96 %. Physical Exam    Laboratory, Pathology, Radiology reviewed in detail with relevantimportant investigations summarized below:    No results for input(s): WBC, HGB, HCT, MCV, PLT in the last 720 hours.   Lab Results   Component Value Date    ALT 19 06/23/2020    AST 19 06/23/2020    ALKPHOS 116 (H) 06/23/2020    BILITOT 0.4 06/23/2020     Mri Brain W Wo Contrast    Result Date: 8/27/2020  EXAMINATION: MRI BRAIN W WO CONTRAST DATE AND TIME:8/26/2020 8:53 AM CLINICAL HISTORY: Headache  C34.92 Non-small cell cancer of left lung ([address] ICD10 COMPARISON: None TECHNIQUE:  Multi-sequence multiplanar imaging of the brain was performed. Sequences included T1-weighted, T2-weighted, FLAIR, diffusion-weighted, ADC maps, and  susceptibility weighted imaging. VOLUME: 15 mL   MR CONTRAST: MultiHance FINDINGS Ventricles: Diffuse prominence of the CSF spaces of the ventricles and cervical convexities most consistent with significant volume loss. There can be significant overlap in appearance of normal age-related changes and neurodegenerative disease. Acute change: No restricted diffusion to suggest an acute infarct. No acute intracranial hemorrhage. Brain parenchyma There are mild patchy nonspecific white matter hyperintensities that may be related to microvascular ischemic change or  normal aging. There is an 8 x 4 mm left parasagittal extra-axial pleural-based enhancing lesion overlying the left parietal lobe just anterior to the marginal sulcus favoring a small meningioma. No other area of abnormal dural or leptomeningeal enhancement. No intra-axial enhancing lesion. Brainstem:Brainstem is unremarkable. Skull base: Cerebellar tonsils are normally positioned. A horizontal defect through the C2 vertebra again noted, this is unchanged in appearance from an MRI scan of the cervical spine on August 6, 2007. Vasculature: The major intracranial arterial structures and dural venous sinuses showed typical flow void, suggesting patency by spin-echo criteria. Sinuses: Polypoid mucosal changes in the right maxillary sinus. AGE-RELATED CHANGES.  8 X 4 MM EXTRA-AXIAL LEFT PARASAGITTAL LESION FAVORING MENINGIOMA. NO EVIDENCE OF METASTATIC DISEASE. Endoscopic investigations:     Assessment and Plan:  Guido Casillas 67 y.o. male for follow up.   History of anemia with a low serum iron and folate level patient is on oral iron supplements and folic acid, also has H. pylori gastritis and lymphocytic colitis controlled with budesonide next milligram a day, patient is

## 2020-09-15 NOTE — PROGRESS NOTES
Mr. Yanira Larkin is a 67 y.o. y/o male with history of Afib who presents today for anticoagulation monitoring and adjustment. INR 1.4 is subtherapeutic for this patient (goal range 2-3) and is reflective of 17.5 mg TWD  Patient verifies current dosing regimen, patient able to verbally recall dose  Patient reports 2  missed doses since last INR because of a colonoscopy  Patient denies s/sx clotting and/or stroke  Patient denies hematuria, epistaxis, rectal bleeding  Patient denies changes in diet, alcohol, or tobacco use  Reviewed medication list and drug allergies with patient, updated any medication additions or modifications accordingly  Patient also denies any pending medical or dental procedures scheduled at this time  Patient was instructed to take an extra 2.5mg today then continue 22.5mg TWD and RTC 2 weeks    CLINICAL PHARMACY CONSULT: MED RECONCILIATION/REVIEW [address]: Yes  Total # of Interventions Recommended: 1  - Increased Dose #: 1  - Maintenance Safety Lab Monitoring #: 1  Total Interventions Accepted: 1  Time Spent (min): 15    LEONEL Kam. Ph. 9/15/2020 1:59 PM

## 2020-09-16 ENCOUNTER — TELEPHONE (OUTPATIENT)
Dept: PHARMACY | Age: 75
End: 2020-09-16

## 2020-09-21 ENCOUNTER — HOSPITAL ENCOUNTER (OUTPATIENT)
Dept: PHARMACY | Age: 75
Setting detail: THERAPIES SERIES
Discharge: HOME | End: 2020-09-21
Payer: MEDICARE

## 2020-09-21 VITALS — TEMPERATURE: 97.7 F

## 2020-09-21 LAB — INTERNATIONAL NORMALIZATION RATIO, POC: 1.7

## 2020-09-21 PROCEDURE — 85610 PROTHROMBIN TIME: CPT

## 2020-09-21 PROCEDURE — 99211 OFF/OP EST MAY X REQ PHY/QHP: CPT

## 2020-09-21 NOTE — PROGRESS NOTES
Mr. Gabriel Quintanilla is a 67 y.o. y/o male with history of Afib who presents today for anticoagulation monitoring and adjustment. INR 1.7 is subtherapeutic for this patient (goal range 2-3) and is reflective of 22.5 mg TWD  Patient verifies current dosing regimen, patient able to verbally recall dose  Patient reports 0 missed doses since last INR   Patient denies s/sx clotting and/or stroke  Patient denies hematuria, epistaxis, rectal bleeding  Patient denies changes in diet, alcohol, or tobacco use  Reviewed medication list and drug allergies with patient, updated any medication additions or modifications accordingly  Patient also denies any pending medical or dental procedures scheduled at this time    Patient called in on 9/18; On 9/16, started:    Biaxin 500mg x14 days   Flagyl 500mg x14 days   Protonix   HELD Atorvastatin for antibiotic duration  Will be done with ABX on 9/29  Had only been taking 2.5mg daily as instructed on phone call    Patient had previously been low at INR = 1.4, potentially due to missed doses. ABX will likely have more of an INR raising effect and is yet to be fully seen. Patient was instructed to take 5mg today and 2.5mg Tu,We,Th and RTC 4 days. Tentatively planned planned for the patient to return 10/2 will adjust schedule based on need/INR on this coming visit.       CLINICAL PHARMACY CONSULT: MED RECONCILIATION/REVIEW ADDENDUM  For Pharmacy Admin Tracking Only  PHSO: Yes  Total # of Interventions Recommended: 2  - Increased Dose #: 1  - Maintenance Safety Lab Monitoring #: 1  Total Interventions Accepted: 2  Time Spent (min): Joan Wu PharmD.  9/21/2020

## 2020-09-25 ENCOUNTER — HOSPITAL ENCOUNTER (OUTPATIENT)
Dept: PHARMACY | Age: 75
Setting detail: THERAPIES SERIES
Discharge: HOME | End: 2020-09-25
Payer: MEDICARE

## 2020-09-25 ENCOUNTER — APPOINTMENT (OUTPATIENT)
Dept: PHARMACY | Age: 75
End: 2020-09-25
Payer: MEDICARE

## 2020-09-25 VITALS — TEMPERATURE: 97.4 F

## 2020-09-25 LAB — INTERNATIONAL NORMALIZATION RATIO, POC: 2.4

## 2020-09-25 PROCEDURE — 85610 PROTHROMBIN TIME: CPT

## 2020-09-25 PROCEDURE — 99211 OFF/OP EST MAY X REQ PHY/QHP: CPT

## 2020-09-25 NOTE — PROGRESS NOTES
Mr. Wong Mcdonough is a 67 y.o. y/o male with history of Afib who presents today for anticoagulation monitoring and adjustment.   INR 2.4 is therapeutic for this patient (goal range 2-3) and is reflective of 20 mg TWD  Patient verifies current dosing regimen, patient able to verbally recall dose  Patient reports 0  missed doses since last INR   Patient denies s/sx clotting and/or stroke  Patient denies hematuria, epistaxis, rectal bleeding  Patient denies changes in diet, alcohol, or tobacco use  Reviewed medication list and drug allergies with patient, updated any medication additions or modifications accordingly  Patient also denies any pending medical or dental procedures scheduled at this time  Patient was instructed to continue with TWD and RTC 2 weeks    CLINICAL PHARMACY CONSULT: MED RECONCILIATION/REVIEW [address]: Yes  Total # of Interventions Recommended: 1  - Maintenance Safety Lab Monitoring #: 1  Total Interventions Accepted: 1  Time Spent (min): [place], PharmD  [address]

## 2020-10-05 ENCOUNTER — APPOINTMENT (OUTPATIENT)
Dept: ENDOSCOPY | Age: 75
End: 2020-10-05
Payer: MEDICARE

## 2020-10-05 PROCEDURE — 3609019000 HC EGD CAPSULE ENDOSCOPY

## 2020-10-05 NOTE — PROGRESS NOTES
Pt. Returned, had no problems with test today. Live view showed capsule in the colon. Test complete. Instructions given and told to follow up with Dr. Olivia Hatfield for results.

## 2020-10-05 NOTE — PROGRESS NOTES
Patient arrived with wife. Explained test, they understand. Connected equipment and activated capsule. Administered capsule with no problem. Pt. Swallowed it with no problem. Live view showed capsule in the stomach. Instructions for the day explained and given to pt.       [employer] Endocapsule:  Lot # [id number]  Exp: 06/30/2021

## 2020-10-09 ENCOUNTER — HOSPITAL ENCOUNTER (OUTPATIENT)
Dept: PHARMACY | Age: 75
Setting detail: THERAPIES SERIES
Discharge: HOME | End: 2020-10-09
Payer: MEDICARE

## 2020-10-09 VITALS — TEMPERATURE: 96.4 F

## 2020-10-09 LAB — INTERNATIONAL NORMALIZATION RATIO, POC: 1.4

## 2020-10-09 PROCEDURE — 99211 OFF/OP EST MAY X REQ PHY/QHP: CPT

## 2020-10-09 PROCEDURE — 85610 PROTHROMBIN TIME: CPT

## 2020-10-09 RX ORDER — WARFARIN SODIUM 5 MG/1
TABLET ORAL
Qty: 70 TABLET | Refills: 1 | Status: SHIPPED | OUTPATIENT
Start: 2020-10-09 | End: 2020-12-23 | Stop reason: SDUPTHER

## 2020-10-09 NOTE — PROGRESS NOTES
Mr. Sam Gutierrez is a 67 y.o. y/o male with history of Afib who presents today for anticoagulation monitoring and adjustment.   INR 1.4 is sub therapeutic for this patient (goal range 2-3) and is reflective of 22.5 mg TWD  Patient verifies current dosing regimen, patient able to verbally recall dose  Patient reports 0  missed doses since last INR   Patient denies s/sx clotting and/or stroke  Patient denies hematuria, epistaxis, rectal bleeding  Patient denies changes in diet, alcohol, or tobacco use  Reviewed medication list and drug allergies with patient, updated any medication additions or modifications accordingly  Prednisone completed 9/2, antibiotic and pantoprozole therapy complete 9/29  Patient also denies any pending medical or dental procedures scheduled at this time  Scheduling procedure with Dr George Ventura, will have more information next week and call clinic if scheduled before next appointment  Patient was instructed to boost today to 7.5 mg (usual 5 mg), boost 10/10 to  5 mg (usual 2.5 mg), then increase to 25 mg TWD (11.1%) and RTC 10 days    CLINICAL PHARMACY CONSULT: MED RECONCILIATION/REVIEW [address]: Yes  Total # of Interventions Recommended: 2  - Increased Dose #: 1  - Maintenance Safety Lab Monitoring #: 1    Total Interventions Accepted: 2  Time Spent (min): 90 Mandible Daria Grajeda

## 2020-10-13 ENCOUNTER — HOSPITAL ENCOUNTER (OUTPATIENT)
Dept: GENERAL RADIOLOGY | Age: 75
Discharge: HOME | DRG: 163 | End: 2020-10-15
Payer: MEDICARE

## 2020-10-13 ENCOUNTER — HOSPITAL ENCOUNTER (OUTPATIENT)
Dept: PREADMISSION TESTING | Age: 75
Discharge: HOME | DRG: 163 | End: 2020-10-17
Payer: MEDICARE

## 2020-10-13 ENCOUNTER — NURSE ONLY (OUTPATIENT)
Dept: PRIMARY CARE CLINIC | Age: 75
End: 2020-10-13

## 2020-10-13 VITALS
BODY MASS INDEX: 25.48 KG/M2 | WEIGHT: 172 LBS | HEIGHT: 69 IN | SYSTOLIC BLOOD PRESSURE: 87 MMHG | DIASTOLIC BLOOD PRESSURE: 67 MMHG | HEART RATE: 54 BPM | TEMPERATURE: 98.8 F | RESPIRATION RATE: 18 BRPM | OXYGEN SATURATION: 97 %

## 2020-10-13 LAB
ABO/RH: NORMAL
ALBUMIN SERPL-MCNC: 2.8 G/DL (ref 3.5–4.6)
ALP BLD-CCNC: 104 U/L (ref 35–104)
ALT SERPL-CCNC: 22 U/L (ref 0–41)
ANION GAP SERPL CALCULATED.3IONS-SCNC: 6 MEQ/L (ref 9–15)
ANTIBODY SCREEN: NORMAL
AST SERPL-CCNC: 23 U/L (ref 0–40)
BASE EXCESS ARTERIAL: 4 (ref -3–3)
BILIRUB SERPL-MCNC: 0.3 MG/DL (ref 0.2–0.7)
BUN BLDV-MCNC: 14 MG/DL (ref 8–23)
CALCIUM IONIZED: 1.26 MMOL/L (ref 1.12–1.32)
CALCIUM SERPL-MCNC: 9.1 MG/DL (ref 8.5–9.9)
CHLORIDE BLD-SCNC: 94 MEQ/L (ref 95–107)
CO2: 30 MEQ/L (ref 20–31)
CREAT SERPL-MCNC: 0.55 MG/DL (ref 0.7–1.2)
GFR AFRICAN AMERICAN: >60
GFR AFRICAN AMERICAN: >60
GFR NON-AFRICAN AMERICAN: >60
GFR NON-AFRICAN AMERICAN: >60
GLOBULIN: 2.9 G/DL (ref 2.3–3.5)
GLUCOSE BLD-MCNC: 124 MG/DL (ref 60–115)
GLUCOSE BLD-MCNC: 124 MG/DL (ref 70–99)
HCO3 ARTERIAL: 28 MMOL/L (ref 21–29)
HCT VFR BLD CALC: 32.8 % (ref 42–52)
HEMOGLOBIN: 10.7 GM/DL (ref 13.5–17.5)
HEMOGLOBIN: 9.9 G/DL (ref 14–18)
LACTATE: 0.82 MMOL/L (ref 0.4–2)
MCH RBC QN AUTO: 20.8 PG (ref 27–31.3)
MCHC RBC AUTO-ENTMCNC: 30.3 % (ref 33–37)
MCV RBC AUTO: 68.6 FL (ref 80–100)
O2 SAT, ARTERIAL: 96 % (ref 93–100)
PCO2 ARTERIAL: 43 MM HG (ref 35–45)
PDW BLD-RTO: 19.7 % (ref 11.5–14.5)
PERFORMED ON: ABNORMAL
PH ARTERIAL: 7.42 (ref 7.35–7.45)
PLATELET # BLD: 275 K/UL (ref 130–400)
PO2 ARTERIAL: 77 MM HG (ref 75–108)
POC CHLORIDE: 100 MEQ/L (ref 99–110)
POC CREATININE: 0.6 MG/DL (ref 0.8–1.3)
POC HEMATOCRIT: 32 % (ref 41–53)
POC POTASSIUM: 4.1 MEQ/L (ref 3.5–5.1)
POC SAMPLE TYPE: ABNORMAL
POC SODIUM: 136 MEQ/L (ref 136–145)
POTASSIUM SERPL-SCNC: 4.6 MEQ/L (ref 3.4–4.9)
RBC # BLD: 4.78 M/UL (ref 4.7–6.1)
SARS-COV-2, NAAT: NOT DETECTED
SODIUM BLD-SCNC: 130 MEQ/L (ref 135–144)
TCO2 ARTERIAL: 29 (ref 22–29)
TOTAL PROTEIN: 5.7 G/DL (ref 6.3–8)
WBC # BLD: 7.5 K/UL (ref 4.8–10.8)

## 2020-10-13 PROCEDURE — 85014 HEMATOCRIT: CPT

## 2020-10-13 PROCEDURE — 84132 ASSAY OF SERUM POTASSIUM: CPT

## 2020-10-13 PROCEDURE — 82330 ASSAY OF CALCIUM: CPT

## 2020-10-13 PROCEDURE — P9016 RBC LEUKOCYTES REDUCED: HCPCS

## 2020-10-13 PROCEDURE — 71046 X-RAY EXAM CHEST 2 VIEWS: CPT

## 2020-10-13 PROCEDURE — 86901 BLOOD TYPING SEROLOGIC RH(D): CPT

## 2020-10-13 PROCEDURE — 85027 COMPLETE CBC AUTOMATED: CPT

## 2020-10-13 PROCEDURE — 86923 COMPATIBILITY TEST ELECTRIC: CPT

## 2020-10-13 PROCEDURE — 36600 WITHDRAWAL OF ARTERIAL BLOOD: CPT

## 2020-10-13 PROCEDURE — U0002 COVID-19 LAB TEST NON-CDC: HCPCS

## 2020-10-13 PROCEDURE — 82803 BLOOD GASES ANY COMBINATION: CPT

## 2020-10-13 PROCEDURE — 80053 COMPREHEN METABOLIC PANEL: CPT

## 2020-10-13 PROCEDURE — 82435 ASSAY OF BLOOD CHLORIDE: CPT

## 2020-10-13 PROCEDURE — 86900 BLOOD TYPING SEROLOGIC ABO: CPT

## 2020-10-13 PROCEDURE — 83605 ASSAY OF LACTIC ACID: CPT

## 2020-10-13 PROCEDURE — 86850 RBC ANTIBODY SCREEN: CPT

## 2020-10-13 PROCEDURE — 82565 ASSAY OF CREATININE: CPT

## 2020-10-13 PROCEDURE — 84295 ASSAY OF SERUM SODIUM: CPT

## 2020-10-13 RX ORDER — SODIUM CHLORIDE 0.9 % (FLUSH) 0.9 %
10 SYRINGE (ML) INJECTION EVERY 12 HOURS SCHEDULED
Status: CANCELLED | OUTPATIENT
Start: 2020-10-15

## 2020-10-13 RX ORDER — SODIUM CHLORIDE 0.9 % (FLUSH) 0.9 %
10 SYRINGE (ML) INJECTION PRN
Status: CANCELLED | OUTPATIENT
Start: 2020-10-15

## 2020-10-13 RX ORDER — SODIUM CHLORIDE, SODIUM LACTATE, POTASSIUM CHLORIDE, CALCIUM CHLORIDE 600; 310; 30; 20 MG/100ML; MG/100ML; MG/100ML; MG/100ML
INJECTION, SOLUTION INTRAVENOUS CONTINUOUS
Status: CANCELLED | OUTPATIENT
Start: 2020-10-15

## 2020-10-13 RX ORDER — LIDOCAINE HYDROCHLORIDE 10 MG/ML
1 INJECTION, SOLUTION EPIDURAL; INFILTRATION; INTRACAUDAL; PERINEURAL
Status: CANCELLED | OUTPATIENT
Start: 2020-10-15 | End: 2020-10-15

## 2020-10-13 NOTE — PROGRESS NOTES
Yellow PAT instructions reviewed with wife, who verbalized understanding, who verbalized understanding. ABG's, Lab and CXR  Done. Sent for COVID test after PAT, patient will self Quarantine until [employer].

## 2020-10-14 ENCOUNTER — ANESTHESIA EVENT (OUTPATIENT)
Dept: OPERATING ROOM | Age: 75
DRG: 163 | End: 2020-10-14
Payer: MEDICARE

## 2020-10-15 ENCOUNTER — APPOINTMENT (OUTPATIENT)
Dept: GENERAL RADIOLOGY | Age: 75
DRG: 163 | End: 2020-10-15
Attending: THORACIC SURGERY (CARDIOTHORACIC VASCULAR SURGERY)
Payer: MEDICARE

## 2020-10-15 ENCOUNTER — HOSPITAL ENCOUNTER (INPATIENT)
Age: 75
LOS: 6 days | Discharge: HOME | DRG: 163 | End: 2020-10-21
Attending: THORACIC SURGERY (CARDIOTHORACIC VASCULAR SURGERY) | Admitting: THORACIC SURGERY (CARDIOTHORACIC VASCULAR SURGERY)
Payer: MEDICARE

## 2020-10-15 ENCOUNTER — ANESTHESIA (OUTPATIENT)
Dept: OPERATING ROOM | Age: 75
DRG: 163 | End: 2020-10-15
Payer: MEDICARE

## 2020-10-15 VITALS
OXYGEN SATURATION: 100 % | TEMPERATURE: 98.4 F | DIASTOLIC BLOOD PRESSURE: 42 MMHG | SYSTOLIC BLOOD PRESSURE: 74 MMHG | RESPIRATION RATE: 12 BRPM

## 2020-10-15 PROBLEM — C34.92 CARCINOMA OF LEFT LUNG (HCC): Status: ACTIVE | Noted: 2020-10-15

## 2020-10-15 LAB
BASE EXCESS ARTERIAL: 2 (ref -3–3)
CALCIUM IONIZED: 1.14 MMOL/L (ref 1.12–1.32)
GFR AFRICAN AMERICAN: >60
GFR NON-AFRICAN AMERICAN: >60
GLUCOSE BLD-MCNC: 133 MG/DL (ref 60–115)
GLUCOSE BLD-MCNC: 142 MG/DL (ref 60–115)
HCO3 ARTERIAL: 27.3 MMOL/L (ref 21–29)
HEMOGLOBIN: 8.9 GM/DL (ref 13.5–17.5)
INR BLD: 1.4
LACTATE: 0.65 MMOL/L (ref 0.4–2)
O2 SAT, ARTERIAL: 97 % (ref 93–100)
PCO2 ARTERIAL: 48 MM HG (ref 35–45)
PERFORMED ON: ABNORMAL
PERFORMED ON: ABNORMAL
PH ARTERIAL: 7.36 (ref 7.35–7.45)
PO2 ARTERIAL: 92 MM HG (ref 75–108)
POC CHLORIDE: 107 MEQ/L (ref 99–110)
POC CREATININE: 0.6 MG/DL (ref 0.8–1.3)
POC FIO2: 50
POC HEMATOCRIT: 26 % (ref 41–53)
POC POTASSIUM: 4.1 MEQ/L (ref 3.5–5.1)
POC SAMPLE TYPE: ABNORMAL
POC SODIUM: 141 MEQ/L (ref 136–145)
PROTHROMBIN TIME: 17.3 SEC (ref 12.3–14.9)
TCO2 ARTERIAL: 29 (ref 22–29)

## 2020-10-15 PROCEDURE — 2580000003 HC RX 258: Performed by: STUDENT IN AN ORGANIZED HEALTH CARE EDUCATION/TRAINING PROGRAM

## 2020-10-15 PROCEDURE — 2580000003 HC RX 258: Performed by: INTERNAL MEDICINE

## 2020-10-15 PROCEDURE — 3600000015 HC SURGERY LEVEL 5 ADDTL 15MIN: Performed by: THORACIC SURGERY (CARDIOTHORACIC VASCULAR SURGERY)

## 2020-10-15 PROCEDURE — 6370000000 HC RX 637 (ALT 250 FOR IP): Performed by: INTERNAL MEDICINE

## 2020-10-15 PROCEDURE — 2500000003 HC RX 250 WO HCPCS: Performed by: INTERNAL MEDICINE

## 2020-10-15 PROCEDURE — 62325 NJX INTERLAMINAR CRV/THRC: CPT

## 2020-10-15 PROCEDURE — 3700000000 HC ANESTHESIA ATTENDED CARE: Performed by: THORACIC SURGERY (CARDIOTHORACIC VASCULAR SURGERY)

## 2020-10-15 PROCEDURE — 99223 1ST HOSP IP/OBS HIGH 75: CPT | Performed by: INTERNAL MEDICINE

## 2020-10-15 PROCEDURE — 82330 ASSAY OF CALCIUM: CPT

## 2020-10-15 PROCEDURE — 84295 ASSAY OF SERUM SODIUM: CPT

## 2020-10-15 PROCEDURE — 3600000005 HC SURGERY LEVEL 5 BASE: Performed by: THORACIC SURGERY (CARDIOTHORACIC VASCULAR SURGERY)

## 2020-10-15 PROCEDURE — 88342 IMHCHEM/IMCYTCHM 1ST ANTB: CPT

## 2020-10-15 PROCEDURE — 6360000002 HC RX W HCPCS: Performed by: INTERNAL MEDICINE

## 2020-10-15 PROCEDURE — C2615 SEALANT, PULMONARY, LIQUID: HCPCS | Performed by: THORACIC SURGERY (CARDIOTHORACIC VASCULAR SURGERY)

## 2020-10-15 PROCEDURE — 7100000000 HC PACU RECOVERY - FIRST 15 MIN

## 2020-10-15 PROCEDURE — 88112 CYTOPATH CELL ENHANCE TECH: CPT

## 2020-10-15 PROCEDURE — 0BTJ0ZZ RESECTION OF LEFT LOWER LUNG LOBE, OPEN APPROACH: ICD-10-PCS | Performed by: THORACIC SURGERY (CARDIOTHORACIC VASCULAR SURGERY)

## 2020-10-15 PROCEDURE — 6360000002 HC RX W HCPCS: Performed by: STUDENT IN AN ORGANIZED HEALTH CARE EDUCATION/TRAINING PROGRAM

## 2020-10-15 PROCEDURE — 2720000010 HC SURG SUPPLY STERILE: Performed by: THORACIC SURGERY (CARDIOTHORACIC VASCULAR SURGERY)

## 2020-10-15 PROCEDURE — 0BJ08ZZ INSPECTION OF TRACHEOBRONCHIAL TREE, VIA NATURAL OR ARTIFICIAL OPENING ENDOSCOPIC: ICD-10-PCS | Performed by: THORACIC SURGERY (CARDIOTHORACIC VASCULAR SURGERY)

## 2020-10-15 PROCEDURE — 85610 PROTHROMBIN TIME: CPT

## 2020-10-15 PROCEDURE — 6360000002 HC RX W HCPCS

## 2020-10-15 PROCEDURE — 85014 HEMATOCRIT: CPT

## 2020-10-15 PROCEDURE — 94640 AIRWAY INHALATION TREATMENT: CPT

## 2020-10-15 PROCEDURE — 6360000002 HC RX W HCPCS: Performed by: THORACIC SURGERY (CARDIOTHORACIC VASCULAR SURGERY)

## 2020-10-15 PROCEDURE — 88305 TISSUE EXAM BY PATHOLOGIST: CPT

## 2020-10-15 PROCEDURE — 94002 VENT MGMT INPAT INIT DAY: CPT

## 2020-10-15 PROCEDURE — 88341 IMHCHEM/IMCYTCHM EA ADD ANTB: CPT

## 2020-10-15 PROCEDURE — 94761 N-INVAS EAR/PLS OXIMETRY MLT: CPT

## 2020-10-15 PROCEDURE — 36600 WITHDRAWAL OF ARTERIAL BLOOD: CPT

## 2020-10-15 PROCEDURE — 6370000000 HC RX 637 (ALT 250 FOR IP): Performed by: THORACIC SURGERY (CARDIOTHORACIC VASCULAR SURGERY)

## 2020-10-15 PROCEDURE — P9041 ALBUMIN (HUMAN),5%, 50ML: HCPCS

## 2020-10-15 PROCEDURE — 3700000001 HC ADD 15 MINUTES (ANESTHESIA): Performed by: THORACIC SURGERY (CARDIOTHORACIC VASCULAR SURGERY)

## 2020-10-15 PROCEDURE — P9047 ALBUMIN (HUMAN), 25%, 50ML: HCPCS | Performed by: INTERNAL MEDICINE

## 2020-10-15 PROCEDURE — 2500000003 HC RX 250 WO HCPCS

## 2020-10-15 PROCEDURE — 2580000003 HC RX 258: Performed by: THORACIC SURGERY (CARDIOTHORACIC VASCULAR SURGERY)

## 2020-10-15 PROCEDURE — 83605 ASSAY OF LACTIC ACID: CPT

## 2020-10-15 PROCEDURE — 2580000003 HC RX 258

## 2020-10-15 PROCEDURE — 82565 ASSAY OF CREATININE: CPT

## 2020-10-15 PROCEDURE — 71045 X-RAY EXAM CHEST 1 VIEW: CPT

## 2020-10-15 PROCEDURE — 82803 BLOOD GASES ANY COMBINATION: CPT

## 2020-10-15 PROCEDURE — 88309 TISSUE EXAM BY PATHOLOGIST: CPT

## 2020-10-15 PROCEDURE — 2580000003 HC RX 258: Performed by: NURSE PRACTITIONER

## 2020-10-15 PROCEDURE — 7100000001 HC PACU RECOVERY - ADDTL 15 MIN

## 2020-10-15 PROCEDURE — 6370000000 HC RX 637 (ALT 250 FOR IP)

## 2020-10-15 PROCEDURE — 0W9B30Z DRAINAGE OF LEFT PLEURAL CAVITY WITH DRAINAGE DEVICE, PERCUTANEOUS APPROACH: ICD-10-PCS | Performed by: THORACIC SURGERY (CARDIOTHORACIC VASCULAR SURGERY)

## 2020-10-15 PROCEDURE — 2709999900 HC NON-CHARGEABLE SUPPLY: Performed by: THORACIC SURGERY (CARDIOTHORACIC VASCULAR SURGERY)

## 2020-10-15 PROCEDURE — 82435 ASSAY OF BLOOD CHLORIDE: CPT

## 2020-10-15 PROCEDURE — 84132 ASSAY OF SERUM POTASSIUM: CPT

## 2020-10-15 PROCEDURE — 2000000000 HC ICU R&B

## 2020-10-15 DEVICE — SEALANT TISS GLUE 4ML PLEUR AIR LEAK PROGEL: Type: IMPLANTABLE DEVICE | Site: LUNG | Status: FUNCTIONAL

## 2020-10-15 RX ORDER — SODIUM CHLORIDE 0.9 % (FLUSH) 0.9 %
10 SYRINGE (ML) INJECTION EVERY 12 HOURS SCHEDULED
Status: DISCONTINUED | OUTPATIENT
Start: 2020-10-15 | End: 2020-10-21 | Stop reason: HOSPADM

## 2020-10-15 RX ORDER — ALBUMIN, HUMAN INJ 5% 5 %
SOLUTION INTRAVENOUS PRN
Status: DISCONTINUED | OUTPATIENT
Start: 2020-10-15 | End: 2020-10-15 | Stop reason: SDUPTHER

## 2020-10-15 RX ORDER — HYDROCODONE BITARTRATE AND ACETAMINOPHEN 5; 325 MG/1; MG/1
1 TABLET ORAL PRN
Status: DISCONTINUED | OUTPATIENT
Start: 2020-10-15 | End: 2020-10-15 | Stop reason: HOSPADM

## 2020-10-15 RX ORDER — PROPOFOL 10 MG/ML
10 INJECTION, EMULSION INTRAVENOUS
Status: DISCONTINUED | OUTPATIENT
Start: 2020-10-15 | End: 2020-10-16

## 2020-10-15 RX ORDER — NALBUPHINE HCL 10 MG/ML
5 AMPUL (ML) INJECTION EVERY 4 HOURS PRN
Status: DISCONTINUED | OUTPATIENT
Start: 2020-10-15 | End: 2020-10-20

## 2020-10-15 RX ORDER — SODIUM CHLORIDE, SODIUM LACTATE, POTASSIUM CHLORIDE, CALCIUM CHLORIDE 600; 310; 30; 20 MG/100ML; MG/100ML; MG/100ML; MG/100ML
INJECTION, SOLUTION INTRAVENOUS CONTINUOUS
Status: DISCONTINUED | OUTPATIENT
Start: 2020-10-15 | End: 2020-10-15

## 2020-10-15 RX ORDER — FOLIC ACID 1 MG/1
1 TABLET ORAL DAILY
Status: DISCONTINUED | OUTPATIENT
Start: 2020-10-15 | End: 2020-10-21 | Stop reason: HOSPADM

## 2020-10-15 RX ORDER — SODIUM CHLORIDE 9 MG/ML
INJECTION, SOLUTION INTRAVENOUS ONCE
Status: COMPLETED | OUTPATIENT
Start: 2020-10-15 | End: 2020-10-15

## 2020-10-15 RX ORDER — SODIUM CHLORIDE 9 MG/ML
INJECTION, SOLUTION INTRAVENOUS
Status: DISPENSED
Start: 2020-10-15 | End: 2020-10-16

## 2020-10-15 RX ORDER — ROCURONIUM BROMIDE 10 MG/ML
INJECTION, SOLUTION INTRAVENOUS PRN
Status: DISCONTINUED | OUTPATIENT
Start: 2020-10-15 | End: 2020-10-15 | Stop reason: SDUPTHER

## 2020-10-15 RX ORDER — DEXTROSE AND SODIUM CHLORIDE 5; .45 G/100ML; G/100ML
INJECTION, SOLUTION INTRAVENOUS CONTINUOUS
Status: DISCONTINUED | OUTPATIENT
Start: 2020-10-16 | End: 2020-10-16

## 2020-10-15 RX ORDER — METOCLOPRAMIDE HYDROCHLORIDE 5 MG/ML
10 INJECTION INTRAMUSCULAR; INTRAVENOUS
Status: DISCONTINUED | OUTPATIENT
Start: 2020-10-15 | End: 2020-10-15 | Stop reason: HOSPADM

## 2020-10-15 RX ORDER — SOTALOL HYDROCHLORIDE 80 MG/1
80 TABLET ORAL 2 TIMES DAILY
Status: DISCONTINUED | OUTPATIENT
Start: 2020-10-15 | End: 2020-10-21 | Stop reason: HOSPADM

## 2020-10-15 RX ORDER — ONDANSETRON 2 MG/ML
INJECTION INTRAMUSCULAR; INTRAVENOUS PRN
Status: DISCONTINUED | OUTPATIENT
Start: 2020-10-15 | End: 2020-10-15 | Stop reason: SDUPTHER

## 2020-10-15 RX ORDER — MAGNESIUM HYDROXIDE 1200 MG/15ML
LIQUID ORAL CONTINUOUS PRN
Status: COMPLETED | OUTPATIENT
Start: 2020-10-15 | End: 2020-10-15

## 2020-10-15 RX ORDER — DEXAMETHASONE SODIUM PHOSPHATE 10 MG/ML
INJECTION INTRAMUSCULAR; INTRAVENOUS PRN
Status: DISCONTINUED | OUTPATIENT
Start: 2020-10-15 | End: 2020-10-15 | Stop reason: SDUPTHER

## 2020-10-15 RX ORDER — SODIUM CHLORIDE 0.9 % (FLUSH) 0.9 %
10 SYRINGE (ML) INJECTION EVERY 12 HOURS SCHEDULED
Status: DISCONTINUED | OUTPATIENT
Start: 2020-10-15 | End: 2020-10-15 | Stop reason: HOSPADM

## 2020-10-15 RX ORDER — SODIUM CHLORIDE, SODIUM LACTATE, POTASSIUM CHLORIDE, CALCIUM CHLORIDE 600; 310; 30; 20 MG/100ML; MG/100ML; MG/100ML; MG/100ML
INJECTION, SOLUTION INTRAVENOUS CONTINUOUS PRN
Status: DISCONTINUED | OUTPATIENT
Start: 2020-10-15 | End: 2020-10-15 | Stop reason: SDUPTHER

## 2020-10-15 RX ORDER — ASPIRIN 81 MG/1
81 TABLET, CHEWABLE ORAL DAILY
Status: DISCONTINUED | OUTPATIENT
Start: 2020-10-15 | End: 2020-10-18

## 2020-10-15 RX ORDER — DEXTROSE AND SODIUM CHLORIDE 5; .45 G/100ML; G/100ML
INJECTION, SOLUTION INTRAVENOUS CONTINUOUS
Status: DISCONTINUED | OUTPATIENT
Start: 2020-10-15 | End: 2020-10-16

## 2020-10-15 RX ORDER — ONDANSETRON 2 MG/ML
4 INJECTION INTRAMUSCULAR; INTRAVENOUS EVERY 6 HOURS PRN
Status: DISCONTINUED | OUTPATIENT
Start: 2020-10-15 | End: 2020-10-20

## 2020-10-15 RX ORDER — BUPIVACAINE HYDROCHLORIDE 2.5 MG/ML
INJECTION, SOLUTION EPIDURAL; INFILTRATION; INTRACAUDAL PRN
Status: DISCONTINUED | OUTPATIENT
Start: 2020-10-15 | End: 2020-10-15 | Stop reason: SDUPTHER

## 2020-10-15 RX ORDER — MEPERIDINE HYDROCHLORIDE 25 MG/ML
12.5 INJECTION INTRAMUSCULAR; INTRAVENOUS; SUBCUTANEOUS EVERY 5 MIN PRN
Status: DISCONTINUED | OUTPATIENT
Start: 2020-10-15 | End: 2020-10-15 | Stop reason: HOSPADM

## 2020-10-15 RX ORDER — PROPOFOL 10 MG/ML
INJECTION, EMULSION INTRAVENOUS PRN
Status: DISCONTINUED | OUTPATIENT
Start: 2020-10-15 | End: 2020-10-15 | Stop reason: SDUPTHER

## 2020-10-15 RX ORDER — DEXTROSE AND SODIUM CHLORIDE 5; .45 G/100ML; G/100ML
INJECTION, SOLUTION INTRAVENOUS CONTINUOUS
Status: DISCONTINUED | OUTPATIENT
Start: 2020-10-16 | End: 2020-10-16 | Stop reason: ALTCHOICE

## 2020-10-15 RX ORDER — LIDOCAINE HYDROCHLORIDE 10 MG/ML
1 INJECTION, SOLUTION EPIDURAL; INFILTRATION; INTRACAUDAL; PERINEURAL
Status: DISCONTINUED | OUTPATIENT
Start: 2020-10-15 | End: 2020-10-15 | Stop reason: HOSPADM

## 2020-10-15 RX ORDER — SODIUM CHLORIDE 9 MG/ML
INJECTION, SOLUTION INTRAVENOUS
Status: DISPENSED
Start: 2020-10-15 | End: 2020-10-15

## 2020-10-15 RX ORDER — DIPHENHYDRAMINE HYDROCHLORIDE 50 MG/ML
12.5 INJECTION INTRAMUSCULAR; INTRAVENOUS
Status: DISCONTINUED | OUTPATIENT
Start: 2020-10-15 | End: 2020-10-15 | Stop reason: HOSPADM

## 2020-10-15 RX ORDER — CITALOPRAM 20 MG/1
20 TABLET ORAL DAILY
Status: DISCONTINUED | OUTPATIENT
Start: 2020-10-15 | End: 2020-10-21 | Stop reason: HOSPADM

## 2020-10-15 RX ORDER — FENTANYL CITRATE 50 UG/ML
INJECTION, SOLUTION INTRAMUSCULAR; INTRAVENOUS PRN
Status: DISCONTINUED | OUTPATIENT
Start: 2020-10-15 | End: 2020-10-15 | Stop reason: SDUPTHER

## 2020-10-15 RX ORDER — MIDAZOLAM HYDROCHLORIDE 1 MG/ML
INJECTION INTRAMUSCULAR; INTRAVENOUS PRN
Status: DISCONTINUED | OUTPATIENT
Start: 2020-10-15 | End: 2020-10-15 | Stop reason: SDUPTHER

## 2020-10-15 RX ORDER — CARBIDOPA, LEVODOPA AND ENTACAPONE 25; 200; 100 MG/1; MG/1; MG/1
1 TABLET, FILM COATED ORAL NIGHTLY
Status: DISCONTINUED | OUTPATIENT
Start: 2020-10-15 | End: 2020-10-21 | Stop reason: HOSPADM

## 2020-10-15 RX ORDER — SODIUM CHLORIDE 0.9 % (FLUSH) 0.9 %
10 SYRINGE (ML) INJECTION PRN
Status: DISCONTINUED | OUTPATIENT
Start: 2020-10-15 | End: 2020-10-21 | Stop reason: HOSPADM

## 2020-10-15 RX ORDER — SODIUM CHLORIDE 0.9 % (FLUSH) 0.9 %
10 SYRINGE (ML) INJECTION PRN
Status: DISCONTINUED | OUTPATIENT
Start: 2020-10-15 | End: 2020-10-15 | Stop reason: HOSPADM

## 2020-10-15 RX ORDER — DIPHENHYDRAMINE HYDROCHLORIDE 50 MG/ML
25 INJECTION INTRAMUSCULAR; INTRAVENOUS EVERY 6 HOURS PRN
Status: DISCONTINUED | OUTPATIENT
Start: 2020-10-15 | End: 2020-10-20

## 2020-10-15 RX ORDER — HYDROCODONE BITARTRATE AND ACETAMINOPHEN 5; 325 MG/1; MG/1
2 TABLET ORAL PRN
Status: DISCONTINUED | OUTPATIENT
Start: 2020-10-15 | End: 2020-10-15 | Stop reason: HOSPADM

## 2020-10-15 RX ORDER — CHLORHEXIDINE GLUCONATE 0.12 MG/ML
15 RINSE ORAL 2 TIMES DAILY
Status: DISCONTINUED | OUTPATIENT
Start: 2020-10-15 | End: 2020-10-16

## 2020-10-15 RX ORDER — ETOMIDATE 2 MG/ML
INJECTION INTRAVENOUS PRN
Status: DISCONTINUED | OUTPATIENT
Start: 2020-10-15 | End: 2020-10-15 | Stop reason: SDUPTHER

## 2020-10-15 RX ORDER — ALBUMIN (HUMAN) 12.5 G/50ML
50 SOLUTION INTRAVENOUS ONCE
Status: COMPLETED | OUTPATIENT
Start: 2020-10-15 | End: 2020-10-15

## 2020-10-15 RX ORDER — ONDANSETRON 2 MG/ML
4 INJECTION INTRAMUSCULAR; INTRAVENOUS
Status: DISCONTINUED | OUTPATIENT
Start: 2020-10-15 | End: 2020-10-15 | Stop reason: HOSPADM

## 2020-10-15 RX ORDER — LIDOCAINE HYDROCHLORIDE 20 MG/ML
INJECTION, SOLUTION INTRAVENOUS PRN
Status: DISCONTINUED | OUTPATIENT
Start: 2020-10-15 | End: 2020-10-15 | Stop reason: SDUPTHER

## 2020-10-15 RX ORDER — FENTANYL CITRATE 50 UG/ML
50 INJECTION, SOLUTION INTRAMUSCULAR; INTRAVENOUS EVERY 10 MIN PRN
Status: DISCONTINUED | OUTPATIENT
Start: 2020-10-15 | End: 2020-10-15 | Stop reason: HOSPADM

## 2020-10-15 RX ORDER — ATORVASTATIN CALCIUM 80 MG/1
80 TABLET, FILM COATED ORAL DAILY
Status: DISCONTINUED | OUTPATIENT
Start: 2020-10-15 | End: 2020-10-21 | Stop reason: HOSPADM

## 2020-10-15 RX ORDER — METOPROLOL TARTRATE 5 MG/5ML
2.5 INJECTION INTRAVENOUS
Status: DISCONTINUED | OUTPATIENT
Start: 2020-10-15 | End: 2020-10-16

## 2020-10-15 RX ORDER — ALBUTEROL SULFATE 90 UG/1
4 AEROSOL, METERED RESPIRATORY (INHALATION) 4 TIMES DAILY
Status: DISCONTINUED | OUTPATIENT
Start: 2020-10-15 | End: 2020-10-16

## 2020-10-15 RX ORDER — NALOXONE HYDROCHLORIDE 0.4 MG/ML
0.4 INJECTION, SOLUTION INTRAMUSCULAR; INTRAVENOUS; SUBCUTANEOUS PRN
Status: DISCONTINUED | OUTPATIENT
Start: 2020-10-15 | End: 2020-10-20

## 2020-10-15 RX ADMIN — PHENYLEPHRINE HYDROCHLORIDE 50 MCG/MIN: 10 INJECTION INTRAVENOUS at 09:04

## 2020-10-15 RX ADMIN — ROCURONIUM BROMIDE 50 MG: 10 INJECTION INTRAVENOUS at 09:00

## 2020-10-15 RX ADMIN — NOREPINEPHRINE BITARTRATE 4 MCG/MIN: 1 INJECTION INTRAVENOUS at 23:15

## 2020-10-15 RX ADMIN — SODIUM CHLORIDE, POTASSIUM CHLORIDE, SODIUM LACTATE AND CALCIUM CHLORIDE: 600; 310; 30; 20 INJECTION, SOLUTION INTRAVENOUS at 08:50

## 2020-10-15 RX ADMIN — ALBUMIN (HUMAN) 50 G: 0.25 INJECTION, SOLUTION INTRAVENOUS at 21:26

## 2020-10-15 RX ADMIN — PHENYLEPHRINE HYDROCHLORIDE 200 MCG: 10 INJECTION INTRAVENOUS at 09:01

## 2020-10-15 RX ADMIN — ROCURONIUM BROMIDE 10 MG: 10 INJECTION INTRAVENOUS at 12:13

## 2020-10-15 RX ADMIN — BUPIVACAINE HYDROCHLORIDE 2 ML: 2.5 INJECTION, SOLUTION EPIDURAL; INFILTRATION; INTRACAUDAL; PERINEURAL at 11:37

## 2020-10-15 RX ADMIN — DEXTROSE AND SODIUM CHLORIDE: 5; 450 INJECTION, SOLUTION INTRAVENOUS at 13:21

## 2020-10-15 RX ADMIN — PHENYLEPHRINE HYDROCHLORIDE 200 MCG: 10 INJECTION INTRAVENOUS at 12:26

## 2020-10-15 RX ADMIN — PROPOFOL 20 MG: 10 INJECTION, EMULSION INTRAVENOUS at 12:59

## 2020-10-15 RX ADMIN — FENTANYL CITRATE 100 MCG: 50 INJECTION, SOLUTION INTRAMUSCULAR; INTRAVENOUS at 08:56

## 2020-10-15 RX ADMIN — PROPOFOL 20 MG: 10 INJECTION, EMULSION INTRAVENOUS at 09:00

## 2020-10-15 RX ADMIN — BUPIVACAINE HYDROCHLORIDE 2 ML: 2.5 INJECTION, SOLUTION EPIDURAL; INFILTRATION; INTRACAUDAL; PERINEURAL at 11:47

## 2020-10-15 RX ADMIN — ALBUTEROL SULFATE 4 PUFF: 90 AEROSOL, METERED RESPIRATORY (INHALATION) at 20:50

## 2020-10-15 RX ADMIN — PROPOFOL 10 MCG/KG/MIN: 10 INJECTION, EMULSION INTRAVENOUS at 13:41

## 2020-10-15 RX ADMIN — VANCOMYCIN HYDROCHLORIDE 1000 MG: 1 INJECTION, POWDER, LYOPHILIZED, FOR SOLUTION INTRAVENOUS at 08:02

## 2020-10-15 RX ADMIN — HYDROCORTISONE SODIUM SUCCINATE 50 MG: 100 INJECTION, POWDER, FOR SOLUTION INTRAMUSCULAR; INTRAVENOUS at 16:16

## 2020-10-15 RX ADMIN — PHENYLEPHRINE HYDROCHLORIDE 200 MCG: 10 INJECTION INTRAVENOUS at 12:07

## 2020-10-15 RX ADMIN — ROPIVACAINE HYDROCHLORIDE 8 ML/HR: 5 INJECTION, SOLUTION EPIDURAL; INFILTRATION; PERINEURAL at 12:19

## 2020-10-15 RX ADMIN — DEXTROSE AND SODIUM CHLORIDE: 5; 450 INJECTION, SOLUTION INTRAVENOUS at 22:14

## 2020-10-15 RX ADMIN — ALBUMIN (HUMAN) 500 ML: 12.5 INJECTION, SOLUTION INTRAVENOUS at 10:00

## 2020-10-15 RX ADMIN — ROCURONIUM BROMIDE 30 MG: 10 INJECTION INTRAVENOUS at 12:31

## 2020-10-15 RX ADMIN — PHENYLEPHRINE HYDROCHLORIDE 300 MCG: 10 INJECTION INTRAVENOUS at 09:04

## 2020-10-15 RX ADMIN — IPRATROPIUM BROMIDE 4 PUFF: 17 AEROSOL, METERED RESPIRATORY (INHALATION) at 20:50

## 2020-10-15 RX ADMIN — Medication 10 ML: at 16:17

## 2020-10-15 RX ADMIN — Medication 10 ML: at 19:37

## 2020-10-15 RX ADMIN — BUPIVACAINE HYDROCHLORIDE 3 ML: 2.5 INJECTION, SOLUTION EPIDURAL; INFILTRATION; INTRACAUDAL; PERINEURAL at 11:41

## 2020-10-15 RX ADMIN — ROCURONIUM BROMIDE 20 MG: 10 INJECTION INTRAVENOUS at 09:35

## 2020-10-15 RX ADMIN — ETOMIDATE 10 MG: 2 INJECTION, SOLUTION INTRAVENOUS at 09:00

## 2020-10-15 RX ADMIN — HYDROCORTISONE SODIUM SUCCINATE 50 MG: 100 INJECTION, POWDER, FOR SOLUTION INTRAMUSCULAR; INTRAVENOUS at 19:37

## 2020-10-15 RX ADMIN — SODIUM CHLORIDE, POTASSIUM CHLORIDE, SODIUM LACTATE AND CALCIUM CHLORIDE 125 ML/HR: 600; 310; 30; 20 INJECTION, SOLUTION INTRAVENOUS at 07:15

## 2020-10-15 RX ADMIN — DEXAMETHASONE SODIUM PHOSPHATE 8 MG: 10 INJECTION INTRAMUSCULAR; INTRAVENOUS at 09:41

## 2020-10-15 RX ADMIN — PROPOFOL 20 MG: 10 INJECTION, EMULSION INTRAVENOUS at 13:03

## 2020-10-15 RX ADMIN — ROCURONIUM BROMIDE 10 MG: 10 INJECTION INTRAVENOUS at 11:06

## 2020-10-15 RX ADMIN — LIDOCAINE HYDROCHLORIDE 60 MG: 20 INJECTION, SOLUTION INTRAVENOUS at 09:00

## 2020-10-15 RX ADMIN — CHLORHEXIDINE GLUCONATE 0.12% ORAL RINSE 15 ML: 1.2 LIQUID ORAL at 19:37

## 2020-10-15 RX ADMIN — ONDANSETRON 4 MG: 2 INJECTION INTRAMUSCULAR; INTRAVENOUS at 12:11

## 2020-10-15 RX ADMIN — SODIUM CHLORIDE, POTASSIUM CHLORIDE, SODIUM LACTATE AND CALCIUM CHLORIDE 125 ML/HR: 600; 310; 30; 20 INJECTION, SOLUTION INTRAVENOUS at 07:16

## 2020-10-15 RX ADMIN — NOREPINEPHRINE BITARTRATE 10 MCG/MIN: 1 INJECTION INTRAVENOUS at 13:00

## 2020-10-15 RX ADMIN — ROCURONIUM BROMIDE 10 MG: 10 INJECTION INTRAVENOUS at 09:56

## 2020-10-15 RX ADMIN — ROCURONIUM BROMIDE 20 MG: 10 INJECTION INTRAVENOUS at 10:14

## 2020-10-15 RX ADMIN — METOPROLOL TARTRATE 2.5 MG: 5 INJECTION, SOLUTION INTRAVENOUS at 21:29

## 2020-10-15 RX ADMIN — MIDAZOLAM HYDROCHLORIDE 1 MG: 2 INJECTION, SOLUTION INTRAMUSCULAR; INTRAVENOUS at 08:50

## 2020-10-15 RX ADMIN — PHENYLEPHRINE HYDROCHLORIDE 200 MCG: 10 INJECTION INTRAVENOUS at 08:59

## 2020-10-15 RX ADMIN — SODIUM CHLORIDE: 9 INJECTION, SOLUTION INTRAVENOUS at 13:28

## 2020-10-15 RX ADMIN — CHLORHEXIDINE GLUCONATE 0.12% ORAL RINSE 15 ML: 1.2 LIQUID ORAL at 16:16

## 2020-10-15 RX ADMIN — SODIUM CHLORIDE, POTASSIUM CHLORIDE, SODIUM LACTATE AND CALCIUM CHLORIDE: 600; 310; 30; 20 INJECTION, SOLUTION INTRAVENOUS at 09:19

## 2020-10-15 ASSESSMENT — PULMONARY FUNCTION TESTS
PIF_VALUE: 20
PIF_VALUE: 19
PIF_VALUE: 20
PIF_VALUE: 35
PIF_VALUE: 2
PIF_VALUE: 21
PIF_VALUE: 20
PIF_VALUE: 27
PIF_VALUE: 19
PIF_VALUE: 24
PIF_VALUE: 26
PIF_VALUE: 1
PIF_VALUE: 21
PIF_VALUE: 20
PIF_VALUE: 35
PIF_VALUE: 32
PIF_VALUE: 0
PIF_VALUE: 33
PIF_VALUE: 18
PIF_VALUE: 31
PIF_VALUE: 28
PIF_VALUE: 35
PIF_VALUE: 25
PIF_VALUE: 33
PIF_VALUE: 27
PIF_VALUE: 35
PIF_VALUE: 25
PIF_VALUE: 35
PIF_VALUE: 17
PIF_VALUE: 23
PIF_VALUE: 20
PIF_VALUE: 35
PIF_VALUE: 19
PIF_VALUE: 35
PIF_VALUE: 33
PIF_VALUE: 35
PIF_VALUE: 24
PIF_VALUE: 19
PIF_VALUE: 35
PIF_VALUE: 21
PIF_VALUE: 35
PIF_VALUE: 15
PIF_VALUE: 18
PIF_VALUE: 24
PIF_VALUE: 33
PIF_VALUE: 33
PIF_VALUE: 31
PIF_VALUE: 32
PIF_VALUE: 27
PIF_VALUE: 35
PIF_VALUE: 35
PIF_VALUE: 34
PIF_VALUE: 33
PIF_VALUE: 20
PIF_VALUE: 35
PIF_VALUE: 18
PIF_VALUE: 20
PIF_VALUE: 26
PIF_VALUE: 35
PIF_VALUE: 21
PIF_VALUE: 23
PIF_VALUE: 22
PIF_VALUE: 35
PIF_VALUE: 25
PIF_VALUE: 22
PIF_VALUE: 34
PIF_VALUE: 23
PIF_VALUE: 26
PIF_VALUE: 25
PIF_VALUE: 35
PIF_VALUE: 33
PIF_VALUE: 32
PIF_VALUE: 32
PIF_VALUE: 24
PIF_VALUE: 0
PIF_VALUE: 22
PIF_VALUE: 35
PIF_VALUE: 26
PIF_VALUE: 19
PIF_VALUE: 18
PIF_VALUE: 35
PIF_VALUE: 17
PIF_VALUE: 35
PIF_VALUE: 35
PIF_VALUE: 24
PIF_VALUE: 32
PIF_VALUE: 35
PIF_VALUE: 35
PIF_VALUE: 39
PIF_VALUE: 18
PIF_VALUE: 35
PIF_VALUE: 25
PIF_VALUE: 18
PIF_VALUE: 1
PIF_VALUE: 26
PIF_VALUE: 24
PIF_VALUE: 20
PIF_VALUE: 18
PIF_VALUE: 1
PIF_VALUE: 32
PIF_VALUE: 35
PIF_VALUE: 35
PIF_VALUE: 31
PIF_VALUE: 26
PIF_VALUE: 19
PIF_VALUE: 36
PIF_VALUE: 32
PIF_VALUE: 35
PIF_VALUE: 33
PIF_VALUE: 35
PIF_VALUE: 33
PIF_VALUE: 35
PIF_VALUE: 20
PIF_VALUE: 32
PIF_VALUE: 26
PIF_VALUE: 35
PIF_VALUE: 35
PIF_VALUE: 21
PIF_VALUE: 18
PIF_VALUE: 35
PIF_VALUE: 31
PIF_VALUE: 20
PIF_VALUE: 34
PIF_VALUE: 20
PIF_VALUE: 20
PIF_VALUE: 23
PIF_VALUE: 19
PIF_VALUE: 36
PIF_VALUE: 35
PIF_VALUE: 22
PIF_VALUE: 28
PIF_VALUE: 21
PIF_VALUE: 35
PIF_VALUE: 35
PIF_VALUE: 34
PIF_VALUE: 22
PIF_VALUE: 32
PIF_VALUE: 20
PIF_VALUE: 30
PIF_VALUE: 24
PIF_VALUE: 35
PIF_VALUE: 32
PIF_VALUE: 32
PIF_VALUE: 30
PIF_VALUE: 26
PIF_VALUE: 35
PIF_VALUE: 35
PIF_VALUE: 19
PIF_VALUE: 33
PIF_VALUE: 23
PIF_VALUE: 20
PIF_VALUE: 15
PIF_VALUE: 18
PIF_VALUE: 35
PIF_VALUE: 23
PIF_VALUE: 32
PIF_VALUE: 32
PIF_VALUE: 26
PIF_VALUE: 25
PIF_VALUE: 20
PIF_VALUE: 35
PIF_VALUE: 25
PIF_VALUE: 33
PIF_VALUE: 35
PIF_VALUE: 35
PIF_VALUE: 18
PIF_VALUE: 14
PIF_VALUE: 21
PIF_VALUE: 26
PIF_VALUE: 23
PIF_VALUE: 17
PIF_VALUE: 35
PIF_VALUE: 27
PIF_VALUE: 33
PIF_VALUE: 35
PIF_VALUE: 23
PIF_VALUE: 34
PIF_VALUE: 32
PIF_VALUE: 35
PIF_VALUE: 22
PIF_VALUE: 28
PIF_VALUE: 27
PIF_VALUE: 35
PIF_VALUE: 1
PIF_VALUE: 35
PIF_VALUE: 35
PIF_VALUE: 31
PIF_VALUE: 20
PIF_VALUE: 17
PIF_VALUE: 1
PIF_VALUE: 22
PIF_VALUE: 1
PIF_VALUE: 19
PIF_VALUE: 22
PIF_VALUE: 31
PIF_VALUE: 36
PIF_VALUE: 20
PIF_VALUE: 19
PIF_VALUE: 20
PIF_VALUE: 32
PIF_VALUE: 17
PIF_VALUE: 31
PIF_VALUE: 20
PIF_VALUE: 26
PIF_VALUE: 32
PIF_VALUE: 16
PIF_VALUE: 35
PIF_VALUE: 31
PIF_VALUE: 1
PIF_VALUE: 16
PIF_VALUE: 25
PIF_VALUE: 33
PIF_VALUE: 13
PIF_VALUE: 35
PIF_VALUE: 35
PIF_VALUE: 22
PIF_VALUE: 18
PIF_VALUE: 31
PIF_VALUE: 25
PIF_VALUE: 35
PIF_VALUE: 18
PIF_VALUE: 35
PIF_VALUE: 32
PIF_VALUE: 32
PIF_VALUE: 36
PIF_VALUE: 23
PIF_VALUE: 19
PIF_VALUE: 29
PIF_VALUE: 16
PIF_VALUE: 17
PIF_VALUE: 17
PIF_VALUE: 35
PIF_VALUE: 30
PIF_VALUE: 20
PIF_VALUE: 19
PIF_VALUE: 21
PIF_VALUE: 16
PIF_VALUE: 19
PIF_VALUE: 2
PIF_VALUE: 35
PIF_VALUE: 24
PIF_VALUE: 24
PIF_VALUE: 19
PIF_VALUE: 21
PIF_VALUE: 36
PIF_VALUE: 19
PIF_VALUE: 12
PIF_VALUE: 1
PIF_VALUE: 31
PIF_VALUE: 24
PIF_VALUE: 31
PIF_VALUE: 35
PIF_VALUE: 35
PIF_VALUE: 17
PIF_VALUE: 20

## 2020-10-15 ASSESSMENT — PAIN SCALES - GENERAL
PAINLEVEL_OUTOF10: 0

## 2020-10-15 ASSESSMENT — PAIN - FUNCTIONAL ASSESSMENT: PAIN_FUNCTIONAL_ASSESSMENT: 0-10
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
## 2020-10-15 NOTE — OP NOTE
Demetria Tejada La Mina [address] [place] [address], [address] [facility]                                OPERATIVE REPORT    PATIENT NAME: Wade Elizabeth                      :        1945  MED REC NO:   [id number]                            ROOM:       04  ACCOUNT NO:   [de-identified]                           ADMIT DATE: 10/15/2020  PROVIDER:     Abelino Louie MD    DATE OF PROCEDURE:  10/15/2020    PREOPERATIVE DIAGNOSIS:  Biopsy-proven squamous cell cancer, left lung  lower lobe. POSTOPERATIVE DIAGNOSIS:  Biopsy-proven squamous cell cancer, left lung  lower lobe. PROCEDURE:  1. Left thoracotomy with left lower lobe lobectomy. 2.  Therapeutic postoperative fiberoptic bronchoscopy. 3.  Chest tube insertion x2. SURGEON:  Abelino Louie MD    ANESTHESIA:  General.    FINDINGS:  A biopsy-proven squamous cell cancer of 6 cm in size and a  30-year-old gentleman referred to me by Dr. Severo Neff. After appropriate  investigation including pulmonary function testing, PET scanning,  cardiology risk stratification and even GI intervention because of his  chronic anemia with most recently a capsule endoscopy done. He was  prepared for surgery. He was taken to the operating room where this  procedure was done without difficulty in the method described below. OPERATIVE PROCEDURE:  With the patient properly prepared and identified,  we opened the left chest through a standard thoracotomy incision. Electrocautery was used for dissection and control of soft tissue  hemostasis. We entered into the pleural space through the superior rib  margin. We preferentially deflated the left lung. We then divided one  rib posteriorly for exposure and then with our retractor in place, we  were able to visualize the left upper and lower lobes. The mass of 6 cm  was readily apparent in the posterior segment of the left lower lobe.    The fissure was totally distinct with no upper and lower lobe  attachments, which made the dissection in the lobar fissure area easier  and at that point, we started to dissect around the pulmonary artery  isolating it from the branches going to the lower lobe where we divided  the branches between double silk ligatures. Working around, we removed  a cluster of lymph node tissue from the interlobar fissure which  measured 1.8 x 1.7 cm and additional tissue was taken from the pulmonary  vein anterior to it 1 x 0.7 cm and both of these were sent for cytology. When we entered the chest, there was serous fluid within the chest.  We  took two 70 mL aliquots for cytology. The total amount removed was 900  mL. Working additionally in the interlobar fissure, we skeletonized the  branches of the pulmonary vein to the lower lobe where we divided with  surgical clips and ligatures of silk as needed and ultimately the last  division of structure was the bronchial areas to the lower lobe, which  we clamped and then visualized the inflation of the upper lobe and then  divided after clamping with a TA-55 4.8-mm staplers x2. We divided the  bronchial airway to lower lobe. The entire specimen was removed from  the operative field. We then instilled a liter of saline and checked  for pneumostasis using even 5 cm of PEEP and there was no air leak. We  checked for hemostasis, which was of satisfactory quality. We placed  two chest tubes, one a straight 32-French and one a right angle  28-French placing them into the thorax and bring them out through  separate stab incisions below the operative site. The ribs were  reapproximated with four intercostal closure sutures. The anatomic  muscles were closed over this. Subcuticular closure of the skin was  performed and the patient was returned to the supine position and  extubated.   He was intubated with a single-lumen tube and through the  side port of the single-lumen tube, we performed a therapeutic  bronchoscopy removing thick, but clear secretions from both the right  mainstem and lobar bronchi as well as the left mainstem bronchus. We  suctioned with the assistance of irrigation and clear the airway until  there was no additional mucus seen in the endobronchial tree. The  patient was then transferred directly from [address] remaining intubated and  taken to room 4 of CVICU. The sponge, instruments and needle counts  were correct x2. Estimated blood loss was 250 mL or less. The patient  tolerated the procedure well. We telephoned his wife Marino Yeung and we informed her of the surgery and the  results and we answered her questions. The patient tolerated this  operation well.         Modesto Francisco MD    D: 10/15/2020 13:46:45       T: 10/15/2020 13:54:32     [email or web address]  Job#: [id number]     Doc#: [id number]    CC:

## 2020-10-15 NOTE — ANESTHESIA PROCEDURE NOTES
Arterial Line:    An arterial line was placed using surface landmarks, in the pre-op for the following indication(s): continuous blood pressure monitoring and blood sampling needed. A 20 gauge (size), 1 and 3/4 inch (length), Arrow (type) catheter was placed, Seldinger technique used, into the right radial artery, secured by tape and Tegaderm. Anesthesia type: Local  Local infiltration: Injection    Events:  patient tolerated procedure well with no complications.   10/15/2020 7:50 AM10/15/2020 7:53 AM  Anesthesiologist: Rui Goodman DO  Performed: Anesthesiologist   Preanesthetic Checklist  Completed: patient identified, site marked, surgical consent, pre-op evaluation, timeout performed, IV checked, risks and benefits discussed, monitors and equipment checked, anesthesia consent given, oxygen available and patient being monitored
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
## 2020-10-15 NOTE — ANESTHESIA POSTPROCEDURE EVALUATION
Department of Anesthesiology  Postprocedure Note    Patient: Italo Singh  MRN: [id number]  YOB: 1945  Date of evaluation: 10/15/2020  Time:  1:12 PM     Procedure Summary     Date:  10/15/20 Room / Location:  [address] [place] [address]    Anesthesia Start:  6179 Anesthesia Stop:  0591    Procedure:  LEFT THORACOTOMY WITH LUNG RESECTION AND FIBEROPTIC BRONCHOSCOPY (Left ) Diagnosis:  (LUNG MASS)    Surgeon:  Jada Echols MD Responsible Provider:  LAYA García CRNA    Anesthesia Type:  general, regional, epidural ASA Status:  3          Anesthesia Type: general, regional, epidural    Brent Phase I: Brent Score: 10    Brent Phase II:      Last vitals: Reviewed and per EMR flowsheets.        Anesthesia Post Evaluation    Patient location during evaluation: ICU  Patient participation: waiting for patient participation  Level of consciousness: sedated and ventilated  Pain score: 0  Airway patency: patent  Nausea & Vomiting: no nausea and no vomiting  Complications: no  Cardiovascular status: blood pressure returned to baseline and hemodynamically stable  Respiratory status: acceptable  Hydration status: euvolemic

## 2020-10-15 NOTE — ANESTHESIA PROCEDURE NOTES
Thoracic T8 epidural    Patient location during procedure: pre-op  Start time: 10/15/2020 8:43 AM  End time: 10/15/2020 8:50 AM  Reason for block: post-op pain management  Staffing  Anesthesiologist: Elmo Portillo DO  Performed: anesthesiologist   Preanesthetic Checklist  Completed: patient identified, site marked, surgical consent, pre-op evaluation, timeout performed, IV checked, risks and benefits discussed, monitors and equipment checked, anesthesia consent given, oxygen available and patient being monitored  Epidural  Patient position: sitting  Prep: Betadine  Patient monitoring: cardiac monitor, continuous pulse ox and frequent blood pressure checks  Approach: midline  Location: thoracic (1-12) (T8)  Injection technique: SINDY air and hanging drop  Provider prep: mask and sterile gloves  Needle  Needle type: Tuohy   Needle gauge: 17 G  Needle length: 3.5 in  Needle insertion depth: 8 cm  Catheter type: end hole  Catheter size: 18 G  Catheter at skin depth: 13 cm  Test dose: negative  Assessment  Hemodynamics: stable  Attempts: 1
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
## 2020-10-15 NOTE — PROGRESS NOTES
Spiritual Care Services     Summary of Visit:      Spiritual Assessment/Intervention/Outcomes:    Encounter Summary  Services provided to[de-identified] Patient  Referral/Consult From[de-identified] Rounding  Support System: Spouse, Children  Continue Visiting: Yes  Complexity of Encounter: Low  Length of Encounter: 15 minutes  Routine  Type: Initial  Assessment: Calm, Approachable  Intervention: [place], Sustaining presence/ Ministry of presence  Outcome: Receptive              Advance Directives (For Healthcare)  Healthcare Directive: Yes, patient has an advance directive for healthcare treatment  Copy in Chart: Yes, copy in chart                Care Plan:        [id number] [address]   Electronically signed by Wendy Sevilla on 10/15/20 at 7:04 PM EDT     To reach a  for emotional and spiritual support, place an [facility] consult request.   If a  is needed immediately, dial 0 and ask to page the on-call .
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
## 2020-10-15 NOTE — PLAN OF CARE
Problem: Restraint Use - Nonviolent/Non-Self-Destructive Behavior:  Goal: Absence of restraint indications  Outcome: Ongoing  Goal: Absence of restraint-related injury  Outcome: Ongoing     Problem: Skin Integrity:  Goal: Will show no infection signs and symptoms  Outcome: Ongoing  Goal: Absence of new skin breakdown  Outcome: Ongoing     Problem: Falls - Risk of:  Goal: Will remain free from falls  Outcome: Ongoing  Goal: Absence of physical injury  Outcome: Ongoing

## 2020-10-16 ENCOUNTER — APPOINTMENT (OUTPATIENT)
Dept: GENERAL RADIOLOGY | Age: 75
DRG: 163 | End: 2020-10-16
Attending: THORACIC SURGERY (CARDIOTHORACIC VASCULAR SURGERY)
Payer: MEDICARE

## 2020-10-16 LAB
ALBUMIN SERPL-MCNC: 2.7 G/DL (ref 3.5–4.6)
ALP BLD-CCNC: 72 U/L (ref 35–104)
ALT SERPL-CCNC: 15 U/L (ref 0–41)
ANION GAP SERPL CALCULATED.3IONS-SCNC: 8 MEQ/L (ref 9–15)
AST SERPL-CCNC: 15 U/L (ref 0–40)
BASE EXCESS ARTERIAL: 2 (ref -3–3)
BILIRUB SERPL-MCNC: 0.3 MG/DL (ref 0.2–0.7)
BLOOD BANK DISPENSE STATUS: NORMAL
BLOOD BANK DISPENSE STATUS: NORMAL
BLOOD BANK PRODUCT CODE: NORMAL
BLOOD BANK PRODUCT CODE: NORMAL
BPU ID: NORMAL
BPU ID: NORMAL
BUN BLDV-MCNC: 10 MG/DL (ref 8–23)
C-REACTIVE PROTEIN, HIGH SENSITIVITY: 94.6 MG/L (ref 0–5)
CALCIUM IONIZED: 1.05 MMOL/L (ref 1.12–1.32)
CALCIUM SERPL-MCNC: 7.5 MG/DL (ref 8.5–9.9)
CHLORIDE BLD-SCNC: 105 MEQ/L (ref 95–107)
CO2: 27 MEQ/L (ref 20–31)
CREAT SERPL-MCNC: 0.44 MG/DL (ref 0.7–1.2)
DESCRIPTION BLOOD BANK: NORMAL
DESCRIPTION BLOOD BANK: NORMAL
GFR AFRICAN AMERICAN: >60
GFR AFRICAN AMERICAN: >60
GFR NON-AFRICAN AMERICAN: >60
GFR NON-AFRICAN AMERICAN: >60
GLOBULIN: 2.4 G/DL (ref 2.3–3.5)
GLUCOSE BLD-MCNC: 171 MG/DL (ref 60–115)
GLUCOSE BLD-MCNC: 178 MG/DL (ref 70–99)
HCO3 ARTERIAL: 27 MMOL/L (ref 21–29)
HCT VFR BLD CALC: 26.2 % (ref 42–52)
HCT VFR BLD CALC: 30.6 % (ref 42–52)
HEMOGLOBIN: 7.9 G/DL (ref 14–18)
HEMOGLOBIN: 8.4 GM/DL (ref 13.5–17.5)
HEMOGLOBIN: 9.3 G/DL (ref 14–18)
LACTATE: 0.71 MMOL/L (ref 0.4–2)
MAGNESIUM: 1.8 MG/DL (ref 1.7–2.4)
MCH RBC QN AUTO: 21 PG (ref 27–31.3)
MCHC RBC AUTO-ENTMCNC: 30.2 % (ref 33–37)
MCV RBC AUTO: 69.3 FL (ref 80–100)
O2 SAT, ARTERIAL: 99 % (ref 93–100)
PCO2 ARTERIAL: 45 MM HG (ref 35–45)
PDW BLD-RTO: 19.7 % (ref 11.5–14.5)
PERFORMED ON: ABNORMAL
PH ARTERIAL: 7.39 (ref 7.35–7.45)
PLATELET # BLD: 209 K/UL (ref 130–400)
PO2 ARTERIAL: 161 MM HG (ref 75–108)
POC CHLORIDE: 104 MEQ/L (ref 99–110)
POC CREATININE: 0.6 MG/DL (ref 0.8–1.3)
POC FIO2: 40
POC HEMATOCRIT: 25 % (ref 41–53)
POC POTASSIUM: 3.6 MEQ/L (ref 3.5–5.1)
POC SAMPLE TYPE: ABNORMAL
POC SODIUM: 142 MEQ/L (ref 136–145)
POTASSIUM SERPL-SCNC: 3.9 MEQ/L (ref 3.4–4.9)
RBC # BLD: 3.78 M/UL (ref 4.7–6.1)
SEDIMENTATION RATE, ERYTHROCYTE: 14 MM (ref 0–20)
SODIUM BLD-SCNC: 140 MEQ/L (ref 135–144)
TCO2 ARTERIAL: 28 (ref 22–29)
TOTAL PROTEIN: 5.1 G/DL (ref 6.3–8)
TROPONIN: <0.01 NG/ML (ref 0–0.01)
WBC # BLD: 9.9 K/UL (ref 4.8–10.8)

## 2020-10-16 PROCEDURE — 85652 RBC SED RATE AUTOMATED: CPT

## 2020-10-16 PROCEDURE — 2000000000 HC ICU R&B

## 2020-10-16 PROCEDURE — 6360000002 HC RX W HCPCS: Performed by: INTERNAL MEDICINE

## 2020-10-16 PROCEDURE — 2580000003 HC RX 258: Performed by: THORACIC SURGERY (CARDIOTHORACIC VASCULAR SURGERY)

## 2020-10-16 PROCEDURE — 0BH17EZ INSERTION OF ENDOTRACHEAL AIRWAY INTO TRACHEA, VIA NATURAL OR ARTIFICIAL OPENING: ICD-10-PCS | Performed by: INTERNAL MEDICINE

## 2020-10-16 PROCEDURE — 37799 UNLISTED PX VASCULAR SURGERY: CPT

## 2020-10-16 PROCEDURE — 83605 ASSAY OF LACTIC ACID: CPT

## 2020-10-16 PROCEDURE — 2700000000 HC OXYGEN THERAPY PER DAY

## 2020-10-16 PROCEDURE — 5A1935Z RESPIRATORY VENTILATION, LESS THAN 24 CONSECUTIVE HOURS: ICD-10-PCS | Performed by: INTERNAL MEDICINE

## 2020-10-16 PROCEDURE — P9041 ALBUMIN (HUMAN),5%, 50ML: HCPCS | Performed by: THORACIC SURGERY (CARDIOTHORACIC VASCULAR SURGERY)

## 2020-10-16 PROCEDURE — P9016 RBC LEUKOCYTES REDUCED: HCPCS

## 2020-10-16 PROCEDURE — 85027 COMPLETE CBC AUTOMATED: CPT

## 2020-10-16 PROCEDURE — 82565 ASSAY OF CREATININE: CPT

## 2020-10-16 PROCEDURE — 6360000002 HC RX W HCPCS

## 2020-10-16 PROCEDURE — 6360000002 HC RX W HCPCS: Performed by: STUDENT IN AN ORGANIZED HEALTH CARE EDUCATION/TRAINING PROGRAM

## 2020-10-16 PROCEDURE — 82803 BLOOD GASES ANY COMBINATION: CPT

## 2020-10-16 PROCEDURE — 94640 AIRWAY INHALATION TREATMENT: CPT

## 2020-10-16 PROCEDURE — 6370000000 HC RX 637 (ALT 250 FOR IP): Performed by: INTERNAL MEDICINE

## 2020-10-16 PROCEDURE — 84484 ASSAY OF TROPONIN QUANT: CPT

## 2020-10-16 PROCEDURE — 2580000003 HC RX 258: Performed by: INTERNAL MEDICINE

## 2020-10-16 PROCEDURE — 36430 TRANSFUSION BLD/BLD COMPNT: CPT

## 2020-10-16 PROCEDURE — 2500000003 HC RX 250 WO HCPCS: Performed by: INTERNAL MEDICINE

## 2020-10-16 PROCEDURE — 83735 ASSAY OF MAGNESIUM: CPT

## 2020-10-16 PROCEDURE — 6360000002 HC RX W HCPCS: Performed by: THORACIC SURGERY (CARDIOTHORACIC VASCULAR SURGERY)

## 2020-10-16 PROCEDURE — 86141 C-REACTIVE PROTEIN HS: CPT

## 2020-10-16 PROCEDURE — 82435 ASSAY OF BLOOD CHLORIDE: CPT

## 2020-10-16 PROCEDURE — 85018 HEMOGLOBIN: CPT

## 2020-10-16 PROCEDURE — 94761 N-INVAS EAR/PLS OXIMETRY MLT: CPT

## 2020-10-16 PROCEDURE — 82330 ASSAY OF CALCIUM: CPT

## 2020-10-16 PROCEDURE — 84132 ASSAY OF SERUM POTASSIUM: CPT

## 2020-10-16 PROCEDURE — 84295 ASSAY OF SERUM SODIUM: CPT

## 2020-10-16 PROCEDURE — 93005 ELECTROCARDIOGRAM TRACING: CPT | Performed by: INTERNAL MEDICINE

## 2020-10-16 PROCEDURE — 2580000003 HC RX 258: Performed by: STUDENT IN AN ORGANIZED HEALTH CARE EDUCATION/TRAINING PROGRAM

## 2020-10-16 PROCEDURE — 71045 X-RAY EXAM CHEST 1 VIEW: CPT

## 2020-10-16 PROCEDURE — 94003 VENT MGMT INPAT SUBQ DAY: CPT

## 2020-10-16 PROCEDURE — 99233 SBSQ HOSP IP/OBS HIGH 50: CPT | Performed by: INTERNAL MEDICINE

## 2020-10-16 PROCEDURE — 85014 HEMATOCRIT: CPT

## 2020-10-16 PROCEDURE — 80053 COMPREHEN METABOLIC PANEL: CPT

## 2020-10-16 RX ORDER — IPRATROPIUM BROMIDE AND ALBUTEROL SULFATE 2.5; .5 MG/3ML; MG/3ML
1 SOLUTION RESPIRATORY (INHALATION) 4 TIMES DAILY
Status: DISCONTINUED | OUTPATIENT
Start: 2020-10-16 | End: 2020-10-19

## 2020-10-16 RX ORDER — BUDESONIDE 3 MG/1
6 CAPSULE, COATED PELLETS ORAL DAILY
Status: DISCONTINUED | OUTPATIENT
Start: 2020-10-16 | End: 2020-10-21 | Stop reason: HOSPADM

## 2020-10-16 RX ORDER — SODIUM CHLORIDE 9 MG/ML
INJECTION, SOLUTION INTRAVENOUS
Status: DISPENSED
Start: 2020-10-16 | End: 2020-10-17

## 2020-10-16 RX ORDER — ALBUTEROL SULFATE 2.5 MG/3ML
2.5 SOLUTION RESPIRATORY (INHALATION)
Status: DISCONTINUED | OUTPATIENT
Start: 2020-10-16 | End: 2020-10-21 | Stop reason: HOSPADM

## 2020-10-16 RX ORDER — FUROSEMIDE 10 MG/ML
20 INJECTION INTRAMUSCULAR; INTRAVENOUS ONCE
Status: COMPLETED | OUTPATIENT
Start: 2020-10-16 | End: 2020-10-16

## 2020-10-16 RX ORDER — FUROSEMIDE 10 MG/ML
40 INJECTION INTRAMUSCULAR; INTRAVENOUS ONCE
Status: COMPLETED | OUTPATIENT
Start: 2020-10-16 | End: 2020-10-16

## 2020-10-16 RX ORDER — PRIMIDONE 250 MG/1
250 TABLET ORAL NIGHTLY
Status: DISCONTINUED | OUTPATIENT
Start: 2020-10-16 | End: 2020-10-21 | Stop reason: HOSPADM

## 2020-10-16 RX ORDER — TRAZODONE HYDROCHLORIDE 50 MG/1
100 TABLET ORAL NIGHTLY PRN
Status: DISCONTINUED | OUTPATIENT
Start: 2020-10-16 | End: 2020-10-21 | Stop reason: HOSPADM

## 2020-10-16 RX ORDER — METOPROLOL TARTRATE 5 MG/5ML
5 INJECTION INTRAVENOUS EVERY 4 HOURS
Status: DISCONTINUED | OUTPATIENT
Start: 2020-10-16 | End: 2020-10-19

## 2020-10-16 RX ORDER — ALBUMIN, HUMAN INJ 5% 5 %
25 SOLUTION INTRAVENOUS ONCE
Status: COMPLETED | OUTPATIENT
Start: 2020-10-16 | End: 2020-10-16

## 2020-10-16 RX ORDER — 0.9 % SODIUM CHLORIDE 0.9 %
20 INTRAVENOUS SOLUTION INTRAVENOUS ONCE
Status: COMPLETED | OUTPATIENT
Start: 2020-10-16 | End: 2020-10-16

## 2020-10-16 RX ORDER — FUROSEMIDE 10 MG/ML
INJECTION INTRAMUSCULAR; INTRAVENOUS
Status: COMPLETED
Start: 2020-10-16 | End: 2020-10-16

## 2020-10-16 RX ADMIN — PROPOFOL 20 MCG/KG/MIN: 10 INJECTION, EMULSION INTRAVENOUS at 01:21

## 2020-10-16 RX ADMIN — METOPROLOL TARTRATE 2.5 MG: 5 INJECTION, SOLUTION INTRAVENOUS at 17:46

## 2020-10-16 RX ADMIN — Medication 10 ML: at 09:00

## 2020-10-16 RX ADMIN — SOTALOL HYDROCHLORIDE 80 MG: 80 TABLET ORAL at 19:43

## 2020-10-16 RX ADMIN — DEXTROSE AND SODIUM CHLORIDE: 5; 450 INJECTION, SOLUTION INTRAVENOUS at 09:36

## 2020-10-16 RX ADMIN — HYDROCORTISONE SODIUM SUCCINATE 50 MG: 100 INJECTION, POWDER, FOR SOLUTION INTRAMUSCULAR; INTRAVENOUS at 08:01

## 2020-10-16 RX ADMIN — ALBUTEROL SULFATE 4 PUFF: 90 AEROSOL, METERED RESPIRATORY (INHALATION) at 07:51

## 2020-10-16 RX ADMIN — IPRATROPIUM BROMIDE AND ALBUTEROL SULFATE 1 AMPULE: .5; 3 SOLUTION RESPIRATORY (INHALATION) at 16:53

## 2020-10-16 RX ADMIN — METOPROLOL TARTRATE 2.5 MG: 5 INJECTION, SOLUTION INTRAVENOUS at 01:14

## 2020-10-16 RX ADMIN — PRIMIDONE 250 MG: 250 TABLET ORAL at 19:43

## 2020-10-16 RX ADMIN — SODIUM CHLORIDE 200 MG: 9 INJECTION, SOLUTION INTRAVENOUS at 20:20

## 2020-10-16 RX ADMIN — METOPROLOL TARTRATE 5 MG: 5 INJECTION, SOLUTION INTRAVENOUS at 23:52

## 2020-10-16 RX ADMIN — ROPIVACAINE HYDROCHLORIDE: 5 INJECTION, SOLUTION EPIDURAL; INFILTRATION; PERINEURAL at 01:06

## 2020-10-16 RX ADMIN — FUROSEMIDE 40 MG: 10 INJECTION, SOLUTION INTRAMUSCULAR; INTRAVENOUS at 17:56

## 2020-10-16 RX ADMIN — IPRATROPIUM BROMIDE 4 PUFF: 17 AEROSOL, METERED RESPIRATORY (INHALATION) at 07:53

## 2020-10-16 RX ADMIN — IPRATROPIUM BROMIDE AND ALBUTEROL SULFATE 1 AMPULE: .5; 3 SOLUTION RESPIRATORY (INHALATION) at 19:16

## 2020-10-16 RX ADMIN — ROPIVACAINE HYDROCHLORIDE: 5 INJECTION, SOLUTION EPIDURAL; INFILTRATION; PERINEURAL at 14:13

## 2020-10-16 RX ADMIN — IPRATROPIUM BROMIDE 4 PUFF: 17 AEROSOL, METERED RESPIRATORY (INHALATION) at 04:08

## 2020-10-16 RX ADMIN — Medication 10 ML: at 20:39

## 2020-10-16 RX ADMIN — CARBIDOPA, LEVODOPA, AND ENTACAPONE 1 TABLET: 25; 100; 200 TABLET, FILM COATED ORAL at 19:43

## 2020-10-16 RX ADMIN — FUROSEMIDE 20 MG: 10 INJECTION, SOLUTION INTRAMUSCULAR; INTRAVENOUS at 11:29

## 2020-10-16 RX ADMIN — HYDROCORTISONE SODIUM SUCCINATE 50 MG: 100 INJECTION, POWDER, FOR SOLUTION INTRAMUSCULAR; INTRAVENOUS at 21:51

## 2020-10-16 RX ADMIN — FUROSEMIDE 20 MG: 10 INJECTION, SOLUTION INTRAVENOUS at 17:11

## 2020-10-16 RX ADMIN — ALBUTEROL SULFATE 4 PUFF: 90 AEROSOL, METERED RESPIRATORY (INHALATION) at 04:08

## 2020-10-16 RX ADMIN — SODIUM CHLORIDE 20 ML: 9 INJECTION, SOLUTION INTRAVENOUS at 10:43

## 2020-10-16 RX ADMIN — IPRATROPIUM BROMIDE AND ALBUTEROL SULFATE 1 AMPULE: .5; 3 SOLUTION RESPIRATORY (INHALATION) at 11:27

## 2020-10-16 RX ADMIN — BUDESONIDE 6 MG: 3 CAPSULE, COATED PELLETS ORAL at 15:01

## 2020-10-16 RX ADMIN — DEXTROSE AND SODIUM CHLORIDE: 5; 450 INJECTION, SOLUTION INTRAVENOUS at 01:27

## 2020-10-16 RX ADMIN — CHLORHEXIDINE GLUCONATE 0.12% ORAL RINSE 15 ML: 1.2 LIQUID ORAL at 08:03

## 2020-10-16 RX ADMIN — METOPROLOL TARTRATE 2.5 MG: 5 INJECTION, SOLUTION INTRAVENOUS at 04:00

## 2020-10-16 RX ADMIN — ALBUMIN (HUMAN) 25 G: 12.5 INJECTION, SOLUTION INTRAVENOUS at 10:13

## 2020-10-16 ASSESSMENT — PAIN SCALES - GENERAL
PAINLEVEL_OUTOF10: 0

## 2020-10-16 ASSESSMENT — PULMONARY FUNCTION TESTS
PIF_VALUE: 17
PIF_VALUE: 19
PIF_VALUE: 22
PIF_VALUE: 24
PIF_VALUE: 19
PIF_VALUE: 19
PIF_VALUE: 20
PIF_VALUE: 19
PIF_VALUE: 30
PIF_VALUE: 20
PIF_VALUE: 19
PIF_VALUE: 20
PIF_VALUE: 21
PIF_VALUE: 19
PIF_VALUE: 14
PIF_VALUE: 29
PIF_VALUE: 21
PIF_VALUE: 23
PIF_VALUE: 25
PIF_VALUE: 21
PIF_VALUE: 17
PIF_VALUE: 20
PIF_VALUE: 19
PIF_VALUE: 22
PIF_VALUE: 20
PIF_VALUE: 20
PIF_VALUE: 21
PIF_VALUE: 20
PIF_VALUE: 20
PIF_VALUE: 17
PIF_VALUE: 29
PIF_VALUE: 18
PIF_VALUE: 18

## 2020-10-16 NOTE — PROGRESS NOTES
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 Warfarin Sodium 5 MG Oral Tablet; take 2.5 mg three times a week. 5 mg all other days    managed by [facility]; Therapy: (Recorded:[id number]) to Recorded    Meds reviewed with patient and wife with list from home. ANoble, LPN     Allergies   1. Milk of Magnesia SUSP   Swelling; Recorded By: Dianne Nageotte; 7/12/2016 8:04:57 AM   2. finasteride   Recorded By: Zeke Keita; 1/25/2018 8:01:13 AM   3. Penicillins   Recorded By: Jose ; 6/19/2009 10:07:05 AM   4. Other   Recorded By: Koko Li; 1/14/2016 11:03:01 AM   5. Tape   Recorded By: Jose ; 6/19/2009 10:07:28 AM    Immunizations  FLU --- Nicole Rubens: [id number] Carls: 01-Oct-2018; Series3: 01-Oct-2019   Influenza --- Nicole Rubens: [id number] Carls: [id number] Arsalan: 18-Nov-2013; Series4:  07-Oct-2014; Series5: 08-Oct-2015; Alexia Griffithasin: 01-Nov-2016; Series7: 01-Oct-2019   PCV --- Series1: 10-Aug-2015   PPSV --- Series1: 01-Jan-2011; Víctor Saucedo: 01-Oct-2018     See above.   ANoble, LPN     Vitals  Vital Signs   Recorded: [id number] 04:35PM   Weight: 167 lb   BMI Calculated: 23.96 kg/m2  BSA Calculated: 1.93  Blood Pressure: 112 / 60    Pt seen Dr. Michelet Barr today and was  112/60. Weight 167. ANoble, LPN     Assessment   1. Atherosclerosis of native coronary artery without angina pectoris (414.01) (I25.10)   2. Status post angioplasty (V45.89) (Z98.62)   3. Chronic obstructive pulmonary disease (496) (J44.9)   4. Tremor (781.0) (R25.1)   5. Paroxysmal atrial fibrillation (427.31) (I48.0)   6. Nonrheumatic aortic valve stenosis (424.1) (I35.0)   7. Essential hypertension (401.9) (I10)   8. Anemia (285.9) (D64.9)   9. Lung cancer (162.9) (C34.90)   10. Former smoker (V15.82) (U54.264)   11. Carotid artery disease (447.9) (I77.9)    Plan   1. Tobacco Use Screening; Status:Complete;   Done: [id number]    Signatures  Electronically signed by : Terrell Man LPN;  Aug 31 2020  4:36PM EST

## 2020-10-16 NOTE — CONSULTS
Hematology/Oncology Consult  Encounter Date: 10/16/2020 2:22 PM    Mr. High is a 67 y.o. male  : 1945  MRN: [id number]  Acct Number: [de-identified]  Requesting Provider: Abelino Louie MD    Reason for request: Microcytic hypochromic anemia      CONSULTANT: Akila Rowley    HPI: Mr. Wade Carmona is a 67years old  male who is being seen in consultation at the request of Dr. Abelino Louie regarding further evaluation and management of severe anemia. Mr. Wade Carmona has been chronically anemic and has had GI evaluation by Dr. Neema Alvarez in the past. As stools for occult blood was positive, EGD and Capsule endoscopy were done in 2020. EGD showed chronic active Gastritis and H. Pylori was +ve. He was treated with Prevpak. Capsule endoscopy was negative . Leslee Lares He had colonoscopy in  which was unremarkable. CBC with diff on 10/13/20 revealed H & H : 9.9G%/32.8% with Microcytic hypochromic RBC indices and platelet count [id number]. Serum Creatinine is within normal limits. Iron studies in 2020 revealed % iron saturatiion of 12. Folate level was low at 4.2. Patient had left lower lobectomy for Squamous cell carcinoma on 10/13/20 . Left lower lobe mass was noted on CT scan of the chest which was done because of failure to thrive and weight loss. His other medical problems include Coronary artery disease, Chronic atrial fibrillation , Prostate cancer , Essential tremors, Type 2 AODM , hypertension and chronic iron deficiency anemia. He has been on Aspirin and warfarin. Since admission , H & H has decreased further and He has received 2 units of packed RBC transfusion so far.      Patient Active Problem List   Diagnosis    Benign prostatic hyperplasia without lower urinary tract symptoms    Essential hypertension    Atrial fibrillation (HCC)    Iron deficiency    Carotid stenosis, right    RLS (restless legs syndrome)    Tobacco use disorder    Gross hematuria    Prostate cancer ([facility] [address] Types: Cigarettes     Start date: 1959     Last attempt to quit: 3/14/2020     Years since quittin.5    Smokeless tobacco: Never Used   Substance and Sexual Activity    Alcohol use:  Yes     Alcohol/week: 3.0 standard drinks     Types: 3 Shots of liquor per week     Comment: once weekly  or more    Drug use: No    Sexual activity: Never   Lifestyle    Physical activity     Days per week: Not on file     Minutes per session: Not on file    Stress: Not on file   Relationships    Social connections     Talks on phone: Not on file     Gets together: Not on file     Attends Jehovah's witness service: Not on file     Active member of club or organization: Not on file     Attends meetings of clubs or organizations: Not on file     Relationship status: Not on file    Intimate partner violence     Fear of current or ex partner: Not on file     Emotionally abused: Not on file     Physically abused: Not on file     Forced sexual activity: Not on file   Other Topics Concern    Not on file   Social History Narrative    Not on file            Current Facility-Administered Medications   Medication Dose Route Frequency Provider Last Rate Last Dose    ipratropium-albuterol (DUONEB) nebulizer solution 1 ampule  1 ampule Inhalation 4x daily Arline Calvin MD   1 ampule at 10/16/20 1127    albuterol (PROVENTIL) nebulizer solution 2.5 mg  2.5 mg Nebulization Q2H PRN Arline Calvin MD        budesonide (ENTOCORT EC) extended release capsule 6 mg  6 mg Oral Daily Gabriel Foster MD        primidone (MYSOLINE) tablet 250 mg  250 mg Oral Nightly Gabriel Foster MD        traZODone (DESYREL) tablet 100 mg  100 mg Oral Nightly PRN Gbariel Foster MD        naloxone [facility]) injection 0.4 mg  0.4 mg Intravenous PRN Anthony Cid DO        diphenhydrAMINE (BENADRYL) injection 25 mg  25 mg Intravenous Q6H PRN Anthony Cid DO        nalbuphine (NUBAIN) injection 5 mg  5 mg Intravenous Q4H PRN Hieu Lee DO melena or hematochezia. Stools for occult blood has been +ve. PHYSICAL EXAMINATION:   VITAL SIGNS: BP (!) 107/58   Pulse 93   Temp 99 °F (37.2 °C)   Resp (!) 31   Ht 5' 8\" (1.727 m)   Wt 148 lb 9.4 oz (67.4 kg)   SpO2 97%   BMI 22.59 kg/m²         GENERAL: In no acute distress, well- nourished, well- developed,alert and oriented to person place and time. SKIN: Warm and dry, withoutjaundice, ecchymoses, or petechiae. HEENT: Normocephalic,Conjunctivae pale , sclera anicteric, oral mucosa moist without lesion or exudate in the visible oral cavity or oropharynx, tongue mid-line with good mobility and no deviation with extension. NODES: No palpable adenopathy in the neck Levels I-V, bilateral   Supraclavicular fossae, axillary chains, or inguinal regions. LUNGS: Good inspiratory effort, no accessory muscle use, clear bilaterally, no focal wheeze, rales or rhonchi. Chest tube draining bloody pleural fluid noted . CARDIAC: Irregular  rate and rhythm, without murmurs, rubs or gallops. ABDOMINAL: Normal bowel soundspresent, soft, non-tender, no mass or  Organomegaly. Lower abdominal surgical scar noted . MUSKL:  No joint deformity   GENITALS: Normal appearing external genitalia. Patient is continent of urine. RECTAL:  Deferred   EXTREMITIES: No edema or calf tenderness . NEUROLOGIC: Gait normal. No grossly apparent focal deficits.     LAB RESULTS:  Recent Results (from the past 24 hour(s))   EKG 12 Lead    Collection Time: 10/16/20  4:23 AM   Result Value Ref Range    Ventricular Rate 94 BPM    Atrial Rate 258 BPM    QRS Duration 78 ms    Q-T Interval 408 ms    QTc Calculation (Bazett) 510 ms    R Axis -33 degrees    T Axis 62 degrees   POCT Arterial    Collection Time: 10/16/20  5:12 AM   Result Value Ref Range    POC Sodium 142 136 - 145 mEq/L    POC Potassium 3.6 3.5 - 5.1 mEq/L    POC Chloride 104 99 - 110 mEq/L    POC Glucose 171 (H) 60 - 115 mg/dl    POC Creatinine 0.6 (L) 0.8 - 1.3 mg/dL    GFR Non- >60 >60    GFR African American >60 >60    Calcium, Ion 1.05 (L) 1.12 - 1.32 mmol/L    pH, Arterial 7.391 7.350 - 7.450    pCO2, Arterial 45 35 - 45 mm Hg    pO2, Arterial 161 (HH) 75 - 108 mm Hg    HCO3, Arterial 27.0 21.0 - 29.0 mmol/L    Base Excess, Arterial 2 -3 - 3    O2 Sat, Arterial 99 (HH) 93 - 100 %    TCO2, Arterial 28 22 - 29    Lactate 0.71 0.40 - 2.00 mmol/L    POC Hematocrit 25 (L) 41 - 53 %    Hemoglobin 8.4 (L) 13.5 - 17.5 gm/dL    FIO2 40.000     Sample Type ART     Performed on SEE BELOW    Sedimentation Rate    Collection Time: 10/16/20  5:45 AM   Result Value Ref Range    Sed Rate 14 0 - 20 mm   CBC    Collection Time: 10/16/20  5:47 AM   Result Value Ref Range    WBC 9.9 4.8 - 10.8 K/uL    RBC 3.78 (L) 4.70 - 6.10 M/uL    Hemoglobin 7.9 (L) 14.0 - 18.0 g/dL    Hematocrit 26.2 (L) 42.0 - 52.0 %    MCV 69.3 (L) 80.0 - 100.0 fL    MCH 21.0 (L) 27.0 - 31.3 pg    MCHC 30.2 (L) 33.0 - 37.0 %    RDW 19.7 (H) 11.5 - 14.5 %    Platelets 791 433 - 842 K/uL   Comprehensive Metabolic Panel    Collection Time: 10/16/20  5:48 AM   Result Value Ref Range    Sodium 140 135 - 144 mEq/L    Potassium 3.9 3.4 - 4.9 mEq/L    Chloride 105 95 - 107 mEq/L    CO2 27 20 - 31 mEq/L    Anion Gap 8 (L) 9 - 15 mEq/L    Glucose 178 (H) 70 - 99 mg/dL    BUN 10 8 - 23 mg/dL    CREATININE 0.44 (L) 0.70 - 1.20 mg/dL    GFR Non-African American >60.0 >60    GFR  >60.0 >60    Calcium 7.5 (L) 8.5 - 9.9 mg/dL    Total Protein 5.1 (L) 6.3 - 8.0 g/dL    Alb 2.7 (L) 3.5 - 4.6 g/dL    Total Bilirubin 0.3 0.2 - 0.7 mg/dL    Alkaline Phosphatase 72 35 - 104 U/L    ALT 15 0 - 41 U/L    AST 15 0 - 40 U/L    Globulin 2.4 2.3 - 3.5 g/dL   Magnesium    Collection Time: 10/16/20  5:48 AM   Result Value Ref Range    Magnesium 1.8 1.7 - 2.4 mg/dL   Troponin    Collection Time: 10/16/20  5:48 AM   Result Value Ref Range    Troponin <0.010 0.000 - 0.010 ng/mL   High sensitivity CRP    Collection Time: 10/16/20 5:48 AM   Result Value Ref Range    CRP High Sensitivity 94.6 (H) 0.0 - 5.0 mg/L     Recent Labs     10/16/20  0548   GLUCOSE 178*        Pathology:     RADIOLOGY RESULTS:  Xr Chest (2 Vw)    Result Date: 10/13/2020  EXAMINATION: XR CHEST (2 VW) CLINICAL HISTORY: POORLY DIFFERENTIATED SQUAMOUS CELL CARCINOMA, LEFT LOWER LOBE. PREOP. COMPARISONS: CHEST RADIOGRAPH, AUGUST 4, 2020. CT BIOPSY, AUGUST 4, 2020. PET/CT, AUGUST 17, 2020. CT CHEST, JUNE 13, 2020. FINDINGS: Osseous structures intact. Cardiopericardial silhouette normal. Aorta calcified. A 10 x 4.5 x 6.6 cm area of increased opacity is identified, posteriorly at the left lung base, responding to the patient's known lung mass. Blunting left costophrenic angle. Ill-defined areas increase opacity left lung base, with blunting left costophrenic angle. LEFT LOWER LOBE MASS PATIENT WITH CLINICAL HISTORY OF SQUAMOUS CELL CARCINOMA. RIGHT LOWER LUNG ATELECTASIS/PNEUMONIA. BILATERAL PLEURAL EFFUSIONS. Xr Chest Portable    Result Date: 10/16/2020  Exam: XR CHEST PORTABLE, XR CHEST PORTABLE October 15, 2020 1253 hours History:  resp failure Technique: AP portable view of the chest obtained. Comparison: PA and lateral chest from October 13, 2020 and PET/CT from August 4, 2020   Findings: The patient is intubated with the tip of the endotracheal tube at the thoracic inlet. The cardiomediastinal silhouette is within normal limits. There is volume loss and increased pulmonary markings in the right upper lobe unchanged since previous study with a chronic appearance. There is no pneumothorax. There are 2 chest tubes on the left side and there are multiple surgical clips in the left hilum. There is mild subcutaneous adipose soft tissues of the left hemithorax. Status post intubation and placement of 2 chest tubes on the left. There is evidence of partial left pneumonectomy. There is no pneumothorax.  Persistent increased pulmonary markings are noted in the right upper lobe. Exam: XR CHEST PORTABLE, XR CHEST PORTABLE October 16, 2020 0459 hours History:  resp failure Technique: AP portable view of the chest obtained. Comparison: None   Findings: The patient is intubated with the tip of the endotracheal tube at the thoracic inlet. The cardiomediastinal silhouette is within normal limits. There is volume loss and increased pulmonary markings in the right upper lobe unchanged since previous study with a chronic appearance. There is no pneumothorax. There are 2 chest tubes on the left side and there are multiple surgical clips in the left hilum. There is mild subcutaneous adipose soft tissues of the left hemithorax. IMPRESSION: Status post intubation and placement of 2 chest tubes on the left. There is evidence of partial left pneumonectomy. There is no pneumothorax. Persistent increased pulmonary markings are noted in the right upper lobe. Xr Chest Portable    Result Date: 10/16/2020  Exam: XR CHEST PORTABLE, XR CHEST PORTABLE October 15, 2020 1253 hours History:  resp failure Technique: AP portable view of the chest obtained. Comparison: PA and lateral chest from October 13, 2020 and PET/CT from August 4, 2020   Findings: The patient is intubated with the tip of the endotracheal tube at the thoracic inlet. The cardiomediastinal silhouette is within normal limits. There is volume loss and increased pulmonary markings in the right upper lobe unchanged since previous study with a chronic appearance. There is no pneumothorax. There are 2 chest tubes on the left side and there are multiple surgical clips in the left hilum. There is mild subcutaneous adipose soft tissues of the left hemithorax. Status post intubation and placement of 2 chest tubes on the left. There is evidence of partial left pneumonectomy. There is no pneumothorax. Persistent increased pulmonary markings are noted in the right upper lobe.  Exam: XR CHEST PORTABLE, XR CHEST PORTABLE October 16, 2020 3595

## 2020-10-16 NOTE — PROGRESS NOTES
Pt c/o difficulty breathing. Audible wheezing throughout. Pt with short, sallow resp. Blood transfusion triturated to 100ml/hr. Colin Sigala notified. New order obtained for 20mg of Lasix IV push x1. Resp called for treatment.

## 2020-10-16 NOTE — PROGRESS NOTES
First unit of blood complete. Pt tolerated the remainder of the blood transfusion without any further respiratory issues.

## 2020-10-16 NOTE — PROGRESS NOTES
evaluated Pt's R eye. Doctors assessment more swelling than drooping. Per doctor apply warm compresses PRN to R eye. Pt stated \"my eye was like this before I had the surgery. \" Pt denies pain, discomfort and changes to vision.

## 2020-10-16 NOTE — CARE COORDINATION
Met with patient and spouse at the bedside. Patient has been extubated and now with 02 per nasal cannula. The plan at this time is to return to home. Both are agreeable to [address] but only if needed.  is a retired LPN and feels she can provide the care needed for her spouse. She has set up a downstairs area in the house for recovery. Freedom of choice offered, if needed both agree on [facility] for their need. CMI as follows:    Hector Driver Case Management Initial Discharge Assessment    Met with Patient and SPOUSE to discuss discharge plan. PCP: Dino Espinosa MD                                Date of Last Visit: 2-3 MONTHS AGOE    If no PCP, list provided? N/A    Discharge Planning    Living Arrangements: independently at home    Who do you live with? SPOUSE    Who helps you with your care:  self    If lives at home:     Do you have any barriers navigating in your home? no    Patient can perform ADL? Yes    Current Services (outpatient and in home) :  None    Dialysis: No    Is transportation available to get to your appointments? Yes  SPOUSE DOES ALL THE DRIVING    DME Equipment:  no    Respiratory equipment: NEB AND HHI    Respiratory provider:  no     [facility]    Consult with Medication Assistance Program?  No      Patient agreeable to [address]? Yes, [facility] (ONLY IF NECESSARY)    Patient agreeable to SNF/Rehab? No    Other discharge needs identified? N/A    Freedom of choice list provided with basic dialogue that supports the patient's individualized plan of care/goals and shares the quality data associated with the providers. Yes    Does Patient Have a High-Risk for Readmission Diagnosis (CHF, PN, MI, COPD)? No    The plan for Transition of Care is related to the following treatment goals: MOBILIZE, PAIN CONTROL, REMOVAL OF CHEST TUBES    Initial Discharge Plan?  SEE ABOVE NOTE    The Patient and/or patient representative was provided with choice of any post-acute providers for care and equipment and agrees with discharge plan  Yes    Electronically signed by Nella Ortiz RN on 10/16/2020 at 2:35 PM

## 2020-10-16 NOTE — PROGRESS NOTES
2045- Spoke with dr Tena Laws. Gave new orders regarding hypotension and afib. Gave new orders see mar. S/p thoracotomy. Chest tubes checked. Stable on garcía, breathing unlabored. Epidural site assessed with no issues noted.

## 2020-10-16 NOTE — PROGRESS NOTES
Hospitalist Progress Note      PCP: Humberto Pinto MD    Date of Admission: 10/15/2020    Chief Complaint:    No chief complaint on file. Subjective:  Patient denies fevers, chills, sweats, CP, SOb.  12 point ROS negative other than mentioned above     Medications:  Reviewed    Infusion Medications    fentanyl epidural builder 8 mL/hr at 10/16/20 0106    norepinephrine 4 mcg/min (10/16/20 0933)    dextrose 5 % and 0.45 % NaCl 75 mL/hr at 10/16/20 0126     Scheduled Medications    ipratropium-albuterol  1 ampule Inhalation 4x daily    budesonide  6 mg Oral Daily    primidone  250 mg Oral Nightly    sodium chloride flush  10 mL Intravenous 2 times per day    hydrocortisone sodium succinate PF  50 mg Intravenous BID    citalopram  20 mg Oral Daily    carbidopa-levodopa-entacapone  1 tablet Oral Nightly    sotalol  80 mg Oral BID    folic acid  1 mg Oral Daily    atorvastatin  80 mg Oral Daily    aspirin  81 mg Oral Daily    [Held by provider] metoprolol  2.5 mg Intravenous Q3H (SCHEDULED)     PRN Meds: albuterol, traZODone, naloxone, diphenhydrAMINE, nalbuphine, ondansetron, sodium chloride flush      Intake/Output Summary (Last 24 hours) at 10/16/2020 1409  Last data filed at 10/16/2020 1345  Gross per 24 hour   Intake 3190 ml   Output 1590 ml   Net 1600 ml     Exam:    BP (!) 107/58   Pulse 93   Temp 99 °F (37.2 °C)   Resp (!) 31   Ht 5' 8\" (1.727 m)   Wt 148 lb 9.4 oz (67.4 kg)   SpO2 97%   BMI 22.59 kg/m²     General appearance: No apparent distress, appears stated age and cooperative. HEENT:  Conjunctivae/corneas clear. Neck: Trachea midline. Respiratory:  Normal respiratory effort. Clear to auscultation  Cardiovascular: Regular rate and rhythm   Abdomen: Soft, non-tender, non-distended with normal bowel sounds.   Musculoskeletal: No clubbing, cyanosis or edema bilaterally  Neuro: Non Focal.   Capillary Refill: Brisk,< 3 seconds   Peripheral Pulses: +2 palpable, equal bilaterally Labs:   Recent Labs     10/15/20  1315 10/16/20  0512 10/16/20  0547   WBC  --   --  9.9   HGB 8.9* 8.4* 7.9*   HCT  --   --  26.2*   PLT  --   --  209     Recent Labs     10/15/20  1315 10/16/20  0512 10/16/20  0548   NA  --   --  140   K  --   --  3.9   CL  --   --  105   CO2  --   --  27   BUN  --   --  10   CREATININE 0.6* 0.6* 0.44*   CALCIUM  --   --  7.5*     Recent Labs     10/16/20  0548   AST 15   ALT 15   BILITOT 0.3   ALKPHOS 72     Recent Labs     10/15/20  0837   INR 1.4     Recent Labs     10/16/20  0548   TROPONINI <0.010     Urinalysis:      Lab Results   Component Value Date    NITRU Negative 06/18/2020    WBCUA 6-10 11/24/2019    BACTERIA Negative 11/24/2019    RBCUA 0-2 11/24/2019    BLOODU Negative 06/18/2020    SPECGRAV 1.019 06/18/2020    GLUCOSEU Negative 06/18/2020    GLUCOSEU . 1G/dL 11/21/2011     Radiology:  XR CHEST PORTABLE   Final Result   Status post intubation and placement of 2 chest tubes on the left. There is evidence of partial left pneumonectomy. There is no pneumothorax. Persistent increased pulmonary markings are noted in the right upper lobe. Exam: XR CHEST PORTABLE, XR CHEST PORTABLE October 16, 2020 0459 hours      History:  resp failure       Technique: AP portable view of the chest obtained. Comparison: None        Findings:      The patient is intubated with the tip of the endotracheal tube at the thoracic inlet. The cardiomediastinal silhouette is within normal limits. There is volume loss and increased pulmonary markings in the right upper lobe unchanged since previous study    with a chronic appearance. There is no pneumothorax. There are 2 chest tubes on the left side and there are multiple surgical clips in the left hilum. There is mild subcutaneous adipose soft tissues of the left hemithorax. IMPRESSION:    Status post intubation and placement of 2 chest tubes on the left. There is evidence of partial left pneumonectomy.  There is no spent additional time explaining care, normal and abnormal findings, and treatment plan. All of pt questions were answered. Counseling, diet and education were  provided. Case will be discussed with nursing staff when appropriate. Family will be updated if and when appropriate.       Diet: DIET GENERAL;    Code Status: Full Code    PT/OT Eval     Electronically signed by Roby Patricia MD on 10/16/2020 at 2:09 PM

## 2020-10-16 NOTE — PROGRESS NOTES
Dr Vazquez Linker called in regards to pt becoming increasingly SOB. Updated on resp tx, increase in 02 demands and Dr Stephanie Elena ordering 20 lasix. Orders given.

## 2020-10-16 NOTE — PROGRESS NOTES
Pt stated treatments helped improve breathing. Pt continues with sallow resp. Wheezing no longer audible.
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
## 2020-10-17 LAB
ALBUMIN SERPL-MCNC: 2.4 G/DL (ref 3.5–4.6)
ALP BLD-CCNC: 78 U/L (ref 35–104)
ALT SERPL-CCNC: <5 U/L (ref 0–41)
ANION GAP SERPL CALCULATED.3IONS-SCNC: 11 MEQ/L (ref 9–15)
AST SERPL-CCNC: 17 U/L (ref 0–40)
BILIRUB SERPL-MCNC: 0.5 MG/DL (ref 0.2–0.7)
BUN BLDV-MCNC: 10 MG/DL (ref 8–23)
CALCIUM SERPL-MCNC: 7.4 MG/DL (ref 8.5–9.9)
CHLORIDE BLD-SCNC: 108 MEQ/L (ref 95–107)
CO2: 22 MEQ/L (ref 20–31)
CREAT SERPL-MCNC: 0.45 MG/DL (ref 0.7–1.2)
FERRITIN: 127.9 NG/ML (ref 30–400)
GFR AFRICAN AMERICAN: >60
GFR NON-AFRICAN AMERICAN: >60
GLOBULIN: 2.5 G/DL (ref 2.3–3.5)
GLUCOSE BLD-MCNC: 107 MG/DL (ref 70–99)
HCT VFR BLD CALC: 31.3 % (ref 42–52)
HEMOGLOBIN: 9.3 G/DL (ref 14–18)
IRON SATURATION: 39 % (ref 11–46)
IRON: 57 UG/DL (ref 59–158)
MCH RBC QN AUTO: 22.6 PG (ref 27–31.3)
MCHC RBC AUTO-ENTMCNC: 29.6 % (ref 33–37)
MCV RBC AUTO: 76.2 FL (ref 80–100)
PDW BLD-RTO: 21.5 % (ref 11.5–14.5)
PLATELET # BLD: 158 K/UL (ref 130–400)
POTASSIUM SERPL-SCNC: 4.3 MEQ/L (ref 3.4–4.9)
RBC # BLD: 4.11 M/UL (ref 4.7–6.1)
SODIUM BLD-SCNC: 141 MEQ/L (ref 135–144)
TOTAL IRON BINDING CAPACITY: 147 UG/DL (ref 178–450)
TOTAL PROTEIN: 4.9 G/DL (ref 6.3–8)
WBC # BLD: 10.8 K/UL (ref 4.8–10.8)

## 2020-10-17 PROCEDURE — 2000000000 HC ICU R&B

## 2020-10-17 PROCEDURE — 82728 ASSAY OF FERRITIN: CPT

## 2020-10-17 PROCEDURE — 85027 COMPLETE CBC AUTOMATED: CPT

## 2020-10-17 PROCEDURE — 80053 COMPREHEN METABOLIC PANEL: CPT

## 2020-10-17 PROCEDURE — 6360000002 HC RX W HCPCS: Performed by: INTERNAL MEDICINE

## 2020-10-17 PROCEDURE — 6370000000 HC RX 637 (ALT 250 FOR IP): Performed by: INTERNAL MEDICINE

## 2020-10-17 PROCEDURE — 94640 AIRWAY INHALATION TREATMENT: CPT

## 2020-10-17 PROCEDURE — 2580000003 HC RX 258: Performed by: INTERNAL MEDICINE

## 2020-10-17 PROCEDURE — 2580000003 HC RX 258: Performed by: THORACIC SURGERY (CARDIOTHORACIC VASCULAR SURGERY)

## 2020-10-17 PROCEDURE — 83540 ASSAY OF IRON: CPT

## 2020-10-17 PROCEDURE — 2500000003 HC RX 250 WO HCPCS: Performed by: INTERNAL MEDICINE

## 2020-10-17 PROCEDURE — 2580000003 HC RX 258: Performed by: STUDENT IN AN ORGANIZED HEALTH CARE EDUCATION/TRAINING PROGRAM

## 2020-10-17 PROCEDURE — 83550 IRON BINDING TEST: CPT

## 2020-10-17 PROCEDURE — P9047 ALBUMIN (HUMAN), 25%, 50ML: HCPCS | Performed by: INTERNAL MEDICINE

## 2020-10-17 PROCEDURE — 99233 SBSQ HOSP IP/OBS HIGH 50: CPT | Performed by: INTERNAL MEDICINE

## 2020-10-17 PROCEDURE — 94761 N-INVAS EAR/PLS OXIMETRY MLT: CPT

## 2020-10-17 PROCEDURE — 6360000002 HC RX W HCPCS: Performed by: STUDENT IN AN ORGANIZED HEALTH CARE EDUCATION/TRAINING PROGRAM

## 2020-10-17 PROCEDURE — 2700000000 HC OXYGEN THERAPY PER DAY

## 2020-10-17 RX ORDER — LIDOCAINE 4 G/G
1 PATCH TOPICAL DAILY
Status: DISCONTINUED | OUTPATIENT
Start: 2020-10-17 | End: 2020-10-21 | Stop reason: HOSPADM

## 2020-10-17 RX ORDER — LACTULOSE 10 G/15ML
30 SOLUTION ORAL ONCE
Status: COMPLETED | OUTPATIENT
Start: 2020-10-17 | End: 2020-10-17

## 2020-10-17 RX ORDER — DIGOXIN 125 MCG
250 TABLET ORAL 2 TIMES DAILY
Status: DISCONTINUED | OUTPATIENT
Start: 2020-10-17 | End: 2020-10-19

## 2020-10-17 RX ORDER — POLYETHYLENE GLYCOL 3350 17 G/17G
17 POWDER, FOR SOLUTION ORAL DAILY
Status: DISCONTINUED | OUTPATIENT
Start: 2020-10-17 | End: 2020-10-21 | Stop reason: HOSPADM

## 2020-10-17 RX ORDER — ALBUMIN (HUMAN) 12.5 G/50ML
50 SOLUTION INTRAVENOUS 2 TIMES DAILY
Status: DISCONTINUED | OUTPATIENT
Start: 2020-10-17 | End: 2020-10-18

## 2020-10-17 RX ORDER — DIGOXIN 0.25 MG/ML
500 INJECTION INTRAMUSCULAR; INTRAVENOUS ONCE
Status: COMPLETED | OUTPATIENT
Start: 2020-10-17 | End: 2020-10-17

## 2020-10-17 RX ORDER — DOCUSATE SODIUM 100 MG/1
100 CAPSULE, LIQUID FILLED ORAL 2 TIMES DAILY
Status: DISCONTINUED | OUTPATIENT
Start: 2020-10-17 | End: 2020-10-21 | Stop reason: HOSPADM

## 2020-10-17 RX ORDER — OXYCODONE AND ACETAMINOPHEN 10; 325 MG/1; MG/1
1 TABLET ORAL EVERY 4 HOURS PRN
Status: DISCONTINUED | OUTPATIENT
Start: 2020-10-18 | End: 2020-10-21 | Stop reason: HOSPADM

## 2020-10-17 RX ORDER — ALBUMIN (HUMAN) 12.5 G/50ML
50 SOLUTION INTRAVENOUS ONCE
Status: COMPLETED | OUTPATIENT
Start: 2020-10-17 | End: 2020-10-17

## 2020-10-17 RX ORDER — SENNA PLUS 8.6 MG/1
2 TABLET ORAL NIGHTLY
Status: DISCONTINUED | OUTPATIENT
Start: 2020-10-17 | End: 2020-10-21 | Stop reason: HOSPADM

## 2020-10-17 RX ORDER — ALBUMIN (HUMAN) 12.5 G/50ML
50 SOLUTION INTRAVENOUS 2 TIMES DAILY
Status: DISCONTINUED | OUTPATIENT
Start: 2020-10-17 | End: 2020-10-17

## 2020-10-17 RX ADMIN — PRIMIDONE 250 MG: 250 TABLET ORAL at 20:58

## 2020-10-17 RX ADMIN — DOCUSATE SODIUM 100 MG: 100 CAPSULE ORAL at 15:14

## 2020-10-17 RX ADMIN — CARBIDOPA, LEVODOPA, AND ENTACAPONE 1 TABLET: 25; 100; 200 TABLET, FILM COATED ORAL at 20:58

## 2020-10-17 RX ADMIN — METOPROLOL TARTRATE 5 MG: 5 INJECTION, SOLUTION INTRAVENOUS at 12:22

## 2020-10-17 RX ADMIN — IPRATROPIUM BROMIDE AND ALBUTEROL SULFATE 1 AMPULE: .5; 3 SOLUTION RESPIRATORY (INHALATION) at 04:31

## 2020-10-17 RX ADMIN — ROPIVACAINE HYDROCHLORIDE: 5 INJECTION, SOLUTION EPIDURAL; INFILTRATION; PERINEURAL at 02:44

## 2020-10-17 RX ADMIN — CITALOPRAM HYDROBROMIDE 20 MG: 20 TABLET ORAL at 09:21

## 2020-10-17 RX ADMIN — ALBUTEROL SULFATE 2.5 MG: 2.5 SOLUTION RESPIRATORY (INHALATION) at 23:50

## 2020-10-17 RX ADMIN — HYDROCORTISONE SODIUM SUCCINATE 50 MG: 100 INJECTION, POWDER, FOR SOLUTION INTRAMUSCULAR; INTRAVENOUS at 20:57

## 2020-10-17 RX ADMIN — ASPIRIN 81 MG: 81 TABLET, CHEWABLE ORAL at 09:21

## 2020-10-17 RX ADMIN — SODIUM CHLORIDE 200 MG: 9 INJECTION, SOLUTION INTRAVENOUS at 20:51

## 2020-10-17 RX ADMIN — BUDESONIDE 6 MG: 3 CAPSULE, COATED PELLETS ORAL at 09:25

## 2020-10-17 RX ADMIN — Medication 10 ML: at 09:24

## 2020-10-17 RX ADMIN — IPRATROPIUM BROMIDE AND ALBUTEROL SULFATE 1 AMPULE: .5; 3 SOLUTION RESPIRATORY (INHALATION) at 19:59

## 2020-10-17 RX ADMIN — IPRATROPIUM BROMIDE AND ALBUTEROL SULFATE 1 AMPULE: .5; 3 SOLUTION RESPIRATORY (INHALATION) at 15:23

## 2020-10-17 RX ADMIN — METOPROLOL TARTRATE 5 MG: 5 INJECTION, SOLUTION INTRAVENOUS at 20:43

## 2020-10-17 RX ADMIN — METOPROLOL TARTRATE 5 MG: 5 INJECTION, SOLUTION INTRAVENOUS at 16:05

## 2020-10-17 RX ADMIN — ATORVASTATIN CALCIUM 80 MG: 80 TABLET, FILM COATED ORAL at 09:21

## 2020-10-17 RX ADMIN — IPRATROPIUM BROMIDE AND ALBUTEROL SULFATE 1 AMPULE: .5; 3 SOLUTION RESPIRATORY (INHALATION) at 09:55

## 2020-10-17 RX ADMIN — ALBUMIN (HUMAN) 50 G: 0.25 INJECTION, SOLUTION INTRAVENOUS at 10:24

## 2020-10-17 RX ADMIN — NOREPINEPHRINE BITARTRATE 2 MCG/MIN: 1 INJECTION INTRAVENOUS at 09:23

## 2020-10-17 RX ADMIN — LACTULOSE 30 G: 20 SOLUTION ORAL at 15:14

## 2020-10-17 RX ADMIN — DIGOXIN 500 MCG: 250 INJECTION, SOLUTION INTRAMUSCULAR; INTRAVENOUS; PARENTERAL at 10:24

## 2020-10-17 RX ADMIN — ALBUTEROL SULFATE 2.5 MG: 2.5 SOLUTION RESPIRATORY (INHALATION) at 00:19

## 2020-10-17 RX ADMIN — SOTALOL HYDROCHLORIDE 80 MG: 80 TABLET ORAL at 20:58

## 2020-10-17 RX ADMIN — DIGOXIN 250 MCG: 125 TABLET ORAL at 17:28

## 2020-10-17 RX ADMIN — ROPIVACAINE HYDROCHLORIDE: 5 INJECTION, SOLUTION EPIDURAL; INFILTRATION; PERINEURAL at 17:01

## 2020-10-17 RX ADMIN — POLYETHYLENE GLYCOL 3350 17 G: 17 POWDER, FOR SOLUTION ORAL at 15:14

## 2020-10-17 RX ADMIN — METOPROLOL TARTRATE 5 MG: 5 INJECTION, SOLUTION INTRAVENOUS at 04:26

## 2020-10-17 RX ADMIN — HYDROCORTISONE SODIUM SUCCINATE 50 MG: 100 INJECTION, POWDER, FOR SOLUTION INTRAMUSCULAR; INTRAVENOUS at 09:22

## 2020-10-17 RX ADMIN — ALBUMIN (HUMAN) 50 G: 0.25 INJECTION, SOLUTION INTRAVENOUS at 20:56

## 2020-10-17 RX ADMIN — FOLIC ACID 1 MG: 1 TABLET ORAL at 09:21

## 2020-10-17 ASSESSMENT — PAIN SCALES - GENERAL
PAINLEVEL_OUTOF10: 0

## 2020-10-17 ASSESSMENT — PULMONARY FUNCTION TESTS
PIF_VALUE: 16
PIF_VALUE: 23
PIF_VALUE: 16

## 2020-10-17 NOTE — PROGRESS NOTES
Hospitalist Progress Note      PCP: Humberto Pinto MD    Date of Admission: 10/15/2020    Chief Complaint:    No chief complaint on file. Subjective:  Patient denies fevers, chills, sweats, CP, SOb. Has not had a BM since Wed. 12 point ROS negative other than mentioned above     Medications:  Reviewed    Infusion Medications    fentanyl epidural builder 6 mL/hr at 10/17/20 0923    norepinephrine Stopped (10/17/20 1025)     Scheduled Medications    lidocaine  1 patch Transdermal Daily    digoxin  250 mcg Oral BID    albumin human  50 g Intravenous BID    lactulose  30 g Oral Once    senna  2 tablet Oral Nightly    docusate sodium  100 mg Oral BID    polyethylene glycol  17 g Oral Daily    ipratropium-albuterol  1 ampule Inhalation 4x daily    budesonide  6 mg Oral Daily    primidone  250 mg Oral Nightly    iron sucrose  200 mg Intravenous Q24H    metoprolol  5 mg Intravenous Q4H    sodium chloride flush  10 mL Intravenous 2 times per day    hydrocortisone sodium succinate PF  50 mg Intravenous BID    citalopram  20 mg Oral Daily    carbidopa-levodopa-entacapone  1 tablet Oral Nightly    sotalol  80 mg Oral BID    folic acid  1 mg Oral Daily    atorvastatin  80 mg Oral Daily    aspirin  81 mg Oral Daily     PRN Meds: [START ON 10/18/2020] oxyCODONE-acetaminophen, albuterol, traZODone, naloxone, diphenhydrAMINE, nalbuphine, ondansetron, sodium chloride flush      Intake/Output Summary (Last 24 hours) at 10/17/2020 1408  Last data filed at 10/17/2020 1330  Gross per 24 hour   Intake 3077.5 ml   Output 2090 ml   Net 987.5 ml     Exam:    /62   Pulse 86   Temp 97.3 °F (36.3 °C) (Bladder)   Resp 23   Ht 5' 8\" (1.727 m)   Wt 148 lb 9.4 oz (67.4 kg)   SpO2 100%   BMI 22.59 kg/m²     General appearance: No apparent distress, appears stated age and cooperative. HEENT:  Conjunctivae/corneas clear. Neck: Trachea midline. Respiratory:  Normal respiratory effort.  Clear to auscultation  Cardiovascular: Regular rate and rhythm   Abdomen: Soft, non-tender, non-distended with normal bowel sounds. Musculoskeletal: No clubbing, cyanosis or edema bilaterally  Neuro: Non Focal.   Capillary Refill: Brisk,< 3 seconds   Peripheral Pulses: +2 palpable, equal bilaterally     Labs:   Recent Labs     10/16/20  0547 10/16/20  2012 10/17/20  0600   WBC 9.9  --  10.8   HGB 7.9* 9.3* 9.3*   HCT 26.2* 30.6* 31.3*     --  158     Recent Labs     10/16/20  0512 10/16/20  0548 10/17/20  0559   NA  --  140 141   K  --  3.9 4.3   CL  --  105 108*   CO2  --  27 22   BUN  --  10 10   CREATININE 0.6* 0.44* 0.45*   CALCIUM  --  7.5* 7.4*     Recent Labs     10/16/20  0548 10/17/20  0559   AST 15 17   ALT 15 <5   BILITOT 0.3 0.5   ALKPHOS 72 78     Recent Labs     10/15/20  0837   INR 1.4     Recent Labs     10/16/20  0548   TROPONINI <0.010     Urinalysis:      Lab Results   Component Value Date    NITRU Negative 06/18/2020    WBCUA 6-10 11/24/2019    BACTERIA Negative 11/24/2019    RBCUA 0-2 11/24/2019    BLOODU Negative 06/18/2020    SPECGRAV 1.019 06/18/2020    GLUCOSEU Negative 06/18/2020    GLUCOSEU . 1G/dL 11/21/2011     Radiology:  XR CHEST PORTABLE   Final Result   Status post intubation and placement of 2 chest tubes on the left. There is evidence of partial left pneumonectomy. There is no pneumothorax. Persistent increased pulmonary markings are noted in the right upper lobe. Exam: XR CHEST PORTABLE, XR CHEST PORTABLE October 16, 2020 0459 hours      History:  resp failure       Technique: AP portable view of the chest obtained. Comparison: None        Findings:      The patient is intubated with the tip of the endotracheal tube at the thoracic inlet. The cardiomediastinal silhouette is within normal limits. There is volume loss and increased pulmonary markings in the right upper lobe unchanged since previous study    with a chronic appearance. There is no pneumothorax.  There are 2 chest tubes on the left side and there are multiple surgical clips in the left hilum. There is mild subcutaneous adipose soft tissues of the left hemithorax. IMPRESSION:    Status post intubation and placement of 2 chest tubes on the left. There is evidence of partial left pneumonectomy. There is no pneumothorax. Persistent increased pulmonary markings are noted in the right upper lobe. XR CHEST PORTABLE   Final Result   Status post intubation and placement of 2 chest tubes on the left. There is evidence of partial left pneumonectomy. There is no pneumothorax. Persistent increased pulmonary markings are noted in the right upper lobe. Exam: XR CHEST PORTABLE, XR CHEST PORTABLE October 16, 2020 0459 hours      History:  resp failure       Technique: AP portable view of the chest obtained. Comparison: None        Findings:      The patient is intubated with the tip of the endotracheal tube at the thoracic inlet. The cardiomediastinal silhouette is within normal limits. There is volume loss and increased pulmonary markings in the right upper lobe unchanged since previous study    with a chronic appearance. There is no pneumothorax. There are 2 chest tubes on the left side and there are multiple surgical clips in the left hilum. There is mild subcutaneous adipose soft tissues of the left hemithorax. IMPRESSION:    Status post intubation and placement of 2 chest tubes on the left. There is evidence of partial left pneumonectomy. There is no pneumothorax. Persistent increased pulmonary markings are noted in the right upper lobe.             XR CHEST PORTABLE    (Results Pending)     Assessment/Plan:    #Post op care and DVT ppx:  Per primary team  #A Fib:  Cardiology is managing  #Squamous cell carcnima:  S/p lobectomy by Dr Felipe Lawrence  #RLS:  Med resumed  #IBS:  Budenoside resumed  #Constipation:  Per wife history of obstruction in the past; no BM since wed; aggressive bowel regimen    Active Hospital Problems    Diagnosis Date Noted    COPD without exacerbation ([facility] [J44.9]     Carcinoma of left lung ([facility] [C34.92] 10/15/2020     Additional work up or/and treatment plan may be added today or then after based on clinical progression. I am managing a portion of pt care. Some medical issues are handled by other specialists. Additional work up and treatment should be done in out pt setting by pt PCP and other out pt providers. In addition to examining and evaluating pt, I spent additional time explaining care, normal and abnormal findings, and treatment plan. All of pt questions were answered. Counseling, diet and education were  provided. Case will be discussed with nursing staff when appropriate. Family will be updated if and when appropriate.       Diet: DIET GENERAL;    Code Status: Full Code    PT/OT Eval     Electronically signed by Juana Altamirano MD on 10/17/2020 at 2:08 PM

## 2020-10-17 NOTE — PROGRESS NOTES
Progress Note    POD #  2    Patient is Jil      The chest x-ray: None today    Chest tubes: < 200 cc in 24 hours    Pain management: epidural is effective    Assessment : Hgb is 9. 3. Hematology Consult appreciated. Plan : Increase activity , continue IS and epidural x 24 hours.   Electronically signed by Jaiden Phan MD on 10/17/2020 at 10:33 AM

## 2020-10-17 NOTE — PROGRESS NOTES
2030- O2 via nc in place. A/ox4, talking and responding to staff appropriately. Able to hold a conversation without obvious trouble breathing. 2200- art line removed, minimal bleeding, pressure dressing applied. 0000- audible wheezing noted. Respiratory aware and prn tx given. Remains a/ox4, breathing unlabored, able to use incentive spirometer. Attempted to reposition, prefers to remain positioned supine- states its easier for him to breath. Denies further pain/discomfort. 0220- resting in bed with eyes closed. Appears comfortable. Breathing unlabored. Audible wheezing improved.
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
## 2020-10-17 NOTE — PROGRESS NOTES
Spiritual Care Services     Summary of Visit:  PT told me that he doing well and that his doctor brought him good news. The next in his mind, is getting ready to go home. We prayed and I anointed her. Spiritual Assessment/Intervention/Outcomes:    Encounter Summary  Services provided to[de-identified] Patient  [facility]  Support System: Spouse, Children  Place of Sikhism: [address]  Continue Visiting: Yes  Complexity of Encounter: Moderate  Length of Encounter: 30 minutes  Spiritual Assessment Completed: Yes  Routine  Type: Follow up  Assessment: Calm, Approachable  Intervention: [place], Sustaining presence/ Ministry of presence  Outcome: Receptive     Spiritual/Scientology  Type: Spiritual support  Assessment: Approachable  Intervention: Anointing, Prayer, Active listening, Explored feelings, thoughts, concerns, Explored coping resources, Nurtured hope  Outcome: Receptive, Coping, Comfort, Expressed gratitude  Sacraments  Sacrament of Sick-Anointing: Anointed     Advance Directives (For Healthcare)  Healthcare Directive: Yes, patient has an advance directive for healthcare treatment  Copy in Chart: Yes, copy in chart           Values / Beliefs  Do you have any ethnic, cultural, sacramental, or spiritual [place]ism needs you would like us to be aware of while you are in the hospital?: No    Care Plan:        [id number] [address]   Electronically signed by Nikos Atkinson on 10/17/20 at 10:50 AM EDT     To reach a  for emotional and spiritual support, place an [facility] consult request.   If a  is needed immediately, dial 0 and ask to page the on-call .

## 2020-10-17 NOTE — PROGRESS NOTES
INPATIENT PROGRESS NOTES    PATIENT NAME: Zach Canales  MRN: [id number]  SERVICE DATE:  October 17, 2020   SERVICE TIME:  11:12 AM      PRIMARY SERVICE: Pulmonary Disease    CHIEF COMPLAIN: COPD and postoperative management. INTERVAL HPI: Patient seen and examined at bedside, Interval Notes, orders reviewed. Nursing notes noted  Patient is extubated. On O2 via nasal cannula O2 saturation 100% on 4 L O2. He is sitting in a chair. He is somewhat tachypneic at times but denies having any significant short of breath. Chest tube on left side. Complain of some pain at chest tube site. He has no fever. No nausea vomiting diarrhea. He was on Levophed stopped. Last blood pressure is 102/52. OBJECTIVE    Body mass index is 22.59 kg/m². PHYSICAL EXAM:  Vitals:  BP (!) 102/52   Pulse 96   Temp 97.3 °F (36.3 °C) (Bladder)   Resp 23   Ht 5' 8\" (1.727 m)   Wt 148 lb 9.4 oz (67.4 kg)   SpO2 100%   BMI 22.59 kg/m²   General: Alert, awake . comfortable in chair, No distress. Head: Atraumatic , Normocephalic   Eyes: PERRL. No sclera icterus. No conjunctival injection. No discharge   ENT: No nasal  discharge. Pharynx clear. lips, teeth, mucosa and gums are normal, tongue protrudes in the midline  Neck:  Trachea midline. No thyromegaly, no JVD, No cervical adenopathy. Chest : Bilaterally symmetrical , mild tachypnea,  No accessory muscle use, chest tube on left side  Lung : . Fair BS bilateral, decreased BS at bases on left side. No Rales. Few scattered rhonchi at left base  Heart[de-identified] Normal  rate. Regular rhythm. No mumur ,  Rub or gallop  ABD: Non-tender. Non-distended. No masses. No organmegaly. Normal bowel sounds. No hernia.   Ext : No Pitting both leg , No Cyanosis No clubbing  Neuro: no focal weakness          DATA:   Recent Labs     10/16/20  0547 10/16/20  2012 10/17/20  0600   WBC 9.9  --  10.8   HGB 7.9* 9.3* 9.3*   HCT 26.2* 30.6* 31.3*   MCV 69.3*  --  76.2*     --  158     Recent Labs 10/16/20  0548 10/17/20  0559    141   K 3.9 4.3    108*   CO2 27 22   BUN 10 10   CREATININE 0.44* 0.45*   GLUCOSE 178* 107*   CALCIUM 7.5* 7.4*   PROT 5.1* 4.9*   LABALBU 2.7* 2.4*   BILITOT 0.3 0.5   ALKPHOS 72 78   AST 15 17   ALT 15 <5   LABGLOM >60.0 >60.0   GFRAA >60.0 >60.0   GLOB 2.4 2.5       MV Settings:     Vent Mode: AC/VC  Vt Ordered: 450 mL  Rate Set: 16 bmp  FiO2 : 60 %  PEEP/CPAP: 5  Pressure Support: 8 cmH20  Peak Inspiratory Pressure: 16 cmH2O  Mean Airway Pressure: 5.4 cmH20  I:E Ratio: 1:2.60    Recent Labs     10/16/20  0512   PHART 7.391   KGJ3KRX 45   PO2ART 161*   XJA1WPY 27.0   BEART 2   X3RDNBUU 99*       O2 Device: Nasal cannula  O2 Flow Rate (L/min): 4 L/min    DIET GENERAL;     MEDICATIONS during current hospitalization:    Continuous Infusions:   fentanyl epidural builder 6 mL/hr at 10/17/20 0923    norepinephrine Stopped (10/17/20 1025)       Scheduled Meds:   albumin human  50 g Intravenous Once    lidocaine  1 patch Transdermal Daily    ipratropium-albuterol  1 ampule Inhalation 4x daily    budesonide  6 mg Oral Daily    primidone  250 mg Oral Nightly    iron sucrose  200 mg Intravenous Q24H    metoprolol  5 mg Intravenous Q4H    sodium chloride flush  10 mL Intravenous 2 times per day    hydrocortisone sodium succinate PF  50 mg Intravenous BID    citalopram  20 mg Oral Daily    carbidopa-levodopa-entacapone  1 tablet Oral Nightly    sotalol  80 mg Oral BID    folic acid  1 mg Oral Daily    atorvastatin  80 mg Oral Daily    aspirin  81 mg Oral Daily       PRN Meds:[START ON 10/18/2020] oxyCODONE-acetaminophen, albuterol, traZODone, naloxone, diphenhydrAMINE, nalbuphine, ondansetron, sodium chloride flush    Radiology  Xr Chest (2 Vw)    Result Date: 10/13/2020  EXAMINATION: XR CHEST (2 VW) CLINICAL HISTORY: POORLY DIFFERENTIATED SQUAMOUS CELL CARCINOMA, LEFT LOWER LOBE. PREOP. COMPARISONS: CHEST RADIOGRAPH, AUGUST 4, 2020. CT BIOPSY, AUGUST 4, 2020. unchanged since previous study with a chronic appearance. There is no pneumothorax. There are 2 chest tubes on the left side and there are multiple surgical clips in the left hilum. There is mild subcutaneous adipose soft tissues of the left hemithorax. IMPRESSION: Status post intubation and placement of 2 chest tubes on the left. There is evidence of partial left pneumonectomy. There is no pneumothorax. Persistent increased pulmonary markings are noted in the right upper lobe. Xr Chest Portable    Result Date: 10/16/2020  Exam: XR CHEST PORTABLE, XR CHEST PORTABLE October 15, 2020 1253 hours History:  resp failure Technique: AP portable view of the chest obtained. Comparison: PA and lateral chest from October 13, 2020 and PET/CT from August 4, 2020   Findings: The patient is intubated with the tip of the endotracheal tube at the thoracic inlet. The cardiomediastinal silhouette is within normal limits. There is volume loss and increased pulmonary markings in the right upper lobe unchanged since previous study with a chronic appearance. There is no pneumothorax. There are 2 chest tubes on the left side and there are multiple surgical clips in the left hilum. There is mild subcutaneous adipose soft tissues of the left hemithorax. Status post intubation and placement of 2 chest tubes on the left. There is evidence of partial left pneumonectomy. There is no pneumothorax. Persistent increased pulmonary markings are noted in the right upper lobe. Exam: XR CHEST PORTABLE, XR CHEST PORTABLE October 16, 2020 0459 hours History:  resp failure Technique: AP portable view of the chest obtained. Comparison: None   Findings: The patient is intubated with the tip of the endotracheal tube at the thoracic inlet. The cardiomediastinal silhouette is within normal limits. There is volume loss and increased pulmonary markings in the right upper lobe unchanged since previous study with a chronic appearance.  There is no pneumothorax. There are 2 chest tubes on the left side and there are multiple surgical clips in the left hilum. There is mild subcutaneous adipose soft tissues of the left hemithorax. IMPRESSION: Status post intubation and placement of 2 chest tubes on the left. There is evidence of partial left pneumonectomy. There is no pneumothorax. Persistent increased pulmonary markings are noted in the right upper lobe. IMPRESSION AND SUGGESTION:  1. Acute postoperative respiratory insufficiency status post extubation on 4 L O2  2. Severe COPD without exacerbation  3. Status post left lower lobectomy  4. Coronary artery disease  5. Moderate aortic stenosis and moderate mitral regurgitation and tricuspid regurgitation    Patient is extubated on 4 L O2 via nasal cannula continue bronchodilator therapy. Encouraged to use incentive spirometer. Chest tube in place Dr. Alee Moreno saw patient today. Drainage is less than 200 cc. Continue nebulizer with DuoNeb 4 times daily albuterol every 2 hours as needed.      Electronically signed by Emeka Nicole MD, FCCP on 10/17/2020 at 11:12 AM

## 2020-10-18 ENCOUNTER — APPOINTMENT (OUTPATIENT)
Dept: GENERAL RADIOLOGY | Age: 75
DRG: 163 | End: 2020-10-18
Attending: THORACIC SURGERY (CARDIOTHORACIC VASCULAR SURGERY)
Payer: MEDICARE

## 2020-10-18 LAB
ALBUMIN SERPL-MCNC: 3.3 G/DL (ref 3.5–4.6)
ALP BLD-CCNC: 119 U/L (ref 35–104)
ALT SERPL-CCNC: <5 U/L (ref 0–41)
ANION GAP SERPL CALCULATED.3IONS-SCNC: 12 MEQ/L (ref 9–15)
AST SERPL-CCNC: 29 U/L (ref 0–40)
BASE EXCESS ARTERIAL: 8 (ref -3–3)
BILIRUB SERPL-MCNC: 0.5 MG/DL (ref 0.2–0.7)
BUN BLDV-MCNC: 16 MG/DL (ref 8–23)
CALCIUM IONIZED: 1.06 MMOL/L (ref 1.12–1.32)
CALCIUM SERPL-MCNC: 8.2 MG/DL (ref 8.5–9.9)
CHLORIDE BLD-SCNC: 100 MEQ/L (ref 95–107)
CO2: 28 MEQ/L (ref 20–31)
CREAT SERPL-MCNC: 0.55 MG/DL (ref 0.7–1.2)
GFR AFRICAN AMERICAN: >60
GFR AFRICAN AMERICAN: >60
GFR NON-AFRICAN AMERICAN: >60
GFR NON-AFRICAN AMERICAN: >60
GLOBULIN: 2.6 G/DL (ref 2.3–3.5)
GLUCOSE BLD-MCNC: 152 MG/DL (ref 70–99)
GLUCOSE BLD-MCNC: 213 MG/DL (ref 60–115)
HCO3 ARTERIAL: 32.6 MMOL/L (ref 21–29)
HCT VFR BLD CALC: 32.9 % (ref 42–52)
HEMOGLOBIN: 12 GM/DL (ref 13.5–17.5)
HEMOGLOBIN: 9.7 G/DL (ref 14–18)
LACTATE: 1.34 MMOL/L (ref 0.4–2)
MAGNESIUM: 1.9 MG/DL (ref 1.7–2.4)
MCH RBC QN AUTO: 22 PG (ref 27–31.3)
MCHC RBC AUTO-ENTMCNC: 29.3 % (ref 33–37)
MCV RBC AUTO: 75 FL (ref 80–100)
O2 SAT, ARTERIAL: 98 % (ref 93–100)
PCO2 ARTERIAL: 51 MM HG (ref 35–45)
PDW BLD-RTO: 22.2 % (ref 11.5–14.5)
PERFORMED ON: ABNORMAL
PH ARTERIAL: 7.42 (ref 7.35–7.45)
PLATELET # BLD: 166 K/UL (ref 130–400)
PO2 ARTERIAL: 97 MM HG (ref 75–108)
POC CHLORIDE: 99 MEQ/L (ref 99–110)
POC CREATININE: 0.8 MG/DL (ref 0.8–1.3)
POC FIO2: 50
POC HEMATOCRIT: 35 % (ref 41–53)
POC POTASSIUM: 3.6 MEQ/L (ref 3.5–5.1)
POC SAMPLE TYPE: ABNORMAL
POC SODIUM: 142 MEQ/L (ref 136–145)
POTASSIUM SERPL-SCNC: 4 MEQ/L (ref 3.4–4.9)
PRO-BNP: 8863 PG/ML
PROCALCITONIN: 0.08 NG/ML (ref 0–0.15)
RBC # BLD: 4.39 M/UL (ref 4.7–6.1)
SODIUM BLD-SCNC: 140 MEQ/L (ref 135–144)
TCO2 ARTERIAL: 34 (ref 22–29)
TOTAL PROTEIN: 5.9 G/DL (ref 6.3–8)
TROPONIN: 0.01 NG/ML (ref 0–0.01)
WBC # BLD: 11.1 K/UL (ref 4.8–10.8)

## 2020-10-18 PROCEDURE — 71045 X-RAY EXAM CHEST 1 VIEW: CPT

## 2020-10-18 PROCEDURE — 6370000000 HC RX 637 (ALT 250 FOR IP): Performed by: INTERNAL MEDICINE

## 2020-10-18 PROCEDURE — 82803 BLOOD GASES ANY COMBINATION: CPT

## 2020-10-18 PROCEDURE — 82565 ASSAY OF CREATININE: CPT

## 2020-10-18 PROCEDURE — 6360000002 HC RX W HCPCS: Performed by: INTERNAL MEDICINE

## 2020-10-18 PROCEDURE — 2500000003 HC RX 250 WO HCPCS: Performed by: INTERNAL MEDICINE

## 2020-10-18 PROCEDURE — 93005 ELECTROCARDIOGRAM TRACING: CPT | Performed by: INTERNAL MEDICINE

## 2020-10-18 PROCEDURE — 2000000000 HC ICU R&B

## 2020-10-18 PROCEDURE — 83605 ASSAY OF LACTIC ACID: CPT

## 2020-10-18 PROCEDURE — 80053 COMPREHEN METABOLIC PANEL: CPT

## 2020-10-18 PROCEDURE — 85027 COMPLETE CBC AUTOMATED: CPT

## 2020-10-18 PROCEDURE — 82330 ASSAY OF CALCIUM: CPT

## 2020-10-18 PROCEDURE — 85014 HEMATOCRIT: CPT

## 2020-10-18 PROCEDURE — 82435 ASSAY OF BLOOD CHLORIDE: CPT

## 2020-10-18 PROCEDURE — 84132 ASSAY OF SERUM POTASSIUM: CPT

## 2020-10-18 PROCEDURE — 99291 CRITICAL CARE FIRST HOUR: CPT | Performed by: INTERNAL MEDICINE

## 2020-10-18 PROCEDURE — 94640 AIRWAY INHALATION TREATMENT: CPT

## 2020-10-18 PROCEDURE — 84484 ASSAY OF TROPONIN QUANT: CPT

## 2020-10-18 PROCEDURE — 2580000003 HC RX 258: Performed by: THORACIC SURGERY (CARDIOTHORACIC VASCULAR SURGERY)

## 2020-10-18 PROCEDURE — 83735 ASSAY OF MAGNESIUM: CPT

## 2020-10-18 PROCEDURE — 84295 ASSAY OF SERUM SODIUM: CPT

## 2020-10-18 PROCEDURE — 36415 COLL VENOUS BLD VENIPUNCTURE: CPT

## 2020-10-18 PROCEDURE — 84145 PROCALCITONIN (PCT): CPT

## 2020-10-18 PROCEDURE — 83880 ASSAY OF NATRIURETIC PEPTIDE: CPT

## 2020-10-18 PROCEDURE — 94761 N-INVAS EAR/PLS OXIMETRY MLT: CPT

## 2020-10-18 PROCEDURE — 6370000000 HC RX 637 (ALT 250 FOR IP): Performed by: THORACIC SURGERY (CARDIOTHORACIC VASCULAR SURGERY)

## 2020-10-18 PROCEDURE — 2700000000 HC OXYGEN THERAPY PER DAY

## 2020-10-18 RX ORDER — FUROSEMIDE 10 MG/ML
60 INJECTION INTRAMUSCULAR; INTRAVENOUS ONCE
Status: COMPLETED | OUTPATIENT
Start: 2020-10-18 | End: 2020-10-18

## 2020-10-18 RX ORDER — FUROSEMIDE 10 MG/ML
40 INJECTION INTRAMUSCULAR; INTRAVENOUS ONCE
Status: COMPLETED | OUTPATIENT
Start: 2020-10-18 | End: 2020-10-18

## 2020-10-18 RX ORDER — ASPIRIN 81 MG/1
81 TABLET ORAL DAILY
Status: DISCONTINUED | OUTPATIENT
Start: 2020-10-18 | End: 2020-10-21 | Stop reason: HOSPADM

## 2020-10-18 RX ORDER — FUROSEMIDE 10 MG/ML
INJECTION INTRAMUSCULAR; INTRAVENOUS
Status: DISPENSED
Start: 2020-10-18 | End: 2020-10-19

## 2020-10-18 RX ADMIN — IPRATROPIUM BROMIDE AND ALBUTEROL SULFATE 1 AMPULE: .5; 3 SOLUTION RESPIRATORY (INHALATION) at 10:54

## 2020-10-18 RX ADMIN — IPRATROPIUM BROMIDE AND ALBUTEROL SULFATE 1 AMPULE: .5; 3 SOLUTION RESPIRATORY (INHALATION) at 04:08

## 2020-10-18 RX ADMIN — CARBIDOPA, LEVODOPA, AND ENTACAPONE 1 TABLET: 25; 100; 200 TABLET, FILM COATED ORAL at 20:59

## 2020-10-18 RX ADMIN — Medication 10 ML: at 08:19

## 2020-10-18 RX ADMIN — METOPROLOL TARTRATE 5 MG: 5 INJECTION, SOLUTION INTRAVENOUS at 11:38

## 2020-10-18 RX ADMIN — IPRATROPIUM BROMIDE AND ALBUTEROL SULFATE 1 AMPULE: .5; 3 SOLUTION RESPIRATORY (INHALATION) at 19:00

## 2020-10-18 RX ADMIN — OXYCODONE AND ACETAMINOPHEN 1 TABLET: 10; 325 TABLET ORAL at 18:27

## 2020-10-18 RX ADMIN — HYDROCORTISONE SODIUM SUCCINATE 50 MG: 100 INJECTION, POWDER, FOR SOLUTION INTRAMUSCULAR; INTRAVENOUS at 21:00

## 2020-10-18 RX ADMIN — DIGOXIN 250 MCG: 125 TABLET ORAL at 08:18

## 2020-10-18 RX ADMIN — SOTALOL HYDROCHLORIDE 80 MG: 80 TABLET ORAL at 20:59

## 2020-10-18 RX ADMIN — METOPROLOL TARTRATE 5 MG: 5 INJECTION, SOLUTION INTRAVENOUS at 08:19

## 2020-10-18 RX ADMIN — METOPROLOL TARTRATE 5 MG: 5 INJECTION, SOLUTION INTRAVENOUS at 00:06

## 2020-10-18 RX ADMIN — METOPROLOL TARTRATE 5 MG: 5 INJECTION, SOLUTION INTRAVENOUS at 21:00

## 2020-10-18 RX ADMIN — BUDESONIDE 6 MG: 3 CAPSULE, COATED PELLETS ORAL at 08:18

## 2020-10-18 RX ADMIN — SOTALOL HYDROCHLORIDE 80 MG: 80 TABLET ORAL at 08:18

## 2020-10-18 RX ADMIN — CITALOPRAM HYDROBROMIDE 20 MG: 20 TABLET ORAL at 08:18

## 2020-10-18 RX ADMIN — Medication 10 ML: at 21:00

## 2020-10-18 RX ADMIN — FUROSEMIDE 40 MG: 10 INJECTION, SOLUTION INTRAMUSCULAR; INTRAVENOUS at 00:06

## 2020-10-18 RX ADMIN — HYDROCORTISONE SODIUM SUCCINATE 50 MG: 100 INJECTION, POWDER, FOR SOLUTION INTRAMUSCULAR; INTRAVENOUS at 08:19

## 2020-10-18 RX ADMIN — METOPROLOL TARTRATE 5 MG: 5 INJECTION, SOLUTION INTRAVENOUS at 16:50

## 2020-10-18 RX ADMIN — ASPIRIN 81 MG: 81 TABLET, CHEWABLE ORAL at 08:18

## 2020-10-18 RX ADMIN — FOLIC ACID 1 MG: 1 TABLET ORAL at 08:18

## 2020-10-18 RX ADMIN — PRIMIDONE 250 MG: 250 TABLET ORAL at 20:59

## 2020-10-18 RX ADMIN — DIGOXIN 250 MCG: 125 TABLET ORAL at 18:06

## 2020-10-18 RX ADMIN — IPRATROPIUM BROMIDE AND ALBUTEROL SULFATE 1 AMPULE: .5; 3 SOLUTION RESPIRATORY (INHALATION) at 15:45

## 2020-10-18 RX ADMIN — FUROSEMIDE 40 MG: 10 INJECTION, SOLUTION INTRAVENOUS at 19:30

## 2020-10-18 RX ADMIN — ATORVASTATIN CALCIUM 80 MG: 80 TABLET, FILM COATED ORAL at 08:18

## 2020-10-18 RX ADMIN — FUROSEMIDE 60 MG: 10 INJECTION, SOLUTION INTRAMUSCULAR; INTRAVENOUS at 10:48

## 2020-10-18 ASSESSMENT — PAIN DESCRIPTION - PAIN TYPE
TYPE: ACUTE PAIN;SURGICAL PAIN

## 2020-10-18 ASSESSMENT — PAIN SCALES - GENERAL
PAINLEVEL_OUTOF10: 0
PAINLEVEL_OUTOF10: 3
PAINLEVEL_OUTOF10: 0
PAINLEVEL_OUTOF10: 0
PAINLEVEL_OUTOF10: 3
PAINLEVEL_OUTOF10: 2
PAINLEVEL_OUTOF10: 0
PAINLEVEL_OUTOF10: 3
PAINLEVEL_OUTOF10: 0

## 2020-10-18 ASSESSMENT — PAIN DESCRIPTION - FREQUENCY
FREQUENCY: INTERMITTENT

## 2020-10-18 ASSESSMENT — PAIN DESCRIPTION - ONSET
ONSET: UNABLE TO TELL
ONSET: ON-GOING
ONSET: UNABLE TO TELL

## 2020-10-18 ASSESSMENT — PAIN DESCRIPTION - LOCATION
LOCATION: CHEST

## 2020-10-18 ASSESSMENT — PAIN DESCRIPTION - ORIENTATION
ORIENTATION: LEFT

## 2020-10-18 ASSESSMENT — PAIN DESCRIPTION - PROGRESSION
CLINICAL_PROGRESSION: NOT CHANGED

## 2020-10-18 ASSESSMENT — PAIN - FUNCTIONAL ASSESSMENT
PAIN_FUNCTIONAL_ASSESSMENT: PREVENTS OR INTERFERES SOME ACTIVE ACTIVITIES AND ADLS
PAIN_FUNCTIONAL_ASSESSMENT: PREVENTS OR INTERFERES SOME ACTIVE ACTIVITIES AND ADLS

## 2020-10-18 ASSESSMENT — PAIN DESCRIPTION - DESCRIPTORS
DESCRIPTORS: ACHING;SHARP

## 2020-10-18 NOTE — ADDENDUM NOTE
Addendum  created 10/18/20 0909 by LAYA Mg CRNA    Intraprocedure Event edited, Intraprocedure LDAs edited, LDA properties accepted

## 2020-10-18 NOTE — PROGRESS NOTES
Hospitalist Progress Note      PCP: Tan Garland MD    Date of Admission: 10/15/2020    Chief Complaint:    No chief complaint on file. Subjective:  Patient denies fevers, chills, sweats, CP, SOb.   12 point ROS negative other than mentioned above     Medications:  Reviewed    Infusion Medications    norepinephrine Stopped (10/17/20 1025)     Scheduled Medications    aspirin  81 mg Oral Daily    lidocaine  1 patch Transdermal Daily    digoxin  250 mcg Oral BID    senna  2 tablet Oral Nightly    docusate sodium  100 mg Oral BID    polyethylene glycol  17 g Oral Daily    ipratropium-albuterol  1 ampule Inhalation 4x daily    budesonide  6 mg Oral Daily    primidone  250 mg Oral Nightly    iron sucrose  200 mg Intravenous Q24H    metoprolol  5 mg Intravenous Q4H    sodium chloride flush  10 mL Intravenous 2 times per day    hydrocortisone sodium succinate PF  50 mg Intravenous BID    citalopram  20 mg Oral Daily    carbidopa-levodopa-entacapone  1 tablet Oral Nightly    sotalol  80 mg Oral BID    folic acid  1 mg Oral Daily    atorvastatin  80 mg Oral Daily     PRN Meds: oxyCODONE-acetaminophen, albuterol, traZODone, naloxone, diphenhydrAMINE, nalbuphine, ondansetron, sodium chloride flush      Intake/Output Summary (Last 24 hours) at 10/18/2020 1211  Last data filed at 10/18/2020 1152  Gross per 24 hour   Intake 908.68 ml   Output 2530 ml   Net -1621.32 ml     Exam:    BP (!) 168/81   Pulse 96   Temp 97.6 °F (36.4 °C) (Oral)   Resp 25   Ht 5' 8\" (1.727 m)   Wt 152 lb 5.4 oz (69.1 kg)   SpO2 98%   BMI 23.16 kg/m²     General appearance: No apparent distress, appears stated age and cooperative. HEENT:  Conjunctivae/corneas clear. Neck: Trachea midline. Respiratory:  Normal respiratory effort. Clear to auscultation; chest tube in place  Cardiovascular: Regular rate and rhythm   Abdomen: Soft, non-tender, non-distended with normal bowel sounds.   Musculoskeletal: No clubbing, cyanosis at the thoracic inlet. The cardiomediastinal silhouette is within normal limits. There is volume loss and increased pulmonary markings in the right upper lobe unchanged since previous study    with a chronic appearance. There is no pneumothorax. There are 2 chest tubes on the left side and there are multiple surgical clips in the left hilum. There is mild subcutaneous adipose soft tissues of the left hemithorax. IMPRESSION:    Status post intubation and placement of 2 chest tubes on the left. There is evidence of partial left pneumonectomy. There is no pneumothorax. Persistent increased pulmonary markings are noted in the right upper lobe. XR CHEST PORTABLE   Final Result   Status post intubation and placement of 2 chest tubes on the left. There is evidence of partial left pneumonectomy. There is no pneumothorax. Persistent increased pulmonary markings are noted in the right upper lobe. Exam: XR CHEST PORTABLE, XR CHEST PORTABLE October 16, 2020 0459 hours      History:  resp failure       Technique: AP portable view of the chest obtained. Comparison: None        Findings:      The patient is intubated with the tip of the endotracheal tube at the thoracic inlet. The cardiomediastinal silhouette is within normal limits. There is volume loss and increased pulmonary markings in the right upper lobe unchanged since previous study    with a chronic appearance. There is no pneumothorax. There are 2 chest tubes on the left side and there are multiple surgical clips in the left hilum. There is mild subcutaneous adipose soft tissues of the left hemithorax. IMPRESSION:    Status post intubation and placement of 2 chest tubes on the left. There is evidence of partial left pneumonectomy. There is no pneumothorax. Persistent increased pulmonary markings are noted in the right upper lobe.               Assessment/Plan:    #Post op care and DVT ppx:  Per primary team  #A Fib:  Cardiology is managing  #Squamous cell carcnima:  S/p lobectomy by Dr Nima Wills  #RLS:  Med resumed  #IBS:  Budenoside resumed  #Constipation:  resolved    Active Hospital Problems    Diagnosis Date Noted    [facility] [J44.9]     Carcinoma of left lung ([facility] [C34.92] 10/15/2020     Additional work up or/and treatment plan may be added today or then after based on clinical progression. I am managing a portion of pt care. Some medical issues are handled by other specialists. Additional work up and treatment should be done in out pt setting by pt PCP and other out pt providers. In addition to examining and evaluating pt, I spent additional time explaining care, normal and abnormal findings, and treatment plan. All of pt questions were answered. Counseling, diet and education were  provided. Case will be discussed with nursing staff when appropriate. Family will be updated if and when appropriate.       Diet: DIET GENERAL;    Code Status: Full Code    PT/OT Eval     Electronically signed by Claus Mar MD on 10/18/2020 at 12:11 PM
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
## 2020-10-18 NOTE — PROGRESS NOTES
hernia.   Ext : Trace pitting both leg , No Cyanosis No clubbing  Neuro: no focal weakness          DATA:   Recent Labs     10/17/20  0600 10/18/20  0505   WBC 10.8 11.1*   HGB 9.3* 9.7*   HCT 31.3* 32.9*   MCV 76.2* 75.0*    166     Recent Labs     10/17/20  0559 10/18/20  0505    140   K 4.3 4.0   * 100   CO2 22 28   BUN 10 16   CREATININE 0.45* 0.55*   GLUCOSE 107* 152*   CALCIUM 7.4* 8.2*   PROT 4.9* 5.9*   LABALBU 2.4* 3.3*   BILITOT 0.5 0.5   ALKPHOS 78 119*   AST 17 29   ALT <5 <5   LABGLOM >60.0 >60.0   GFRAA >60.0 >60.0   GLOB 2.5 2.6       MV Settings:     Vent Mode: AC/VC  Vt Ordered: 450 mL  Rate Set: 16 bmp  FiO2 : 35 %  PEEP/CPAP: 5  Pressure Support: 8 cmH20  Peak Inspiratory Pressure: 16 cmH2O  Mean Airway Pressure: 5.4 cmH20  I:E Ratio: 1:2.60    Recent Labs     10/16/20  0512   PHART 7.391   WVU7FMP 45   PO2ART 161*   LOR8HVN 27.0   BEART 2   R4XKVXBB 99*       O2 Device: Heated high flow cannula  O2 Flow Rate (L/min): 30 L/min    DIET GENERAL;     MEDICATIONS during current hospitalization:    Continuous Infusions:   norepinephrine Stopped (10/17/20 1025)       Scheduled Meds:   aspirin  81 mg Oral Daily    lidocaine  1 patch Transdermal Daily    digoxin  250 mcg Oral BID    senna  2 tablet Oral Nightly    docusate sodium  100 mg Oral BID    polyethylene glycol  17 g Oral Daily    ipratropium-albuterol  1 ampule Inhalation 4x daily    budesonide  6 mg Oral Daily    primidone  250 mg Oral Nightly    iron sucrose  200 mg Intravenous Q24H    metoprolol  5 mg Intravenous Q4H    sodium chloride flush  10 mL Intravenous 2 times per day    hydrocortisone sodium succinate PF  50 mg Intravenous BID    citalopram  20 mg Oral Daily    carbidopa-levodopa-entacapone  1 tablet Oral Nightly    sotalol  80 mg Oral BID    folic acid  1 mg Oral Daily    atorvastatin  80 mg Oral Daily       PRN Meds:oxyCODONE-acetaminophen, albuterol, traZODone, naloxone, diphenhydrAMINE, nalbuphine, ondansetron, sodium chloride flush    Radiology  Xr Chest (2 Vw)    Result Date: 10/13/2020  EXAMINATION: XR CHEST (2 VW) CLINICAL HISTORY: POORLY DIFFERENTIATED SQUAMOUS CELL CARCINOMA, LEFT LOWER LOBE. PREOP. COMPARISONS: CHEST RADIOGRAPH, AUGUST 4, 2020. CT BIOPSY, AUGUST 4, 2020. PET/CT, AUGUST 17, 2020. CT CHEST, JUNE 13, 2020. FINDINGS: Osseous structures intact. Cardiopericardial silhouette normal. Aorta calcified. A 10 x 4.5 x 6.6 cm area of increased opacity is identified, posteriorly at the left lung base, responding to the patient's known lung mass. Blunting left costophrenic angle. Ill-defined areas increase opacity left lung base, with blunting left costophrenic angle. LEFT LOWER LOBE MASS PATIENT WITH CLINICAL HISTORY OF SQUAMOUS CELL CARCINOMA. RIGHT LOWER LUNG ATELECTASIS/PNEUMONIA. BILATERAL PLEURAL EFFUSIONS. Xr Chest Portable    Result Date: 10/16/2020  Exam: XR CHEST PORTABLE, XR CHEST PORTABLE October 15, 2020 1253 hours History:  resp failure Technique: AP portable view of the chest obtained. Comparison: PA and lateral chest from October 13, 2020 and PET/CT from August 4, 2020   Findings: The patient is intubated with the tip of the endotracheal tube at the thoracic inlet. The cardiomediastinal silhouette is within normal limits. There is volume loss and increased pulmonary markings in the right upper lobe unchanged since previous study with a chronic appearance. There is no pneumothorax. There are 2 chest tubes on the left side and there are multiple surgical clips in the left hilum. There is mild subcutaneous adipose soft tissues of the left hemithorax. Status post intubation and placement of 2 chest tubes on the left. There is evidence of partial left pneumonectomy. There is no pneumothorax. Persistent increased pulmonary markings are noted in the right upper lobe.  Exam: XR CHEST PORTABLE, XR CHEST PORTABLE October 16, 2020 0459 hours History:  resp failure Technique: AP portable view of the chest obtained. Comparison: None   Findings: The patient is intubated with the tip of the endotracheal tube at the thoracic inlet. The cardiomediastinal silhouette is within normal limits. There is volume loss and increased pulmonary markings in the right upper lobe unchanged since previous study with a chronic appearance. There is no pneumothorax. There are 2 chest tubes on the left side and there are multiple surgical clips in the left hilum. There is mild subcutaneous adipose soft tissues of the left hemithorax. IMPRESSION: Status post intubation and placement of 2 chest tubes on the left. There is evidence of partial left pneumonectomy. There is no pneumothorax. Persistent increased pulmonary markings are noted in the right upper lobe. Xr Chest Portable    Result Date: 10/16/2020  Exam: XR CHEST PORTABLE, XR CHEST PORTABLE October 15, 2020 1253 hours History:  resp failure Technique: AP portable view of the chest obtained. Comparison: PA and lateral chest from October 13, 2020 and PET/CT from August 4, 2020   Findings: The patient is intubated with the tip of the endotracheal tube at the thoracic inlet. The cardiomediastinal silhouette is within normal limits. There is volume loss and increased pulmonary markings in the right upper lobe unchanged since previous study with a chronic appearance. There is no pneumothorax. There are 2 chest tubes on the left side and there are multiple surgical clips in the left hilum. There is mild subcutaneous adipose soft tissues of the left hemithorax. Status post intubation and placement of 2 chest tubes on the left. There is evidence of partial left pneumonectomy. There is no pneumothorax. Persistent increased pulmonary markings are noted in the right upper lobe. Exam: XR CHEST PORTABLE, XR CHEST PORTABLE October 16, 2020 0459 hours History:  resp failure Technique: AP portable view of the chest obtained. Comparison: None   Findings:  The patient is intubated with the tip of the endotracheal tube at the thoracic inlet. The cardiomediastinal silhouette is within normal limits. There is volume loss and increased pulmonary markings in the right upper lobe unchanged since previous study with a chronic appearance. There is no pneumothorax. There are 2 chest tubes on the left side and there are multiple surgical clips in the left hilum. There is mild subcutaneous adipose soft tissues of the left hemithorax. IMPRESSION: Status post intubation and placement of 2 chest tubes on the left. There is evidence of partial left pneumonectomy. There is no pneumothorax. Persistent increased pulmonary markings are noted in the right upper lobe. Narrative    EXAMINATION: CHEST PORTABLE VIEW         CLINICAL HISTORY: Postop day 3 status post left thoracotomy with left lower lobe lobectomy.         COMPARISONS: October 16, 2020         FINDINGS:         Single  views of the chest is submitted. Interval extubation. 2. Left-sided chest tubes. One is at the level of the aortic arch and one is at the base of the left lower lobe. The cardiac silhouette is enlarged. Pulmonary vascular congested with increased interstitial markings suggesting component CHF. Correlate clinically. Right sided trachea. Atelectasis, developing groundglass infiltrate right lower lobe with trace right pleural effusion. Area of patchy to coalescent infiltrate left lower lobe with now blunting of the left costophrenic angle suggesting developing trace pleural effusion. Metta Reason Pneumothoraces.                                                                                           Impression    PULMONARY VASCULAR IS CONGESTED INCREASED INTERSTITIAL MARKINGS SUGGESTING COMPONENT OF CHF. CORRELATE CLINICALLY    DEVELOPING AREA OF ATELECTASIS, GROUNDGLASS INFILTRATE RIGHT LOWER LOBE WITH TRACE RIGHT PLEURAL EFFUSION.     AREA OF PATCHY TO COALESCENT INFILTRATE LEFT LOWER LOBE WITH TRACE LEFT PLEURAL EFFUSION. IMPRESSION AND SUGGESTION:    This is a critically ill patient at risk of deterioration / death , needing close ICU monitoring and intervention due to below noted problems   1. Acute postoperative respiratory insufficiency status post extubation   2. Fluid overload/acute CHF  3. Severe COPD without exacerbation  4. Respiratory distress secondary to above  5. Status post left lower lobectomy  6. Coronary artery disease  7. Moderate aortic stenosis and moderate mitral regurgitation and tricuspid regurgitation    Patient is having worsening shortness of breath with respiratory distress secondary to fluid overload and CHF. Recommend diuretic therapy. Discussed with Dr. Bolivar increase O2 to 5 L and then switch to high flow oxygen if needed. Lasix 60 mg IV ordered. Chest x-ray reviewed shows congestive heart failure and left and right lower lobe atelectasis infiltration and effusion. continue bronchodilator therapy. Encouraged to use incentive spirometer. Chest tube in place Dr. Katelin Hernandez. Continue nebulizer with DuoNeb 4 times daily albuterol every 2 hours as needed. If condition worsen need intubation and vent support.   CC time 36 minutes    Electronically signed by Wenceslao Caballero MD, FCCP on 10/18/2020 at 1:32 PM

## 2020-10-18 NOTE — CARE COORDINATION
PT UP IN CHAIR. PT CHANGED TO HIGH FLOW OXYGEN TODAY D/T RESP. STATUS. PT CONTINUES TO HAVE 2 CHEST TUBES.   LSW/CM TO FOLLOW FOR DC NEEDS. ? Nydia 78 AND HOME O2 EVAL.

## 2020-10-19 ENCOUNTER — APPOINTMENT (OUTPATIENT)
Dept: GENERAL RADIOLOGY | Age: 75
DRG: 163 | End: 2020-10-19
Attending: THORACIC SURGERY (CARDIOTHORACIC VASCULAR SURGERY)
Payer: MEDICARE

## 2020-10-19 PROBLEM — E43 SEVERE MALNUTRITION (HCC): Status: ACTIVE | Noted: 2020-10-19

## 2020-10-19 LAB
ALBUMIN SERPL-MCNC: 3.1 G/DL (ref 3.5–4.6)
ALP BLD-CCNC: 113 U/L (ref 35–104)
ALT SERPL-CCNC: 6 U/L (ref 0–41)
ANION GAP SERPL CALCULATED.3IONS-SCNC: 9 MEQ/L (ref 9–15)
AST SERPL-CCNC: 22 U/L (ref 0–40)
BILIRUB SERPL-MCNC: 0.5 MG/DL (ref 0.2–0.7)
BUN BLDV-MCNC: 13 MG/DL (ref 8–23)
CALCIUM SERPL-MCNC: 8.2 MG/DL (ref 8.5–9.9)
CHLORIDE BLD-SCNC: 95 MEQ/L (ref 95–107)
CO2: 37 MEQ/L (ref 20–31)
CREAT SERPL-MCNC: 0.38 MG/DL (ref 0.7–1.2)
EKG ATRIAL RATE: 178 BPM
EKG ATRIAL RATE: 258 BPM
EKG Q-T INTERVAL: 408 MS
EKG Q-T INTERVAL: 430 MS
EKG QRS DURATION: 78 MS
EKG QRS DURATION: 88 MS
EKG QTC CALCULATION (BAZETT): 505 MS
EKG QTC CALCULATION (BAZETT): 510 MS
EKG R AXIS: -33 DEGREES
EKG R AXIS: -33 DEGREES
EKG T AXIS: -25 DEGREES
EKG T AXIS: 62 DEGREES
EKG VENTRICULAR RATE: 83 BPM
EKG VENTRICULAR RATE: 94 BPM
GFR AFRICAN AMERICAN: >60
GFR NON-AFRICAN AMERICAN: >60
GLOBULIN: 2.6 G/DL (ref 2.3–3.5)
GLUCOSE BLD-MCNC: 137 MG/DL (ref 70–99)
HCT VFR BLD CALC: 33 % (ref 42–52)
HEMOGLOBIN: 10.1 G/DL (ref 14–18)
INR BLD: 1.3
MAGNESIUM: 1.8 MG/DL (ref 1.7–2.4)
MCH RBC QN AUTO: 22.1 PG (ref 27–31.3)
MCHC RBC AUTO-ENTMCNC: 30.5 % (ref 33–37)
MCV RBC AUTO: 72.4 FL (ref 80–100)
PDW BLD-RTO: 22.2 % (ref 11.5–14.5)
PLATELET # BLD: 193 K/UL (ref 130–400)
POTASSIUM SERPL-SCNC: 3.4 MEQ/L (ref 3.4–4.9)
PROTHROMBIN TIME: 16.7 SEC (ref 12.3–14.9)
RBC # BLD: 4.56 M/UL (ref 4.7–6.1)
SODIUM BLD-SCNC: 141 MEQ/L (ref 135–144)
TOTAL PROTEIN: 5.7 G/DL (ref 6.3–8)
WBC # BLD: 8.8 K/UL (ref 4.8–10.8)

## 2020-10-19 PROCEDURE — 6370000000 HC RX 637 (ALT 250 FOR IP): Performed by: INTERNAL MEDICINE

## 2020-10-19 PROCEDURE — 80053 COMPREHEN METABOLIC PANEL: CPT

## 2020-10-19 PROCEDURE — 2700000000 HC OXYGEN THERAPY PER DAY

## 2020-10-19 PROCEDURE — 2500000003 HC RX 250 WO HCPCS: Performed by: INTERNAL MEDICINE

## 2020-10-19 PROCEDURE — 85610 PROTHROMBIN TIME: CPT

## 2020-10-19 PROCEDURE — 94640 AIRWAY INHALATION TREATMENT: CPT

## 2020-10-19 PROCEDURE — 94761 N-INVAS EAR/PLS OXIMETRY MLT: CPT

## 2020-10-19 PROCEDURE — 6360000002 HC RX W HCPCS: Performed by: INTERNAL MEDICINE

## 2020-10-19 PROCEDURE — 2580000003 HC RX 258: Performed by: THORACIC SURGERY (CARDIOTHORACIC VASCULAR SURGERY)

## 2020-10-19 PROCEDURE — 99233 SBSQ HOSP IP/OBS HIGH 50: CPT | Performed by: INTERNAL MEDICINE

## 2020-10-19 PROCEDURE — 83735 ASSAY OF MAGNESIUM: CPT

## 2020-10-19 PROCEDURE — 85027 COMPLETE CBC AUTOMATED: CPT

## 2020-10-19 PROCEDURE — 6370000000 HC RX 637 (ALT 250 FOR IP): Performed by: THORACIC SURGERY (CARDIOTHORACIC VASCULAR SURGERY)

## 2020-10-19 PROCEDURE — 71045 X-RAY EXAM CHEST 1 VIEW: CPT

## 2020-10-19 PROCEDURE — 2000000000 HC ICU R&B

## 2020-10-19 PROCEDURE — 36415 COLL VENOUS BLD VENIPUNCTURE: CPT

## 2020-10-19 RX ORDER — ISOSORBIDE MONONITRATE 30 MG/1
30 TABLET, EXTENDED RELEASE ORAL DAILY
Status: DISCONTINUED | OUTPATIENT
Start: 2020-10-19 | End: 2020-10-21 | Stop reason: HOSPADM

## 2020-10-19 RX ORDER — WARFARIN SODIUM 5 MG/1
5 TABLET ORAL ONCE
Status: COMPLETED | OUTPATIENT
Start: 2020-10-19 | End: 2020-10-19

## 2020-10-19 RX ORDER — POTASSIUM CHLORIDE 20 MEQ/1
20 TABLET, EXTENDED RELEASE ORAL ONCE
Status: COMPLETED | OUTPATIENT
Start: 2020-10-19 | End: 2020-10-19

## 2020-10-19 RX ORDER — HYDROCHLOROTHIAZIDE 12.5 MG/1
12.5 TABLET ORAL DAILY
Status: DISCONTINUED | OUTPATIENT
Start: 2020-10-19 | End: 2020-10-21 | Stop reason: HOSPADM

## 2020-10-19 RX ORDER — METOPROLOL TARTRATE 5 MG/5ML
5 INJECTION INTRAVENOUS EVERY 6 HOURS PRN
Status: DISCONTINUED | OUTPATIENT
Start: 2020-10-19 | End: 2020-10-21 | Stop reason: HOSPADM

## 2020-10-19 RX ORDER — IPRATROPIUM BROMIDE AND ALBUTEROL SULFATE 2.5; .5 MG/3ML; MG/3ML
1 SOLUTION RESPIRATORY (INHALATION) EVERY 4 HOURS PRN
Status: DISCONTINUED | OUTPATIENT
Start: 2020-10-19 | End: 2020-10-21 | Stop reason: HOSPADM

## 2020-10-19 RX ADMIN — POTASSIUM CHLORIDE 20 MEQ: 20 TABLET, EXTENDED RELEASE ORAL at 12:08

## 2020-10-19 RX ADMIN — HYDROCORTISONE SODIUM SUCCINATE 50 MG: 100 INJECTION, POWDER, FOR SOLUTION INTRAMUSCULAR; INTRAVENOUS at 08:46

## 2020-10-19 RX ADMIN — DIGOXIN 250 MCG: 125 TABLET ORAL at 08:43

## 2020-10-19 RX ADMIN — SOTALOL HYDROCHLORIDE 80 MG: 80 TABLET ORAL at 20:36

## 2020-10-19 RX ADMIN — IPRATROPIUM BROMIDE AND ALBUTEROL SULFATE 1 AMPULE: .5; 3 SOLUTION RESPIRATORY (INHALATION) at 04:08

## 2020-10-19 RX ADMIN — WARFARIN SODIUM 5 MG: 5 TABLET ORAL at 22:04

## 2020-10-19 RX ADMIN — METOPROLOL TARTRATE 5 MG: 5 INJECTION, SOLUTION INTRAVENOUS at 08:44

## 2020-10-19 RX ADMIN — ATORVASTATIN CALCIUM 80 MG: 80 TABLET, FILM COATED ORAL at 08:43

## 2020-10-19 RX ADMIN — IPRATROPIUM BROMIDE AND ALBUTEROL SULFATE 1 AMPULE: .5; 3 SOLUTION RESPIRATORY (INHALATION) at 08:23

## 2020-10-19 RX ADMIN — FOLIC ACID 1 MG: 1 TABLET ORAL at 08:43

## 2020-10-19 RX ADMIN — GLYCOPYRROLATE AND FORMOTEROL FUMARATE 2 PUFF: 9; 4.8 AEROSOL, METERED RESPIRATORY (INHALATION) at 14:29

## 2020-10-19 RX ADMIN — POLYETHYLENE GLYCOL 3350 17 G: 17 POWDER, FOR SOLUTION ORAL at 08:47

## 2020-10-19 RX ADMIN — ASPIRIN 81 MG: 81 TABLET, COATED ORAL at 08:44

## 2020-10-19 RX ADMIN — Medication 10 ML: at 08:46

## 2020-10-19 RX ADMIN — PRIMIDONE 250 MG: 250 TABLET ORAL at 20:36

## 2020-10-19 RX ADMIN — DOCUSATE SODIUM 100 MG: 100 CAPSULE ORAL at 20:36

## 2020-10-19 RX ADMIN — Medication 17.2 MG: at 20:35

## 2020-10-19 RX ADMIN — ISOSORBIDE MONONITRATE 30 MG: 30 TABLET, EXTENDED RELEASE ORAL at 12:08

## 2020-10-19 RX ADMIN — SOTALOL HYDROCHLORIDE 80 MG: 80 TABLET ORAL at 08:44

## 2020-10-19 RX ADMIN — METOPROLOL TARTRATE 5 MG: 5 INJECTION, SOLUTION INTRAVENOUS at 04:20

## 2020-10-19 RX ADMIN — CARBIDOPA, LEVODOPA, AND ENTACAPONE 1 TABLET: 25; 100; 200 TABLET, FILM COATED ORAL at 20:36

## 2020-10-19 RX ADMIN — DOCUSATE SODIUM 100 MG: 100 CAPSULE ORAL at 08:44

## 2020-10-19 RX ADMIN — CITALOPRAM HYDROBROMIDE 20 MG: 20 TABLET ORAL at 08:43

## 2020-10-19 RX ADMIN — HYDROCHLOROTHIAZIDE 12.5 MG: 12.5 TABLET ORAL at 12:08

## 2020-10-19 RX ADMIN — METOPROLOL TARTRATE 5 MG: 5 INJECTION, SOLUTION INTRAVENOUS at 00:12

## 2020-10-19 RX ADMIN — Medication 10 ML: at 20:35

## 2020-10-19 ASSESSMENT — PAIN DESCRIPTION - PROGRESSION

## 2020-10-19 ASSESSMENT — PAIN SCALES - GENERAL
PAINLEVEL_OUTOF10: 0
PAINLEVEL_OUTOF10: 3
PAINLEVEL_OUTOF10: 0
PAINLEVEL_OUTOF10: 3
PAINLEVEL_OUTOF10: 2
PAINLEVEL_OUTOF10: 0
PAINLEVEL_OUTOF10: 0
PAINLEVEL_OUTOF10: 2
PAINLEVEL_OUTOF10: 0
PAINLEVEL_OUTOF10: 2
PAINLEVEL_OUTOF10: 0
PAINLEVEL_OUTOF10: 0
PAINLEVEL_OUTOF10: 2
PAINLEVEL_OUTOF10: 3
PAINLEVEL_OUTOF10: 2
PAINLEVEL_OUTOF10: 2
PAINLEVEL_OUTOF10: 0

## 2020-10-19 ASSESSMENT — PAIN DESCRIPTION - DESCRIPTORS
DESCRIPTORS: ACHING;SHARP

## 2020-10-19 ASSESSMENT — PAIN DESCRIPTION - ORIENTATION
ORIENTATION: LEFT

## 2020-10-19 ASSESSMENT — PAIN DESCRIPTION - LOCATION
LOCATION: CHEST

## 2020-10-19 ASSESSMENT — PAIN DESCRIPTION - PAIN TYPE
TYPE: ACUTE PAIN;SURGICAL PAIN

## 2020-10-19 ASSESSMENT — PAIN - FUNCTIONAL ASSESSMENT
PAIN_FUNCTIONAL_ASSESSMENT: PREVENTS OR INTERFERES SOME ACTIVE ACTIVITIES AND ADLS

## 2020-10-19 ASSESSMENT — PAIN DESCRIPTION - FREQUENCY
FREQUENCY: INTERMITTENT

## 2020-10-19 ASSESSMENT — PAIN DESCRIPTION - ONSET
ONSET: ON-GOING

## 2020-10-19 NOTE — PROGRESS NOTES
Clinical Pharmacy Note    Balaji Simpson is a 67 y.o. male for whom pharmacy has been asked to manage warfarin therapy. Reason for Admission: Lung resection    Consulting Physician: Nader  Warfarin dose prior to admission: 25 mg TWD  Warfarin Indication: A-Fib  Target INR range: 2-3  Order for concomitant anticoagulant therapy: ASA 81 mg    Outpatient warfarin provider: DAT/Montse Hale    Past Medical History:   Diagnosis Date    CAD (coronary artery disease)     has 16 cardiac stents    Cancer ([facility] [address]     COPD (chronic obstructive pulmonary disease) (HCC)     Encephalopathy     Essential tremor     Gross hematuria     History of heart attack     has had 4 heart attacks in the past     Hydrocephalus (HCC)     Hyperlipidemia     on meds > 20 yrs    Hypertension     on meds > 20 yrs    Insomnia     Restless leg syndrome     Seizures ([facility] [address]     Tremors of nervous system     Type 2 diabetes mellitus with other circulatory complications ([facility] [address] 0/40/9350               No results for input(s): INR in the last 72 hours. Recent Labs     10/17/20  0600 10/18/20  0505 10/18/20  1925 10/19/20  0457   HGB 9.3* 9.7* 12.0* 10.1*   HCT 31.3* 32.9*  --  33.0*    166  --  193       Current warfarin drug-drug interactions: ASA, primidone    Date INR Warfarin Dose   10-19-20 ---- 5 mg                                   INR has been ordered. Patient was restarted on home dose of warfarin 5 mg today. Daily PT/INR until stable within therapeutic range. Thank you for the consult. LEONEL Hess. Ph.  10/19/2020  6:34 PM

## 2020-10-19 NOTE — PLAN OF CARE
Nutrition Problem #1: Severe malnutrition, In context of chronic illness  Intervention: Food and/or Nutrient Delivery: Modify Current Diet, Start Oral Nutrition Supplement  Nutritional Goals: po > 50% meals and ONS, maintain weight >150#, gluc < 180

## 2020-10-19 NOTE — PROGRESS NOTES
Comprehensive Nutrition Assessment    Type and Reason for Visit:  Initial(poor po per rounds)    Nutrition Recommendations/Plan:   Change to Carb Control diet  Add Diabetic ONS x3  Counsled on improtance of adequate nutritional heallth for recovery and disease management, with emphasis on nutrient rich food choices and appropriate supplement use    Nutrition Assessment:  Severe malnutrition, Pt did not trigger a nutritional referral, however pt has had > 50# weight loss ( 26%) < 1 year due to lung CA. Current diet appropriate, will begin an oral supplement    Malnutrition Assessment:  Malnutrition Status:  Severe malnutrition    Context:  Chronic Illness     Findings of the 6 clinical characteristics of malnutrition:  Energy Intake:  7 - 75% or less estimated energy requirements for 1 month or longer  Weight Loss:  7 - Greater than 20% over 1 year     Body Fat Loss:  1 - Mild body fat loss Buccal region, Orbital   Muscle Mass Loss:  1 - Mild muscle mass loss Clavicles (pectoralis & deltoids), Hand (interosseous), Temples (temporalis), Scapula (trapezius)  Fluid Accumulation:  No significant fluid accumulation     Strength:  Not Performed    Estimated Daily Nutrient Needs:  Energy (kcal):  5443-3061 kcals ( 28-30 kcal/kg); Weight Used for Energy Requirements:  Current(67 kg)     Protein (g):  80-94 g protien ( 1.2-1.4 g/kg); Weight Used for Protein Requirements:  Current(67 kg)        Fluid (ml/day):  ~1800 ( 28 ml/kg); Weight Used for Fluid Requirements:  Current(67 kg)      Nutrition Related Findings:  Up in chair at time of visit, POD #4 LLL lobectomy for lung Ca, no GI/nutrition complaints, other than poor appeite, which has been chronic. Hx includes COPD, DM, CAD, lung Ca, Labs noted glucose 137-213, taking < 50% of meals      Wounds:  None       Current Nutrition Therapies:    DIET GENERAL;     Anthropometric Measures:  · Height: 5' 8\" (172.7 cm)  · Current Body Weight: 147 lb (66.7 kg)   · Admission Body Weight: 148 lb (67.1 kg)    · Usual Body Weight: 200 lb (90.7 kg)((12/19), 173# ( 6/20), 167# ( 8/20))     · Ideal Body Weight: 154 lbs; % Ideal Body Weight   95%  · BMI: 22.4  · BMI Categories: Normal Weight (BMI 22.0 to 24.9) age over 72       Nutrition Diagnosis:   · Severe malnutrition, In context of chronic illness related to catabolic illness, inadequate protein-energy intake as evidenced by weight loss greater than or equal to 20% in 1 year, poor intake prior to admission, intake 26-50%      Nutrition Interventions:   Food and/or Nutrient Delivery:  Modify Current Diet, Start Oral Nutrition Supplement  Nutrition Education/Counseling:  Education initiated   Coordination of Nutrition Care:  Continued Inpatient Monitoring    Goals:  po > 50% meals and ONS, maintain weight >150#, gluc < 180       Nutrition Monitoring and Evaluation:     Food/Nutrient Intake Outcomes:  Food and Nutrient Intake, Supplement Intake  Physical Signs/Symptoms Outcomes:  Biochemical Data, Weight, Meal Time Behavior     Discharge Planning:     Too soon to determine     Electronically signed by Rashawn Guerra RD, LD on 10/19/20 at 3:33 PM EDT

## 2020-10-19 NOTE — PROGRESS NOTES
[facility] Heart Progress Note               Rounding Cardiologist:  Sobia Richardson MD   Primary Cardiologist: Dr. Dary Avitia    Date:  10/19/2020  Patient:  Artur Mendoza  YOB: 1945  MRN:  [id number]   Admit Date:  10/15/2020      SUBJECTIVE    Artur Mendoza was seen and examined today at bedside. He denies any chest pain or shortness of breath. Up in chair. Eating lunch. Looks and feels better. His wife is at the bedside. Yesterday am he had hypotension. Now improved. Recurrent atrial fibrillation with rapid ventricular response. Heart rates improved. He previously had post op atrial fib and eventually was noted on a Holter monitor to have some recurrence. Chronically anticoagulated. He has been maintained on sotalol. Was held postop while on ventilator. Has mostly remained in NSR prior to this admit. Transfused two 2 units of packed red blood cells yesterday. He denies chest pain or shortness of breath. Consult HPI:   Artur Mendoza is a pleasant 67 y.o. male who cardiology is asked to see in consultation postoperatively for left lower lobe squamous cell resection by Dr. Silvia Mclaughlin. Patient has the above-noted past history including known severe COPD, coronary artery disease post previous coronary angioplasties with most recent catheterization June 2020 noting patent stents, medical treatment advised. Moderate aortic stenosis. Carotid artery disease post right carotid endarterectomy. Negative Myoview stress test January 2019. Normal left ventricular function. Postoperatively patient did well. He is on ventilatory support and sedation. Vital signs are stable. Patient Follows with Dr. Nia Ramos, [facility]. Patient History and Records, EMR reviewed. Patient examined. Denies recent CP, LH, Dizziness, TIA or CVA Symptoms. No Orthopnea, Edema or CHF symptoms. No Palpitations. No Syncope. No Fever, Chills or Cold symptoms.   No GI,  or Bleeding complaints. VITALS     Vitals:    10/19/20 0500 10/19/20 0600 10/19/20 0609 10/19/20 0700   BP: 128/67 (!) 125/50  128/73   Pulse: 90 79  89   Resp: 23 17  23   Temp:       TempSrc:       SpO2: 100% 100%  100%   Weight:   147 lb 11.3 oz (67 kg)    Height:           Intake/Output Summary (Last 24 hours) at 10/19/2020 1015  Last data filed at 10/19/2020 0609  Gross per 24 hour   Intake 530 ml   Output 4070 ml   Net -3540 ml       Patient Vitals for the past 96 hrs (Last 3 readings):   Weight   10/19/20 0609 147 lb 11.3 oz (67 kg)   10/18/20 0542 152 lb 5.4 oz (69.1 kg)   10/15/20 1530 148 lb 9.4 oz (67.4 kg)       PHYSICAL EXAM   PHYSICAL EXAMINATION:  GENERAL:  Well developed, well nourished, in no acute distress. Sitting up in chair. Chest tube in place. CHEST:  Symmetric and nontender. NEURO/PSYCH:  Alert and oriented times three with approppriate behavior and responses. NECK:  Supple, no JVD, no bruit. LUNGS:  Decreased breath sounds bilaterally. HEART:  Rate and rhythm irregularly irregular. II/VI LUBNA RUSB. There are no rubs, clicks or heaves. EXTREMITIES:  Warm with good color, no clubbing or cyanosis. There is 1+ bilateral edema noted. PERIPHERAL VASCULAR:  Pulses present and equally palpable; 2+ throughout. DIAGNOSTIC RESULTS   EKG:   Atrial fibrillation  Left axis deviation  Nonspecific ST and T wave abnormality  Prolonged QT  Abnormal ECG  When compared with ECG of 16-OCT-2020 04:23,  T wave inversion now evident in Inferior leads  T wave inversion now evident in Anterior leads  Nonspecific T wave abnormality no longer evident in Lateral leads    Telemetry: atrial fibrillation - controlled.       LAB DATA   BMP:  Recent Labs     10/17/20  0559 10/18/20  0505 10/18/20  1925 10/19/20  0457    140  --  141   K 4.3 4.0  --  3.4   * 100  --  95   CO2 22 28  --  37*   BUN 10 16  --  13   CREATININE 0.45* 0.55* 0.8 0.38*   LABGLOM >60.0 >60.0 >60 >60.0       Cardiac Enzymes:  Recent Labs     10/18/20  1919   TROPONINI 0.013*       CBC:   Lab Results   Component Value Date    WBC 8.8 10/19/2020    RBC 4.56 10/19/2020    RBC 4.97 04/20/2012    HGB 10.1 10/19/2020    HCT 33.0 10/19/2020     10/19/2020       CMP:    Lab Results   Component Value Date     10/19/2020    K 3.4 10/19/2020    K 4.1 06/20/2020    CL 95 10/19/2020    CO2 37 10/19/2020    BUN 13 10/19/2020    CREATININE 0.38 10/19/2020    GFRAA >60.0 10/19/2020    LABGLOM >60.0 10/19/2020    GLUCOSE 137 10/19/2020    GLUCOSE 127 03/23/2012    CALCIUM 8.2 10/19/2020       Hepatic Function Panel:    Lab Results   Component Value Date    ALKPHOS 113 10/19/2020    ALT 6 10/19/2020    AST 22 10/19/2020    PROT 5.7 10/19/2020    BILITOT 0.5 10/19/2020    BILIDIR <0.2 04/22/2019    IBILI see below 04/22/2019    LABALBU 3.1 10/19/2020    LABALBU 4.4 03/23/2012       Magnesium:    Lab Results   Component Value Date    MG 1.8 10/19/2020    MG 2.2 12/27/2011       PT/INR:    Lab Results   Component Value Date    PROTIME 17.3 10/15/2020    PROTIME 11.7 04/20/2012    INR 1.4 10/15/2020       HgBA1c:    Lab Results   Component Value Date    LABA1C 5.9 08/03/2020       Lipid Profile:    Lab Results   Component Value Date    TRIG 72 06/23/2020    HDL 38 06/23/2020    LDLCALC 30 06/23/2020       TSH:    Lab Results   Component Value Date    TSH 1.550 08/03/2020     PRO-BNP:   Lab Results   Component Value Date    PROBNP 8,863 10/18/2020    PROBNP 241 09/09/2016        RADIOLOGY     XR CHEST PORTABLE   Final Result      STABLE POSTOPERATIVE CHEST. XR CHEST PORTABLE   Preliminary Result   PERSISTENT PREDOMINANTLY LEFT LUNG AIRSPACE OPACITIES WITH NO IMPROVEMENT. XR CHEST PORTABLE   Final Result   PULMONARY VASCULAR IS CONGESTED INCREASED INTERSTITIAL MARKINGS SUGGESTING COMPONENT OF CHF.  CORRELATE CLINICALLY   DEVELOPING AREA OF ATELECTASIS, GROUNDGLASS INFILTRATE RIGHT LOWER LOBE WITH TRACE RIGHT PLEURAL EFFUSION. AREA OF PATCHY TO COALESCENT INFILTRATE LEFT LOWER LOBE WITH TRACE LEFT PLEURAL EFFUSION. XR CHEST PORTABLE   Final Result   Status post intubation and placement of 2 chest tubes on the left. There is evidence of partial left pneumonectomy. There is no pneumothorax. Persistent increased pulmonary markings are noted in the right upper lobe. Exam: XR CHEST PORTABLE, XR CHEST PORTABLE October 16, 2020 0459 hours      History:  resp failure       Technique: AP portable view of the chest obtained. Comparison: None        Findings:      The patient is intubated with the tip of the endotracheal tube at the thoracic inlet. The cardiomediastinal silhouette is within normal limits. There is volume loss and increased pulmonary markings in the right upper lobe unchanged since previous study    with a chronic appearance. There is no pneumothorax. There are 2 chest tubes on the left side and there are multiple surgical clips in the left hilum. There is mild subcutaneous adipose soft tissues of the left hemithorax. IMPRESSION:    Status post intubation and placement of 2 chest tubes on the left. There is evidence of partial left pneumonectomy. There is no pneumothorax. Persistent increased pulmonary markings are noted in the right upper lobe. XR CHEST PORTABLE   Final Result   Status post intubation and placement of 2 chest tubes on the left. There is evidence of partial left pneumonectomy. There is no pneumothorax. Persistent increased pulmonary markings are noted in the right upper lobe. Exam: XR CHEST PORTABLE, XR CHEST PORTABLE October 16, 2020 0459 hours      History:  resp failure       Technique: AP portable view of the chest obtained. Comparison: None        Findings:      The patient is intubated with the tip of the endotracheal tube at the thoracic inlet. The cardiomediastinal silhouette is within normal limits.   There is volume loss and increased pulmonary markings in the right upper lobe unchanged since previous study    with a chronic appearance. There is no pneumothorax. There are 2 chest tubes on the left side and there are multiple surgical clips in the left hilum. There is mild subcutaneous adipose soft tissues of the left hemithorax. IMPRESSION:    Status post intubation and placement of 2 chest tubes on the left. There is evidence of partial left pneumonectomy. There is no pneumothorax. Persistent increased pulmonary markings are noted in the right upper lobe. CURRENT MEDICATIONS       potassium chloride  20 mEq Oral Once    aspirin  81 mg Oral Daily    lidocaine  1 patch Transdermal Daily    digoxin  250 mcg Oral BID    senna  2 tablet Oral Nightly    docusate sodium  100 mg Oral BID    polyethylene glycol  17 g Oral Daily    ipratropium-albuterol  1 ampule Inhalation 4x daily    budesonide  6 mg Oral Daily    primidone  250 mg Oral Nightly    iron sucrose  200 mg Intravenous Q24H    metoprolol  5 mg Intravenous Q4H    sodium chloride flush  10 mL Intravenous 2 times per day    hydrocortisone sodium succinate PF  50 mg Intravenous BID    citalopram  20 mg Oral Daily    carbidopa-levodopa-entacapone  1 tablet Oral Nightly    sotalol  80 mg Oral BID    folic acid  1 mg Oral Daily    atorvastatin  80 mg Oral Daily      norepinephrine Stopped (10/17/20 1025)       ASSESSMENT     1. 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  2. Postop anemia. Status post transfusion. 3. COPD without exacerbation. 4. Paroxysmal atrial fibrillation, recurrent postoperatively. Rates improved. 5. Long-term anticoagulation therapy, Coumadin currently held  6. Coronary artery disease status post multivessel angioplasty with repeat catheterization June 2020 revealing patent stents, medical treatment advised.   7. Negative Myoview perfusion study January 2019.  8. Normal left ventricular systolic function, LVEF 54%  9. Aortic stenosis, moderate. 10. Carotid artery disease post prior right carotid endarterectomy. 11. Hypertension. 12. Hyperlipidemia. 13. Diabetes. 14. Degenerative joint disease. 15. Prior gastritis without bleeding. 16. History of depression. 17. Past tobacco abuse. PLAN     Continue sotalol as tolerated. Discontinue Digoxin. PRN IV lopressor. Monitor QT interval.  QT on EKG today is 400 msec. Advance diet and discontinue IV fluids. Resume warfarin when chest tube is pulled. Please do not hesitate to call with questions.   Electronically signed by Emeka Schmitz MD, [facility] [place] 10[place]19/2020 at 10:15 AM

## 2020-10-19 NOTE — PROGRESS NOTES
0800 Assessment complete, see flow sheet. Patient A/O X4, following all commands. Remains on high flow and liberty well. Chest tubes X2 to left side draining well, serosang drainage, with no air leak noted. Incision clean dry and intact. Patient repositioned for comfort. [id number] Seen by Dr Gopi Zurita on morning rounds, update given. High flow removed and patient place on O2 NC @5L per order, liberty well  [address] Seen by Dr Miguel Gotti, update given. 1230 Patient repositioned for comfort, wife at bedside and update given. No changes in assessment noted. 1600 Seen by Dr Kaz Gonzalez, update given. One chest tube DCed and the other changed over to a Heimlich valve, glove tape to the end of the valve by Dr Kaz Gonzalez. Patient liberty well. Site clean dry and intact. 1730 While eating diner patient got increased SOB, SPO2 decreased to the high 80%, Placed back on high flow by resp therapy, liberty well. No changes in assessment noted. 1830 SPO2 on high flow at present time 99%. Patient resting watching TV.

## 2020-10-19 NOTE — CARE COORDINATION
Met with patient and his wife . Pt plans to return home and denies any need for HHC . Pt states his [place] Gearing is a nurse and can assess him and understands mets etc. I let them know he may need O2 as he was on high flow this AM and now 5Lnc. resp will continue to wean down and assess his sats. Pt up ambulating with nurse and feels he is strong enough to go home and not need PT/OT. Will continue to follow to assess. He would like a Rollator for home.

## 2020-10-19 NOTE — PROGRESS NOTES
Hospitalist Progress Note      PCP: Ruba Hernandez MD    Date of Admission: 10/15/2020    Chief Complaint:    No chief complaint on file. Subjective:  Patient denies fevers, chills, sweats, CP, SOb. No longer feeling SOB. 12 point ROS negative other than mentioned above     Medications:  Reviewed    Infusion Medications    norepinephrine Stopped (10/17/20 1025)     Scheduled Medications    potassium chloride  20 mEq Oral Once    aspirin  81 mg Oral Daily    lidocaine  1 patch Transdermal Daily    digoxin  250 mcg Oral BID    senna  2 tablet Oral Nightly    docusate sodium  100 mg Oral BID    polyethylene glycol  17 g Oral Daily    ipratropium-albuterol  1 ampule Inhalation 4x daily    budesonide  6 mg Oral Daily    primidone  250 mg Oral Nightly    iron sucrose  200 mg Intravenous Q24H    metoprolol  5 mg Intravenous Q4H    sodium chloride flush  10 mL Intravenous 2 times per day    hydrocortisone sodium succinate PF  50 mg Intravenous BID    citalopram  20 mg Oral Daily    carbidopa-levodopa-entacapone  1 tablet Oral Nightly    sotalol  80 mg Oral BID    folic acid  1 mg Oral Daily    atorvastatin  80 mg Oral Daily     PRN Meds: oxyCODONE-acetaminophen, albuterol, traZODone, naloxone, diphenhydrAMINE, nalbuphine, ondansetron, sodium chloride flush      Intake/Output Summary (Last 24 hours) at 10/19/2020 1010  Last data filed at 10/19/2020 0609  Gross per 24 hour   Intake 530 ml   Output 4070 ml   Net -3540 ml     Exam:    /73   Pulse 89   Temp 97.9 °F (36.6 °C) (Oral)   Resp 23   Ht 5' 8\" (1.727 m)   Wt 147 lb 11.3 oz (67 kg)   SpO2 100%   BMI 22.46 kg/m²     General appearance: No apparent distress, appears stated age and cooperative. HEENT:  Conjunctivae/corneas clear. Neck: Trachea midline. Respiratory:  Normal respiratory effort.  Clear to auscultation; chest tube in place  Cardiovascular: Regular rate and rhythm   Abdomen: Soft, non-tender, non-distended with normal bowel sounds. Musculoskeletal: No clubbing, cyanosis or edema bilaterally  Neuro: Non Focal.   Capillary Refill: Brisk,< 3 seconds   Peripheral Pulses: +2 palpable, equal bilaterally     Labs:   Recent Labs     10/17/20  0600 10/18/20  0505 10/18/20  1925 10/19/20  0457   WBC 10.8 11.1*  --  8.8   HGB 9.3* 9.7* 12.0* 10.1*   HCT 31.3* 32.9*  --  33.0*    166  --  193     Recent Labs     10/17/20  0559 10/18/20  0505 10/18/20  1925 10/19/20  0457    140  --  141   K 4.3 4.0  --  3.4   * 100  --  95   CO2 22 28  --  37*   BUN 10 16  --  13   CREATININE 0.45* 0.55* 0.8 0.38*   CALCIUM 7.4* 8.2*  --  8.2*     Recent Labs     10/17/20  0559 10/18/20  0505 10/19/20  0457   AST 17 29 22   ALT <5 <5 6   BILITOT 0.5 0.5 0.5   ALKPHOS 78 119* 113*     No results for input(s): INR in the last 72 hours. Recent Labs     10/18/20  1919   TROPONINI 0.013*     Urinalysis:      Lab Results   Component Value Date    NITRU Negative 06/18/2020    WBCUA 6-10 11/24/2019    BACTERIA Negative 11/24/2019    RBCUA 0-2 11/24/2019    BLOODU Negative 06/18/2020    SPECGRAV 1.019 06/18/2020    GLUCOSEU Negative 06/18/2020    GLUCOSEU . 1G/dL 11/21/2011     Radiology:  XR CHEST PORTABLE   Final Result      STABLE POSTOPERATIVE CHEST. XR CHEST PORTABLE   Preliminary Result   PERSISTENT PREDOMINANTLY LEFT LUNG AIRSPACE OPACITIES WITH NO IMPROVEMENT. XR CHEST PORTABLE   Final Result   PULMONARY VASCULAR IS CONGESTED INCREASED INTERSTITIAL MARKINGS SUGGESTING COMPONENT OF CHF. CORRELATE CLINICALLY   DEVELOPING AREA OF ATELECTASIS, GROUNDGLASS INFILTRATE RIGHT LOWER LOBE WITH TRACE RIGHT PLEURAL EFFUSION. AREA OF PATCHY TO COALESCENT INFILTRATE LEFT LOWER LOBE WITH TRACE LEFT PLEURAL EFFUSION. XR CHEST PORTABLE   Final Result   Status post intubation and placement of 2 chest tubes on the left. There is evidence of partial left pneumonectomy. There is no pneumothorax.  Persistent increased pulmonary markings are noted in the right upper lobe. Exam: XR CHEST PORTABLE, XR CHEST PORTABLE October 16, 2020 0459 hours      History:  resp failure       Technique: AP portable view of the chest obtained. Comparison: None        Findings:      The patient is intubated with the tip of the endotracheal tube at the thoracic inlet. The cardiomediastinal silhouette is within normal limits. There is volume loss and increased pulmonary markings in the right upper lobe unchanged since previous study    with a chronic appearance. There is no pneumothorax. There are 2 chest tubes on the left side and there are multiple surgical clips in the left hilum. There is mild subcutaneous adipose soft tissues of the left hemithorax. IMPRESSION:    Status post intubation and placement of 2 chest tubes on the left. There is evidence of partial left pneumonectomy. There is no pneumothorax. Persistent increased pulmonary markings are noted in the right upper lobe. XR CHEST PORTABLE   Final Result   Status post intubation and placement of 2 chest tubes on the left. There is evidence of partial left pneumonectomy. There is no pneumothorax. Persistent increased pulmonary markings are noted in the right upper lobe. Exam: XR CHEST PORTABLE, XR CHEST PORTABLE October 16, 2020 0459 hours      History:  resp failure       Technique: AP portable view of the chest obtained. Comparison: None        Findings:      The patient is intubated with the tip of the endotracheal tube at the thoracic inlet. The cardiomediastinal silhouette is within normal limits. There is volume loss and increased pulmonary markings in the right upper lobe unchanged since previous study    with a chronic appearance. There is no pneumothorax. There are 2 chest tubes on the left side and there are multiple surgical clips in the left hilum. There is mild subcutaneous adipose soft tissues of the left hemithorax.       IMPRESSION: Status post intubation and placement of 2 chest tubes on the left. There is evidence of partial left pneumonectomy. There is no pneumothorax. Persistent increased pulmonary markings are noted in the right upper lobe. Assessment/Plan:    #Post op care and DVT ppx:  Per primary team  #A Fib:  Cardiology is managing  #Squamous cell carcnima:  S/p lobectomy by Dr Mary Franco  #RLS:  Med resumed  #IBS:  Budenoside resumed  #Constipation:  Resolved  #Acute on chronic diastolic CHF:  Given lasix last night with resolution of symptoms    Active Hospital Problems    Diagnosis Date Noted    COPD without exacerbation ([address] [J44.9]     Carcinoma of left lung ([address] [C34.92] 10/15/2020     Additional work up or/and treatment plan may be added today or then after based on clinical progression. I am managing a portion of pt care. Some medical issues are handled by other specialists. Additional work up and treatment should be done in out pt setting by pt PCP and other out pt providers. In addition to examining and evaluating pt, I spent additional time explaining care, normal and abnormal findings, and treatment plan. All of pt questions were answered. Counseling, diet and education were  provided. Case will be discussed with nursing staff when appropriate. Family will be updated if and when appropriate.       Diet: DIET GENERAL;    Code Status: Full Code    PT/OT Eval     Electronically signed by Ida Kayser, MD on 10/19/2020 at 10:10 AM

## 2020-10-19 NOTE — PROGRESS NOTES
Progress Note    POD #  4    Patient is Marco Filomena     The chest x-ray: Congestion again noted. Chest tubes: serous drainage seen. No air leak. 100 cc since 0700. The pathology report is not yet issued. Plan: Heimlich valve to remaining CT.  I/E CXRs in am.    Electronically signed by John Burton MD on 10/19/2020 at 3:51 PM

## 2020-10-19 NOTE — PROGRESS NOTES
Hematology/Oncology   Progress Note        CHIEF COMPLAINT/HPI:  Follow up of lung cancer and anemia. CBC showed hemoglobin at 10.4. Improved after blood transfusion and parenteral iron. REVIEW OF SYSTEMS:    Unremarkable except for symptoms mentioned in HPI.     Current Inpatient Medications:    Current Facility-Administered Medications   Medication Dose Route Frequency Provider Last Rate Last Dose    [START ON 10/20/2020] hydrocortisone sodium succinate PF (SOLU-CORTEF) injection 50 mg  50 mg Intravenous Daily Melinda Rojas MD        isosorbide mononitrate (IMDUR) extended release tablet 30 mg  30 mg Oral Daily Melinda Rojas MD   30 mg at 10/19/20 1208    hydroCHLOROthiazide (HYDRODIURIL) tablet 12.5 mg  12.5 mg Oral Daily Melinda Rojas MD   12.5 mg at 10/19/20 1208    glycopyrrolate-formoterol (BEVESPI) 9-4.8 MCG/ACT inhaler 2 puff  2 puff Inhalation BID Melinda Rojas MD        ipratropium-albuterol (DUONEB) nebulizer solution 1 ampule  1 ampule Inhalation Q4H PRN Melinda Rojas MD        metoprolol (LOPRESSOR) injection 5 mg  5 mg Intravenous Q6H PRN Donavan Hernandez MD        aspirin EC tablet 81 mg  81 mg Oral Daily Trent Gillette MD   81 mg at 10/19/20 0844    oxyCODONE-acetaminophen (PERCOCET)  MG per tablet 1 tablet  1 tablet Oral Q4H PRN Amy Su MD   1 tablet at 10/18/20 1827    lidocaine 4 % external patch 1 patch  1 patch Transdermal Daily Amy Su MD   1 patch at 10/19/20 0842    senna (SENOKOT) tablet 17.2 mg  2 tablet Oral Nightly Sydney Velez MD   Stopped at 10/18/20 2101    docusate sodium (COLACE) capsule 100 mg  100 mg Oral BID Sydney Velez MD   100 mg at 10/19/20 0844    polyethylene glycol (GLYCOLAX) packet 17 g  17 g Oral Daily Sydney Velez MD   17 g at 10/19/20 0847    albuterol (PROVENTIL) nebulizer solution 2.5 mg  2.5 mg Nebulization Q2H PRN Erika Jiang MD   2.5 mg at 10/17/20 2350    budesonide (ENTOCORT EC) extended release capsule 6 mg  6 mg Oral Daily Marylin Ballard MD   6 mg at 10/18/20 0818    primidone (MYSOLINE) tablet 250 mg  250 mg Oral Nightly Marylin Ballard MD   250 mg at 10/18/20 2059    traZODone (DESYREL) tablet 100 mg  100 mg Oral Nightly PRN Marylin Ballard MD        iron sucrose (VENOFER) 200 mg in sodium chloride 0.9 % 100 mL IVPB  200 mg Intravenous Q24H Lucho Dunn MD   Stopped at 10/17/20 2208    naloxone (NARCAN) injection 0.4 mg  0.4 mg Intravenous PRN Hieu Peñaon, DO        diphenhydrAMINE (BENADRYL) injection 25 mg  25 mg Intravenous Q6H PRN Hieu Peñaon, DO        nalbuphine (NUBAIN) injection 5 mg  5 mg Intravenous Q4H PRN Anthony Cid, DO        ondansetron (ZOFRAN) injection 4 mg  4 mg Intravenous Q6H PRN Hieu Peñaon, DO        sodium chloride flush 0.9 % injection 10 mL  10 mL Intravenous 2 times per day John Pablo MD   10 mL at 10/19/20 0846    sodium chloride flush 0.9 % injection 10 mL  10 mL Intravenous PRN John Pablo MD   10 mL at 10/15/20 1617    citalopram (CELEXA) tablet 20 mg  20 mg Oral Daily Marylin Ballard MD   20 mg at 10/19/20 0843    carbidopa-levodopa-entacapone (STALEVO 100) -200 MG per tablet 1 tablet  1 tablet Oral Nightly Marylin Ballard MD   1 tablet at 10/18/20 2059    sotalol (BETAPACE) tablet 80 mg  80 mg Oral BID Lui Lemon MD   80 mg at 59/13/21 2016    folic acid (FOLVITE) tablet 1 mg  1 mg Oral Daily Marylin Ballard MD   1 mg at 10/19/20 0843    atorvastatin (LIPITOR) tablet 80 mg  80 mg Oral Daily Marylin Ballard MD   80 mg at 10/19/20 0843       PHYSICAL EXAM:    EYES:  Lids and lashes normal, pupils equal, round and reactive to light, extra ocular muscles intact, sclera clear, conjunctiva normal    ENT:  Normocephalic, without obvious abnormality, atraumatic, sinuses nontender on palpation, external ears without lesions, oral pharynx with moist mucus membranes, tonsils without erythema or exudates, gums normal and good dentition. NECK:  Supple, symmetrical, trachea midline, no adenopathy, thyroid symmetric, not enlarged and no tenderness, skin normal    CHEST:    LUNGS:  No increased work of breathing, good air exchange, clear to auscultation bilaterally, no crackles or wheezing    CARDIOVASCULAR:  Normal apical impulse, regular rate and rhythm, normal S1 and S2, no S3 or S4, and no murmur noted    ABDOMEN:  No scars, normal bowel sounds, soft, non-distended, non-tender, no masses palpated, no hepatosplenomegally    MUSCULOSKELETAL:  There is no redness, warmth, or swelling of the joints. Full range of motion noted. Motor strength is 5 out of 5 all extremities bilaterally. Tone is normal.  EXTREMITIES:    NEURO:    DATA:      PT/INR:  No results found for: PTINR  PTT:    Lab Results   Component Value Date    APTT 37.8 06/18/2020     CMP:    Lab Results   Component Value Date     10/19/2020    K 3.4 10/19/2020    K 4.1 06/20/2020    CL 95 10/19/2020    CO2 37 10/19/2020    BUN 13 10/19/2020    PROT 5.7 10/19/2020     Magnesium:    Lab Results   Component Value Date    MG 1.8 10/19/2020    MG 2.2 12/27/2011     Phosphorus:  No components found for: PO4  Calcium:  No components found for: CA  CBC:    Lab Results   Component Value Date    WBC 8.8 10/19/2020    RBC 4.56 10/19/2020    RBC 4.97 04/20/2012    HGB 10.1 10/19/2020    HCT 33.0 10/19/2020    MCV 72.4 10/19/2020    RDW 22.2 10/19/2020     10/19/2020     DIFF:    Lab Results   Component Value Date    MCV 72.4 10/19/2020    RDW 22.2 10/19/2020      LDH:  No results found for: LDH  Uric Acid:  No components found for: URIC    EKG Reviewed  Appropriate Radiology Reviewed      Pathology: Reviewed where indicated      ASSESSMENT:  Active Problems:    Carcinoma of left lung ([facility]    COPD without exacerbation ([facility] [address]  Resolved Problems:    * No resolved hospital problems.  *    Patient Active Problem List   Diagnosis    Benign prostatic hyperplasia without lower urinary tract symptoms    Essential hypertension    Atrial fibrillation (HCC)    Iron deficiency    Carotid stenosis, right    RLS (restless legs syndrome)    Tobacco use disorder    Gross hematuria    Prostate cancer ([address]    Restless leg syndrome, uncontrolled    Disabling essential tremor    Insomnia    Recurrent major depressive disorder, in partial remission ([address]    Type 2 diabetes mellitus with other circulatory complications (HCC)    Chest pain    NSTEMI (non-ST elevated myocardial infarction) (HCC)    Lumbar radiculopathy    Essential tremor    Idiopathic hypotension    Gastric polyps    Gastritis without bleeding    Carcinoma of left lung ([address]    COPD without exacerbation (HCC)       PLAN:  Follow up in our office on discharge.           Electronically signed by Harsha Ba MD on 10/19/20 at 12:47 PM EDT

## 2020-10-19 NOTE — PROGRESS NOTES
Pulmonary & Critical Care Medicine ICU Progress Note  Chief complaint : Acute respiratory failure    Subjunctive/24 hour events :   Patient seen and examined during multidisciplinary rounds with RN, charge nurse, RT, pharmacy, dietitian, and social service. Patient feels better, he requested to be taken off the high flow stool nausea for him, chest tube in place no air leak, pain is controlled, he did have a bowel movement yesterday, urine output is good, no nausea no vomiting he walked yesterday in the hallway and tolerated that well on nasal cannula. He tolerated BiPAP while asleep at night      Social History     Tobacco Use    Smoking status: Former Smoker     Packs/day: 1.00     Years: 58.00     Pack years: 58.00     Types: Cigarettes     Start date: 1958     Last attempt to quit: 3/14/2020     Years since quittin.6    Smokeless tobacco: Never Used   Substance Use Topics    Alcohol use:  Yes     Alcohol/week: 3.0 standard drinks     Types: 3 Shots of liquor per week     Comment: once weekly  or more         Problem Relation Age of Onset    Other Mother         liver scerosis    Other Father         did not have a father    Other Sister         does not know sister   Carina Pineda         brain cancer    Other Son         unsure of medical problems    Other Daughter         unsure of medical problems       Recent Labs     10/18/20  1925   PHART 7.417   MCA2SHR 51*   PO2ART 97*       MV Settings:  Vent Mode: AC/VC Rate Set: 16 bmp/Vt Ordered: 450 mL/ /FiO2 : 40 %           IV:   norepinephrine Stopped (10/17/20 1025)       Vitals:  /73   Pulse 89   Temp 97.9 °F (36.6 °C) (Oral)   Resp 23   Ht 5' 8\" (1.727 m)   Wt 147 lb 11.3 oz (67 kg)   SpO2 100%   BMI 22.46 kg/m²    Tmax:        Intake/Output Summary (Last 24 hours) at 10/19/2020 1124  Last data filed at 10/19/2020 0609  Gross per 24 hour   Intake 530 ml   Output 3920 ml   Net -3390 ml       EXAM:  General: alert, cooperative, no distress  Head: normocephalic, atraumatic  Eyes:No gross abnormalities. ENT:  MMM no lesions  Neck:  supple  Chest : Few crackles or rhonchi, no wheezing, nontender, tympanic chest tube in place, no subcutaneous air  Heart[de-identified] Heart sounds are normal.  Regular rate and rhythm without murmur, gallop or rub. ABD:  symmetric, soft, non-tender  Musculoskeletal : no cyanosis, no clubbing and no edema  Neuro:  Grossly normal  Skin: No rashes or nodules noted.   Lymph node:  no cervical nodes  Urology: No [place]   Psychiatric: appropriate    Medications:  Scheduled Meds:   potassium chloride  20 mEq Oral Once    aspirin  81 mg Oral Daily    lidocaine  1 patch Transdermal Daily    digoxin  250 mcg Oral BID    senna  2 tablet Oral Nightly    docusate sodium  100 mg Oral BID    polyethylene glycol  17 g Oral Daily    ipratropium-albuterol  1 ampule Inhalation 4x daily    budesonide  6 mg Oral Daily    primidone  250 mg Oral Nightly    iron sucrose  200 mg Intravenous Q24H    metoprolol  5 mg Intravenous Q4H    sodium chloride flush  10 mL Intravenous 2 times per day    hydrocortisone sodium succinate PF  50 mg Intravenous BID    citalopram  20 mg Oral Daily    carbidopa-levodopa-entacapone  1 tablet Oral Nightly    sotalol  80 mg Oral BID    folic acid  1 mg Oral Daily    atorvastatin  80 mg Oral Daily       PRN Meds:  oxyCODONE-acetaminophen, albuterol, traZODone, naloxone, diphenhydrAMINE, nalbuphine, ondansetron, sodium chloride flush    Results: reviewed by me   CBC:   Recent Labs     10/17/20  0600 10/18/20  0505 10/18/20  1925 10/19/20  0457   WBC 10.8 11.1*  --  8.8   HGB 9.3* 9.7* 12.0* 10.1*   HCT 31.3* 32.9*  --  33.0*   MCV 76.2* 75.0*  --  72.4*    166  --  193     BMP:   Recent Labs     10/17/20  0559 10/18/20  0505 10/18/20  1925 10/19/20  0457    140  --  141   K 4.3 4.0  --  3.4   * 100  --  95   CO2 22 28  --  37*   BUN 10 16  --  13   CREATININE 0.45* 0.55* 0.8 0.38*     LIVER PROFILE:   Recent Labs     10/17/20  0559 10/18/20  0505 10/19/20  0457   AST 17 29 22   ALT <5 <5 6   BILITOT 0.5 0.5 0.5   ALKPHOS 78 119* 113*     PT/INR: No results for input(s): PROTIME, INR in the last 72 hours. APTT: No results for input(s): APTT in the last 72 hours. UA:No results for input(s): NITRITE, COLORU, PHUR, LABCAST, WBCUA, RBCUA, MUCUS, TRICHOMONAS, YEAST, BACTERIA, CLARITYU, SPECGRAV, LEUKOCYTESUR, UROBILINOGEN, BILIRUBINUR, BLOODU, GLUCOSEU, AMORPHOUS in the last 72 hours. Invalid input(s): Tawny Odor    Cultures:  Negative so far  Films:  CXR reviewed by me and it showed chest tubes in place, stable no pneumothorax      Assessment: This is a critically ill patient at risk of deterioration / death , needing close ICU monitoring and intervention due to below noted problems   · Acute respiratory insufficiency status post extubation  · Chronic hypercapnic respiratory failure due to underlying severe COPD  · COPD at baseline no signs of exacerbation  · Non-small cell lung cancer status post left lower lobe lobectomy  Recommendation  · Transition to nasal cannula  · BiPAP while asleep  · Encourage activity  · DC metoprolol, and continue sotalol  · Resume hydrochlorothiazide, Imdur   · Wean off Solu-Cortef  · Start Bevespi  · Pulmonary hygiene  · Watch volume status avoid overload  · Chest tube management per thoracic surgery      I spent 30 min with this patient, greater the 50% of this time was spent in counseling and/or coordinating of care.           Electronically signed by Jerel Rodas MD,  [facility] ,on 10/19/2020 at 11:24 AM

## 2020-10-20 ENCOUNTER — APPOINTMENT (OUTPATIENT)
Dept: GENERAL RADIOLOGY | Age: 75
DRG: 163 | End: 2020-10-20
Attending: THORACIC SURGERY (CARDIOTHORACIC VASCULAR SURGERY)
Payer: MEDICARE

## 2020-10-20 ENCOUNTER — APPOINTMENT (OUTPATIENT)
Dept: PHARMACY | Age: 75
End: 2020-10-20
Payer: MEDICARE

## 2020-10-20 LAB
ALBUMIN SERPL-MCNC: 2.9 G/DL (ref 3.5–4.6)
ANION GAP SERPL CALCULATED.3IONS-SCNC: 6 MEQ/L (ref 9–15)
ANISOCYTOSIS: ABNORMAL
BASOPHILS ABSOLUTE: 0.1 K/UL (ref 0–0.2)
BASOPHILS RELATIVE PERCENT: 1.2 %
BUN BLDV-MCNC: 16 MG/DL (ref 8–23)
CALCIUM SERPL-MCNC: 7.9 MG/DL (ref 8.5–9.9)
CHLORIDE BLD-SCNC: 94 MEQ/L (ref 95–107)
CO2: 40 MEQ/L (ref 20–31)
CREAT SERPL-MCNC: 0.51 MG/DL (ref 0.7–1.2)
EOSINOPHILS ABSOLUTE: 0.6 K/UL (ref 0–0.7)
EOSINOPHILS RELATIVE PERCENT: 5.8 %
GFR AFRICAN AMERICAN: >60
GFR NON-AFRICAN AMERICAN: >60
GLUCOSE BLD-MCNC: 192 MG/DL (ref 70–99)
HCT VFR BLD CALC: 37.5 % (ref 42–52)
HEMOGLOBIN: 11.1 G/DL (ref 14–18)
INR BLD: 1.4
LYMPHOCYTES ABSOLUTE: 0.4 K/UL (ref 1–4.8)
LYMPHOCYTES RELATIVE PERCENT: 4 %
MCH RBC QN AUTO: 22 PG (ref 27–31.3)
MCHC RBC AUTO-ENTMCNC: 29.6 % (ref 33–37)
MCV RBC AUTO: 74.1 FL (ref 80–100)
MICROCYTES: ABNORMAL
MONOCYTES ABSOLUTE: 0.8 K/UL (ref 0.2–0.8)
MONOCYTES RELATIVE PERCENT: 7.8 %
NEUTROPHILS ABSOLUTE: 8.1 K/UL (ref 1.4–6.5)
NEUTROPHILS RELATIVE PERCENT: 81.2 %
OVALOCYTES: ABNORMAL
PDW BLD-RTO: 22.6 % (ref 11.5–14.5)
PHOSPHORUS: 2.8 MG/DL (ref 2.3–4.8)
PLATELET # BLD: 196 K/UL (ref 130–400)
PLATELET SLIDE REVIEW: NORMAL
POIKILOCYTES: ABNORMAL
POTASSIUM SERPL-SCNC: 3.4 MEQ/L (ref 3.4–4.9)
PROTHROMBIN TIME: 17 SEC (ref 12.3–14.9)
RBC # BLD: 5.05 M/UL (ref 4.7–6.1)
RETICULOCYTE ABSOLUTE COUNT: 0.09 M/CUMM (ref 0.02–0.11)
RETICULOCYTE COUNT PCT: 1.7 % (ref 0.6–2.2)
SODIUM BLD-SCNC: 140 MEQ/L (ref 135–144)
TEAR DROP CELLS: ABNORMAL
WBC # BLD: 10 K/UL (ref 4.8–10.8)

## 2020-10-20 PROCEDURE — 85610 PROTHROMBIN TIME: CPT

## 2020-10-20 PROCEDURE — 6370000000 HC RX 637 (ALT 250 FOR IP): Performed by: INTERNAL MEDICINE

## 2020-10-20 PROCEDURE — 99233 SBSQ HOSP IP/OBS HIGH 50: CPT | Performed by: INTERNAL MEDICINE

## 2020-10-20 PROCEDURE — 2580000003 HC RX 258: Performed by: THORACIC SURGERY (CARDIOTHORACIC VASCULAR SURGERY)

## 2020-10-20 PROCEDURE — 6360000002 HC RX W HCPCS: Performed by: INTERNAL MEDICINE

## 2020-10-20 PROCEDURE — 2000000000 HC ICU R&B

## 2020-10-20 PROCEDURE — 85025 COMPLETE CBC W/AUTO DIFF WBC: CPT

## 2020-10-20 PROCEDURE — 2580000003 HC RX 258: Performed by: INTERNAL MEDICINE

## 2020-10-20 PROCEDURE — 71046 X-RAY EXAM CHEST 2 VIEWS: CPT

## 2020-10-20 PROCEDURE — 94761 N-INVAS EAR/PLS OXIMETRY MLT: CPT

## 2020-10-20 PROCEDURE — 94640 AIRWAY INHALATION TREATMENT: CPT

## 2020-10-20 PROCEDURE — 85046 RETICYTE/HGB CONCENTRATE: CPT

## 2020-10-20 PROCEDURE — 2700000000 HC OXYGEN THERAPY PER DAY

## 2020-10-20 PROCEDURE — 36415 COLL VENOUS BLD VENIPUNCTURE: CPT

## 2020-10-20 PROCEDURE — 80069 RENAL FUNCTION PANEL: CPT

## 2020-10-20 PROCEDURE — 6370000000 HC RX 637 (ALT 250 FOR IP): Performed by: THORACIC SURGERY (CARDIOTHORACIC VASCULAR SURGERY)

## 2020-10-20 RX ORDER — POTASSIUM CHLORIDE 20 MEQ/1
20 TABLET, EXTENDED RELEASE ORAL 2 TIMES DAILY WITH MEALS
Status: DISCONTINUED | OUTPATIENT
Start: 2020-10-20 | End: 2020-10-20

## 2020-10-20 RX ORDER — BUDESONIDE 0.5 MG/2ML
0.5 INHALANT ORAL 2 TIMES DAILY
Status: DISCONTINUED | OUTPATIENT
Start: 2020-10-20 | End: 2020-10-21 | Stop reason: HOSPADM

## 2020-10-20 RX ORDER — POTASSIUM CHLORIDE 20 MEQ/1
40 TABLET, EXTENDED RELEASE ORAL ONCE
Status: COMPLETED | OUTPATIENT
Start: 2020-10-20 | End: 2020-10-20

## 2020-10-20 RX ADMIN — SOTALOL HYDROCHLORIDE 80 MG: 80 TABLET ORAL at 09:28

## 2020-10-20 RX ADMIN — HYDROCHLOROTHIAZIDE 12.5 MG: 12.5 TABLET ORAL at 08:35

## 2020-10-20 RX ADMIN — DOCUSATE SODIUM 100 MG: 100 CAPSULE ORAL at 20:42

## 2020-10-20 RX ADMIN — HYDROCORTISONE SODIUM SUCCINATE 50 MG: 100 INJECTION, POWDER, FOR SOLUTION INTRAMUSCULAR; INTRAVENOUS at 08:35

## 2020-10-20 RX ADMIN — BUDESONIDE 6 MG: 3 CAPSULE, COATED PELLETS ORAL at 09:20

## 2020-10-20 RX ADMIN — Medication 10 ML: at 20:42

## 2020-10-20 RX ADMIN — ISOSORBIDE MONONITRATE 30 MG: 30 TABLET, EXTENDED RELEASE ORAL at 08:35

## 2020-10-20 RX ADMIN — Medication 10 ML: at 09:29

## 2020-10-20 RX ADMIN — BUDESONIDE 500 MCG: 0.5 SUSPENSION RESPIRATORY (INHALATION) at 16:34

## 2020-10-20 RX ADMIN — PRIMIDONE 250 MG: 250 TABLET ORAL at 20:42

## 2020-10-20 RX ADMIN — WARFARIN SODIUM 7.5 MG: 2.5 TABLET ORAL at 17:05

## 2020-10-20 RX ADMIN — GLYCOPYRROLATE AND FORMOTEROL FUMARATE 2 PUFF: 9; 4.8 AEROSOL, METERED RESPIRATORY (INHALATION) at 16:35

## 2020-10-20 RX ADMIN — ASPIRIN 81 MG: 81 TABLET, COATED ORAL at 08:35

## 2020-10-20 RX ADMIN — SOTALOL HYDROCHLORIDE 80 MG: 80 TABLET ORAL at 20:42

## 2020-10-20 RX ADMIN — CARBIDOPA, LEVODOPA, AND ENTACAPONE 1 TABLET: 25; 100; 200 TABLET, FILM COATED ORAL at 20:41

## 2020-10-20 RX ADMIN — FOLIC ACID 1 MG: 1 TABLET ORAL at 08:35

## 2020-10-20 RX ADMIN — GLYCOPYRROLATE AND FORMOTEROL FUMARATE 2 PUFF: 9; 4.8 AEROSOL, METERED RESPIRATORY (INHALATION) at 04:19

## 2020-10-20 RX ADMIN — POTASSIUM CHLORIDE 40 MEQ: 20 TABLET, EXTENDED RELEASE ORAL at 20:42

## 2020-10-20 RX ADMIN — SODIUM CHLORIDE 200 MG: 9 INJECTION, SOLUTION INTRAVENOUS at 11:46

## 2020-10-20 RX ADMIN — CITALOPRAM HYDROBROMIDE 20 MG: 20 TABLET ORAL at 08:35

## 2020-10-20 RX ADMIN — ATORVASTATIN CALCIUM 80 MG: 80 TABLET, FILM COATED ORAL at 08:35

## 2020-10-20 ASSESSMENT — PAIN SCALES - GENERAL
PAINLEVEL_OUTOF10: 0

## 2020-10-20 NOTE — PROGRESS NOTES
[facility] Heart Progress Note               Rounding Cardiologist:  Valdemar Guerrero MD   Primary Cardiologist: Dr. Mars Moreno    Date:  10/20/2020  Patient:  eSbas Bhatti  YOB: 1945  MRN:  [id number]   Admit Date:  10/15/2020      SUBJECTIVE    Sebas Bhatti was seen and examined today at bedside. He denies any chest pain or shortness of breath. Up in chair. Eating lunch. Looks and feels better. His wife is at the bedside. Earlier this admit developed atrial fibrillation with rapid ventricular response. Heart rates improved. He previously had post op atrial fib and eventually was noted on a Holter monitor to have some recurrence. Chronically anticoagulated. He has been maintained on sotalol which was held postop while on ventilator. Predominantly NSR prior to this admit. He denies chest pain or shortness of breath. Consult HPI:   Sebas Bhatti is a pleasant 67 y.o. male who cardiology is asked to see in consultation postoperatively for left lower lobe squamous cell resection by Dr. Katelin Hernandez. Patient has the above-noted past history including known severe COPD, coronary artery disease post previous coronary angioplasties with most recent catheterization June 2020 noting patent stents, medical treatment advised. Moderate aortic stenosis. Carotid artery disease post right carotid endarterectomy. Negative Myoview stress test January 2019. Normal left ventricular function. Postoperatively patient did well. He is on ventilatory support and sedation. Vital signs are stable. Patient Follows with Dr. Jaycob Tan, [facility]. Patient History and Records, EMR reviewed. Patient examined. Denies recent CP, LH, Dizziness, TIA or CVA Symptoms. No Orthopnea, Edema or CHF symptoms. No Palpitations. No Syncope. No Fever, Chills or Cold symptoms. No GI,  or Bleeding complaints.       VITALS     Vitals:    10/20/20 0900 10/20/20 1000 10/20/20 1100 10/20/20 1200   BP: (!) 142/80  (!) 107/56 (!) 143/65   Pulse: 93 92 91 81   Resp: 23 21 25 20   Temp:    97.4 °F (36.3 °C)   TempSrc:    Oral   SpO2: 100% 100% 98% 95%   Weight:       Height:           Intake/Output Summary (Last 24 hours) at 10/20/2020 1332  Last data filed at 10/20/2020 0600  Gross per 24 hour   Intake 360 ml   Output 1100 ml   Net -740 ml       Patient Vitals for the past 96 hrs (Last 3 readings):   Weight   10/19/20 0609 147 lb 11.3 oz (67 kg)   10/18/20 0542 152 lb 5.4 oz (69.1 kg)       PHYSICAL EXAM   PHYSICAL EXAMINATION:  GENERAL:  Well developed, well nourished, in no acute distress. Sitting up in chair. Chest tube in place. CHEST:  Symmetric and nontender. NEURO/PSYCH:  Alert and oriented times three with approppriate behavior and responses. NECK:  Supple, no JVD, no bruit. LUNGS:  Decreased breath sounds bilaterally. HEART:  Rate and rhythm irregularly irregular. II/VI LUBNA RUSB. There are no rubs, clicks or heaves. EXTREMITIES:  Warm with good color, no clubbing or cyanosis. There is 1+ bilateral edema noted. PERIPHERAL VASCULAR:  Pulses present and equally palpable; 2+ throughout. DIAGNOSTIC RESULTS   EKG:   Atrial fibrillation  Left axis deviation  Nonspecific ST and T wave abnormality  Prolonged QT  Abnormal ECG  When compared with ECG of 16-OCT-2020 04:23,  T wave inversion now evident in Inferior leads  T wave inversion now evident in Anterior leads  Nonspecific T wave abnormality no longer evident in Lateral leads    Telemetry: atrial fibrillation - controlled.       LAB DATA   BMP:  Recent Labs     10/18/20  0505 10/18/20  1925 10/19/20  0457     --  141   K 4.0  --  3.4     --  95   CO2 28  --  37*   BUN 16  --  13   CREATININE 0.55* 0.8 0.38*   LABGLOM >60.0 >60 >60.0       Cardiac Enzymes:  Recent Labs     10/18/20  1919   TROPONINI 0.013*       CBC:   Lab Results   Component Value Date    WBC 10.0 10/20/2020    RBC 5.05 10/20/2020    RBC 4.97 04/20/2012 HGB 11.1 10/20/2020    HCT 37.5 10/20/2020     10/20/2020       CMP:    Lab Results   Component Value Date     10/19/2020    K 3.4 10/19/2020    K 4.1 06/20/2020    CL 95 10/19/2020    CO2 37 10/19/2020    BUN 13 10/19/2020    CREATININE 0.38 10/19/2020    GFRAA >60.0 10/19/2020    LABGLOM >60.0 10/19/2020    GLUCOSE 137 10/19/2020    GLUCOSE 127 03/23/2012    CALCIUM 8.2 10/19/2020       Hepatic Function Panel:    Lab Results   Component Value Date    ALKPHOS 113 10/19/2020    ALT 6 10/19/2020    AST 22 10/19/2020    PROT 5.7 10/19/2020    BILITOT 0.5 10/19/2020    BILIDIR <0.2 04/22/2019    IBILI see below 04/22/2019    LABALBU 3.1 10/19/2020    LABALBU 4.4 03/23/2012       Magnesium:    Lab Results   Component Value Date    MG 1.8 10/19/2020    MG 2.2 12/27/2011       PT/INR:    Lab Results   Component Value Date    PROTIME 17.0 10/20/2020    PROTIME 11.7 04/20/2012    INR 1.4 10/20/2020       HgBA1c:    Lab Results   Component Value Date    LABA1C 5.9 08/03/2020       Lipid Profile:    Lab Results   Component Value Date    TRIG 72 06/23/2020    HDL 38 06/23/2020    LDLCALC 30 06/23/2020       TSH:    Lab Results   Component Value Date    TSH 1.550 08/03/2020     PRO-BNP:   Lab Results   Component Value Date    PROBNP 8,863 10/18/2020    PROBNP 241 09/09/2016        RADIOLOGY     XR CHEST INSPIRATION AND EXPIRATION   Final Result      1. The left chest tube remains in place. There is a small left apical pneumothorax slightly more apparent on expiration. 2. There is a small right pleural effusion. XR CHEST PORTABLE   Final Result      STABLE POSTOPERATIVE CHEST. XR CHEST PORTABLE   Final Result   PERSISTENT PREDOMINANTLY LEFT LUNG AIRSPACE OPACITIES WITH NO IMPROVEMENT. XR CHEST PORTABLE   Final Result   PULMONARY VASCULAR IS CONGESTED INCREASED INTERSTITIAL MARKINGS SUGGESTING COMPONENT OF CHF.  CORRELATE CLINICALLY   DEVELOPING AREA OF ATELECTASIS, GROUNDGLASS INFILTRATE RIGHT LOWER LOBE WITH TRACE RIGHT PLEURAL EFFUSION. AREA OF PATCHY TO COALESCENT INFILTRATE LEFT LOWER LOBE WITH TRACE LEFT PLEURAL EFFUSION. XR CHEST PORTABLE   Final Result   Status post intubation and placement of 2 chest tubes on the left. There is evidence of partial left pneumonectomy. There is no pneumothorax. Persistent increased pulmonary markings are noted in the right upper lobe. Exam: XR CHEST PORTABLE, XR CHEST PORTABLE October 16, 2020 0459 hours      History:  resp failure       Technique: AP portable view of the chest obtained. Comparison: None        Findings:      The patient is intubated with the tip of the endotracheal tube at the thoracic inlet. The cardiomediastinal silhouette is within normal limits. There is volume loss and increased pulmonary markings in the right upper lobe unchanged since previous study    with a chronic appearance. There is no pneumothorax. There are 2 chest tubes on the left side and there are multiple surgical clips in the left hilum. There is mild subcutaneous adipose soft tissues of the left hemithorax. IMPRESSION:    Status post intubation and placement of 2 chest tubes on the left. There is evidence of partial left pneumonectomy. There is no pneumothorax. Persistent increased pulmonary markings are noted in the right upper lobe. XR CHEST PORTABLE   Final Result   Status post intubation and placement of 2 chest tubes on the left. There is evidence of partial left pneumonectomy. There is no pneumothorax. Persistent increased pulmonary markings are noted in the right upper lobe. Exam: XR CHEST PORTABLE, XR CHEST PORTABLE October 16, 2020 0459 hours      History:  resp failure       Technique: AP portable view of the chest obtained. Comparison: None        Findings:      The patient is intubated with the tip of the endotracheal tube at the thoracic inlet.  The cardiomediastinal silhouette is within normal limits. There is volume loss and increased pulmonary markings in the right upper lobe unchanged since previous study    with a chronic appearance. There is no pneumothorax. There are 2 chest tubes on the left side and there are multiple surgical clips in the left hilum. There is mild subcutaneous adipose soft tissues of the left hemithorax. IMPRESSION:    Status post intubation and placement of 2 chest tubes on the left. There is evidence of partial left pneumonectomy. There is no pneumothorax. Persistent increased pulmonary markings are noted in the right upper lobe. XR CHEST PORTABLE    (Results Pending)     CURRENT MEDICATIONS       budesonide  0.5 mg Nebulization BID    hydrocortisone sodium succinate PF  50 mg Intravenous Daily    isosorbide mononitrate  30 mg Oral Daily    hydroCHLOROthiazide  12.5 mg Oral Daily    glycopyrrolate-formoterol  2 puff Inhalation BID    warfarin (COUMADIN) daily dosing (placeholder)   Other RX Placeholder    aspirin  81 mg Oral Daily    lidocaine  1 patch Transdermal Daily    senna  2 tablet Oral Nightly    docusate sodium  100 mg Oral BID    polyethylene glycol  17 g Oral Daily    budesonide  6 mg Oral Daily    primidone  250 mg Oral Nightly    sodium chloride flush  10 mL Intravenous 2 times per day    citalopram  20 mg Oral Daily    carbidopa-levodopa-entacapone  1 tablet Oral Nightly    sotalol  80 mg Oral BID    folic acid  1 mg Oral Daily    atorvastatin  80 mg Oral Daily         ASSESSMENT     1. 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  2. Postop anemia. Status post transfusion. 3. COPD without exacerbation. 4. Paroxysmal atrial fibrillation, recurrent postoperatively. Rates improved. 5. Long-term anticoagulation therapy, Coumadin currently held  6.  Coronary artery disease status post multivessel angioplasty with repeat catheterization June 2020 revealing patent stents, medical treatment advised. 7. Negative Myoview perfusion study January 2019.  8. Normal left ventricular systolic function, LVEF 09%  9. Aortic stenosis, moderate. 10. Carotid artery disease post prior right carotid endarterectomy. 11. Hypertension. 12. Hyperlipidemia. 13. Diabetes. 14. Degenerative joint disease. 15. Prior gastritis without bleeding. 16. History of depression. 17. Past tobacco abuse. PLAN     Continue sotalol as tolerated. Digoxin was discontinued. PRN IV lopressor. Monitor QT interval.  QT on EKG today is 400 msec. Advance diet and discontinue IV fluids. Resume warfarin. Please do not hesitate to call with questions.   Electronically signed by Brain Bear MD, [facility] on 10/20/2020 at 1:32 PM

## 2020-10-20 NOTE — PROGRESS NOTES
Hematology/Oncology   Progress Note        CHIEF COMPLAINT/HPI:  Follow up of anemia. Still has microcytosis. He will benefit from additional venofer today. Checking retic count. REVIEW OF SYSTEMS:    Unremarkable except for symptoms mentioned in HPI.     Current Inpatient Medications:    Current Facility-Administered Medications   Medication Dose Route Frequency Provider Last Rate Last Dose    iron sucrose (VENOFER) 200 mg in sodium chloride 0.9 % 100 mL IVPB  200 mg Intravenous Once Tu Potter MD        hydrocortisone sodium succinate PF (SOLU-CORTEF) injection 50 mg  50 mg Intravenous Daily Melinda Rojas MD   50 mg at 10/20/20 0835    isosorbide mononitrate (IMDUR) extended release tablet 30 mg  30 mg Oral Daily Melinda Rojas MD   30 mg at 10/20/20 0835    hydroCHLOROthiazide (HYDRODIURIL) tablet 12.5 mg  12.5 mg Oral Daily Melinda Rojas MD   12.5 mg at 10/20/20 0835    glycopyrrolate-formoterol (BEVESPI) 9-4.8 MCG/ACT inhaler 2 puff  2 puff Inhalation BID Melinda Rojas MD   2 puff at 10/20/20 0419    ipratropium-albuterol (DUONEB) nebulizer solution 1 ampule  1 ampule Inhalation Q4H PRN Melinda Rojas MD        metoprolol (LOPRESSOR) injection 5 mg  5 mg Intravenous Q6H PRN Donavan Hernandez MD        warfarin (COUMADIN) daily dosing (placeholder)   Other RX Placeholder Sydney Velez MD        aspirin EC tablet 81 mg  81 mg Oral Daily Trent Gillette MD   81 mg at 10/20/20 0835    oxyCODONE-acetaminophen (PERCOCET)  MG per tablet 1 tablet  1 tablet Oral Q4H PRN Amy Su MD   1 tablet at 10/18/20 1827    lidocaine 4 % external patch 1 patch  1 patch Transdermal Daily Amy Su MD   1 patch at 10/20/20 0834    senna (SENOKOT) tablet 17.2 mg  2 tablet Oral Nightly Sydney Velez MD   17.2 mg at 10/19/20 2035    docusate sodium (COLACE) capsule 100 mg  100 mg Oral BID Sydney Velez MD   100 mg at 10/19/20 2036    polyethylene glycol (GLYCOLAX) packet 17 g  17 g Oral Daily Marylin Ballard MD   17 g at 10/19/20 0847    albuterol (PROVENTIL) nebulizer solution 2.5 mg  2.5 mg Nebulization Q2H PRN Kierra Sauceda MD   2.5 mg at 10/17/20 2350    budesonide (ENTOCORT EC) extended release capsule 6 mg  6 mg Oral Daily Marylin Ballard MD   6 mg at 10/20/20 0920    primidone (MYSOLINE) tablet 250 mg  250 mg Oral Nightly Marylin Ballard MD   250 mg at 10/19/20 2036    traZODone (DESYREL) tablet 100 mg  100 mg Oral Nightly PRN Marylin Ballard MD        sodium chloride flush 0.9 % injection 10 mL  10 mL Intravenous 2 times per day John Pablo MD   10 mL at 10/20/20 0929    sodium chloride flush 0.9 % injection 10 mL  10 mL Intravenous PRN Jonh Pablo MD   10 mL at 10/15/20 1617    citalopram (CELEXA) tablet 20 mg  20 mg Oral Daily Marylin Ballard MD   20 mg at 10/20/20 0835    carbidopa-levodopa-entacapone (STALEVO 100) -200 MG per tablet 1 tablet  1 tablet Oral Nightly Marylin Ballard MD   1 tablet at 10/19/20 2036    sotalol (BETAPACE) tablet 80 mg  80 mg Oral BID Lui Lemon MD   80 mg at 65/48/76 4735    folic acid (FOLVITE) tablet 1 mg  1 mg Oral Daily Marylin Ballard MD   1 mg at 10/20/20 8896    atorvastatin (LIPITOR) tablet 80 mg  80 mg Oral Daily Marylin Ballard MD   80 mg at 10/20/20 0835       PHYSICAL EXAM:    EYES:  Lids and lashes normal, pupils equal, round and reactive to light, extra ocular muscles intact, sclera clear, conjunctiva normal    ENT:  Normocephalic, without obvious abnormality, atraumatic, sinuses nontender on palpation, external ears without lesions, oral pharynx with moist mucus membranes, tonsils without erythema or exudates, gums normal and good dentition.     NECK:  Supple, symmetrical, trachea midline, no adenopathy, thyroid symmetric, not enlarged and no tenderness, skin normal    CHEST:    LUNGS:  No increased work of breathing, good air exchange, clear to auscultation bilaterally, no crackles or wheezing    CARDIOVASCULAR:  Normal apical impulse, regular rate and rhythm, normal S1 and S2, no S3 or S4, and no murmur noted    ABDOMEN:  No scars, normal bowel sounds, soft, non-distended, non-tender, no masses palpated, no hepatosplenomegally    MUSCULOSKELETAL:  There is no redness, warmth, or swelling of the joints. Full range of motion noted. Motor strength is 5 out of 5 all extremities bilaterally. Tone is normal.  EXTREMITIES:    NEURO:    DATA:      PT/INR:  No results found for: PTINR  PTT:    Lab Results   Component Value Date    APTT 37.8 06/18/2020     CMP:    Lab Results   Component Value Date     10/19/2020    K 3.4 10/19/2020    K 4.1 06/20/2020    CL 95 10/19/2020    CO2 37 10/19/2020    BUN 13 10/19/2020    PROT 5.7 10/19/2020     Magnesium:    Lab Results   Component Value Date    MG 1.8 10/19/2020    MG 2.2 12/27/2011     Phosphorus:  No components found for: PO4  Calcium:  No components found for: CA  CBC:    Lab Results   Component Value Date    WBC 8.8 10/19/2020    RBC 4.56 10/19/2020    RBC 4.97 04/20/2012    HGB 10.1 10/19/2020    HCT 33.0 10/19/2020    MCV 72.4 10/19/2020    RDW 22.2 10/19/2020     10/19/2020     DIFF:    Lab Results   Component Value Date    MCV 72.4 10/19/2020    RDW 22.2 10/19/2020      LDH:  No results found for: LDH  Uric Acid:  No components found for: URIC    EKG Reviewed  Appropriate Radiology Reviewed      Pathology: Reviewed where indicated      ASSESSMENT:  Active Problems:    Carcinoma of left lung (HCC)    COPD without exacerbation ([address]    Severe malnutrition ([address]  Resolved Problems:    * No resolved hospital problems.  *    Patient Active Problem List   Diagnosis    Benign prostatic hyperplasia without lower urinary tract symptoms    Essential hypertension    Atrial fibrillation (HCC)    Iron deficiency    Carotid stenosis, right    RLS (restless legs syndrome)    Tobacco use disorder    Gross hematuria    Prostate cancer ([address]    Restless leg syndrome, uncontrolled    Disabling essential tremor    Insomnia    [facility]urrent major depressive disorder[facility] partial [facility]    Type 2 diabetes mellitus with other circulatory complications (HCC)    Chest pain    NSTEMI (non-ST elevated myocardial infarction) (HCC)    Lumbar radiculopathy    Essential tremor    Idiopathic hypotension    Gastric polyps    Gastritis without bleeding    Carcinoma of left lung (HCC)    COPD without exacerbation (HCC)    Severe malnutrition (HCC)       PLAN:  Venofer today.           Electronically signed by Levi Vance MD on 10/20/20 at 10:37 AM EDT

## 2020-10-20 NOTE — CARE COORDINATION
Team rounds done in ICU and pt was on high flow O2 during night. He is on 5lnc at present. He was also started on Coumadin. HE is not ready for dc at this time . 1500 Spoke to Amanuel Moraes from [facility] services to make her aware pt may require home O2 and Trilogy at discharge. Order forms on blue chart. PT has been ambulating with nurses and rollator.

## 2020-10-20 NOTE — PROGRESS NOTES
Pulmonary & Critical Care Medicine ICU Progress Note  Chief complaint : Acute respiratory failure    Subjunctive/24 hour events :   Patient seen and examined during multidisciplinary rounds with RN, charge nurse, RT, pharmacy, dietitian, and social service. Patient currently on oxygen 5 L/min, during the night he did require high flow oxygen, now feels better, no chest pain, no coughing, he was able to walk today on 5 L oxygen with no significant desaturation, he does not feel short of breath, no fever or chills, bowels moving, and urine output is good      Social History     Tobacco Use    Smoking status: Former Smoker     Packs/day: 1.00     Years: 58.00     Pack years: 58.00     Types: Cigarettes     Start date: 1958     Last attempt to quit: 3/14/2020     Years since quittin.6    Smokeless tobacco: Never Used   Substance Use Topics    Alcohol use:  Yes     Alcohol/week: 3.0 standard drinks     Types: 3 Shots of liquor per week     Comment: once weekly  or more         Problem Relation Age of Onset    Other Mother         liver scerosis    Other Father         did not have a father    Other Sister         does not know sister   Emmanuel Waite         brain cancer    Other Son         unsure of medical problems    Other Daughter         unsure of medical problems       Recent Labs     10/18/20  192   PHART 7.417   ENE0FTM 51*   PO2ART 97*       MV Settings:  Vent Mode: AC/VC Rate Set: 16 bmp/Vt Ordered: 450 mL/ /FiO2 : 40 %           IV:      Vitals:  BP (!) 142/82   Pulse 78   Temp 97.8 °F (36.6 °C) (Oral)   Resp 25   Ht 5' 8\" (1.727 m)   Wt 147 lb 11.3 oz (67 kg)   SpO2 100%   BMI 22.46 kg/m²    Tmax:        Intake/Output Summary (Last 24 hours) at 10/20/2020 1032  Last data filed at 10/20/2020 0600  Gross per 24 hour   Intake 360 ml   Output 1175 ml   Net -815 ml       EXAM:  General: alert, cooperative, no distress  Head: normocephalic, atraumatic  Eyes:No gross PT/INR:   Recent Labs     10/19/20  1845 10/20/20  0507   PROTIME 16.7* 17.0*   INR 1.3 1.4     APTT: No results for input(s): APTT in the last 72 hours. UA:No results for input(s): NITRITE, COLORU, PHUR, LABCAST, WBCUA, RBCUA, MUCUS, TRICHOMONAS, YEAST, BACTERIA, CLARITYU, SPECGRAV, LEUKOCYTESUR, UROBILINOGEN, BILIRUBINUR, BLOODU, GLUCOSEU, AMORPHOUS in the last 72 hours. Invalid input(s): Tawny Odor    Cultures:  Negative so far  Films:  CXR reviewed by me and it showed small left apical pneumothorax    Assessment: This is a critically ill patient at risk of deterioration / death , needing close ICU monitoring and intervention due to below noted problems   · Acute hypoxic respiratory failure, post lobectomy and due to postsurgical atelectasis  · Incentive spirometry volume less than 500 cc  · Chronic hypercapnic respiratory failure due to underlying severe COPD  · COPD at baseline no signs of exacerbation  · Non-small cell lung cancer status post left lower lobe lobectomy    Recommendation  · O2 to keep sat 88 to 90%  · Encourage incentive spirometry  · Encourage activity  · continue sotalol  · Wean off Solu-Cortef  · Start Bevespi   · Budesonide nebs  · Pulmonary hygiene  · Watch volume status avoid overload  · Chest tube management per thoracic surgery      I spent 30 min with this patient, greater the 50% of this time was spent in counseling and/or coordinating of care.           Electronically signed by Jerel Rodas MD,  [facility] ,on 10/20/2020 at 10:32 AM

## 2020-10-20 NOTE — PROGRESS NOTES
Clinical Pharmacy Note    Warfarin consult follow-up    Recent Labs     10/20/20  0507   INR 1.4     Recent Labs     10/18/20  0505 10/18/20  1925 10/19/20  0457 10/20/20  0507   HGB 9.7* 12.0* 10.1* 11.1*   HCT 32.9*  --  33.0* 37.5*     --  193 196       Current warfarin drug-drug interactions: ASA, primidone, atorvastatin, percocet, hydrocortisone     Date INR Warfarin Dose   10-19-20 1.3 5 mg   10-20-20 1.4 7.5mg                                              Current INR is 1.4, which is sub-therapeutic for Afib with goal range of 2-3. Will give warfarin  boost dose today. Patient's home dose is 2.5 mg on Tuesday. Daily PT/INR until stable within therapeutic range. Ruma Ruth, PharmD Candidate  10/20/2020  2:23 PM      Reviewed and edited    Since INR remains sub-therapeutic will boost dose to warfarin 7.5mg x today. PSM sent to clarify if lovenox bridge needed. Will continue to monitor.    Frannie Carmen PharmD

## 2020-10-20 NOTE — PROGRESS NOTES
0800 Assessment complete, see flow sheet. Patient A/O X4, following all commands. Left chest tube remains to Heimlich valve, site cont to ooze serous color drainage. Incision clean dry and intact. Patient repositioned for comfort. 3088 Seen by Dr Nita Bell on morning rounds, update given. See orders. 1030 Seen by Dr Ricky Garcia, update given, see orders. 2016 [facility] Of moderate amount of soft brown stool, patient cleansed, and returned to bed, giat slow but fairly steady, liberty well. No changes in assessment noted. 1330 Seen by Dr Richelle Deluna, update given. Discussed patients condition and plan of care with patient and wife. 1500 Incont of small BM, patient cleaned and bed linen changed, mepilex removed and barrier cream applied. Repositioned for comfort. 1730 Patient given warm blanket per request. Repositioned for comfort. O2 decreased to 4L NC by resp. Therapy, liberty well, will cont to monitor. No changes in assessment noted.

## 2020-10-20 NOTE — PROGRESS NOTES
1050- pt/inr results reviewed by pharmacist for coumadin dosing. Coumadin dosed at 5mg today. Repeat pt/inr in am    0030- patient resting comfortably in bed watching tv. Breathing unlabored. Oxygen remains in place and patient not pulling at tubing. 0449- switched to NC 5L, tolerating well. SpO2 100%, breathing unlabored. 0630- ambulated In hallway from room 4 to room 10 using rollator walker. O2 via NC @5L, SpO2 99. Ambulated with slow steady gait. Denied feeling dizzy or sob. Returned to room without incident and currently sitting up in chair watching tv.

## 2020-10-20 NOTE — PROGRESS NOTES
Hospitalist Progress Note      PCP: Peggy Craig MD    Date of Admission: 10/15/2020    Chief Complaint:    No chief complaint on file. Subjective:  Patient denies fevers, chills, sweats, CP, SOb.    12 point ROS negative other than mentioned above     Medications:  Reviewed    Infusion Medications     Scheduled Medications    budesonide  0.5 mg Nebulization BID    warfarin  7.5 mg Oral Once    hydrocortisone sodium succinate PF  50 mg Intravenous Daily    isosorbide mononitrate  30 mg Oral Daily    hydroCHLOROthiazide  12.5 mg Oral Daily    glycopyrrolate-formoterol  2 puff Inhalation BID    warfarin (COUMADIN) daily dosing (placeholder)   Other RX Placeholder    aspirin  81 mg Oral Daily    lidocaine  1 patch Transdermal Daily    senna  2 tablet Oral Nightly    docusate sodium  100 mg Oral BID    polyethylene glycol  17 g Oral Daily    budesonide  6 mg Oral Daily    primidone  250 mg Oral Nightly    sodium chloride flush  10 mL Intravenous 2 times per day    citalopram  20 mg Oral Daily    carbidopa-levodopa-entacapone  1 tablet Oral Nightly    sotalol  80 mg Oral BID    folic acid  1 mg Oral Daily    atorvastatin  80 mg Oral Daily     PRN Meds: ipratropium-albuterol, metoprolol, oxyCODONE-acetaminophen, albuterol, traZODone, sodium chloride flush      Intake/Output Summary (Last 24 hours) at 10/20/2020 1501  Last data filed at 10/20/2020 0600  Gross per 24 hour   Intake 360 ml   Output 1100 ml   Net -740 ml     Exam:    BP 94/67   Pulse 88   Temp 97.4 °F (36.3 °C) (Oral)   Resp 20   Ht 5' 8\" (1.727 m)   Wt 147 lb 11.3 oz (67 kg)   SpO2 100%   BMI 22.46 kg/m²     General appearance: No apparent distress, appears stated age and cooperative. HEENT:  Conjunctivae/corneas clear. Neck: Trachea midline. Respiratory:  Normal respiratory effort.  Clear to auscultation  Cardiovascular: Regular rate and rhythm   Abdomen: Soft, non-tender, non-distended with normal bowel sounds. Musculoskeletal: No clubbing, cyanosis or edema bilaterally  Neuro: Non Focal.   Capillary Refill: Brisk,< 3 seconds   Peripheral Pulses: +2 palpable, equal bilaterally     Labs:   Recent Labs     10/18/20  0505 10/18/20  1925 10/19/20  0457 10/20/20  0507   WBC 11.1*  --  8.8 10.0   HGB 9.7* 12.0* 10.1* 11.1*   HCT 32.9*  --  33.0* 37.5*     --  193 196     Recent Labs     10/18/20  0505 10/18/20  1925 10/19/20  0457     --  141   K 4.0  --  3.4     --  95   CO2 28  --  37*   BUN 16  --  13   CREATININE 0.55* 0.8 0.38*   CALCIUM 8.2*  --  8.2*     Recent Labs     10/18/20  0505 10/19/20  0457   AST 29 22   ALT <5 6   BILITOT 0.5 0.5   ALKPHOS 119* 113*     Recent Labs     10/19/20  1845 10/20/20  0507   INR 1.3 1.4     Recent Labs     10/18/20  1919   TROPONINI 0.013*     Urinalysis:      Lab Results   Component Value Date    NITRU Negative 06/18/2020    WBCUA 6-10 11/24/2019    BACTERIA Negative 11/24/2019    RBCUA 0-2 11/24/2019    BLOODU Negative 06/18/2020    SPECGRAV 1.019 06/18/2020    GLUCOSEU Negative 06/18/2020    GLUCOSEU . 1G/dL 11/21/2011     Radiology:  XR CHEST INSPIRATION AND EXPIRATION   Final Result      1. The left chest tube remains in place. There is a small left apical pneumothorax slightly more apparent on expiration. 2. There is a small right pleural effusion. XR CHEST PORTABLE   Final Result      STABLE POSTOPERATIVE CHEST. XR CHEST PORTABLE   Final Result   PERSISTENT PREDOMINANTLY LEFT LUNG AIRSPACE OPACITIES WITH NO IMPROVEMENT. XR CHEST PORTABLE   Final Result   PULMONARY VASCULAR IS CONGESTED INCREASED INTERSTITIAL MARKINGS SUGGESTING COMPONENT OF CHF. CORRELATE CLINICALLY   DEVELOPING AREA OF ATELECTASIS, GROUNDGLASS INFILTRATE RIGHT LOWER LOBE WITH TRACE RIGHT PLEURAL EFFUSION. AREA OF PATCHY TO COALESCENT INFILTRATE LEFT LOWER LOBE WITH TRACE LEFT PLEURAL EFFUSION.       XR CHEST PORTABLE   Final Result   Status post intubation and placement of 2 chest tubes on the left. There is evidence of partial left pneumonectomy. There is no pneumothorax. Persistent increased pulmonary markings are noted in the right upper lobe. Exam: XR CHEST PORTABLE, XR CHEST PORTABLE October 16, 2020 0459 hours      History:  resp failure       Technique: AP portable view of the chest obtained. Comparison: None        Findings:      The patient is intubated with the tip of the endotracheal tube at the thoracic inlet. The cardiomediastinal silhouette is within normal limits. There is volume loss and increased pulmonary markings in the right upper lobe unchanged since previous study    with a chronic appearance. There is no pneumothorax. There are 2 chest tubes on the left side and there are multiple surgical clips in the left hilum. There is mild subcutaneous adipose soft tissues of the left hemithorax. IMPRESSION:    Status post intubation and placement of 2 chest tubes on the left. There is evidence of partial left pneumonectomy. There is no pneumothorax. Persistent increased pulmonary markings are noted in the right upper lobe. XR CHEST PORTABLE   Final Result   Status post intubation and placement of 2 chest tubes on the left. There is evidence of partial left pneumonectomy. There is no pneumothorax. Persistent increased pulmonary markings are noted in the right upper lobe. Exam: XR CHEST PORTABLE, XR CHEST PORTABLE October 16, 2020 0459 hours      History:  resp failure       Technique: AP portable view of the chest obtained. Comparison: None        Findings:      The patient is intubated with the tip of the endotracheal tube at the thoracic inlet. The cardiomediastinal silhouette is within normal limits. There is volume loss and increased pulmonary markings in the right upper lobe unchanged since previous study    with a chronic appearance. There is no pneumothorax.  There are 2 chest tubes on the left side and there are multiple surgical clips in the left hilum. There is mild subcutaneous adipose soft tissues of the left hemithorax. IMPRESSION:    Status post intubation and placement of 2 chest tubes on the left. There is evidence of partial left pneumonectomy. There is no pneumothorax. Persistent increased pulmonary markings are noted in the right upper lobe. XR CHEST PORTABLE    (Results Pending)     Assessment/Plan:    #Post op care and DVT ppx:  Per primary team  #A Fib:  Cardiology is managing  #Squamous cell carcnima:  S/p lobectomy by Dr Meredith Cloud  #RLS:  Med resumed  #IBS:  Budenoside resumed  #Constipation:  Resolved  #Acute on chronic diastolic CHF:  Resolved    Active Hospital Problems    Diagnosis Date Noted    Severe malnutrition ([facility] [E43] 10/19/2020    COPD without exacerbation ([facility] [J44.9]     Carcinoma of left lung ([facility] [C34.92] 10/15/2020     Additional work up or/and treatment plan may be added today or then after based on clinical progression. I am managing a portion of pt care. Some medical issues are handled by other specialists. Additional work up and treatment should be done in out pt setting by pt PCP and other out pt providers. In addition to examining and evaluating pt, I spent additional time explaining care, normal and abnormal findings, and treatment plan. All of pt questions were answered. Counseling, diet and education were  provided. Case will be discussed with nursing staff when appropriate. Family will be updated if and when appropriate. Diet: DIET CARB CONTROL;   Dietary Nutrition Supplements: Diabetic Oral Supplement    Code Status: Full Code    PT/OT Eval     Electronically signed by Antonia Meza MD on 10/20/2020 at 3:01 PM

## 2020-10-20 NOTE — PROGRESS NOTES
Physician Progress Note      PATIENT:               Lazarus Kuldeep  CSN #:                  [id number]  :                       1945  ADMIT DATE:       10/15/2020 6:02 AM  [address] [place] DATE:  RESPONDING  PROVIDER #:        Joseluis Caballero MD          QUERY TEXT:    Dear Cardiologist/IM:    Patient admitted for thoracotomy due to LLL squamous cell carcinoma. Noted:   10/18 Pulmonology: Fluid overload/acute CHF. Please document in progress notes   and discharge summary the type and acuity of CHF:    The medical record reflects the following:  Risk Factors: LLL lung CA, HTN, aortic stenosis, thoracotomy  Clinical Indicators:  10/18 Dr. Roosevelt Bynum: Complain of shortness of breath this morning. increase O2   to 5 L O2. He is sitting in a chair. tachypneic with respiratory rate in   mid 30s. Crystal Ruma Crystal Ruma Teresa Hendrix He received albumin yesterday and Lasix and as per RN he is having   more shortness of breath this morning. Patient was seen by Dr. Toni Beth   given Lasix 60 mg. Advised to put him on high flow oxygen. CXR shows   congestive heart failure and developing atelectasis infiltration right lower   lobe and trace pleural effusion. patchy coalescent infiltrates in left lower   lobe with trace left pleural effusion   Fluid ov  10/18 Dr. Jennings Backbone: Hypotension resolved with IVF, PRBCs and Albumin. Now Hypertensive and SOB, up in chair. 1. Normal left ventricular systolic   function, LVEF 54%  2. Aortic stenosis, moderate   1. DC albumin and decrease   IVFs. 2. Lasix IV now. Echo 20:  Normal left ventricular systolic function, LVEF 65 %.   Treatment: IV Lasix   DC albumin and decrease IVFs  CXR  critical care    pulmonology  Options provided:  -- Acute on Chronic Diastolic CHF/HFpEF  -- Acute Diastolic CHF/HFpEF  -- Other - I will add my own diagnosis  -- Disagree - Not applicable / Not valid  -- Disagree - Clinically unable to determine / Unknown  -- Refer to Clinical Documentation Reviewer    PROVIDER RESPONSE TEXT:    This patient is in diastolic CHF/HFpEF exacerbation. Query created by: Alicja Saleem on 10/19/2020 7:28 AM      QUERY TEXT:    Patient admitted for thoracotomy due to LLL squamous cell carcinoma. Please   document in progress notes and discharge summary if you are evaluating and /or   treating any of the following: The medical record reflects the following:  Risk Factors: > 50# weight loss ( 26%) < 1 year due to lung CA  T2DM  severe   COPD  Clinical Indicators:  10/15 Dr. Melchor Canchola: left lower lobectomy for Squamous cell carcinoma on   10/13/20 . Left lower lobe mass was noted on CT scan of the chest which was   done because of failure to thrive and weight loss. Folate deficiency  10/19 Dietician Mily Raymundo: Severe malnutrition  Chronic Illness  Findings of the 6   clinical characteristics of malnutrition:  Energy Intake:  7 - 75% or less estimated energy requirements for 1 month or   longer  Weight Loss:  7 - Greater than 20% over 1 year  Body Fat Loss:  1 - Mild body fat loss Buccal region, Orbital  Muscle Mass Loss:  1 - Mild muscle mass loss Clavicles (pectoralis &   deltoids), Hand (interosseous), Temples (temporalis), Scapula (trapezius)  Treatment: malnutrition assessment  carb control diabetic ONS x3  counseled on   nutrient rich foods  Options provided:  -- Protein calorie malnutrition severe  -- Protein calorie malnutrition moderate  -- Other - I will add my own diagnosis  -- Disagree - Not applicable / Not valid  -- Disagree - Clinically unable to determine / Unknown  -- Refer to Clinical Documentation Reviewer    PROVIDER RESPONSE TEXT:    This patient has severe protein calorie malnutrition.     Query created by: Alicja Saleem on 10/20/2020 7:27 AM      Electronically signed by:  Henry Sarkar MD 10/20/2020 4:23 PM

## 2020-10-20 NOTE — PROGRESS NOTES
Progress Note    POD #  5    Patient is Charley Elliott     The chest x-ray: Small apical air space left on I/E films. Chest tubes: Serous drainage but no air leak. Pathology : no official report yet but I discussed the features with Dr Sky Larson. According to him there are no + lymph nodes. The mass is 6 cm and the histology is Squamous Cell. I explained to the patient and his wife that he will need adjuvant Rx which will be provided by Hem-Onc team.    Plan: Venofer IV today. Oxygen for home requirements per Dr Prasad Gordillo. CT removal in am tomorrow with discharge anticipated 10/21.     Electronically signed by Shanti Mijares MD on 10/20/2020 at 1:12 PM

## 2020-10-21 ENCOUNTER — APPOINTMENT (OUTPATIENT)
Dept: GENERAL RADIOLOGY | Age: 75
DRG: 163 | End: 2020-10-21
Attending: THORACIC SURGERY (CARDIOTHORACIC VASCULAR SURGERY)
Payer: MEDICARE

## 2020-10-21 ENCOUNTER — TELEPHONE (OUTPATIENT)
Dept: PHARMACY | Age: 75
End: 2020-10-21

## 2020-10-21 VITALS
DIASTOLIC BLOOD PRESSURE: 78 MMHG | BODY MASS INDEX: 19.28 KG/M2 | WEIGHT: 127.21 LBS | HEIGHT: 68 IN | OXYGEN SATURATION: 99 % | RESPIRATION RATE: 16 BRPM | HEART RATE: 82 BPM | SYSTOLIC BLOOD PRESSURE: 144 MMHG | TEMPERATURE: 98 F

## 2020-10-21 LAB
ALBUMIN SERPL-MCNC: 3 G/DL (ref 3.5–4.6)
ALP BLD-CCNC: 107 U/L (ref 35–104)
ALT SERPL-CCNC: 20 U/L (ref 0–41)
ANION GAP SERPL CALCULATED.3IONS-SCNC: 10 MEQ/L (ref 9–15)
AST SERPL-CCNC: 32 U/L (ref 0–40)
BASOPHILS ABSOLUTE: 0.1 K/UL (ref 0–0.2)
BASOPHILS RELATIVE PERCENT: 0.9 %
BILIRUB SERPL-MCNC: 0.4 MG/DL (ref 0.2–0.7)
BUN BLDV-MCNC: 15 MG/DL (ref 8–23)
CALCIUM SERPL-MCNC: 8.2 MG/DL (ref 8.5–9.9)
CHLORIDE BLD-SCNC: 96 MEQ/L (ref 95–107)
CO2: 35 MEQ/L (ref 20–31)
CREAT SERPL-MCNC: 0.43 MG/DL (ref 0.7–1.2)
EOSINOPHILS ABSOLUTE: 0.6 K/UL (ref 0–0.7)
EOSINOPHILS RELATIVE PERCENT: 5.6 %
GFR AFRICAN AMERICAN: >60
GFR NON-AFRICAN AMERICAN: >60
GLOBULIN: 2.6 G/DL (ref 2.3–3.5)
GLUCOSE BLD-MCNC: 144 MG/DL (ref 70–99)
HCT VFR BLD CALC: 36.6 % (ref 42–52)
HEMOGLOBIN: 11 G/DL (ref 14–18)
INR BLD: 1.6
LYMPHOCYTES ABSOLUTE: 0.5 K/UL (ref 1–4.8)
LYMPHOCYTES RELATIVE PERCENT: 4.5 %
MCH RBC QN AUTO: 22.1 PG (ref 27–31.3)
MCHC RBC AUTO-ENTMCNC: 30.1 % (ref 33–37)
MCV RBC AUTO: 73.2 FL (ref 80–100)
MONOCYTES ABSOLUTE: 0.8 K/UL (ref 0.2–0.8)
MONOCYTES RELATIVE PERCENT: 7.9 %
NEUTROPHILS ABSOLUTE: 8.2 K/UL (ref 1.4–6.5)
NEUTROPHILS RELATIVE PERCENT: 81.1 %
PDW BLD-RTO: 23 % (ref 11.5–14.5)
PLATELET # BLD: 187 K/UL (ref 130–400)
POTASSIUM REFLEX MAGNESIUM: 3.7 MEQ/L (ref 3.4–4.9)
PROTHROMBIN TIME: 19.1 SEC (ref 12.3–14.9)
RBC # BLD: 5.01 M/UL (ref 4.7–6.1)
SODIUM BLD-SCNC: 141 MEQ/L (ref 135–144)
TOTAL PROTEIN: 5.6 G/DL (ref 6.3–8)
TROPONIN: 0.02 NG/ML (ref 0–0.01)
WBC # BLD: 10.1 K/UL (ref 4.8–10.8)

## 2020-10-21 PROCEDURE — 6360000002 HC RX W HCPCS: Performed by: INTERNAL MEDICINE

## 2020-10-21 PROCEDURE — 80053 COMPREHEN METABOLIC PANEL: CPT

## 2020-10-21 PROCEDURE — 6370000000 HC RX 637 (ALT 250 FOR IP): Performed by: THORACIC SURGERY (CARDIOTHORACIC VASCULAR SURGERY)

## 2020-10-21 PROCEDURE — 6370000000 HC RX 637 (ALT 250 FOR IP): Performed by: INTERNAL MEDICINE

## 2020-10-21 PROCEDURE — 36415 COLL VENOUS BLD VENIPUNCTURE: CPT

## 2020-10-21 PROCEDURE — 71045 X-RAY EXAM CHEST 1 VIEW: CPT

## 2020-10-21 PROCEDURE — 2580000003 HC RX 258: Performed by: THORACIC SURGERY (CARDIOTHORACIC VASCULAR SURGERY)

## 2020-10-21 PROCEDURE — 85610 PROTHROMBIN TIME: CPT

## 2020-10-21 PROCEDURE — 84484 ASSAY OF TROPONIN QUANT: CPT

## 2020-10-21 PROCEDURE — 85025 COMPLETE CBC W/AUTO DIFF WBC: CPT

## 2020-10-21 PROCEDURE — 2700000000 HC OXYGEN THERAPY PER DAY

## 2020-10-21 PROCEDURE — 99232 SBSQ HOSP IP/OBS MODERATE 35: CPT | Performed by: INTERNAL MEDICINE

## 2020-10-21 PROCEDURE — 94640 AIRWAY INHALATION TREATMENT: CPT

## 2020-10-21 PROCEDURE — 94761 N-INVAS EAR/PLS OXIMETRY MLT: CPT

## 2020-10-21 RX ADMIN — WARFARIN SODIUM 7.5 MG: 2.5 TABLET ORAL at 14:44

## 2020-10-21 RX ADMIN — ISOSORBIDE MONONITRATE 30 MG: 30 TABLET, EXTENDED RELEASE ORAL at 09:38

## 2020-10-21 RX ADMIN — SOTALOL HYDROCHLORIDE 80 MG: 80 TABLET ORAL at 09:38

## 2020-10-21 RX ADMIN — HYDROCORTISONE SODIUM SUCCINATE 50 MG: 100 INJECTION, POWDER, FOR SOLUTION INTRAMUSCULAR; INTRAVENOUS at 09:37

## 2020-10-21 RX ADMIN — BUDESONIDE 6 MG: 3 CAPSULE, COATED PELLETS ORAL at 09:39

## 2020-10-21 RX ADMIN — CITALOPRAM HYDROBROMIDE 20 MG: 20 TABLET ORAL at 09:38

## 2020-10-21 RX ADMIN — FOLIC ACID 1 MG: 1 TABLET ORAL at 09:38

## 2020-10-21 RX ADMIN — BUDESONIDE 500 MCG: 0.5 SUSPENSION RESPIRATORY (INHALATION) at 06:00

## 2020-10-21 RX ADMIN — GLYCOPYRROLATE AND FORMOTEROL FUMARATE 2 PUFF: 9; 4.8 AEROSOL, METERED RESPIRATORY (INHALATION) at 06:00

## 2020-10-21 RX ADMIN — ASPIRIN 81 MG: 81 TABLET, COATED ORAL at 09:38

## 2020-10-21 RX ADMIN — Medication 10 ML: at 09:40

## 2020-10-21 RX ADMIN — HYDROCHLOROTHIAZIDE 12.5 MG: 12.5 TABLET ORAL at 09:57

## 2020-10-21 RX ADMIN — ATORVASTATIN CALCIUM 80 MG: 80 TABLET, FILM COATED ORAL at 09:38

## 2020-10-21 ASSESSMENT — PAIN SCALES - GENERAL
PAINLEVEL_OUTOF10: 0
PAINLEVEL_OUTOF10: 0

## 2020-10-21 NOTE — PROGRESS NOTES
Clinical Pharmacy Note    Warfarin consult follow-up    Recent Labs     10/21/20  0503   INR 1.6     Recent Labs     10/19/20  0457 10/20/20  0507 10/21/20  0503   HGB 10.1* 11.1* 11.0*   HCT 33.0* 37.5* 36.6*    196 187       Current warfarin drug-drug interactions: ASA, primidone, atorvastatin, percocet, hydrocortisone     Date INR Warfarin Dose   10-19-20 1.3 5 mg   10-20-20 1.4 7.5 mg     10-21-20 1.6  7.5 mg                                      Current INR is 1.6, which is sub-therapeutic for Afib with goal range of 2-3. Will give warfarin 7.5 mg boost dose today. Patient's home dose is 5 mg on Wednesdays. Daily PT/INR until stable within therapeutic range.     Nikki Staley, PharmD Candidate  10/21/2020  11:09 AM

## 2020-10-21 NOTE — PROGRESS NOTES
Spoke with Soco Lima in Pharmacy to arrange coumadin dose for today and follow up as outpatient.  Schuduled for 10/22/2020 at 2pm.

## 2020-10-21 NOTE — PROGRESS NOTES
Have attempted three times to stop O2, pt resting inn bed and stats fall to 87% with pt watching TV. Applied 2L and sats up to 99%.

## 2020-10-21 NOTE — PROGRESS NOTES
Pulmonary & Critical Care Medicine ICU Progress Note  Chief complaint : Acute respiratory failure    Subjunctive/24 hour events :   Patient seen and examined during multidisciplinary rounds with RN, charge nurse, RT, pharmacy, dietitian, and social service. Patient doing better, currently on 2 L oxygen he continues to desat occasionally to 88-87%, otherwise he denies chest pain, no cough, no fever, no nausea no vomiting he ambulates well      Social History     Tobacco Use    Smoking status: Former Smoker     Packs/day: 1.00     Years: 58.00     Pack years: 58.00     Types: Cigarettes     Start date: 1958     Last attempt to quit: 3/14/2020     Years since quittin.6    Smokeless tobacco: Never Used   Substance Use Topics    Alcohol use: Yes     Alcohol/week: 3.0 standard drinks     Types: 3 Shots of liquor per week     Comment: once weekly  or more         Problem Relation Age of Onset    Other Mother         liver scerosis    Other Father         did not have a father    Other Sister         does not know sister   Raf Tova         brain cancer    Other Son         unsure of medical problems    Other Daughter         unsure of medical problems       Recent Labs     10/18/20  1925   PHART 7.417   VRX6CLG 51*   PO2ART 97*       MV Settings:  Vent Mode: AC/VC Rate Set: 16 bmp/Vt Ordered: 450 mL/ /FiO2 : 40 %           IV:      Vitals:  BP (!) 144/78   Pulse 82   Temp 98 °F (36.7 °C) (Oral)   Resp 16   Ht 5' 8\" (1.727 m)   Wt 127 lb 3.3 oz (57.7 kg)   SpO2 99%   BMI 19.34 kg/m²    Tmax:        Intake/Output Summary (Last 24 hours) at 10/21/2020 1032  Last data filed at 10/21/2020 0600  Gross per 24 hour   Intake 460 ml   Output 800 ml   Net -340 ml       EXAM:  General: alert, cooperative, no distress  Head: normocephalic, atraumatic  Eyes:No gross abnormalities.   ENT:  MMM no lesions  Neck:  supple  Chest : Good air movement, minimal rales on the left, no wheezing, nontender, tympanic, chest tube on the left  Heart[de-identified] Heart sounds are normal.  Regular rate and rhythm without murmur, gallop or rub. ABD:  symmetric, soft, non-tender  Musculoskeletal : no cyanosis, no clubbing and no edema  Neuro:  Grossly normal  Skin: No rashes or nodules noted.   Lymph node:  no cervical nodes  Urology: No Henderson   Psychiatric: appropriate    Medications:  Scheduled Meds:   budesonide  0.5 mg Nebulization BID    isosorbide mononitrate  30 mg Oral Daily    hydroCHLOROthiazide  12.5 mg Oral Daily    glycopyrrolate-formoterol  2 puff Inhalation BID    warfarin (COUMADIN) daily dosing (placeholder)   Other RX Placeholder    aspirin  81 mg Oral Daily    lidocaine  1 patch Transdermal Daily    senna  2 tablet Oral Nightly    docusate sodium  100 mg Oral BID    polyethylene glycol  17 g Oral Daily    budesonide  6 mg Oral Daily    primidone  250 mg Oral Nightly    sodium chloride flush  10 mL Intravenous 2 times per day    citalopram  20 mg Oral Daily    carbidopa-levodopa-entacapone  1 tablet Oral Nightly    sotalol  80 mg Oral BID    folic acid  1 mg Oral Daily    atorvastatin  80 mg Oral Daily       PRN Meds:  ipratropium-albuterol, metoprolol, oxyCODONE-acetaminophen, albuterol, traZODone, sodium chloride flush    Results: reviewed by me   CBC:   Recent Labs     10/19/20  0457 10/20/20  0507 10/21/20  0503   WBC 8.8 10.0 10.1   HGB 10.1* 11.1* 11.0*   HCT 33.0* 37.5* 36.6*   MCV 72.4* 74.1* 73.2*    196 187     BMP:   Recent Labs     10/19/20  0457 10/20/20  1805 10/21/20  0503    140 141   K 3.4 3.4 3.7   CL 95 94* 96   CO2 37* 40* 35*   PHOS  --  2.8  --    BUN 13 16 15   CREATININE 0.38* 0.51* 0.43*     LIVER PROFILE:   Recent Labs     10/19/20  0457 10/21/20  0503   AST 22 32   ALT 6 20   BILITOT 0.5 0.4   ALKPHOS 113* 107*     PT/INR:   Recent Labs     10/19/20  1845 10/20/20  0507 10/21/20  0503   PROTIME 16.7* 17.0* 19.1*   INR 1.3 1.4 1.6     APTT: No results for input(s): APTT in the last 72 hours. UA:No results for input(s): NITRITE, COLORU, PHUR, LABCAST, WBCUA, RBCUA, MUCUS, TRICHOMONAS, YEAST, BACTERIA, CLARITYU, SPECGRAV, LEUKOCYTESUR, UROBILINOGEN, BILIRUBINUR, BLOODU, GLUCOSEU, AMORPHOUS in the last 72 hours. Invalid input(s): Euel Sinks    Cultures:  Negative so far  Films:  CXR reviewed by me shows improved but persistent left apical pneumothorax      Assessment:   This is a critically ill patient at risk of deterioration / death , needing close ICU monitoring and intervention due to below noted problems   · Acute hypoxic respiratory failure, post lobectomy and due to postsurgical atelectasis  · Incentive spirometry volume less than 500 cc  · Chronic hypercapnic respiratory failure due to underlying severe COPD  · COPD at baseline no signs of exacerbation  · Non-small cell lung cancer status post left lower lobe lobectomy    Recommendation  · O2 to keep sat 88 to 90%  · Will discharge patient with home O2 2 L/min continuous for now  · Encourage incentive spirometry  · Encourage activity  · DC Solu-Cortef  · Start Trelegy elepta   · Pulmonary hygiene  · Watch volume status avoid overload  · Chest tube management per thoracic surgery    Dr Mis Laurent       Electronically signed by Talya Henderson MD,  [facility] ,on 10/21/2020 at 10:32 AM

## 2020-10-21 NOTE — PROGRESS NOTES
Discharge instructions given to pt and wife as well as specific Dr Lux Bryan instructions and prescriptions. Follow up appointments reviewed and wife verbalized understanding. Discharged via wheelchair.

## 2020-10-21 NOTE — PROGRESS NOTES
[facility] Heart Progress Note               Rounding Cardiologist:  Emeka Schmitz MD   Primary Cardiologist: Dr. Chintan Bruno    Date:  10/21/2020  Patient:  Thom Barrientos  YOB: 1945  MRN:  [id number]   Admit Date:  10/15/2020      SUBJECTIVE    Thom Barrientos was seen and examined today at bedside. He denies any chest pain or shortness of breath. Lying flat in bed. Feels fine. His wife is at the bedside. Earlier this admit developed atrial fibrillation with rapid ventricular response. Heart rates improved. He previously had post op atrial fib and eventually was noted on a Holter monitor to have some recurrence. Chronically anticoagulated. He has been maintained on sotalol which was held postop while on ventilator. Predominantly NSR prior to this admit. He denies chest pain or shortness of breath. 2.5 liters negative fluid balance since admit. Consult HPI:   Thom Barrientos is a pleasant 67 y.o. male who cardiology is asked to see in consultation postoperatively for left lower lobe squamous cell resection by Dr. Anatoliy Saenz. Patient has the above-noted past history including known severe COPD, coronary artery disease post previous coronary angioplasties with most recent catheterization June 2020 noting patent stents, medical treatment advised. Moderate aortic stenosis. Carotid artery disease post right carotid endarterectomy. Negative Myoview stress test January 2019. Normal left ventricular function. Postoperatively patient did well. He is on ventilatory support and sedation. Vital signs are stable. Patient Follows with Dr. Jamil Kauffman, [facility]. Patient History and Records, EMR reviewed. Patient examined. Denies recent CP, LH, Dizziness, TIA or CVA Symptoms. No Orthopnea, Edema or CHF symptoms. No Palpitations. No Syncope. No Fever, Chills or Cold symptoms. No GI,  or Bleeding complaints.       VITALS     Vitals:    10/21/20 0500 10/21/20 0600 10/21/20 0700 10/21/20 0800   BP: (!) 115/42 111/72 (!) 129/59 (!) 144/78   Pulse: 69 78 82    Resp: 13 17 16    Temp:       TempSrc:       SpO2: 100% 100% 99%    Weight:    127 lb 3.3 oz (57.7 kg)   Height:           Intake/Output Summary (Last 24 hours) at 10/21/2020 1042  Last data filed at 10/21/2020 0600  Gross per 24 hour   Intake 460 ml   Output 800 ml   Net -340 ml       Patient Vitals for the past 96 hrs (Last 3 readings):   Weight   10/21/20 0800 127 lb 3.3 oz (57.7 kg)   10/19/20 0609 147 lb 11.3 oz (67 kg)   10/18/20 0542 152 lb 5.4 oz (69.1 kg)       PHYSICAL EXAM   PHYSICAL EXAMINATION:  GENERAL:  Well developed, well nourished, in no acute distress. Sitting up in chair. Chest tube in place. CHEST:  Symmetric and nontender. NEURO/PSYCH:  Alert and oriented times three with approppriate behavior and responses. NECK:  Supple, no JVD, no bruit. LUNGS:  Decreased breath sounds bilaterally. HEART:  Rate and rhythm irregularly irregular. II/VI LUBNA RUSB. There are no rubs, clicks or heaves. EXTREMITIES:  Warm with good color, no clubbing or cyanosis. There is 1+ bilateral edema noted. PERIPHERAL VASCULAR:  Pulses present and equally palpable; 2+ throughout. DIAGNOSTIC RESULTS   EKG:   Atrial fibrillation  Left axis deviation  Nonspecific ST and T wave abnormality  Prolonged QT  Abnormal ECG  When compared with ECG of 16-OCT-2020 04:23,  T wave inversion now evident in Inferior leads  T wave inversion now evident in Anterior leads  Nonspecific T wave abnormality no longer evident in Lateral leads    Telemetry: atrial fibrillation - controlled.       LAB DATA   BMP:  Recent Labs     10/19/20  0457 10/20/20  1805 10/21/20  0503    140 141   K 3.4 3.4 3.7   CL 95 94* 96   CO2 37* 40* 35*   BUN 13 16 15   CREATININE 0.38* 0.51* 0.43*   LABGLOM >60.0 >60.0 >60.0       Cardiac Enzymes:  Recent Labs     10/18/20  1919   TROPONINI 0.013*       CBC:   Lab Results   Component Value Date    WBC 10.1 10/21/2020    RBC 5.01 10/21/2020    RBC 4.97 04/20/2012    HGB 11.0 10/21/2020    HCT 36.6 10/21/2020     10/21/2020       CMP:    Lab Results   Component Value Date     10/21/2020    K 3.7 10/21/2020    CL 96 10/21/2020    CO2 35 10/21/2020    BUN 15 10/21/2020    CREATININE 0.43 10/21/2020    GFRAA >60.0 10/21/2020    LABGLOM >60.0 10/21/2020    GLUCOSE 144 10/21/2020    GLUCOSE 127 03/23/2012    CALCIUM 8.2 10/21/2020       Hepatic Function Panel:    Lab Results   Component Value Date    ALKPHOS 107 10/21/2020    ALT 20 10/21/2020    AST 32 10/21/2020    PROT 5.6 10/21/2020    BILITOT 0.4 10/21/2020    BILIDIR <0.2 04/22/2019    IBILI see below 04/22/2019    LABALBU 3.0 10/21/2020    LABALBU 4.4 03/23/2012       Magnesium:    Lab Results   Component Value Date    MG 1.8 10/19/2020    MG 2.2 12/27/2011       PT/INR:    Lab Results   Component Value Date    PROTIME 19.1 10/21/2020    PROTIME 11.7 04/20/2012    INR 1.6 10/21/2020       HgBA1c:    Lab Results   Component Value Date    LABA1C 5.9 08/03/2020       Lipid Profile:    Lab Results   Component Value Date    TRIG 72 06/23/2020    HDL 38 06/23/2020    LDLCALC 30 06/23/2020       TSH:    Lab Results   Component Value Date    TSH 1.550 08/03/2020     PRO-BNP:   Lab Results   Component Value Date    PROBNP 8,863 10/18/2020    PROBNP 241 09/09/2016        RADIOLOGY     XR CHEST PORTABLE   Final Result   There is a persistent left chest tube in place and a small left apical pneumothorax. XR CHEST INSPIRATION AND EXPIRATION   Final Result      1. The left chest tube remains in place. There is a small left apical pneumothorax slightly more apparent on expiration. 2. There is a small right pleural effusion. XR CHEST PORTABLE   Final Result      STABLE POSTOPERATIVE CHEST. XR CHEST PORTABLE   Final Result   PERSISTENT PREDOMINANTLY LEFT LUNG AIRSPACE OPACITIES WITH NO IMPROVEMENT.          XR CHEST PORTABLE   Final Result   PULMONARY VASCULAR IS CONGESTED INCREASED INTERSTITIAL MARKINGS SUGGESTING COMPONENT OF CHF. CORRELATE CLINICALLY   DEVELOPING AREA OF ATELECTASIS, GROUNDGLASS INFILTRATE RIGHT LOWER LOBE WITH TRACE RIGHT PLEURAL EFFUSION. AREA OF PATCHY TO COALESCENT INFILTRATE LEFT LOWER LOBE WITH TRACE LEFT PLEURAL EFFUSION. XR CHEST PORTABLE   Final Result   Status post intubation and placement of 2 chest tubes on the left. There is evidence of partial left pneumonectomy. There is no pneumothorax. Persistent increased pulmonary markings are noted in the right upper lobe. Exam: XR CHEST PORTABLE, XR CHEST PORTABLE October 16, 2020 0459 hours      History:  resp failure       Technique: AP portable view of the chest obtained. Comparison: None        Findings:      The patient is intubated with the tip of the endotracheal tube at the thoracic inlet. The cardiomediastinal silhouette is within normal limits. There is volume loss and increased pulmonary markings in the right upper lobe unchanged since previous study    with a chronic appearance. There is no pneumothorax. There are 2 chest tubes on the left side and there are multiple surgical clips in the left hilum. There is mild subcutaneous adipose soft tissues of the left hemithorax. IMPRESSION:    Status post intubation and placement of 2 chest tubes on the left. There is evidence of partial left pneumonectomy. There is no pneumothorax. Persistent increased pulmonary markings are noted in the right upper lobe. XR CHEST PORTABLE   Final Result   Status post intubation and placement of 2 chest tubes on the left. There is evidence of partial left pneumonectomy. There is no pneumothorax. Persistent increased pulmonary markings are noted in the right upper lobe.          Exam: XR CHEST PORTABLE, XR CHEST PORTABLE October 16, 2020 0459 hours      History:  resp failure       Technique: AP portable view of the chest obtained. Comparison: None        Findings:      The patient is intubated with the tip of the endotracheal tube at the thoracic inlet. The cardiomediastinal silhouette is within normal limits. There is volume loss and increased pulmonary markings in the right upper lobe unchanged since previous study    with a chronic appearance. There is no pneumothorax. There are 2 chest tubes on the left side and there are multiple surgical clips in the left hilum. There is mild subcutaneous adipose soft tissues of the left hemithorax. IMPRESSION:    Status post intubation and placement of 2 chest tubes on the left. There is evidence of partial left pneumonectomy. There is no pneumothorax. Persistent increased pulmonary markings are noted in the right upper lobe. CURRENT MEDICATIONS       budesonide  0.5 mg Nebulization BID    isosorbide mononitrate  30 mg Oral Daily    hydroCHLOROthiazide  12.5 mg Oral Daily    glycopyrrolate-formoterol  2 puff Inhalation BID    warfarin (COUMADIN) daily dosing (placeholder)   Other RX Placeholder    aspirin  81 mg Oral Daily    lidocaine  1 patch Transdermal Daily    senna  2 tablet Oral Nightly    docusate sodium  100 mg Oral BID    polyethylene glycol  17 g Oral Daily    budesonide  6 mg Oral Daily    primidone  250 mg Oral Nightly    sodium chloride flush  10 mL Intravenous 2 times per day    citalopram  20 mg Oral Daily    carbidopa-levodopa-entacapone  1 tablet Oral Nightly    sotalol  80 mg Oral BID    folic acid  1 mg Oral Daily    atorvastatin  80 mg Oral Daily         ASSESSMENT     1. 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  2. Postop anemia. Status post transfusion. 3. COPD without exacerbation. 4. Paroxysmal atrial fibrillation, recurrent postoperatively. Rates improved. 5. Long-term anticoagulation therapy, Coumadin currently held  6.  Coronary artery disease status post multivessel angioplasty with repeat catheterization June 2020 revealing patent stents, medical treatment advised. 7. Negative Myoview perfusion study January 2019.  8. Normal left ventricular systolic function, LVEF 00%  9. Aortic stenosis, moderate. 10. Carotid artery disease post prior right carotid endarterectomy. 11. Hypertension. 12. Hyperlipidemia. 13. Diabetes. 14. Degenerative joint disease. 15. Prior gastritis without bleeding. 16. History of depression. 17. Past tobacco abuse. PLAN     OK to discharge home. Continue sotalol as tolerated. Adjust anticoagulation. PT/INR next week. Home O2 at 2L per Dr. Pilar Mccall. Follow-up as scheduled. Elective cardioversion in 4 weeks if atrial fibrillation persists. He was previously on Xarelto and Eliqus but these were discontinued due to an interaction with mysoline. States he prefers to continue using mysoline. Please do not hesitate to call with questions.   Electronically signed by Clarence Mccormick MD, [facility] on 10/21/2020 at 10:42 AM

## 2020-10-21 NOTE — CARE COORDINATION
Social work note: [employer] completed. Plan is DC this date. Spoke with RN Beena Martin at [phone] who reports patient has oxygen need set up as well as a script for walker.  Electronically signed by KEVIN Chang on 10/21/2020 at 1:55 PM

## 2020-10-21 NOTE — PLAN OF CARE
Problem: Skin Integrity:  Goal: Will show no infection signs and symptoms  Description: Will show no infection signs and symptoms  Outcome: Ongoing  Goal: Absence of new skin breakdown  Description: Absence of new skin breakdown  Outcome: Ongoing     Problem: Falls - Risk of:  Goal: Will remain free from falls  Description: Will remain free from falls  Outcome: Ongoing  Goal: Absence of physical injury  Description: Absence of physical injury  Outcome: Ongoing     Problem: Nutrition  Goal: Optimal nutrition therapy  Outcome: Ongoing       Will continue to monitor.     Electronically signed by Ivania Osborn RN on 10/21/2020 at 4:28 AM

## 2020-10-21 NOTE — PROGRESS NOTES
Hematology/Oncology   Progress Note        CHIEF COMPLAINT/HPI:  Follow up of lung cancer sp lobectomy and anemia. Hemoglobin at 11. Retic was normal. Being discharged today. To follow up at the cancer center with Dr Domenica Sepulveda in 2-3 weeks. REVIEW OF SYSTEMS:    Unremarkable except for symptoms mentioned in HPI.     Current Inpatient Medications:    Current Facility-Administered Medications   Medication Dose Route Frequency Provider Last Rate Last Dose    budesonide (PULMICORT) nebulizer suspension 500 mcg  0.5 mg Nebulization BID Jaci De Dios MD   500 mcg at 10/21/20 0600    isosorbide mononitrate (IMDUR) extended release tablet 30 mg  30 mg Oral Daily Jaci De Dios MD   30 mg at 10/21/20 0938    hydroCHLOROthiazide (HYDRODIURIL) tablet 12.5 mg  12.5 mg Oral Daily Jaci De Dios MD   12.5 mg at 10/21/20 0957    glycopyrrolate-formoterol (BEVESPI) 9-4.8 MCG/ACT inhaler 2 puff  2 puff Inhalation BID Jaci De Dios MD   2 puff at 10/21/20 0600    ipratropium-albuterol (DUONEB) nebulizer solution 1 ampule  1 ampule Inhalation Q4H PRN aJci De Dios MD        metoprolol (LOPRESSOR) injection 5 mg  5 mg Intravenous Q6H PRN Sharon Peters MD        warfarin (COUMADIN) daily dosing (placeholder)   Other RX Placeholder Aldo June MD        aspirin EC tablet 81 mg  81 mg Oral Daily Rowena Bernardo MD   81 mg at 10/21/20 0938    oxyCODONE-acetaminophen (PERCOCET)  MG per tablet 1 tablet  1 tablet Oral Q4H PRN Orlando Simon MD   1 tablet at 10/18/20 1827    lidocaine 4 % external patch 1 patch  1 patch Transdermal Daily Orlando Simon MD   1 patch at 10/21/20 2887    senna (SENOKOT) tablet 17.2 mg  2 tablet Oral Nightly Aldo June MD   17.2 mg at 10/19/20 2035    docusate sodium (COLACE) capsule 100 mg  100 mg Oral BID Aldo June MD   100 mg at 10/20/20 2042    polyethylene glycol (GLYCOLAX) packet 17 g  17 g Oral Daily Aldo June MD   17 g at 10/19/20 0806    albuterol (PROVENTIL) nebulizer solution 2.5 mg  2.5 mg Nebulization Q2H PRN Tara Shultz MD   2.5 mg at 10/17/20 2350    budesonide (ENTOCORT EC) extended release capsule 6 mg  6 mg Oral Daily Candelaria Dennis MD   6 mg at 10/21/20 9403    primidone (MYSOLINE) tablet 250 mg  250 mg Oral Nightly Candelaria Dennis MD   250 mg at 10/20/20 2042    traZODone (DESYREL) tablet 100 mg  100 mg Oral Nightly PRN Candelaria Dennis MD        sodium chloride flush 0.9 % injection 10 mL  10 mL Intravenous 2 times per day Severiano Budds, MD   10 mL at 10/21/20 0940    sodium chloride flush 0.9 % injection 10 mL  10 mL Intravenous PRN Severiano Budds, MD   10 mL at 10/15/20 1617    citalopram (CELEXA) tablet 20 mg  20 mg Oral Daily Candelaria Dennis MD   20 mg at 10/21/20 8597    carbidopa-levodopa-entacapone (STALEVO 100) -200 MG per tablet 1 tablet  1 tablet Oral Nightly Candelaria Dennis MD   1 tablet at 10/20/20 2041    sotalol (BETAPACE) tablet 80 mg  80 mg Oral BID Jessica Roberto MD   80 mg at 50/49/90 6210    folic acid (FOLVITE) tablet 1 mg  1 mg Oral Daily Candelaria Dennis MD   1 mg at 10/21/20 6286    atorvastatin (LIPITOR) tablet 80 mg  80 mg Oral Daily Candelaria Dennis MD   80 mg at 10/21/20 3907       PHYSICAL EXAM:    EYES:  Lids and lashes normal, pupils equal, round and reactive to light, extra ocular muscles intact, sclera clear, conjunctiva normal    ENT:  Normocephalic, without obvious abnormality, atraumatic, sinuses nontender on palpation, external ears without lesions, oral pharynx with moist mucus membranes, tonsils without erythema or exudates, gums normal and good dentition.     NECK:  Supple, symmetrical, trachea midline, no adenopathy, thyroid symmetric, not enlarged and no tenderness, skin normal    CHEST:    LUNGS:  No increased work of breathing, good air exchange, clear to auscultation bilaterally, no crackles or wheezing    CARDIOVASCULAR:  Normal apical impulse, regular rate and rhythm, normal depressive disorder, in partial remission ([facility]    Type 2 diabetes mellitus with other circulatory complications (HCC)    Chest pain    NSTEMI (non-ST elevated myocardial infarction) (HCC)    Lumbar radiculopathy    Essential tremor    Idiopathic hypotension    Gastric polyps    Gastritis without bleeding    Carcinoma of left lung (HCC)    COPD without exacerbation (HCC)    Severe malnutrition ([facility]       PLAN:  Ok to [employer] today.           Electronically signed by Cathy Lovett MD on 10/21/20 at 10:14 AM EDT

## 2020-10-21 NOTE — PROGRESS NOTES
Assiste up to bathroom from chair then ambulated in hallway approx 50 feet withO2. Denied SOB and chest pain tolerated well. Back to bed when returned to room.

## 2020-10-21 NOTE — PROGRESS NOTES
1. Do not use any lotions, creams or powders around the incision area. 2. You may shower; try to keep the incision area clean and dry. 3. Avoid heavy lifting (over 10 pounds) for 3-4 weeks. 4. Avoid driving a car until approved by Dr. Nima Wills after first postoperative appointment. 5. Walk as much as you like. Remember, you will tire more easily in the first few weeks following surgery, guide yourself accordingly. 6. If you have sutures in at the chest tube site, leave them alone. They will be removed in the office. 7. If your incision site develops a large amount of swelling, redness, and or drainage, or if you begin running a high fever (102° or higher), call the office. 8. Take your medication for discomfort as prescribed. 9. Continue to use your incentive spirometer (breathing machine) at home, at least 4 times a day. 10. Women: Avoid wearing a bra until your incision is completely healed. Cotton undershirts may offer some support. 11. You may go up and down steps; take them slowly, one at a time. 15. Dr. Augusto Lopez office will call you to schedule a follow-up appointment. You will need a chest x-ray; please have taken the day of, or the day before your appointment, and bring films to appointment. 13. If you have any questions or problems, call the office. Dr. Augusto Lopez office: [phone].

## 2020-10-21 NOTE — PROGRESS NOTES
Physician Progress Note      PATIENT:               Galen Alvarenga  CSN #:                  [id number]  :                       1945  ADMIT DATE:       10/15/2020 6:02 AM  [address] [place] DATE:  RESPONDING  PROVIDER #:        Robert Michele MD          QUERY TEXT:    Dear Intensivist/Pulmonology/IM:    Pt admitted for thoracotomy due to LLL squamous cell carcinoma. Pt noted to   have \"Hypotension possibly hypovolemic, anesthetic effect, underlying cardiac   disease could also be contributing (aortic stenosis), he received some fluid   resuscitation, will maintain vasopressor support for now and continue close   monitoring . \" in the Intensivist/Pulmonology consult note. If possible, please   document in the progress notes and discharge summary if you are evaluating   and/or treating any of the following: The medical record reflects the following:  Risk Factors: LLL lung CA, HTN, aortic stenosis, thoracotomy  Clinical Indicators: HR , RR 14-38, BP 46/18-92/59, MAPS 24-63;   documented heart rhythm A fib, H&H: 8.9/26-7.9/26.2; EKG: A fib, nonspecific T   wave abnormality, prolonged ; Intensivist/Pulmonology PN: \"Hypotension   possibly hypovolemic, anesthetic effect, underlying cardiac disease could also   be contributing (aortic stenosis), he received some fluid resuscitation, will   maintain vasopressor support for now and continue close monitoring\"  Treatment: ICU care, maintain intubation with MV, IVFB LR & NS, IV albumin, IV   Levophed,  IV Venofer, PRBC 2 units, labs and monitoring,    If you have any questions, please call [phone]. Thank you.  Hossein Lopez,   RN, BSN, CDS  Options provided:  -- Cardiogenic Shock  -- Hemorrhagic Shock  -- Hypovolemic Shock  -- Hypovolemia without Shock  -- Hypotension without Shock  -- Other - I will add my own diagnosis  -- Disagree - Not applicable / Not valid  -- Disagree - Clinically unable to determine / Unknown  -- Refer to Clinical Documentation Reviewer    PROVIDER RESPONSE TEXT:    This patient has hypotension without shock.     Query created by: Feliz Romberg on 10/17/2020 11:00 AM      Electronically signed by:  Araseli Weathers MD 10/21/2020 12:13 PM

## 2020-10-22 ENCOUNTER — HOSPITAL ENCOUNTER (OUTPATIENT)
Dept: PHARMACY | Age: 75
Setting detail: THERAPIES SERIES
Discharge: HOME | End: 2020-10-22
Payer: MEDICARE

## 2020-10-22 ENCOUNTER — CARE COORDINATION (OUTPATIENT)
Dept: CASE MANAGEMENT | Age: 75
End: 2020-10-22

## 2020-10-22 LAB — INTERNATIONAL NORMALIZATION RATIO, POC: 2.1

## 2020-10-22 PROCEDURE — 99211 OFF/OP EST MAY X REQ PHY/QHP: CPT

## 2020-10-22 PROCEDURE — 85610 PROTHROMBIN TIME: CPT

## 2020-10-22 NOTE — DISCHARGE SUMMARY
[address] Tejada [address]                      [address], [address] [facility]                               DISCHARGE SUMMARY    PATIENT NAME: Won Su                      :        1945  MED REC NO:   [id number]                            ROOM:       [facility]  ACCOUNT NO:   [de-identified]                           ADMIT DATE: 10/15/2020  PROVIDER:     Jaiden Phan MD                 [facility] DATE: 10/21/2020    PRINCIPAL DIAGNOSIS:  Non-small cell cancer squamous cell, left lung  lower lobe. ADDITIONAL DIAGNOSES:  Chronic anemia, COPD. OPERATION PERFORMED:  Left thoracotomy with left lower lobe lobectomy on  10/15. CONSULTANTS:  Pulmonary Medicine, Cardiology and Hematology/Oncology. SUMMARY:  A 58-year-old gentleman with biopsy-proven squamous cell  cancer of the left lung, removed by lobectomy on the day of admission. Because of his low hemoglobin entering the OR, he was transfused  postoperatively for appropriate levels of hemoglobin. He was seen in  the postoperative course by Pulmonary Medicine as well as by  Hematology/Oncology. He was given Venofer for his repletion of his iron  stores and will be seen in the outpatient setting by Hematology Oncology  both for the anemia as well as adjuvant therapy. I discussed with the  patient prior to discharge that because of the size of his mass, which  was 6 cm, even the presence of negative lymph node findings on  pathology, he probably would need adjuvant therapy, which will be  arranged by Hematology/Oncology. He will be given on discharge,  Percocet prescription for pain. He is to use his incentive spirometer  at home. He will be seen both by Pulmonary Medicine,  Hematology/Oncology and by me. His condition on discharge is  satisfactory. Prognosis is guarded.         Uriel Ospina MD    D: 10/21/2020 9:23:46       T: 10/21/2020 9:31:41     [id number]  Job#: [id number]     Doc#: [id number]    CC:

## 2020-10-22 NOTE — PROGRESS NOTES
Mr. Mayela Lowry is a 67 y.o. y/o male with history of Afib who presents today for anticoagulation monitoring and adjustment. Face to face visit completed, procedural mask and face shield worn by myself as instructed; procedural mask worn by patient and caregiver, room cleaned pre and post visit with Virex Plus spray, patient temperature check result: 97.3*F - caregiver 97.5*F       INR 2.1 is therapeutic for this patient (goal range 2-3) and is reflective of 30 mg TWD - as patient was in the hospital s/p thoracotomy with Dr. Ayanna King, previous home maintenance dosage is warfarin 25mg TWD   Patient verifies current dosing regimen, patient able to verbally recall dose  Patient reports 5  missed doses since last INR due to HOLDING warfarin for 5 days pre-op, per Dr. Ayanna King   Patient denies s/sx clotting and/or stroke  Patient denies hematuria, epistaxis, rectal bleeding  Patient denies changes in diet, alcohol, or tobacco use  Reviewed medication list and drug allergies with patient, updated any medication additions or modifications accordingly - no changes in medications at this time, but patient has follow up with Dr. Reyna Mortimer to discussed plan for chemotherapy - caregiver (patient's daughter) to call us with medication changes and chemo care plan if not seen in office prior to starting chemo.    Patient also denies any pending medical or dental procedures scheduled at this time- patient discharged from ICU 10/21/20 s/p thoracotomy w/ lung resection/bronchoscopy performed 10/15/20   Patient was instructed to continue with warfarin 25mg TWD and RTC 2 weeks    CLINICAL PHARMACY CONSULT: MED RECONCILIATION/REVIEW [address]: Yes  Total # of Interventions Recommended: 1  - Maintenance Safety Lab Monitoring #: 1    Total Interventions Accepted: 1  Time Spent (min): 12 [facility] Taime Flores PharmD   10/22/2020 2:39 PM

## 2020-10-22 NOTE — CARE COORDINATION
[facility] Transitions Initial Follow Up Call    Call within 2 business days of discharge: Yes    Patient: Gavin November Patient : 1945   MRN: [id number]  Reason for Admission: L Lung CA, s/p L Thoracotomy w/ lung resection & bronchoscopy. Discharge Date: 10/21/20 RARS: Readmission Risk Score: 28  NR  10/13/2020 CV-19 lab neg. Alan CNP 10/27 1:30. Pending post-op Dr Cherrie Garrido request call back for initial dc review.

## 2020-10-26 ENCOUNTER — CARE COORDINATION (OUTPATIENT)
Dept: CASE MANAGEMENT | Age: 75
End: 2020-10-26

## 2020-10-26 PROCEDURE — 1111F DSCHRG MED/CURRENT MED MERGE: CPT | Performed by: FAMILY MEDICINE

## 2020-10-26 NOTE — CARE COORDINATION
[facility] Transitions Initial Follow Up Call     Call within 2 business days of discharge: Yes     Patient: Taylor Canales          Patient : 1945   MRN: [id number]         Reason for Admission: L Lung CA, s/p L Thoracotomy w/ lung resection & bronchoscopy. Discharge Date: 10/21/20     RARS: Readmission Risk Score: 28  NR  10/13/2020 CV-19 lab neg. Alan CNP 10/27 1:30. Pending post-op Dr Milan Notice  Med rec/1111F order completed. Care Transitions      Challenges to be reviewed by the provider   Additional needs identified to be addressed with provider No  none    Discussed COVID-19 related testing which was available at this time. Test results were negative. Patient informed of results, if available? Yes         Method of communication with provider : none    Advance Care Planning:   Does patient have an Advance Directive:  not on file. Was this a readmission? No  Patient stated reason for admission: Lung sx. Patients top risk factors for readmission: medical condition    Care Transition Nurse (CTN) contacted the patient by telephone to perform post hospital discharge assessment. Verified name and  with patient as identifiers. Provided introduction to self, and explanation of the CTN role. CTN reviewed discharge instructions, medical action plan and red flags with patient who verbalized understanding. Patient given an opportunity to ask questions and does not have any further questions or concerns at this time. Were discharge instructions available to patient? Yes. Reviewed appropriate site of care based on symptoms and resources available to patient including: When to call 911 and Reviewed symptoms of CV-19 and Influenza to report. Needs flu shot. . The patient agrees to contact the PCP office for questions related to their healthcare. Medication reconciliation was performed with patient, who verbalizes understanding of administration of home medications.  Advised obtaining a 90-day supply of all daily and as-needed medications. Covid Risk Education    Patient has following risk factors of: COPD and diabetes. Education provided regarding infection prevention, and signs and symptoms of COVID-19 and when to seek medical attention with patient who verbalized understanding. Discussed exposure protocols and quarantine From CDC: Are you at higher risk for severe illness?   and given an opportunity for questions and concerns. The patient agrees to contact the COVID-19 hotline [phone] or PCP office for questions related to COVID-19. For more information on steps you can take to protect yourself, see CDC's How to Protect Yourself     Patient/family/caregiver given information for [employer] and agrees to enroll no  Patient's preferred e-mail: declines  Patient's preferred phone number: declines    Discussed follow-up appointments. If no appointment was previously scheduled, appointment scheduling offered: Pt advised to contact Dr Mitzi Martines office to schedule post-op. Pt states he will have his wife make this call. Is follow up appointment scheduled within 7 days of discharge? Yes  [facility] follow up appointment(s):     Plan for follow-up call in 7-10 days based on severity of symptoms and risk factors. Plan for next call: symptom management-Pt denies any acute pain concerns. Reports he is wearing home O2 continuous. States he has occsaional cough producing clear mucus. Denies fever, chills, or flu-like symptoms. No incision site concerns. Pt states he does not check home BS. Denies any home, DME, HHC, or med needs. CTN provided contact information for future needs.

## 2020-10-27 ENCOUNTER — OFFICE VISIT (OUTPATIENT)
Dept: FAMILY MEDICINE CLINIC | Age: 75
End: 2020-10-27
Payer: MEDICARE

## 2020-10-27 VITALS
HEIGHT: 70 IN | BODY MASS INDEX: 23.22 KG/M2 | HEART RATE: 62 BPM | TEMPERATURE: 97.3 F | SYSTOLIC BLOOD PRESSURE: 113 MMHG | DIASTOLIC BLOOD PRESSURE: 78 MMHG | OXYGEN SATURATION: 100 % | WEIGHT: 162.2 LBS

## 2020-10-27 PROCEDURE — 99495 TRANSJ CARE MGMT MOD F2F 14D: CPT | Performed by: NURSE PRACTITIONER

## 2020-10-27 PROCEDURE — 1111F DSCHRG MED/CURRENT MED MERGE: CPT | Performed by: NURSE PRACTITIONER

## 2020-10-27 PROCEDURE — G0008 ADMIN INFLUENZA VIRUS VAC: HCPCS | Performed by: NURSE PRACTITIONER

## 2020-10-27 PROCEDURE — 90694 VACC AIIV4 NO PRSRV 0.5ML IM: CPT | Performed by: NURSE PRACTITIONER

## 2020-10-27 RX ORDER — GLUCOSAMINE HCL/CHONDROITIN SU 500-400 MG
CAPSULE ORAL
Qty: 300 STRIP | Refills: 0 | Status: SHIPPED | OUTPATIENT
Start: 2020-10-27 | End: 2021-01-01 | Stop reason: SDUPTHER

## 2020-10-27 ASSESSMENT — ENCOUNTER SYMPTOMS
SHORTNESS OF BREATH: 0
CONSTIPATION: 0
WHEEZING: 0
COUGH: 0
DIARRHEA: 0
NAUSEA: 0

## 2020-10-27 NOTE — PROGRESS NOTES
Respiratory Therapy Supplies (NEBULIZER AIR TUBE/PLUGS) MISC  Nebulizer machine with tubing and supplies             sotalol (BETAPACE) 80 MG tablet  Take 1 tablet by mouth 2 times daily             traZODone (DESYREL) 100 MG tablet  TAKE 1 TABLET NIGHTLY             warfarin (COUMADIN) 5 MG tablet  Take as directed by [facility]. Quantity for 90 day supply. Medications marked \"taking\" at this time  Outpatient Medications Marked as Taking for the 10/27/20 encounter (Office Visit) with LAYA Manuel - CNP   Medication Sig Dispense Refill    blood glucose monitor strips Test one time a day & as needed for symptoms of irregular blood glucose- for one touch ultra 300 strip 0    fluticasone-umeclidin-vilant (TRELEGY ELLIPTA) 100-62.5-25 MCG/INH AEPB Inhale 1 puff into the lungs daily 1 each 3    Budesonide ER 6 MG CP24 Take 6 mg by mouth daily      warfarin (COUMADIN) 5 MG tablet Take as directed by [facility]. Quantity for 90 day supply.  70 tablet 1    traZODone (DESYREL) 100 MG tablet TAKE 1 TABLET NIGHTLY 90 tablet 0    loperamide (IMODIUM) 2 MG capsule Take 2 mg by mouth 4 times daily as needed for Diarrhea      citalopram (CELEXA) 20 MG tablet TAKE 1 TABLET DAILY 90 tablet 0    ipratropium-albuterol (DUONEB) 0.5-2.5 (3) MG/3ML SOLN nebulizer solution Inhale 3 mLs into the lungs 4 times daily 586 mL 5    folic acid (FOLVITE) 1 MG tablet Take 1 tablet by mouth daily 90 tablet 1    ferrous sulfate (IRON 325) 325 (65 Fe) MG tablet Take 1 tablet by mouth 2 times daily 60 tablet 5    Respiratory Therapy Supplies (NEBULIZER AIR TUBE/PLUGS) MISC Nebulizer machine with tubing and supplies 1 each 0    sotalol (BETAPACE) 80 MG tablet Take 1 tablet by mouth 2 times daily 60 tablet 3    hydrochlorothiazide (HYDRODIURIL) 12.5 MG tablet Take 1 tablet by mouth every other day (Patient taking differently: Take 12.5 mg by mouth daily as needed (for edema) ) 30 tablet 3    albuterol sulfate HFA (VENTOLIN HFA) 108 (90 Base) MCG/ACT inhaler Inhale 2 puffs into the lungs 4 times daily as needed for Wheezing 3 Inhaler 1    carbidopa-levodopa-entacapone (STALEVO 100) -200 MG TABS Take 1 tablet by mouth nightly      Leuprolide Acetate (LUPRON IJ) Inject as directed every 6 months      primidone (MYSOLINE) 250 MG tablet TAKE 1 TABLET AT BEDTIME 90 tablet 3    blood glucose monitor kit and supplies Test one time a day & as needed for symptoms of irregular blood glucose. 1 kit 0    Lancets MISC 1 each by Does not apply route daily 300 each 1    isosorbide mononitrate (IMDUR) 60 MG extended release tablet Take 30 mg by mouth daily       Handicap Placard MISC by Does not apply route Good for 5 years. Good from 1/31/2017 through 1/31/2022. 1 each 0    atorvastatin (LIPITOR) 80 MG tablet Take 80 mg by mouth daily.  aspirin 81 MG tablet Take 81 mg by mouth daily.  Omega-3 Fatty Acids (FISH OIL) 1200 MG CPDR Take by mouth 2 times daily           Medications patient taking as of now reconciled against medications ordered at time of hospital discharge: Yes    Chief Complaint   Patient presents with   [address] from Hospital     Patient presents today to follow up from hospital d/c [facility] EM[facility] AT [place] 10/21/20 thoracotomy Lt lower lobe Dr. Courtney Gallo. 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 He denies any pain. Breathing has been good. He is on 2L of oxygen. He has been getting around good with assistance from wife. He reports mild fatigue if having a lot to do, but otherwise feels great. He has a small skin tear/wound on right arm where an old IV site was per wife and pt. Has been using topical atb ointment and keeping clean. Saw Dr. Delaney Conrad this morning. Has appts with other specialist coming. Taking all medication as prescribed. No concerns or questions.  Declines any need for HHC or PT.      Inpatient course: Discharge summary reviewed- see chart. Interval history/Current status: stable    Review of Systems   Constitutional: Positive for activity change. Negative for chills and fever. HENT: Negative for congestion, ear pain and sinus pressure. Respiratory: Negative for cough, shortness of breath and wheezing. Cardiovascular: Negative for chest pain, palpitations and leg swelling. Gastrointestinal: Negative for abdominal pain, constipation, diarrhea and nausea. Genitourinary: Negative for difficulty urinating. Musculoskeletal: Negative for myalgias. Neurological: Negative for dizziness and headaches. Psychiatric/Behavioral: Negative for self-injury and suicidal ideas. Vitals:    10/27/20 1345   BP: 113/78   Site: Right Upper Arm   Position: Sitting   Cuff Size: Medium Adult   Pulse: 62   Temp: 97.3 °F (36.3 °C)   TempSrc: Temporal   SpO2: 100%   Weight: 162 lb 3.2 oz (73.6 kg)   Height: 5' 10\" (1.778 m)     Body mass index is 23.27 kg/m². Wt Readings from Last 3 Encounters:   10/27/20 162 lb 3.2 oz (73.6 kg)   10/21/20 127 lb 3.3 oz (57.7 kg)   10/13/20 172 lb (78 kg)     BP Readings from Last 3 Encounters:   10/27/20 113/78   10/21/20 (!) 144/78   10/13/20 87/67       Physical Exam  Constitutional:       Appearance: He is well-developed. HENT:      Head: Normocephalic. Right Ear: External ear normal.      Left Ear: External ear normal.      Nose: Nose normal.      Mouth/Throat:      Mouth: Mucous membranes are moist.      Pharynx: Oropharynx is clear. Eyes:      Conjunctiva/sclera: Conjunctivae normal.   Neck:      Musculoskeletal: Normal range of motion. Cardiovascular:      Rate and Rhythm: Normal rate and regular rhythm. Heart sounds: Normal heart sounds. Pulmonary:      Effort: Pulmonary effort is normal.      Breath sounds: Normal breath sounds. Musculoskeletal: Normal range of motion. Skin:     General: Skin is warm and dry. Neurological:      Mental Status: He is alert and oriented to person, place, and time. Cranial Nerves: Cranial nerves are intact. Psychiatric:         Mood and Affect: Mood normal.         Behavior: Behavior normal.             Assessment/Plan:  1. S/P partial lobectomy of lung  Doing well. Continue regular f/u with specialist.   - NM DISCHARGE MEDS RECONCILED W/ CURRENT OUTPATIENT MED LIST    2. Need for influenza vaccination    - INFLUENZA, QUADV, ADJUVANTED, 72 YRS =, IM, PF, PREFILL SYR, 0.5ML (FLUAD)    3. Carcinoma of left lung ([address]  Keep f/u with specialist as scheduled and PCP in the next couple of weeks. - NM DISCHARGE MEDS RECONCILED W/ CURRENT OUTPATIENT MED LIST  - Varicella Zoster Antibody, IgG; Future    4. Essential hypertension    - NM DISCHARGE MEDS RECONCILED W/ CURRENT OUTPATIENT MED LIST    5. History of chicken pox  Unsure if he has had chicken pox and so need to decide on shingles vaccine, will get titers and discuss with PCP  - Varicella Zoster Antibody, IgG; Future    6. Type 2 diabetes mellitus with other circulatory complications (HCC)    - blood glucose monitor strips; Test one time a day & as needed for symptoms of irregular blood glucose- for one touch ultra  Dispense: 300 strip; Refill: 0     7. Skin Tear   Continue to monitor for signs of  Infection, Keep clean with soap and water and use topical atb. 8. Phlebitis    Appears to be some mild phlebitis- pt is already on blood thinner. Encouraged monitoring for signs of infection and f/u immediately. Warm compress. Medical Decision Making: moderate complexity      Return as scheduled with PCP. Reviewed with the patient: current clinicalstatus, medications, activities and diet. Side effects, adverse effects of the medication prescribedtoday, as well as treatment plan/ rationale and result expectations have been discussedwith the patient who expresses understanding and desires to proceed.     Close follow upto evaluate treatment results and for coordination of care. I have reviewedthe patient's medical history in detail and updated the computerized patient record.     Juan Daniel Martinez, APRN - CNP

## 2020-10-27 NOTE — PROGRESS NOTES
Vaccine Information Sheet, \"Influenza - Inactivated\"  given to Jessica Saleem, or parent/legal guardian of  Jessica Saleem and verbalized understanding. Patient responses:    Have you ever had a reaction to a flu vaccine? No  Are you able to eat eggs without adverse effects? Yes  Do you have any current illness? No  Have you ever had [place] Syndrome? No    Flu vaccine given per order. Please see immunization tab.

## 2020-10-28 ASSESSMENT — ENCOUNTER SYMPTOMS
SINUS PRESSURE: 0
ABDOMINAL PAIN: 0

## 2020-10-28 NOTE — PATIENT INSTRUCTIONS
lifting anything over 5 pounds or that would make you strain. This may include a child, heavy grocery bags and milk containers, a heavy briefcase or backpack, cat litter or dog food bags, or a vacuum .     · You may be able to take showers (unless you have a drain near your incision). If you have a drain, follow your doctor's instructions to empty and care for it. Do not take a bath for the first 2 weeks, or until your doctor tells you it is okay.     · Ask your doctor when you can drive again.     · You will probably need to take 1 to 2 months off from work. It depends on the type of work you do and how you feel.     · Do not fly in an airplane or dive deeply (such as in scuba diving) until your doctor tells you it is okay. Avoid any situations where there is increased air pressure. Diet    · You can eat your normal diet. If your stomach is upset, try bland, low-fat foods like plain rice, broiled chicken, toast, and yogurt.     · Ask your doctor how much fluid you should drink.     · You may notice that your bowel movements are not regular right after your surgery. This is common. Try to avoid constipation and straining with bowel movements. You may want to take a fiber supplement every day. If you have not had a bowel movement after a couple of days, ask your doctor about taking a mild laxative. Medicines    · Your doctor will tell you if and when you can restart your medicines. He or she will also give you instructions about taking any new medicines.     · If you take aspirin or some other blood thinner, ask your doctor if and when to start taking it again. Make sure that you understand exactly what your doctor wants you to do.     · Be safe with medicines. Take pain medicines exactly as directed. ? If the doctor gave you a prescription medicine for pain, take it as prescribed.   ? If you are not taking a prescription pain medicine, ask your doctor if you can take an over-the-counter medicine.     · If you think your pain medicine is making you sick to your stomach:  ? Take your medicine after meals (unless your doctor has told you not to). ? Ask your doctor for a different pain medicine.     · If your doctor prescribed antibiotics, take them as directed. Do not stop taking them just because you feel better. You need to take the full course of antibiotics. Incision care    · If you have strips of tape on the incision, leave the tape on for a week or until it falls off.     · Wash the area daily with warm, soapy water and pat it dry. Other cleaning products, such as hydrogen peroxide, can make the wound heal more slowly. You may cover the area with a gauze bandage if it weeps or rubs against clothing. Change the bandage every day.     · Keep the area clean and dry.     · Wear clean, loose clothing over your incision. Exercise    · To help keep your lungs clear, cough and do deep breathing exercises as instructed by your doctor, nurse, or respiratory therapist.     · Your doctor may send you home with an incentive spirometer. This is a device that helps you practice taking deep breaths. This can help keep your lungs healthy.     · Ask your doctor about shoulder exercises to keep the muscles near your chest strong and flexible. Other instructions    · Do not smoke or allow others to smoke around you. If you need help quitting, talk to your doctor about stop-smoking programs and medicines. These can increase your chances of quitting for good.     · Try to avoid being around people who have a cold, the flu, or other illnesses. Follow-up care is a key part of your treatment and safety. Be sure to make and go to all appointments, and call your doctor if you are having problems. It's also a good idea to know your test results and keep a list of the medicines you take. When should you call for help? Call 911 anytime you think you may need emergency care.  For example, call if:    · You passed out (lost consciousness).     · You have severe trouble breathing.     · You have sudden chest pain and shortness of breath, or you cough up blood. Call your doctor now or seek immediate medical care if:    · You are sick to your stomach or cannot keep fluids down.     · You have pain that does not get better after you take pain medicine.     · You have a fever over 100°F.     · You have signs of infection, such as:  ? Increased pain, swelling, warmth, or redness. ? Red streaks leading from the incision. ? Pus draining from the incision. ? Swollen lymph nodes in your neck, armpits, or groin. ? A fever.     · You have loose stitches, or your incision comes open.     · Bright red blood has soaked through the bandage over your incision.     · You cough up a lot more mucus than normal, or the mucus changes color. Watch closely for any changes in your health, and be sure to contact your doctor if:    · Fluid leaks around the drain in your chest, or you have a sudden increase in the amount of fluid that comes out of the drain. Where can you learn more? Go to [email or web address].[employer]. org and sign in to your [employer] account. Enter W342 in the [employer] box to learn more about \"Lung Resection: What to Expect at Home. \"     If you do not have an account, please click on the \"Sign Up Now\" link. Current as of: February 24, 2020               Content Version: 12.6  © 4044-6520 [employer]. Care instructions adapted under license by [facility] ([facility]). If you have questions about a medical condition or this instruction, always ask your healthcare professional. [place] [address] any warranty or liability for your use of this information.

## 2020-11-02 ENCOUNTER — HOSPITAL ENCOUNTER (OUTPATIENT)
Dept: GENERAL RADIOLOGY | Age: 75
Discharge: HOME | End: 2020-11-04
Payer: MEDICARE

## 2020-11-02 PROCEDURE — 71046 X-RAY EXAM CHEST 2 VIEWS: CPT

## 2020-11-05 ENCOUNTER — HOSPITAL ENCOUNTER (OUTPATIENT)
Dept: PHARMACY | Age: 75
Setting detail: THERAPIES SERIES
Discharge: HOME | End: 2020-11-05
Payer: MEDICARE

## 2020-11-05 DIAGNOSIS — Z86.19 HISTORY OF CHICKEN POX: ICD-10-CM

## 2020-11-05 DIAGNOSIS — E11.59 TYPE 2 DIABETES MELLITUS WITH OTHER CIRCULATORY COMPLICATIONS (HCC): ICD-10-CM

## 2020-11-05 DIAGNOSIS — C34.92 CARCINOMA OF LEFT LUNG (HCC): ICD-10-CM

## 2020-11-05 LAB
HBA1C MFR BLD: 5.9 % (ref 4.8–5.9)
INTERNATIONAL NORMALIZATION RATIO, POC: 1.2

## 2020-11-05 PROCEDURE — 99211 OFF/OP EST MAY X REQ PHY/QHP: CPT | Performed by: PHARMACIST

## 2020-11-05 PROCEDURE — 85610 PROTHROMBIN TIME: CPT | Performed by: PHARMACIST

## 2020-11-05 NOTE — PROGRESS NOTES
Mr. Mayela Lowry is a 67 y.o. y/o male with history of Afib who presents today for anticoagulation monitoring and adjustment. INR 1.2 is subtherapeutic for this patient (goal range 2-3) and is reflective of 25 mg TWD  Patient verifies current dosing regimen, patient able to verbally recall dose  Patient reports no  missed doses since last INR   Patient denies s/sx clotting and/or stroke  Patient denies hematuria, epistaxis, rectal bleeding  Patient denies changes in diet, alcohol, or tobacco use  Reviewed medication list and drug allergies with patient, updated any medication additions or modifications accordingly  Patient also denies any pending medical or dental procedures scheduled at this time  Patient was d/c from hospital 2 weeks ago following left lower lobe lobectomy for non-small cell cancer. Patient states he will be seeing Oncologist in 2 weeks to begin chemotx    Patient was instructed to increase TWD to 27.5mg (10% increase) and RTC 11 days. CLINICAL PHARMACY CONSULT: MED RECONCILIATION/REVIEW ADDENDUM    For Pharmacy Admin Tracking Only    PHSO: Yes  Total # of Interventions Recommended: 1  - Increased Dose #: 1  - Maintenance Safety Lab Monitoring #: 1  Total Interventions Accepted: 1  Time Spent (min): 15    LEONEL Kam. Ph. 11/5/2020 12:13 PM

## 2020-11-06 ENCOUNTER — CARE COORDINATION (OUTPATIENT)
Dept: CASE MANAGEMENT | Age: 75
End: 2020-11-06

## 2020-11-06 ENCOUNTER — OFFICE VISIT (OUTPATIENT)
Dept: FAMILY MEDICINE CLINIC | Age: 75
End: 2020-11-06
Payer: MEDICARE

## 2020-11-06 VITALS
WEIGHT: 156 LBS | BODY MASS INDEX: 22.33 KG/M2 | SYSTOLIC BLOOD PRESSURE: 110 MMHG | RESPIRATION RATE: 17 BRPM | HEART RATE: 77 BPM | TEMPERATURE: 97.2 F | OXYGEN SATURATION: 98 % | DIASTOLIC BLOOD PRESSURE: 60 MMHG | HEIGHT: 70 IN

## 2020-11-06 PROCEDURE — G8484 FLU IMMUNIZE NO ADMIN: HCPCS | Performed by: FAMILY MEDICINE

## 2020-11-06 PROCEDURE — 99214 OFFICE O/P EST MOD 30 MIN: CPT | Performed by: FAMILY MEDICINE

## 2020-11-06 PROCEDURE — 1111F DSCHRG MED/CURRENT MED MERGE: CPT | Performed by: FAMILY MEDICINE

## 2020-11-06 PROCEDURE — G8427 DOCREV CUR MEDS BY ELIG CLIN: HCPCS | Performed by: FAMILY MEDICINE

## 2020-11-06 PROCEDURE — 1036F TOBACCO NON-USER: CPT | Performed by: FAMILY MEDICINE

## 2020-11-06 PROCEDURE — G8420 CALC BMI NORM PARAMETERS: HCPCS | Performed by: FAMILY MEDICINE

## 2020-11-06 PROCEDURE — 1123F ACP DISCUSS/DSCN MKR DOCD: CPT | Performed by: FAMILY MEDICINE

## 2020-11-06 PROCEDURE — 4040F PNEUMOC VAC/ADMIN/RCVD: CPT | Performed by: FAMILY MEDICINE

## 2020-11-06 ASSESSMENT — PATIENT HEALTH QUESTIONNAIRE - PHQ9
SUM OF ALL RESPONSES TO PHQ QUESTIONS 1-9: 0
SUM OF ALL RESPONSES TO PHQ9 QUESTIONS 1 & 2: 0
2. FEELING DOWN, DEPRESSED OR HOPELESS: 0
1. LITTLE INTEREST OR PLEASURE IN DOING THINGS: 0

## 2020-11-06 ASSESSMENT — ENCOUNTER SYMPTOMS
VOMITING: 0
ABDOMINAL PAIN: 0

## 2020-11-06 NOTE — CARE COORDINATION
Patient: Shan Oliver          Patient : 1945   RSX: [id number]         SORNAK for Admission: L Lung CA, s/p L Thoracotomy w/ lung resection & bronchoscopy. Discharge Date: 10/21/20     RARS: Readmission Risk Score: 28  NR  10/13/2020 CV-19 lab neg. Alan CNP 10/27 1:30. Attended. Attended Dr Ninfa Tineo post-op. Med rec/1111F order completed. Care Transitions      Needs to be reviewed by the provider   Additional needs identified to be addressed with provider No  none  Discussed COVID-19 related testing which was available at this time. Test results were negative. Patient informed of results, if available? Yes         Method of communication with provider : none    Care Transition Nurse (CTN) contacted the patient by telephone to follow up after admission. Verified name and  with patient as identifiers. Addressed changes since last contact: Pt reports he has no acute pain concerns. States wearing O2 continuous. No exertional SOB concerns or cough. Reports incision site clean and dry w/out drainage. States he is eating well and getting rest. Denies any home, DME, HHC, or med needs at this time. Discharged needs reviewed: none  Follow up appointment completed? Yes    Advance Care Planning:   Does patient have an Advance Directive:  not on file. CTN reviewed discharge instructions, medical action plan and red flags with patient and discussed any barriers to care and/or understanding of plan of care after discharge. Discussed appropriate site of care based on symptoms and resources available to patient including: When to call 911 and Reviewed symptoms of CV-19 & Influenza to report. . The patient agrees to contact the PCP office for questions related to their healthcare. Patients top risk factors for readmission: medical condition  Interventions to address risk factors: Reviewed precatuions to follow for prevention of CV-19 and post-op symptoms to notify surgeon or go to ED.     Discussed follow-up appointments. If no appointment was previously scheduled, appointment scheduling offered: Attended. Is follow up appointment scheduled within 7 days of discharge? Yes  Non-[facility] follow up appointment(s):     Plan for follow-up call in 7-10 days based on severity of symptoms and risk factors. Plan for next call: CTN to fu for final transitions. CTN provided contact information for future needs.

## 2020-11-06 NOTE — PROGRESS NOTES
Subjective:      Patient ID: Titus Castellano is a 67 y.o. male    Mental Health Problem   This is a chronic problem. The onset of the illness is precipitated by emotional stress. Additional symptoms of the illness do not include abdominal pain. Diabetes   Pertinent negatives for diabetes include no weakness. Here in follow up for diabetes and depression. Depression stable with medication. Glucose around 130 in am.  Sleeping well at night. Had recent surgery on lung for lung cancer. Seeing oncology later this month to look at chemo options    Review of Systems   Constitutional: Negative for chills and fever. Gastrointestinal: Negative for abdominal pain and vomiting. Neurological: Negative for weakness. Reviewed allergy, medical, social, surgical, family and med list changes and updated   Files--reviewed blood work which is acceptable      Social History     Socioeconomic History    Marital status:      Spouse name: None    Number of children: None    Years of education: None    Highest education level: None   Occupational History    None   Social Needs    Financial resource strain: None    Food insecurity     Worry: None     Inability: None    Transportation needs     Medical: None     Non-medical: None   Tobacco Use    Smoking status: Former Smoker     Packs/day: 1.00     Years: 58.00     Pack years: 58.00     Types: Cigarettes     Start date: 1959     Last attempt to quit: 3/14/2020     Years since quittin.6    Smokeless tobacco: Never Used   Substance and Sexual Activity    Alcohol use:  Yes     Alcohol/week: 3.0 standard drinks     Types: 3 Shots of liquor per week     Comment: once weekly  or more    Drug use: No    Sexual activity: Never   Lifestyle    Physical activity     Days per week: None     Minutes per session: None    Stress: None   Relationships    Social connections     Talks on phone: None     Gets together: None     Attends Religion service: None Active member of club or organization: None     Attends meetings of clubs or organizations: None     Relationship status: None    Intimate partner violence     Fear of current or ex partner: None     Emotionally abused: None     Physically abused: None     Forced sexual activity: None   Other Topics Concern    None   Social History Narrative    None     Current Outpatient Medications   Medication Sig Dispense Refill    blood glucose monitor strips Test one time a day & as needed for symptoms of irregular blood glucose- for one touch ultra 300 strip 0    fluticasone-umeclidin-vilant (TRELEGY ELLIPTA) 100-62.5-25 MCG/INH AEPB Inhale 1 puff into the lungs daily 1 each 3    Budesonide ER 6 MG CP24 Take 6 mg by mouth daily      warfarin (COUMADIN) 5 MG tablet Take as directed by [facility]. Quantity for 90 day supply.  70 tablet 1    traZODone (DESYREL) 100 MG tablet TAKE 1 TABLET NIGHTLY 90 tablet 0    loperamide (IMODIUM) 2 MG capsule Take 2 mg by mouth 4 times daily as needed for Diarrhea      citalopram (CELEXA) 20 MG tablet TAKE 1 TABLET DAILY 90 tablet 0    ipratropium-albuterol (DUONEB) 0.5-2.5 (3) MG/3ML SOLN nebulizer solution Inhale 3 mLs into the lungs 4 times daily 411 mL 5    folic acid (FOLVITE) 1 MG tablet Take 1 tablet by mouth daily 90 tablet 1    ferrous sulfate (IRON 325) 325 (65 Fe) MG tablet Take 1 tablet by mouth 2 times daily 60 tablet 5    Respiratory Therapy Supplies (NEBULIZER AIR TUBE/PLUGS) MISC Nebulizer machine with tubing and supplies 1 each 0    sotalol (BETAPACE) 80 MG tablet Take 1 tablet by mouth 2 times daily 60 tablet 3    hydrochlorothiazide (HYDRODIURIL) 12.5 MG tablet Take 1 tablet by mouth every other day (Patient taking differently: Take 12.5 mg by mouth daily as needed (for edema) ) 30 tablet 3    albuterol sulfate HFA (VENTOLIN HFA) 108 (90 Base) MCG/ACT inhaler Inhale 2 puffs into the lungs 4 times daily as needed for Wheezing 3 Inhaler 1    carbidopa-levodopa-entacapone (STALEVO 100) -200 MG TABS Take 1 tablet by mouth nightly      Leuprolide Acetate (LUPRON IJ) Inject as directed every 6 months      primidone (MYSOLINE) 250 MG tablet TAKE 1 TABLET AT BEDTIME 90 tablet 3    blood glucose monitor kit and supplies Test one time a day & as needed for symptoms of irregular blood glucose. 1 kit 0    Lancets MISC 1 each by Does not apply route daily 300 each 1    isosorbide mononitrate (IMDUR) 60 MG extended release tablet Take 30 mg by mouth daily       Handicap Placard MISC by Does not apply route Good for 5 years. Good from 1/31/2017 through 1/31/2022. 1 each 0    atorvastatin (LIPITOR) 80 MG tablet Take 80 mg by mouth daily.  aspirin 81 MG tablet Take 81 mg by mouth daily.  Omega-3 Fatty Acids (FISH OIL) 1200 MG CPDR Take by mouth 2 times daily       nitroGLYCERIN (NITROSTAT) 0.4 MG SL tablet Place 0.4 mg under the tongue every 5 minutes as needed for Chest pain. No current facility-administered medications for this visit.       Family History   Problem Relation Age of Onset    Other Mother         liver scerosis   Linnea Newell Other Father         did not have a father    Other Sister         does not know sister   Raf Bernardo         brain cancer    Other Son         unsure of medical problems    Other Daughter         unsure of medical problems     Past Medical History:   Diagnosis Date    CAD (coronary artery disease)     has 16 cardiac stents    Cancer ([address]     COPD (chronic obstructive pulmonary disease) ([address]     Encephalopathy     Essential tremor     Gross hematuria     History of heart attack     has had 4 heart attacks in the past     Hydrocephalus ([address]     Hyperlipidemia     on meds > 20 yrs    Hypertension     on meds > 20 yrs    Insomnia     Restless leg syndrome     Seizures ([address]     Tremors of nervous system     Type 2 diabetes mellitus with other circulatory complications ([facility] 0/58/7297     Objective:   /60   Pulse 77   Temp 97.2 °F (36.2 °C) (Tympanic)   Resp 17   Ht 5' 10\" (1.778 m)   Wt 156 lb (70.8 kg)   SpO2 98%   BMI 22.38 kg/m²     Physical Exam     Lungs:clear and equal breath sounds. No wheezes or rales. Heart:rate reg. 1/6 syst murmur along llsb. No gallops                  D.P  1+ equal  Extremities:no edema in either leg  Gen: In no acute distress    Patient with appropriate affect. Alert    Thought content appropriate  Good eye contact      Dorsalis pedis and posterior tibial pulses are symmetric. No fissures between the toes. No open sores on the feet. Tactile sensation intact    Assessment:       Diagnosis Orders   1. Type 2 diabetes mellitus with other circulatory complications ([facility]     2. Chronic insomnia     3.  Recurrent major depressive disorder, in partial remission ([facility]           Plan:   Continue current meds   F/u with other specialities as planned    Fasting blood work in April and f/u after done

## 2020-11-07 LAB — VZV IGG SER QL IA: >4000 IV

## 2020-11-09 ENCOUNTER — TELEPHONE (OUTPATIENT)
Dept: UROLOGY | Age: 75
End: 2020-11-09

## 2020-11-09 ENCOUNTER — OFFICE VISIT (OUTPATIENT)
Dept: PULMONOLOGY | Age: 75
End: 2020-11-09
Payer: MEDICARE

## 2020-11-09 ENCOUNTER — TELEPHONE (OUTPATIENT)
Dept: PHARMACY | Age: 75
End: 2020-11-09

## 2020-11-09 VITALS
HEART RATE: 75 BPM | DIASTOLIC BLOOD PRESSURE: 60 MMHG | TEMPERATURE: 97.4 F | RESPIRATION RATE: 16 BRPM | OXYGEN SATURATION: 99 % | SYSTOLIC BLOOD PRESSURE: 104 MMHG

## 2020-11-09 PROCEDURE — G8427 DOCREV CUR MEDS BY ELIG CLIN: HCPCS | Performed by: INTERNAL MEDICINE

## 2020-11-09 PROCEDURE — 1111F DSCHRG MED/CURRENT MED MERGE: CPT | Performed by: INTERNAL MEDICINE

## 2020-11-09 PROCEDURE — 1036F TOBACCO NON-USER: CPT | Performed by: INTERNAL MEDICINE

## 2020-11-09 PROCEDURE — 3023F SPIROM DOC REV: CPT | Performed by: INTERNAL MEDICINE

## 2020-11-09 PROCEDURE — 4040F PNEUMOC VAC/ADMIN/RCVD: CPT | Performed by: INTERNAL MEDICINE

## 2020-11-09 PROCEDURE — 99214 OFFICE O/P EST MOD 30 MIN: CPT | Performed by: INTERNAL MEDICINE

## 2020-11-09 PROCEDURE — G8926 SPIRO NO PERF OR DOC: HCPCS | Performed by: INTERNAL MEDICINE

## 2020-11-09 PROCEDURE — 1123F ACP DISCUSS/DSCN MKR DOCD: CPT | Performed by: INTERNAL MEDICINE

## 2020-11-09 PROCEDURE — G8484 FLU IMMUNIZE NO ADMIN: HCPCS | Performed by: INTERNAL MEDICINE

## 2020-11-09 PROCEDURE — G8420 CALC BMI NORM PARAMETERS: HCPCS | Performed by: INTERNAL MEDICINE

## 2020-11-09 ASSESSMENT — ENCOUNTER SYMPTOMS
SORE THROAT: 0
SINUS PRESSURE: 0
COUGH: 1
NAUSEA: 0
VOMITING: 0
ABDOMINAL PAIN: 0
WHEEZING: 0
CHEST TIGHTNESS: 0
SHORTNESS OF BREATH: 1
RHINORRHEA: 0
DIARRHEA: 0

## 2020-11-09 NOTE — TELEPHONE ENCOUNTER
See below - patient to be off warfarin therapy at this time, per Dr. Bola Coppola           11/9/20 10:21 AM   Note      Bright red blood started Saturday. Yesterday jolie color. No pain, no difficulty urinating, no fever,  Pt is on coumadin. Does not have catheter. Coumadin was recently increased                 11/9/20 10:21 AM   Bonita Li routed this conversation to MD Oscar Batista MD   to Fannie Szymanski, [facility]       11/9/20 10:37 AM   Note      Hold the coumadin      Fannie Szymanski CMA           11/9/20 11:24 AM   Note      Pt advised and told to hold Asprin. Also he takes folic acid and iron should he hold those as well?
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
## 2020-11-09 NOTE — TELEPHONE ENCOUNTER
Bright red blood started Saturday. Yesterday jolei color. No pain, no difficulty urinating, no fever,  Pt is on coumadin. Does not have catheter.   Coumadin was recently increased

## 2020-11-13 NOTE — TELEPHONE ENCOUNTER
Patient wife states that she has held the patients blood thinner and his urine is dark yellow but she dosn't see any blood in the last 24hrs.

## 2020-11-13 NOTE — TELEPHONE ENCOUNTER
Lindsey Leo advised that pt can restart his blood thinners and to call the office if gross hematuria occurs again.

## 2020-11-16 ENCOUNTER — HOSPITAL ENCOUNTER (OUTPATIENT)
Dept: PHARMACY | Age: 75
Setting detail: THERAPIES SERIES
Discharge: HOME | End: 2020-11-16
Payer: MEDICARE

## 2020-11-16 VITALS — TEMPERATURE: 97.6 F

## 2020-11-16 LAB — INTERNATIONAL NORMALIZATION RATIO, POC: 1.1

## 2020-11-16 PROCEDURE — 85610 PROTHROMBIN TIME: CPT

## 2020-11-16 PROCEDURE — 99211 OFF/OP EST MAY X REQ PHY/QHP: CPT

## 2020-11-16 NOTE — PROGRESS NOTES
Mr. Modesto Stockton is a 67 y.o. y/o male with history of Afib who presents today for anticoagulation monitoring and adjustment. INR 1.1 is subtherapeutic for this patient (goal range 2-3) and is reflective of 12.5 mg TWD (total dose from past 7 days)  Patient verifies current dosing regimen, patient able to verbally recall dose  Patient reports four missed doses since last INR. Patient had blood in his urine last week and held warfarin for 4 days (11/09/20-11/12/20) per instructions from Dr. Josiane Rutledge. Patient has not had any blood in his urine since Thursday evening, 11/12/20. Patient denies s/sx clotting and/or stroke  Patient denies hematuria, epistaxis, rectal bleeding  Patient denies changes in diet, alcohol, or tobacco use  Reviewed medication list and drug allergies with patient, updated any medication additions or modifications accordingly  Patient also denies any pending medical or dental procedures scheduled at this time  Patient was instructed to boost today's dose to 5 mg (normally 2.5 mg) and tomorrow's dose to 7.5 mg (normally 5 mg), then continue usual regimen of warfarin 27.5 mg TWD and RTC 1 week    Patient has an appointment with Dr. Markus Villaseñor tomorrow and will begin chemotherapy treatment.      CLINICAL PHARMACY CONSULT: MED RECONCILIATION/REVIEW ADDENDUM    For Pharmacy Admin Tracking Only    PHSO: Yes  Total # of Interventions Recommended: 3  - Increased Dose #: 2  - Maintenance Safety Lab Monitoring #: 1  Total Interventions Accepted: 3  Time Spent (min): 262 Shan Villafana, PharmD  [address]

## 2020-11-17 ENCOUNTER — CARE COORDINATION (OUTPATIENT)
Dept: CASE MANAGEMENT | Age: 75
End: 2020-11-17

## 2020-11-17 NOTE — CARE COORDINATION
Patient: Shan Oliver          Patient : 1945   FSD: [id number]         CVZHGB for Admission: L Lung CA, s/p L Thoracotomy w/ lung resection & bronchoscopy. Discharge Date: 10/21/20     RARS: Readmission Risk Score: 28  NR  10/13/2020 CV-19 lab neg. Alan CNP 10/27 1:30. Attended. Attended Dr Brando Arce post-op. Med rec/1111F order completed. Care Transitions    Care Transitions request call back to P: [phone] for final fu. CTN s/o.

## 2020-11-19 ENCOUNTER — TELEPHONE (OUTPATIENT)
Dept: UROLOGY | Age: 75
End: 2020-11-19

## 2020-11-19 NOTE — TELEPHONE ENCOUNTER
Stop coumadin have him drop off urine for culture in am  Set up a cystoscopy in the office next week  Victoria Tay MD

## 2020-11-20 ENCOUNTER — NURSE ONLY (OUTPATIENT)
Dept: UROLOGY | Age: 75
End: 2020-11-20
Payer: MEDICARE

## 2020-11-20 ENCOUNTER — TELEPHONE (OUTPATIENT)
Dept: SURGERY | Age: 75
End: 2020-11-20

## 2020-11-20 DIAGNOSIS — R31.0 GROSS HEMATURIA: ICD-10-CM

## 2020-11-20 LAB
BILIRUBIN, POC: ABNORMAL
BLOOD URINE, POC: ABNORMAL
CLARITY, POC: ABNORMAL
COLOR, POC: YELLOW
GLUCOSE URINE, POC: ABNORMAL
KETONES, POC: ABNORMAL
LEUKOCYTE EST, POC: ABNORMAL
NITRITE, POC: POSITIVE
PH, POC: 5
PROTEIN, POC: ABNORMAL
SPECIFIC GRAVITY, POC: 1.02
UROBILINOGEN, POC: 0.2

## 2020-11-20 PROCEDURE — 81003 URINALYSIS AUTO W/O SCOPE: CPT | Performed by: UROLOGY

## 2020-11-20 NOTE — TELEPHONE ENCOUNTER
Urine drop off.  For blood in urine  Pharm[facility] is : [facility] [place]  Allergies:PCN, Finasteride  UA shows:    Color, UA  11/20/2020 10:27 AM  Unknown    yellow    Clarity, UA  11/20/2020 10:27 AM  Unknown    cloudy    Glucose, UA POC  11/20/2020 10:27 AM  Unknown    neg    Bilirubin, UA  11/20/2020 10:27 AM  Unknown    neg    Ketones, UA  11/20/2020 10:27 AM  Unknown    neg    Spec Grav, UA  11/20/2020 10:27 AM  Unknown    1.020    Blood, UA POC  11/20/2020 10:27 AM  Unknown    moderate    pH, UA  11/20/2020 10:27 AM  Unknown    5.0    Protein, UA POC  11/20/2020 10:27 AM  Unknown    100  mg    Urobilinogen, UA  11/20/2020 10:27 AM  Unknown    0.2    Leukocytes, UA  11/20/2020 10:27 AM  Unknown    large    Nitrite, UA  11/20/2020 10:27 AM  Unknown    positive      Culture is pending for you to sign

## 2020-11-23 ENCOUNTER — PROCEDURE VISIT (OUTPATIENT)
Dept: UROLOGY | Age: 75
End: 2020-11-23
Payer: MEDICARE

## 2020-11-23 ENCOUNTER — HOSPITAL ENCOUNTER (OUTPATIENT)
Dept: PHARMACY | Age: 75
Setting detail: THERAPIES SERIES
Discharge: HOME | End: 2020-11-23
Payer: MEDICARE

## 2020-11-23 VITALS
HEIGHT: 70 IN | HEART RATE: 73 BPM | BODY MASS INDEX: 22.33 KG/M2 | WEIGHT: 156 LBS | DIASTOLIC BLOOD PRESSURE: 70 MMHG | SYSTOLIC BLOOD PRESSURE: 112 MMHG

## 2020-11-23 LAB
BILIRUBIN, POC: ABNORMAL
BLOOD URINE, POC: ABNORMAL
CLARITY, POC: ABNORMAL
COLOR, POC: YELLOW
GLUCOSE URINE, POC: ABNORMAL
INTERNATIONAL NORMALIZATION RATIO, POC: 1.1
KETONES, POC: ABNORMAL
LEUKOCYTE EST, POC: ABNORMAL
NITRITE, POC: ABNORMAL
ORGANISM: ABNORMAL
PH, POC: 5.5
PROTEIN, POC: 100
SPECIFIC GRAVITY, POC: 1.02
URINE CULTURE, ROUTINE: ABNORMAL
UROBILINOGEN, POC: 0.2

## 2020-11-23 PROCEDURE — 99214 OFFICE O/P EST MOD 30 MIN: CPT | Performed by: UROLOGY

## 2020-11-23 PROCEDURE — G8420 CALC BMI NORM PARAMETERS: HCPCS | Performed by: UROLOGY

## 2020-11-23 PROCEDURE — 99211 OFF/OP EST MAY X REQ PHY/QHP: CPT

## 2020-11-23 PROCEDURE — 81003 URINALYSIS AUTO W/O SCOPE: CPT | Performed by: UROLOGY

## 2020-11-23 PROCEDURE — 1123F ACP DISCUSS/DSCN MKR DOCD: CPT | Performed by: UROLOGY

## 2020-11-23 PROCEDURE — 85610 PROTHROMBIN TIME: CPT

## 2020-11-23 PROCEDURE — 4040F PNEUMOC VAC/ADMIN/RCVD: CPT | Performed by: UROLOGY

## 2020-11-23 PROCEDURE — G8484 FLU IMMUNIZE NO ADMIN: HCPCS | Performed by: UROLOGY

## 2020-11-23 PROCEDURE — 1036F TOBACCO NON-USER: CPT | Performed by: UROLOGY

## 2020-11-23 PROCEDURE — G8427 DOCREV CUR MEDS BY ELIG CLIN: HCPCS | Performed by: UROLOGY

## 2020-11-23 RX ORDER — SULFAMETHOXAZOLE AND TRIMETHOPRIM 800; 160 MG/1; MG/1
1 TABLET ORAL 2 TIMES DAILY
Qty: 14 TABLET | Refills: 0 | Status: SHIPPED | OUTPATIENT
Start: 2020-11-23 | End: 2020-11-30
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
## 2020-11-23 NOTE — PROGRESS NOTES
Mr. Mayela Lowry is a 67 y.o. y/o male with history of Afib who presents today for anticoagulation monitoring and adjustment. INR 1.1 is sub therapeutic for this patient (goal range 2-3) and is reflective of 15 mg TWD from past 7 days (27.5 mg TWD maintenance plan)  Patient verifies current dosing regimen, patient able to verbally recall dose  Patient reports 4  held doses since last INR (per Dr Vitaly Dent)  Patient denies s/sx clotting and/or stroke  Patient denies epistaxis, rectal bleeding  Patient states bleeding from catheter on 11/19, Dr Vitaly Dent held warfarin for 4 days, prescribed Bactrim and wants patient to hold for 14 days. Went to [facility] and spoke with Dr Sherif Oropeza regarding only 1 therapeutic INR on 10/22, otherwise sub therapeutic levels since 10/9. Asked for message to be sent to Dr Merlin Giron. Dr Merlin Giron responded: \"OK to hold warfarin as requested by Dr. Vitaly Dent. Start back on it once approved by Dr. Vitaly Dent. His dose likely needs to be adjusted to be somewhat higher as he has mostly been subtherapeutic taking the current dose\"  Patient denies changes in diet, alcohol, or tobacco use  Reviewed medication list and drug allergies with patient, updated any medication additions or modifications accordingly  Patient also denies any pending medical or dental procedures scheduled at this time  Patient was instructed per Dr Merlin Giron OK to hold warfarin as requested by Dr. Vitaly Dent and to start back on warfarin once approved by Dr Vitaly Dent.  Advised to take full 5 mg tablet for 2 days once approved, then return to maintenance plan 27.5 mg TWD and RTC 12/4 (approximately 4 days after restart)    CLINICAL PHARMACY CONSULT: MED RECONCILIATION/REVIEW ADDENDUM    For Pharmacy Admin Tracking Only    PHSO: Yes  Total # of Interventions Recommended: 2  - Increased Dose #: 1  - Maintenance Safety Lab Monitoring #: 1    Total Interventions Accepted: 1  Time Spent (min): [address] [facility]

## 2020-12-02 RX ORDER — CITALOPRAM 20 MG/1
TABLET ORAL
Qty: 90 TABLET | Refills: 0 | Status: SHIPPED | OUTPATIENT
Start: 2020-12-02 | End: 2021-03-02

## 2020-12-04 ENCOUNTER — HOSPITAL ENCOUNTER (OUTPATIENT)
Dept: PHARMACY | Age: 75
Setting detail: THERAPIES SERIES
Discharge: HOME | End: 2020-12-04
Payer: MEDICARE

## 2020-12-04 VITALS — TEMPERATURE: 96.8 F

## 2020-12-04 LAB — INTERNATIONAL NORMALIZATION RATIO, POC: 1.1

## 2020-12-04 PROCEDURE — 99211 OFF/OP EST MAY X REQ PHY/QHP: CPT

## 2020-12-04 PROCEDURE — 85610 PROTHROMBIN TIME: CPT

## 2020-12-04 NOTE — PROGRESS NOTES
Mr. Cony Cardenas is a 67 y.o. y/o male with history of Afib who presents today for anticoagulation monitoring and adjustment. Due to COVID protocol, extensive cleaning with Virex performed before and after visit. Temperature assessed. Masks worn by both parties the entire visit. Faceshield/googles worn by me. INR 1.1 is subtherapeutic for this patient (goal range 2-3) and is reflective of 30 mg TWD  Patient verifies current dosing regimen, patient able to verbally recall dose  Patient reports 4 missed doses since last INR for a procedure  Patient denies s/sx clotting and/or stroke  Patient denies hematuria, epistaxis, rectal bleeding  Patient denies changes in diet, alcohol, or tobacco use  Reviewed medication list and drug allergies with patient, updated any medication additions or modifications accordingly  Pt has now started on Chemo therapy regimen.   Will bring in names after treatment on Tuesday  Patient also denies any pending medical or dental procedures scheduled at this time  Patient was instructed to boost x 2 days and continue 27.5 mg TWD and RTC 1 week    CLINICAL PHARMACY CONSULT: MED RECONCILIATION/REVIEW [address]: Yes  Total # of Interventions Recommended: 0  Total Interventions Accepted: 0  Time Spent (min): [address] [facility] [address]  Staff Pharmacist, [facility]  12/4/2020  11:10 AM

## 2020-12-08 PROBLEM — K43.9 VENTRAL HERNIA WITHOUT OBSTRUCTION OR GANGRENE: Status: ACTIVE | Noted: 2019-03-29

## 2020-12-08 PROBLEM — R50.9 FEVER: Status: ACTIVE | Noted: 2020-12-08

## 2020-12-08 PROBLEM — Z72.0 TOBACCO USER: Status: ACTIVE | Noted: 2019-01-24

## 2020-12-08 PROBLEM — E78.49 OTHER HYPERLIPIDEMIA: Status: ACTIVE | Noted: 2019-03-29

## 2020-12-08 PROBLEM — R00.2 PALPITATIONS: Status: ACTIVE | Noted: 2017-04-25

## 2020-12-08 PROBLEM — I35.0 AORTIC STENOSIS: Status: ACTIVE | Noted: 2019-03-29

## 2020-12-08 PROBLEM — G25.81 RESTLESS LEGS: Status: ACTIVE | Noted: 2019-01-24

## 2020-12-08 PROBLEM — E11.9 TYPE 2 DIABETES MELLITUS (HCC): Status: ACTIVE | Noted: 2020-04-27

## 2020-12-08 PROBLEM — F33.41 RECURRENT MAJOR DEPRESSIVE DISORDER, IN PARTIAL REMISSION (HCC): Status: ACTIVE | Noted: 2019-01-28

## 2020-12-08 PROBLEM — I65.29 CAROTID ARTERY STENOSIS: Status: ACTIVE | Noted: 2018-08-13

## 2020-12-08 PROBLEM — R63.4 WEIGHT LOSS: Status: ACTIVE | Noted: 2020-12-08

## 2020-12-08 PROBLEM — C61 MALIGNANT NEOPLASM OF PROSTATE (HCC): Status: ACTIVE | Noted: 2019-03-29

## 2020-12-10 ENCOUNTER — OFFICE VISIT (OUTPATIENT)
Dept: NEUROLOGY | Age: 75
End: 2020-12-10
Payer: MEDICARE

## 2020-12-10 VITALS — SYSTOLIC BLOOD PRESSURE: 85 MMHG | DIASTOLIC BLOOD PRESSURE: 61 MMHG | HEART RATE: 83 BPM

## 2020-12-10 PROBLEM — R26.0 ATAXIC GAIT: Status: ACTIVE | Noted: 2020-12-10

## 2020-12-10 PROBLEM — I95.9 ARTERIAL HYPOTENSION: Status: ACTIVE | Noted: 2020-08-05

## 2020-12-10 PROCEDURE — 4040F PNEUMOC VAC/ADMIN/RCVD: CPT | Performed by: PSYCHIATRY & NEUROLOGY

## 2020-12-10 PROCEDURE — 1123F ACP DISCUSS/DSCN MKR DOCD: CPT | Performed by: PSYCHIATRY & NEUROLOGY

## 2020-12-10 PROCEDURE — G8484 FLU IMMUNIZE NO ADMIN: HCPCS | Performed by: PSYCHIATRY & NEUROLOGY

## 2020-12-10 PROCEDURE — G8427 DOCREV CUR MEDS BY ELIG CLIN: HCPCS | Performed by: PSYCHIATRY & NEUROLOGY

## 2020-12-10 PROCEDURE — G8420 CALC BMI NORM PARAMETERS: HCPCS | Performed by: PSYCHIATRY & NEUROLOGY

## 2020-12-10 PROCEDURE — 99214 OFFICE O/P EST MOD 30 MIN: CPT | Performed by: PSYCHIATRY & NEUROLOGY

## 2020-12-10 PROCEDURE — 1036F TOBACCO NON-USER: CPT | Performed by: PSYCHIATRY & NEUROLOGY

## 2020-12-10 RX ORDER — CARBIDOPA, LEVODOPA AND ENTACAPONE 25; 200; 100 MG/1; MG/1; MG/1
1 TABLET, FILM COATED ORAL NIGHTLY
Qty: 90 TABLET | Refills: 3 | Status: SHIPPED | OUTPATIENT
Start: 2020-12-10 | End: 2021-01-01

## 2020-12-10 RX ORDER — PROCHLORPERAZINE MALEATE 10 MG
TABLET ORAL
COMMUNITY
Start: 2020-11-24 | End: 2022-01-01 | Stop reason: ALTCHOICE

## 2020-12-10 ASSESSMENT — ENCOUNTER SYMPTOMS
PHOTOPHOBIA: 0
VOMITING: 0
COLOR CHANGE: 0
TROUBLE SWALLOWING: 0
BACK PAIN: 0
CHOKING: 0
SHORTNESS OF BREATH: 0
NAUSEA: 0
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
## 2020-12-11 ENCOUNTER — HOSPITAL ENCOUNTER (OUTPATIENT)
Dept: PHARMACY | Age: 75
Setting detail: THERAPIES SERIES
Discharge: HOME | End: 2020-12-11
Payer: MEDICARE

## 2020-12-11 VITALS — TEMPERATURE: 97.5 F

## 2020-12-11 LAB — INTERNATIONAL NORMALIZATION RATIO, POC: 1.1

## 2020-12-11 PROCEDURE — 99211 OFF/OP EST MAY X REQ PHY/QHP: CPT

## 2020-12-11 PROCEDURE — 85610 PROTHROMBIN TIME: CPT

## 2020-12-11 NOTE — PROGRESS NOTES
Mr. Vitor Ruiz is a 67 y.o. y/o male with history of Afib who presents today for anticoagulation monitoring and adjustment. INR 1.1 is subtherapeutic for this patient (goal range 2-3) and is reflective of 32.5 mg TWD  Patient verifies current dosing regimen, patient able to verbally recall dose  Patient reports no  missed doses since last INR   Patient denies s/sx clotting and/or stroke  Patient denies hematuria, epistaxis, rectal bleeding  Patient denies changes in diet, alcohol, or tobacco use  Reviewed medication list and drug allergies with patient, updated any medication additions or modifications accordingly    Patient does take primidone (decreased INR) but this is not new.  And has started gemzar    Patient also denies any pending medical or dental procedures scheduled at this time  Patient was instructed to take 7.5 mg today then increase TWD to 35 mg and RTC 1 weeks    CLINICAL PHARMACY CONSULT: MED RECONCILIATION/REVIEW [address]: Yes  Total # of Interventions Recommended: 2  - Increased Dose #: 1  - Maintenance Safety Lab Monitoring #: 1  Total Interventions Accepted: 2  Time Spent (min): 30    Lakeshia Springer, PharmD, [address] [facility]  Staff Pharmacist  12/11/2020 1:12 PM

## 2020-12-17 ENCOUNTER — HOSPITAL ENCOUNTER (OUTPATIENT)
Dept: PHARMACY | Age: 75
Setting detail: THERAPIES SERIES
Discharge: HOME | End: 2020-12-17
Payer: MEDICARE

## 2020-12-17 ENCOUNTER — PROCEDURE VISIT (OUTPATIENT)
Dept: UROLOGY | Age: 75
End: 2020-12-17
Payer: MEDICARE

## 2020-12-17 VITALS
HEART RATE: 67 BPM | BODY MASS INDEX: 22.9 KG/M2 | DIASTOLIC BLOOD PRESSURE: 70 MMHG | WEIGHT: 160 LBS | SYSTOLIC BLOOD PRESSURE: 110 MMHG | HEIGHT: 70 IN

## 2020-12-17 VITALS — TEMPERATURE: 97 F

## 2020-12-17 LAB
BILIRUBIN, POC: ABNORMAL
BLOOD URINE, POC: ABNORMAL
CLARITY, POC: CLEAR
COLOR, POC: YELLOW
GLUCOSE URINE, POC: ABNORMAL
INTERNATIONAL NORMALIZATION RATIO, POC: 1.2
KETONES, POC: ABNORMAL
LEUKOCYTE EST, POC: ABNORMAL
NITRITE, POC: ABNORMAL
PH, POC: 5
PROTEIN, POC: ABNORMAL
SPECIFIC GRAVITY, POC: 1.02
UROBILINOGEN, POC: 0.2

## 2020-12-17 PROCEDURE — 99211 OFF/OP EST MAY X REQ PHY/QHP: CPT

## 2020-12-17 PROCEDURE — 85610 PROTHROMBIN TIME: CPT

## 2020-12-17 PROCEDURE — 52000 CYSTOURETHROSCOPY: CPT | Performed by: UROLOGY

## 2020-12-17 PROCEDURE — 81003 URINALYSIS AUTO W/O SCOPE: CPT | Performed by: UROLOGY

## 2020-12-17 PROCEDURE — 99999 PR OFFICE/OUTPT VISIT,PROCEDURE ONLY: CPT | Performed by: UROLOGY

## 2020-12-17 RX ORDER — SULFAMETHOXAZOLE AND TRIMETHOPRIM 800; 160 MG/1; MG/1
1 TABLET ORAL ONCE
Qty: 1 TABLET | Refills: 0 | COMMUNITY
Start: 2020-12-17 | End: 2020-12-17

## 2020-12-17 NOTE — PROGRESS NOTES
Urology  Patient here for cystoscopy to complete his work-up for gross hematuria which was secondary to UTI exacerbated by anticoagulation  Patient status post TURP, radiation therapy for prostate cancer, currently undergoing chemotherapy for lung cancer      Procedure note  Flexible cystoscopy/local anesthetic/premedicated with Bactrim  Normal penile urethra  Prostatic fossa open with some necrosis most likely secondary to radiation therapy mid prostate  Organized blood clot noted  Not obstructing prostatic fossa  No evidence of radiation cystitis  No evidence of papillary lesions  No evidence of urothelial carcinoma  PSA 1 year with follow-up  Nir Orta MD

## 2020-12-21 RX ORDER — TRAZODONE HYDROCHLORIDE 100 MG/1
TABLET ORAL
Qty: 90 TABLET | Refills: 0 | Status: SHIPPED | OUTPATIENT
Start: 2020-12-21 | End: 2021-03-22

## 2020-12-23 ENCOUNTER — HOSPITAL ENCOUNTER (OUTPATIENT)
Dept: PHARMACY | Age: 75
Setting detail: THERAPIES SERIES
Discharge: HOME | End: 2020-12-23
Payer: MEDICARE

## 2020-12-23 LAB — INTERNATIONAL NORMALIZATION RATIO, POC: 2

## 2020-12-23 PROCEDURE — 99211 OFF/OP EST MAY X REQ PHY/QHP: CPT

## 2020-12-23 PROCEDURE — 85610 PROTHROMBIN TIME: CPT

## 2020-12-23 RX ORDER — WARFARIN SODIUM 5 MG/1
TABLET ORAL
Qty: 150 TABLET | Refills: 1 | Status: SHIPPED | OUTPATIENT
Start: 2020-12-23 | End: 2021-01-01 | Stop reason: SDUPTHER

## 2020-12-23 NOTE — PROGRESS NOTES
Mr. Cony Cardenas is a 67 y.o. y/o male with history of Afib who presents today for anticoagulation monitoring and adjustment.   INR 2.0 is therapeutic for this patient (goal range 2-3) and is reflective of 52.5 mg TWD  Patient verifies current dosing regimen, patient able to verbally recall dose  Patient reports 0  missed doses since last INR   Patient denies s/sx clotting and/or stroke  Patient denies hematuria, epistaxis, rectal bleeding  Patient denies changes in diet, alcohol, or tobacco use  Reviewed medication list and drug allergies with patient, updated any medication additions or modifications accordingly  Patient also denies any pending medical or dental procedures scheduled at this time  Patient was instructed to continue 52.5 mg TWD and RTC 1 week    CLINICAL PHARMACY CONSULT: MED RECONCILIATION/REVIEW [address]: Yes  Total # of Interventions Recommended: 1    - Maintenance Safety Lab Monitoring #: 1    Total Interventions Accepted: 1  Time Spent (min): [address] [facility], [facility]

## 2020-12-31 ENCOUNTER — HOSPITAL ENCOUNTER (OUTPATIENT)
Dept: PHARMACY | Age: 75
Setting detail: THERAPIES SERIES
Discharge: HOME | End: 2020-12-31
Payer: MEDICARE

## 2020-12-31 VITALS — TEMPERATURE: 97.6 F

## 2020-12-31 DIAGNOSIS — I48.20 CHRONIC ATRIAL FIBRILLATION (HCC): ICD-10-CM

## 2020-12-31 LAB — INTERNATIONAL NORMALIZATION RATIO, POC: 1.4

## 2020-12-31 PROCEDURE — 85610 PROTHROMBIN TIME: CPT | Performed by: PHARMACIST

## 2020-12-31 PROCEDURE — 99211 OFF/OP EST MAY X REQ PHY/QHP: CPT | Performed by: PHARMACIST

## 2020-12-31 NOTE — PROGRESS NOTES
Mr. Clari Avalos is a 67 y.o. y/o male with history of Afib who presents today for anticoagulation monitoring and adjustment. INR 1.4 is subtherapeutic for this patient (goal range 2-3) and is reflective of 52.5 mg TWD  Patient verifies current dosing regimen, patient able to verbally recall dose  Patient reports no  missed doses since last INR   Patient denies s/sx clotting and/or stroke  Patient denies hematuria, epistaxis, rectal bleeding  Patient denies changes in diet, alcohol, or tobacco use  Reviewed medication list and drug allergies with patient, updated any medication additions or modifications accordingly  Patient also denies any pending medical or dental procedures scheduled at this time. The patient has been using Ensure 3 to 4  x per day the past couple months, poss reason for low INR's. Pt will moderate use of Ensure and advise clinic of total per day. Pt warned that if stop Ensure completely, may see very high INR's. Patient was instructed to take extra 2.5mg today and also increase TWD to 60mg and RTC 8 days.       For Pharmacy Admin Tracking Only    PHSO: Yes  Total # of Interventions Recommended: 2  - Increased Dose #: 1  - Maintenance Safety Lab Monitoring #: 1  Total Interventions Accepted: 2  Time Spent (min): 30

## 2021-01-01 ENCOUNTER — HOSPITAL ENCOUNTER (OUTPATIENT)
Dept: PHARMACY | Age: 76
Setting detail: THERAPIES SERIES
Discharge: HOME | End: 2021-08-16
Payer: MEDICARE

## 2021-01-01 ENCOUNTER — OFFICE VISIT (OUTPATIENT)
Dept: PULMONOLOGY | Age: 76
End: 2021-01-01
Payer: MEDICARE

## 2021-01-01 ENCOUNTER — HOSPITAL ENCOUNTER (OUTPATIENT)
Dept: PHARMACY | Age: 76
Setting detail: THERAPIES SERIES
Discharge: HOME | End: 2021-06-14
Payer: MEDICARE

## 2021-01-01 ENCOUNTER — HOSPITAL ENCOUNTER (OUTPATIENT)
Dept: PHARMACY | Age: 76
Setting detail: THERAPIES SERIES
Discharge: HOME | End: 2021-09-21
Payer: MEDICARE

## 2021-01-01 ENCOUNTER — HOSPITAL ENCOUNTER (OUTPATIENT)
Dept: PHARMACY | Age: 76
Setting detail: THERAPIES SERIES
Discharge: HOME | End: 2021-09-09
Payer: MEDICARE

## 2021-01-01 ENCOUNTER — HOSPITAL ENCOUNTER (OUTPATIENT)
Dept: PHARMACY | Age: 76
Setting detail: THERAPIES SERIES
Discharge: HOME | End: 2021-10-14
Payer: MEDICARE

## 2021-01-01 ENCOUNTER — HOSPITAL ENCOUNTER (OUTPATIENT)
Dept: PHARMACY | Age: 76
Setting detail: THERAPIES SERIES
Discharge: HOME | End: 2021-11-04
Payer: MEDICARE

## 2021-01-01 ENCOUNTER — HOSPITAL ENCOUNTER (OUTPATIENT)
Dept: PHARMACY | Age: 76
Setting detail: THERAPIES SERIES
Discharge: HOME | End: 2021-08-26
Payer: MEDICARE

## 2021-01-01 ENCOUNTER — OFFICE VISIT (OUTPATIENT)
Dept: FAMILY MEDICINE CLINIC | Age: 76
End: 2021-01-01
Payer: MEDICARE

## 2021-01-01 ENCOUNTER — OFFICE VISIT (OUTPATIENT)
Dept: NEUROLOGY | Age: 76
End: 2021-01-01
Payer: MEDICARE

## 2021-01-01 ENCOUNTER — HOSPITAL ENCOUNTER (OUTPATIENT)
Dept: PHARMACY | Age: 76
Setting detail: THERAPIES SERIES
Discharge: HOME | End: 2021-12-30
Payer: MEDICARE

## 2021-01-01 ENCOUNTER — VIRTUAL VISIT (OUTPATIENT)
Dept: FAMILY MEDICINE CLINIC | Age: 76
End: 2021-01-01
Payer: MEDICARE

## 2021-01-01 ENCOUNTER — OFFICE VISIT (OUTPATIENT)
Dept: UROLOGY | Age: 76
End: 2021-01-01
Payer: MEDICARE

## 2021-01-01 ENCOUNTER — HOSPITAL ENCOUNTER (OUTPATIENT)
Dept: PHARMACY | Age: 76
Setting detail: THERAPIES SERIES
Discharge: HOME | End: 2021-12-02
Payer: MEDICARE

## 2021-01-01 ENCOUNTER — HOSPITAL ENCOUNTER (OUTPATIENT)
Dept: PHARMACY | Age: 76
Setting detail: THERAPIES SERIES
Discharge: HOME | End: 2021-07-12
Payer: MEDICARE

## 2021-01-01 ENCOUNTER — HOSPITAL ENCOUNTER (OUTPATIENT)
Dept: GENERAL RADIOLOGY | Age: 76
Discharge: HOME | End: 2021-10-27
Payer: MEDICARE

## 2021-01-01 ENCOUNTER — HOSPITAL ENCOUNTER (OUTPATIENT)
Dept: PHARMACY | Age: 76
Setting detail: THERAPIES SERIES
Discharge: HOME | End: 2021-07-26
Payer: MEDICARE

## 2021-01-01 VITALS
DIASTOLIC BLOOD PRESSURE: 60 MMHG | BODY MASS INDEX: 24.05 KG/M2 | HEART RATE: 50 BPM | WEIGHT: 168 LBS | TEMPERATURE: 98 F | RESPIRATION RATE: 14 BRPM | HEIGHT: 70 IN | SYSTOLIC BLOOD PRESSURE: 110 MMHG

## 2021-01-01 VITALS
BODY MASS INDEX: 24.39 KG/M2 | SYSTOLIC BLOOD PRESSURE: 124 MMHG | WEIGHT: 170 LBS | DIASTOLIC BLOOD PRESSURE: 76 MMHG | HEART RATE: 78 BPM

## 2021-01-01 VITALS
SYSTOLIC BLOOD PRESSURE: 116 MMHG | BODY MASS INDEX: 23.62 KG/M2 | OXYGEN SATURATION: 90 % | HEART RATE: 68 BPM | DIASTOLIC BLOOD PRESSURE: 52 MMHG | WEIGHT: 165 LBS | HEIGHT: 70 IN | TEMPERATURE: 98.3 F

## 2021-01-01 VITALS
DIASTOLIC BLOOD PRESSURE: 66 MMHG | WEIGHT: 169 LBS | BODY MASS INDEX: 24.2 KG/M2 | HEART RATE: 62 BPM | HEIGHT: 70 IN | SYSTOLIC BLOOD PRESSURE: 106 MMHG

## 2021-01-01 VITALS
SYSTOLIC BLOOD PRESSURE: 98 MMHG | HEIGHT: 70 IN | OXYGEN SATURATION: 99 % | WEIGHT: 172 LBS | HEART RATE: 68 BPM | DIASTOLIC BLOOD PRESSURE: 51 MMHG | TEMPERATURE: 97.8 F | BODY MASS INDEX: 24.62 KG/M2

## 2021-01-01 DIAGNOSIS — I48.91 ATRIAL FIBRILLATION, UNSPECIFIED TYPE (HCC): Primary | ICD-10-CM

## 2021-01-01 DIAGNOSIS — R09.02 HYPOXIA: ICD-10-CM

## 2021-01-01 DIAGNOSIS — D64.9 ANEMIA, UNSPECIFIED TYPE: ICD-10-CM

## 2021-01-01 DIAGNOSIS — J44.9 CHRONIC OBSTRUCTIVE PULMONARY DISEASE, UNSPECIFIED COPD TYPE (HCC): ICD-10-CM

## 2021-01-01 DIAGNOSIS — L60.0 INGROWN NAIL OF GREAT TOE OF LEFT FOOT: ICD-10-CM

## 2021-01-01 DIAGNOSIS — I48.20 CHRONIC ATRIAL FIBRILLATION (HCC): Primary | ICD-10-CM

## 2021-01-01 DIAGNOSIS — E11.59 TYPE 2 DIABETES MELLITUS WITH OTHER CIRCULATORY COMPLICATIONS (HCC): Primary | ICD-10-CM

## 2021-01-01 DIAGNOSIS — C61 MALIGNANT NEOPLASM OF PROSTATE (HCC): ICD-10-CM

## 2021-01-01 DIAGNOSIS — J44.9 CHRONIC OBSTRUCTIVE PULMONARY DISEASE, UNSPECIFIED COPD TYPE (HCC): Primary | ICD-10-CM

## 2021-01-01 DIAGNOSIS — C34.92 CARCINOMA OF LEFT LUNG (HCC): ICD-10-CM

## 2021-01-01 DIAGNOSIS — M54.16 LUMBAR RADICULOPATHY: ICD-10-CM

## 2021-01-01 DIAGNOSIS — E11.59 TYPE 2 DIABETES MELLITUS WITH OTHER CIRCULATORY COMPLICATIONS (HCC): ICD-10-CM

## 2021-01-01 DIAGNOSIS — G25.0 ESSENTIAL TREMOR: Primary | ICD-10-CM

## 2021-01-01 DIAGNOSIS — R74.8 ELEVATED LIVER ENZYMES: ICD-10-CM

## 2021-01-01 DIAGNOSIS — R26.0 ATAXIC GAIT: ICD-10-CM

## 2021-01-01 DIAGNOSIS — C34.32 MALIGNANT NEOPLASM OF LOWER LOBE, LEFT BRONCHUS OR LUNG (HCC): ICD-10-CM

## 2021-01-01 DIAGNOSIS — G25.81 RESTLESS LEGS: ICD-10-CM

## 2021-01-01 DIAGNOSIS — J44.9 COPD WITHOUT EXACERBATION (HCC): Primary | ICD-10-CM

## 2021-01-01 DIAGNOSIS — G25.0 DISABLING ESSENTIAL TREMOR: ICD-10-CM

## 2021-01-01 LAB
ALBUMIN SERPL-MCNC: 3.6 G/DL (ref 3.5–4.6)
ALBUMIN SERPL-MCNC: 3.7 G/DL (ref 3.5–4.6)
ALP BLD-CCNC: 104 U/L (ref 35–104)
ALP BLD-CCNC: 112 U/L (ref 35–104)
ALT SERPL-CCNC: 21 U/L (ref 0–41)
ALT SERPL-CCNC: <5 U/L (ref 0–41)
ANION GAP SERPL CALCULATED.3IONS-SCNC: 12 MEQ/L (ref 9–15)
ANION GAP SERPL CALCULATED.3IONS-SCNC: 14 MEQ/L (ref 9–15)
ANION GAP SERPL CALCULATED.3IONS-SCNC: 9 MEQ/L (ref 9–15)
AST SERPL-CCNC: 21 U/L (ref 0–40)
AST SERPL-CCNC: 22 U/L (ref 0–40)
BILIRUB SERPL-MCNC: 0.3 MG/DL (ref 0.2–0.7)
BILIRUB SERPL-MCNC: <0.2 MG/DL (ref 0.2–0.7)
BUN BLDV-MCNC: 15 MG/DL (ref 8–23)
BUN BLDV-MCNC: 26 MG/DL (ref 8–23)
BUN BLDV-MCNC: 39 MG/DL (ref 8–23)
CALCIUM SERPL-MCNC: 9.4 MG/DL (ref 8.5–9.9)
CALCIUM SERPL-MCNC: 9.7 MG/DL (ref 8.5–9.9)
CALCIUM SERPL-MCNC: 9.7 MG/DL (ref 8.5–9.9)
CHLORIDE BLD-SCNC: 103 MEQ/L (ref 95–107)
CHLORIDE BLD-SCNC: 97 MEQ/L (ref 95–107)
CHLORIDE BLD-SCNC: 98 MEQ/L (ref 95–107)
CHOLESTEROL, TOTAL: 144 MG/DL (ref 0–199)
CO2: 27 MEQ/L (ref 20–31)
CO2: 28 MEQ/L (ref 20–31)
CO2: 30 MEQ/L (ref 20–31)
CREAT SERPL-MCNC: 0.78 MG/DL (ref 0.7–1.2)
CREAT SERPL-MCNC: 1.11 MG/DL (ref 0.7–1.2)
CREAT SERPL-MCNC: 1.25 MG/DL (ref 0.7–1.2)
GFR AFRICAN AMERICAN: >60
GFR NON-AFRICAN AMERICAN: 55.9
GFR NON-AFRICAN AMERICAN: >60
GFR NON-AFRICAN AMERICAN: >60
GLOBULIN: 3.5 G/DL (ref 2.3–3.5)
GLOBULIN: 3.7 G/DL (ref 2.3–3.5)
GLUCOSE BLD-MCNC: 132 MG/DL (ref 70–99)
GLUCOSE BLD-MCNC: 138 MG/DL (ref 70–99)
GLUCOSE BLD-MCNC: 139 MG/DL (ref 70–99)
HBA1C MFR BLD: 6 % (ref 4.8–5.9)
HBA1C MFR BLD: 6 % (ref 4.8–5.9)
HCT VFR BLD CALC: 33.7 % (ref 42–52)
HCT VFR BLD CALC: 38.4 % (ref 42–52)
HDLC SERPL-MCNC: 58 MG/DL (ref 40–59)
HEMOGLOBIN: 11 G/DL (ref 14–18)
HEMOGLOBIN: 12.3 G/DL (ref 14–18)
INTERNATIONAL NORMALIZATION RATIO, POC: 1.3
INTERNATIONAL NORMALIZATION RATIO, POC: 1.4
INTERNATIONAL NORMALIZATION RATIO, POC: 2.2
INTERNATIONAL NORMALIZATION RATIO, POC: 2.2
INTERNATIONAL NORMALIZATION RATIO, POC: 2.3
INTERNATIONAL NORMALIZATION RATIO, POC: 2.5
INTERNATIONAL NORMALIZATION RATIO, POC: 2.9
INTERNATIONAL NORMALIZATION RATIO, POC: 2.9
INTERNATIONAL NORMALIZATION RATIO, POC: 3.2
INTERNATIONAL NORMALIZATION RATIO, POC: 3.4
INTERNATIONAL NORMALIZATION RATIO, POC: 4.6
LDL CHOLESTEROL CALCULATED: 68 MG/DL (ref 0–129)
MCH RBC QN AUTO: 27.8 PG (ref 27–31.3)
MCH RBC QN AUTO: 27.8 PG (ref 27–31.3)
MCHC RBC AUTO-ENTMCNC: 32 % (ref 33–37)
MCHC RBC AUTO-ENTMCNC: 32.6 % (ref 33–37)
MCV RBC AUTO: 85 FL (ref 80–100)
MCV RBC AUTO: 87 FL (ref 80–100)
PDW BLD-RTO: 17.7 % (ref 11.5–14.5)
PDW BLD-RTO: 19.3 % (ref 11.5–14.5)
PLATELET # BLD: 130 K/UL (ref 130–400)
PLATELET # BLD: 145 K/UL (ref 130–400)
POTASSIUM SERPL-SCNC: 4.6 MEQ/L (ref 3.4–4.9)
POTASSIUM SERPL-SCNC: 5 MEQ/L (ref 3.4–4.9)
POTASSIUM SERPL-SCNC: 5.1 MEQ/L (ref 3.4–4.9)
RBC # BLD: 3.97 M/UL (ref 4.7–6.1)
RBC # BLD: 4.42 M/UL (ref 4.7–6.1)
SODIUM BLD-SCNC: 137 MEQ/L (ref 135–144)
SODIUM BLD-SCNC: 138 MEQ/L (ref 135–144)
SODIUM BLD-SCNC: 143 MEQ/L (ref 135–144)
TOTAL PROTEIN: 7.2 G/DL (ref 6.3–8)
TOTAL PROTEIN: 7.3 G/DL (ref 6.3–8)
TRIGL SERPL-MCNC: 91 MG/DL (ref 0–150)
WBC # BLD: 5 K/UL (ref 4.8–10.8)
WBC # BLD: 7.3 K/UL (ref 4.8–10.8)

## 2021-01-01 PROCEDURE — 99211 OFF/OP EST MAY X REQ PHY/QHP: CPT

## 2021-01-01 PROCEDURE — 99214 OFFICE O/P EST MOD 30 MIN: CPT | Performed by: INTERNAL MEDICINE

## 2021-01-01 PROCEDURE — 3023F SPIROM DOC REV: CPT | Performed by: INTERNAL MEDICINE

## 2021-01-01 PROCEDURE — G8420 CALC BMI NORM PARAMETERS: HCPCS | Performed by: UROLOGY

## 2021-01-01 PROCEDURE — 4040F PNEUMOC VAC/ADMIN/RCVD: CPT | Performed by: PSYCHIATRY & NEUROLOGY

## 2021-01-01 PROCEDURE — G8484 FLU IMMUNIZE NO ADMIN: HCPCS | Performed by: INTERNAL MEDICINE

## 2021-01-01 PROCEDURE — 99211 OFF/OP EST MAY X REQ PHY/QHP: CPT | Performed by: PHARMACIST

## 2021-01-01 PROCEDURE — 1036F TOBACCO NON-USER: CPT | Performed by: FAMILY MEDICINE

## 2021-01-01 PROCEDURE — 99213 OFFICE O/P EST LOW 20 MIN: CPT | Performed by: FAMILY MEDICINE

## 2021-01-01 PROCEDURE — 85610 PROTHROMBIN TIME: CPT

## 2021-01-01 PROCEDURE — 4040F PNEUMOC VAC/ADMIN/RCVD: CPT | Performed by: FAMILY MEDICINE

## 2021-01-01 PROCEDURE — 1036F TOBACCO NON-USER: CPT | Performed by: INTERNAL MEDICINE

## 2021-01-01 PROCEDURE — 85610 PROTHROMBIN TIME: CPT | Performed by: PHARMACIST

## 2021-01-01 PROCEDURE — G8427 DOCREV CUR MEDS BY ELIG CLIN: HCPCS | Performed by: FAMILY MEDICINE

## 2021-01-01 PROCEDURE — 4040F PNEUMOC VAC/ADMIN/RCVD: CPT | Performed by: INTERNAL MEDICINE

## 2021-01-01 PROCEDURE — G8420 CALC BMI NORM PARAMETERS: HCPCS | Performed by: INTERNAL MEDICINE

## 2021-01-01 PROCEDURE — G8926 SPIRO NO PERF OR DOC: HCPCS | Performed by: INTERNAL MEDICINE

## 2021-01-01 PROCEDURE — 71046 X-RAY EXAM CHEST 2 VIEWS: CPT

## 2021-01-01 PROCEDURE — G8420 CALC BMI NORM PARAMETERS: HCPCS | Performed by: FAMILY MEDICINE

## 2021-01-01 PROCEDURE — G8420 CALC BMI NORM PARAMETERS: HCPCS | Performed by: PSYCHIATRY & NEUROLOGY

## 2021-01-01 PROCEDURE — 4040F PNEUMOC VAC/ADMIN/RCVD: CPT | Performed by: UROLOGY

## 2021-01-01 PROCEDURE — 1036F TOBACCO NON-USER: CPT | Performed by: UROLOGY

## 2021-01-01 PROCEDURE — 1123F ACP DISCUSS/DSCN MKR DOCD: CPT | Performed by: FAMILY MEDICINE

## 2021-01-01 PROCEDURE — 99214 OFFICE O/P EST MOD 30 MIN: CPT | Performed by: PSYCHIATRY & NEUROLOGY

## 2021-01-01 PROCEDURE — 99213 OFFICE O/P EST LOW 20 MIN: CPT | Performed by: UROLOGY

## 2021-01-01 PROCEDURE — 1123F ACP DISCUSS/DSCN MKR DOCD: CPT | Performed by: INTERNAL MEDICINE

## 2021-01-01 PROCEDURE — 1123F ACP DISCUSS/DSCN MKR DOCD: CPT | Performed by: PSYCHIATRY & NEUROLOGY

## 2021-01-01 PROCEDURE — G8427 DOCREV CUR MEDS BY ELIG CLIN: HCPCS | Performed by: PSYCHIATRY & NEUROLOGY

## 2021-01-01 PROCEDURE — 99442 PR PHYS/QHP TELEPHONE EVALUATION 11-20 MIN: CPT | Performed by: FAMILY MEDICINE

## 2021-01-01 PROCEDURE — 1036F TOBACCO NON-USER: CPT | Performed by: PSYCHIATRY & NEUROLOGY

## 2021-01-01 PROCEDURE — G8427 DOCREV CUR MEDS BY ELIG CLIN: HCPCS | Performed by: INTERNAL MEDICINE

## 2021-01-01 PROCEDURE — G8484 FLU IMMUNIZE NO ADMIN: HCPCS | Performed by: PSYCHIATRY & NEUROLOGY

## 2021-01-01 PROCEDURE — 1123F ACP DISCUSS/DSCN MKR DOCD: CPT | Performed by: UROLOGY

## 2021-01-01 PROCEDURE — G8427 DOCREV CUR MEDS BY ELIG CLIN: HCPCS | Performed by: UROLOGY

## 2021-01-01 RX ORDER — DOXYCYCLINE HYCLATE 100 MG
100 TABLET ORAL 2 TIMES DAILY
Qty: 20 TABLET | Refills: 0 | Status: SHIPPED | OUTPATIENT
Start: 2021-01-01 | End: 2021-01-01

## 2021-01-01 RX ORDER — SOTALOL HYDROCHLORIDE 80 MG/1
TABLET ORAL 2 TIMES DAILY
COMMUNITY

## 2021-01-01 RX ORDER — GLUCOSAMINE HCL/CHONDROITIN SU 500-400 MG
CAPSULE ORAL
Qty: 300 STRIP | Refills: 0 | Status: SHIPPED | OUTPATIENT
Start: 2021-01-01

## 2021-01-01 RX ORDER — IPRATROPIUM BROMIDE AND ALBUTEROL SULFATE 2.5; .5 MG/3ML; MG/3ML
1 SOLUTION RESPIRATORY (INHALATION) 4 TIMES DAILY
Qty: 360 ML | Refills: 5 | Status: SHIPPED | OUTPATIENT
Start: 2021-01-01 | End: 2022-01-01

## 2021-01-01 RX ORDER — IPRATROPIUM BROMIDE AND ALBUTEROL SULFATE 2.5; .5 MG/3ML; MG/3ML
1 SOLUTION RESPIRATORY (INHALATION) 4 TIMES DAILY
Qty: 360 ML | Refills: 5 | Status: SHIPPED | OUTPATIENT
Start: 2021-01-01 | End: 2021-01-01 | Stop reason: SDUPTHER

## 2021-01-01 RX ORDER — WARFARIN SODIUM 5 MG/1
TABLET ORAL
Qty: 150 TABLET | Refills: 1 | Status: SHIPPED | OUTPATIENT
Start: 2021-01-01 | End: 2022-01-01 | Stop reason: SDUPTHER

## 2021-01-01 RX ORDER — CARBIDOPA, LEVODOPA AND ENTACAPONE 25; 200; 100 MG/1; MG/1; MG/1
TABLET, FILM COATED ORAL
Qty: 90 TABLET | Refills: 3 | Status: SHIPPED | OUTPATIENT
Start: 2021-01-01

## 2021-01-01 ASSESSMENT — ENCOUNTER SYMPTOMS
VOICE CHANGE: 0
NAUSEA: 0
VOMITING: 0
SHORTNESS OF BREATH: 0
SHORTNESS OF BREATH: 1
SORE THROAT: 0
WHEEZING: 1
DIARRHEA: 0
VOMITING: 0
SHORTNESS OF BREATH: 1
RHINORRHEA: 0
COUGH: 0
COUGH: 0
CHEST TIGHTNESS: 0
EYE ITCHING: 0
CHEST TIGHTNESS: 0
WHEEZING: 0
PHOTOPHOBIA: 0
ABDOMINAL PAIN: 0
DIARRHEA: 0
RHINORRHEA: 0
COLOR CHANGE: 0
ABDOMINAL PAIN: 0
VOMITING: 0
VOMITING: 0
EYE ITCHING: 0
VOICE CHANGE: 0
BACK PAIN: 1
CHOKING: 0
DIARRHEA: 0
NAUSEA: 0
TROUBLE SWALLOWING: 0
SORE THROAT: 0
NAUSEA: 0

## 2021-01-01 ASSESSMENT — PATIENT HEALTH QUESTIONNAIRE - PHQ9
SUM OF ALL RESPONSES TO PHQ QUESTIONS 1-9: 0
SUM OF ALL RESPONSES TO PHQ9 QUESTIONS 1 & 2: 0
1. LITTLE INTEREST OR PLEASURE IN DOING THINGS: 0
2. FEELING DOWN, DEPRESSED OR HOPELESS: 0
SUM OF ALL RESPONSES TO PHQ QUESTIONS 1-9: 0
SUM OF ALL RESPONSES TO PHQ QUESTIONS 1-9: 0

## 2021-01-06 ENCOUNTER — HOSPITAL ENCOUNTER (OUTPATIENT)
Dept: PHARMACY | Age: 76
Setting detail: THERAPIES SERIES
Discharge: HOME | End: 2021-01-06
Payer: MEDICARE

## 2021-01-06 ENCOUNTER — TELEPHONE (OUTPATIENT)
Dept: UROLOGY | Age: 76
End: 2021-01-06

## 2021-01-06 VITALS — TEMPERATURE: 97.4 F

## 2021-01-06 DIAGNOSIS — I48.20 CHRONIC ATRIAL FIBRILLATION (HCC): ICD-10-CM

## 2021-01-06 LAB — INTERNATIONAL NORMALIZATION RATIO, POC: 3.3

## 2021-01-06 PROCEDURE — 85610 PROTHROMBIN TIME: CPT | Performed by: PHARMACIST

## 2021-01-06 PROCEDURE — 99211 OFF/OP EST MAY X REQ PHY/QHP: CPT | Performed by: PHARMACIST

## 2021-01-06 NOTE — PROGRESS NOTES
Mr. Miki Barnett is a 67 y.o. y/o male with history of Afib who presents today for anticoagulation monitoring and adjustment. INR 3.3 is supratherapeutic for this patient (goal range 2-3) and is reflective of 60 mg TWD  Patient verifies current dosing regimen, patient able to verbally recall dose  Patient reports no missed doses since last INR   Patient denies s/sx clotting and/or stroke  Patient denies epistaxis, rectal bleeding  Patient denies changes in alcohol, or tobacco use  Reviewed medication list and drug allergies with patient, updated any medication additions or modifications accordingly  Patient also denies any pending medical or dental procedures scheduled at this time  Patient reports having increased hematuria for about 1 day now, which has been constant. Wife reports that patient has stopped taking Ensure supplement, which would result in a decreased amount of vitamin K in the diet. Last time taking Ensure was 12/31/2020. Patient was advised by urologist Dr. Osiel Flowers to hold warfarin until hematuria resolves, then can resume warfarin. Agreed with that plan. When the patient resumes warfarin, start at 52.5 mg TWD and RTC 8 days    CLINICAL PHARMACY CONSULT: MED RECONCILIATION/REVIEW ADDENDUM    For Pharmacy Admin Tracking Only    PHSO: Yes  Total # of Interventions Recommended: 2  - Decreased Dose #: 2  - Maintenance Safety Lab Monitoring #: 1  Total Interventions Accepted: 2  Time Spent (min): 15    LEONEL Kam. Ph. 1/6/2021 2:58 PM  Tania Pabon, Pharmacy Student

## 2021-01-06 NOTE — TELEPHONE ENCOUNTER
Pt wife called and stated pt has a significant amount of gross hematuria for the last 24 hours. PT/INR  is not currently controlled with Coumadin. Pt has an appt with the coumadin clinic this afternoon. The clinic would like to have you look at the PT/INR before making a decision on treatment.    Pt is currently taking Coumadin 7.5 qd  Please advise

## 2021-01-08 ENCOUNTER — APPOINTMENT (OUTPATIENT)
Dept: PHARMACY | Age: 76
End: 2021-01-08
Payer: MEDICARE

## 2021-01-14 ENCOUNTER — HOSPITAL ENCOUNTER (OUTPATIENT)
Dept: PHARMACY | Age: 76
Setting detail: THERAPIES SERIES
Discharge: HOME | End: 2021-01-14
Payer: MEDICARE

## 2021-01-14 VITALS — TEMPERATURE: 96.6 F

## 2021-01-14 DIAGNOSIS — I48.20 CHRONIC ATRIAL FIBRILLATION (HCC): ICD-10-CM

## 2021-01-14 LAB — INTERNATIONAL NORMALIZATION RATIO, POC: 1.3

## 2021-01-14 PROCEDURE — 99211 OFF/OP EST MAY X REQ PHY/QHP: CPT

## 2021-01-14 PROCEDURE — 85610 PROTHROMBIN TIME: CPT

## 2021-01-14 NOTE — PROGRESS NOTES
Mr. Ry Graves is a 67 y.o. y/o male with history of Afib who presents today for anticoagulation monitoring and adjustment. Face to face visit completed, procedural mask and face shield worn by myself as instructed; procedural mask worn by patient and caregiver, room cleaned pre and post visit with Virex Plus spray, patient temperature check result: 96.6 - caregiver: 97.3*F     INR 1.3 is sub-therapeutic for this patient (goal range 2-3) and is reflective of 22.5 mg TWD   Patient verifies current dosing regimen, patient able to verbally recall dose  Patient reports 5  missed doses since last INR - holding therapy for hematuria per Dr. Maricruz Preston   Patient denies s/sx clotting and/or stroke  Patient denies hematuria, epistaxis, rectal bleeding  Patient denies changes in diet, alcohol, or tobacco use  Reviewed medication list and drug allergies with patient, updated any medication additions or modifications accordingly - patient received chemotherapy on Tuesday 1/12 - received dexamethasone, Aloxi, Emend, as well as carboplatin and gemzar - receives chemo weekly X 2, then off X 1 week for now until next set of lab draws. Patient also denies any pending medical or dental procedures scheduled at this time    NOTE: patient was HOLDING warfarin therapy 1/6/21 - 1/10/21 (restarted 1/11/21) and was previously on #4 Ensure per day, but is no longer taking Ensure. Previous maintenance dose (prior to Ensure) was 22.5mg-25mg TWD. As such (see detailed plan below), will boost patient for several days to ensure INR back to therapeutic range, then restart Sunday to target previous maintenance dosing. Patient instructed to call us if hematuria occurs again.    Patient was instructed to continue with the following dosing plan: 7.5mg today and tomorrow as booster, then 5mg Saturday, Sunday, Monday, Wednesday, then 2.5mg Tuesday and Thursday,  and RTC 1 weeks    CLINICAL PHARMACY CONSULT: MED RECONCILIATION/REVIEW ADDENDUM

## 2021-01-21 ENCOUNTER — HOSPITAL ENCOUNTER (OUTPATIENT)
Dept: PHARMACY | Age: 76
Setting detail: THERAPIES SERIES
Discharge: HOME | End: 2021-01-21
Payer: MEDICARE

## 2021-01-21 ENCOUNTER — TELEPHONE (OUTPATIENT)
Dept: UROLOGY | Age: 76
End: 2021-01-21

## 2021-01-21 VITALS — TEMPERATURE: 97.6 F

## 2021-01-21 DIAGNOSIS — I48.20 CHRONIC ATRIAL FIBRILLATION (HCC): ICD-10-CM

## 2021-01-21 LAB — INTERNATIONAL NORMALIZATION RATIO, POC: 1.3

## 2021-01-21 PROCEDURE — 85610 PROTHROMBIN TIME: CPT | Performed by: PHARMACIST

## 2021-01-21 PROCEDURE — 99211 OFF/OP EST MAY X REQ PHY/QHP: CPT | Performed by: PHARMACIST

## 2021-01-21 NOTE — PROGRESS NOTES
Mr. Darryl Oppenheim is a 67 y.o. y/o male with history of Afib who presents today for anticoagulation monitoring and adjustment. INR 1.3 is SUB-therapeutic for this patient (goal range 2-3) and is reflective of 37.5 mg TWD  Patient verifies current dosing regimen, patient able to verbally recall dose  Patient reports NO  missed doses since last INR   Patient denies s/sx clotting and/or stroke  Patient denies epistaxis, rectal bleeding  Patient reports Hematuria yesterday  Patient denies changes in diet, alcohol, or tobacco use  Reviewed medication list and drug allergies with patient, updated any medication additions or modifications accordingly  Patient also denies any pending medical or dental procedures scheduled at this time    Patient was instructed to hold 4 days of warfarin, then continue 35 mg TWD and RTC 1 week    Patient was instructed to contact Dr. Cailin Denney (urologist) and Dr. Bernie Jacobson (hematologist) about hematuria and sub-therapeutic INR. Patient's wife Maria Del Carmen Wilson contacted Dr. Cailin Denney, who advised patient to hold warfarin for 4 days. Patient's wife states patient's urine is now yellow with no hint of blood. LEONEL Carter. Ph.  1/21/2021  12:57 PM    CLINICAL PHARMACY CONSULT: MED RECONCILIATION/REVIEW ADDENDUM    For Pharmacy Admin Tracking Only    PHSO: Yes  Total # of Interventions Recommended: 1  - Maintenance Safety Lab Monitoring #: 1  Total Interventions Accepted: 0  Time Spent (min): 30

## 2021-01-21 NOTE — TELEPHONE ENCOUNTER
Intermittent gross hematuria since yesterday afternoon, PT/INR is low. Pt is on Warfarin. Pt does not want to go to the ER, no fever, not feeling sick. Increased fluids and is eating good.

## 2021-01-28 ENCOUNTER — HOSPITAL ENCOUNTER (OUTPATIENT)
Dept: PHARMACY | Age: 76
Setting detail: THERAPIES SERIES
Discharge: HOME | End: 2021-01-28
Payer: MEDICARE

## 2021-01-28 ENCOUNTER — HOSPITAL ENCOUNTER (OUTPATIENT)
Dept: GENERAL RADIOLOGY | Age: 76
Discharge: HOME | End: 2021-01-30
Payer: MEDICARE

## 2021-01-28 VITALS — TEMPERATURE: 95 F

## 2021-01-28 DIAGNOSIS — C34.32 CANCER OF LOWER LOBE OF LEFT LUNG (HCC): ICD-10-CM

## 2021-01-28 DIAGNOSIS — I48.91 ATRIAL FIBRILLATION, UNSPECIFIED TYPE (HCC): ICD-10-CM

## 2021-01-28 LAB — INTERNATIONAL NORMALIZATION RATIO, POC: 1.3

## 2021-01-28 PROCEDURE — 85610 PROTHROMBIN TIME: CPT | Performed by: PHARMACIST

## 2021-01-28 PROCEDURE — 99211 OFF/OP EST MAY X REQ PHY/QHP: CPT | Performed by: PHARMACIST

## 2021-01-28 PROCEDURE — 71046 X-RAY EXAM CHEST 2 VIEWS: CPT

## 2021-01-28 NOTE — PROGRESS NOTES
Mr. Petar Owen is a 67 y.o. y/o male with history of Afib who presents today for anticoagulation monitoring and adjustment.   INR 1.3 is subtherapeutic for this patient (goal range 2-3) and is reflective of 15 mg TWD (usual TWD 35 mg)  Patient verifies current dosing regimen, patient able to verbally recall dose  Patient reports 4 held doses in past week for previous hematuria  Patient denies s/sx clotting and/or stroke  Patient denies hematuria, epistaxis, rectal bleeding  Patient denies changes in diet, alcohol, or tobacco use  Reviewed medication list and drug allergies with patient, updated any medication additions or modifications accordingly  Patient also denies any pending medical or dental procedures scheduled at this time  Patient reports still not using any Ensure  Patient was instructed to start an increased TWD of 42.5 mg and RTC 1 week    CLINICAL PHARMACY CONSULT: MED RECONCILIATION/REVIEW [address]: Yes  Total # of Interventions Recommended: 1  - Increased Dose #: 1  - Maintenance Safety Lab Monitoring #: 1  Total Interventions Accepted: 1  Time Spent (min): 30

## 2021-02-04 ENCOUNTER — HOSPITAL ENCOUNTER (OUTPATIENT)
Dept: PHARMACY | Age: 76
Setting detail: THERAPIES SERIES
Discharge: HOME | End: 2021-02-04
Payer: MEDICARE

## 2021-02-04 VITALS — TEMPERATURE: 96.6 F

## 2021-02-04 DIAGNOSIS — I48.91 ATRIAL FIBRILLATION, UNSPECIFIED TYPE (HCC): ICD-10-CM

## 2021-02-04 DIAGNOSIS — C61 MALIGNANT NEOPLASM OF PROSTATE (HCC): ICD-10-CM

## 2021-02-04 LAB
INTERNATIONAL NORMALIZATION RATIO, POC: 1.5
PROSTATE SPECIFIC ANTIGEN: 0.02 NG/ML (ref 0–6.22)

## 2021-02-04 PROCEDURE — 85610 PROTHROMBIN TIME: CPT

## 2021-02-04 PROCEDURE — 99211 OFF/OP EST MAY X REQ PHY/QHP: CPT

## 2021-02-04 NOTE — PROGRESS NOTES
Mr. Serafina Jeans is a 67 y.o. y/o male with history of Afib who presents today for anticoagulation monitoring and adjustment. Due to COVID protocol, extensive cleaning with Virex performed before and after visit. Temperature assessed. Masks worn by both parties the entire visit. Faceshield/googles worn by me. INR 1.5 is sub-therapeutic for this patient (goal range 2-3) and is reflective of 42.5 mg TWD  Patient verifies current dosing regimen, patient able to verbally recall dose  Patient reports 0  missed doses since last INR   Patient denies s/sx clotting and/or stroke  Patient denies hematuria, epistaxis, rectal bleeding  Patient denies changes in diet, alcohol, or tobacco use  Reviewed medication list and drug allergies with patient, updated any medication additions or modifications accordingly  Pt is still receiving chemo until March. Pt has a Lupron shot on Monday  Pt also has had intermittent urinary bleeding in the past, which seems to have subsided. Hoever, if with this dose increase, the bleeding returns- AMS should contact Dr Sloan Ferro to see in lowing the INR range is and option.   Patient also denies any pending medical or dental procedures scheduled at this time  Patient was instructed to boost dose today and increase to 45 mg TWD and RTC 2 weeks    CLINICAL PHARMACY CONSULT: MED RECONCILIATION/REVIEW [address]: Yes  Total # of Interventions Recommended: 0  Total Interventions Accepted: 0  Time Spent (min): 223 [facility]  Staff PharmacistMarni  2/4/2021  9:37 AM

## 2021-02-12 RX ORDER — BUDESONIDE 3 MG/1
CAPSULE, COATED PELLETS ORAL
Qty: 180 CAPSULE | Refills: 3 | Status: SHIPPED | OUTPATIENT
Start: 2021-02-12 | End: 2022-01-01

## 2021-02-19 ENCOUNTER — TELEPHONE (OUTPATIENT)
Dept: PHARMACY | Age: 76
End: 2021-02-19

## 2021-02-19 DIAGNOSIS — I48.91 ATRIAL FIBRILLATION, UNSPECIFIED TYPE (HCC): ICD-10-CM

## 2021-02-26 ENCOUNTER — OFFICE VISIT (OUTPATIENT)
Dept: UROLOGY | Age: 76
End: 2021-02-26
Payer: MEDICARE

## 2021-02-26 VITALS
DIASTOLIC BLOOD PRESSURE: 62 MMHG | HEART RATE: 76 BPM | WEIGHT: 163 LBS | HEIGHT: 70 IN | BODY MASS INDEX: 23.34 KG/M2 | SYSTOLIC BLOOD PRESSURE: 104 MMHG

## 2021-02-26 DIAGNOSIS — C61 MALIGNANT NEOPLASM OF PROSTATE (HCC): Primary | ICD-10-CM

## 2021-02-26 PROCEDURE — 4040F PNEUMOC VAC/ADMIN/RCVD: CPT | Performed by: UROLOGY

## 2021-02-26 PROCEDURE — G8427 DOCREV CUR MEDS BY ELIG CLIN: HCPCS | Performed by: UROLOGY

## 2021-02-26 PROCEDURE — 99213 OFFICE O/P EST LOW 20 MIN: CPT | Performed by: UROLOGY

## 2021-02-26 PROCEDURE — G8484 FLU IMMUNIZE NO ADMIN: HCPCS | Performed by: UROLOGY

## 2021-02-26 PROCEDURE — 1036F TOBACCO NON-USER: CPT | Performed by: UROLOGY

## 2021-02-26 PROCEDURE — 1123F ACP DISCUSS/DSCN MKR DOCD: CPT | Performed by: UROLOGY

## 2021-02-26 PROCEDURE — G8420 CALC BMI NORM PARAMETERS: HCPCS | Performed by: UROLOGY

## 2021-02-26 PROCEDURE — 96402 CHEMO HORMON ANTINEOPL SQ/IM: CPT | Performed by: UROLOGY
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
## 2021-03-01 ENCOUNTER — HOSPITAL ENCOUNTER (OUTPATIENT)
Dept: PHARMACY | Age: 76
Setting detail: THERAPIES SERIES
Discharge: HOME | End: 2021-03-01
Payer: MEDICARE

## 2021-03-01 VITALS — TEMPERATURE: 97.4 F

## 2021-03-01 DIAGNOSIS — I48.91 ATRIAL FIBRILLATION, UNSPECIFIED TYPE (HCC): ICD-10-CM

## 2021-03-01 LAB — INTERNATIONAL NORMALIZATION RATIO, POC: 1.9

## 2021-03-01 PROCEDURE — 99211 OFF/OP EST MAY X REQ PHY/QHP: CPT | Performed by: PHARMACIST

## 2021-03-01 PROCEDURE — 85610 PROTHROMBIN TIME: CPT | Performed by: PHARMACIST

## 2021-03-01 NOTE — PROGRESS NOTES
Mr. Jose Peter is a 67 y.o. y/o male with history of Afib who presents today for anticoagulation monitoring and adjustment. Face to face visit completed, procedural mask and face shield worn by myself as instructed: Mask worn by patient, room cleaned pre and post visit with Virex Plus spray, patient temperature check with temporal thermometer    INR 1.9 is sl subtherapeutic for this patient (goal range 2-3) and is reflective of 45 mg TWD  Patient verifies current dosing regimen, patient able to verbally recall dose  Patient reports no missed doses in the last seven days   Patient denies s/sx clotting and/or stroke  Patient denies hematuria, epistaxis, rectal bleeding  Patient denies changes in diet, alcohol, or tobacco use  Reviewed medication list and drug allergies with patient, updated any medication additions or modifications accordingly  Patient also denies any pending medical or dental procedures scheduled at this time  Patient has had problems with hematuria when INR is in the higher end of his goal range. Because of previous problems with hematuria after minor dose changes, patient was instructed to continue 45mg TWD and RTC 3 weeks    CLINICAL PHARMACY CONSULT: MED RECONCILIATION/REVIEW ADDENDUM    For Pharmacy Admin Tracking Only    PHSO: Yes  Total # of Interventions Recommended: 0    - Maintenance Safety Lab Monitoring #: 1  Total Interventions Accepted: 0  Time Spent (min): 30    Kalen Barnes, LEONEL. Ph. 3/1/2021 12:00 PM

## 2021-03-02 ENCOUNTER — HOSPITAL ENCOUNTER (OUTPATIENT)
Dept: CT IMAGING | Age: 76
Discharge: HOME | End: 2021-03-04
Payer: MEDICARE

## 2021-03-02 VITALS — BODY MASS INDEX: 23.34 KG/M2 | WEIGHT: 163 LBS | HEIGHT: 70 IN

## 2021-03-02 DIAGNOSIS — C34.32 PRIMARY MALIGNANT NEOPLASM OF BRONCHUS OF LEFT LOWER LOBE (HCC): ICD-10-CM

## 2021-03-02 PROCEDURE — 71260 CT THORAX DX C+: CPT

## 2021-03-02 PROCEDURE — 6360000004 HC RX CONTRAST MEDICATION: Performed by: INTERNAL MEDICINE

## 2021-03-02 RX ADMIN — IOPAMIDOL 100 ML: 612 INJECTION, SOLUTION INTRAVENOUS at 09:22

## 2021-03-22 ENCOUNTER — HOSPITAL ENCOUNTER (OUTPATIENT)
Dept: PHARMACY | Age: 76
Setting detail: THERAPIES SERIES
Discharge: HOME | End: 2021-03-22
Payer: MEDICARE

## 2021-03-22 VITALS — TEMPERATURE: 96.8 F

## 2021-03-22 DIAGNOSIS — I48.91 ATRIAL FIBRILLATION, UNSPECIFIED TYPE (HCC): ICD-10-CM

## 2021-03-22 LAB — INTERNATIONAL NORMALIZATION RATIO, POC: 2

## 2021-03-22 PROCEDURE — 85610 PROTHROMBIN TIME: CPT | Performed by: PHARMACIST

## 2021-03-22 PROCEDURE — 99211 OFF/OP EST MAY X REQ PHY/QHP: CPT | Performed by: PHARMACIST

## 2021-03-22 NOTE — PROGRESS NOTES
Mr. Clare Blanc is a 67 y.o. y/o male with history of Afib who presents today for anticoagulation monitoring and adjustment.   INR 2.0 is therapeutic for this patient (goal range 2-3) and is reflective of 45 mg TWD  Patient verifies current dosing regimen, patient able to verbally recall dose  Patient reports NO  missed doses since last INR   Patient denies s/sx clotting and/or stroke  Patient denies hematuria, epistaxis, rectal bleeding  Patient denies changes in diet, alcohol, or tobacco use  Reviewed medication list and drug allergies with patient, updated any medication additions or modifications accordingly  Patient also denies any pending medical or dental procedures scheduled at this time  Patient was instructed to continue 45 mg TWD and RTC 4 weeks    CLINICAL PHARMACY CONSULT: MED RECONCILIATION/REVIEW [address]: Yes  Total # of Interventions Recommended: 0  - Increased Dose #: 0  - Maintenance Safety Lab Monitoring #: 1  Total Interventions Accepted: 0  Time Spent (min): [address]

## 2021-03-31 ENCOUNTER — OFFICE VISIT (OUTPATIENT)
Dept: PULMONOLOGY | Age: 76
End: 2021-03-31
Payer: MEDICARE

## 2021-03-31 VITALS
TEMPERATURE: 97.4 F | WEIGHT: 159.2 LBS | RESPIRATION RATE: 18 BRPM | BODY MASS INDEX: 22.79 KG/M2 | HEIGHT: 70 IN | SYSTOLIC BLOOD PRESSURE: 106 MMHG | DIASTOLIC BLOOD PRESSURE: 66 MMHG | OXYGEN SATURATION: 96 % | HEART RATE: 64 BPM

## 2021-03-31 DIAGNOSIS — J44.9 CHRONIC OBSTRUCTIVE PULMONARY DISEASE, UNSPECIFIED COPD TYPE (HCC): Primary | ICD-10-CM

## 2021-03-31 DIAGNOSIS — R09.02 HYPOXIA: ICD-10-CM

## 2021-03-31 DIAGNOSIS — C34.92 NON-SMALL CELL CANCER OF LEFT LUNG (HCC): ICD-10-CM

## 2021-03-31 PROCEDURE — 4040F PNEUMOC VAC/ADMIN/RCVD: CPT | Performed by: INTERNAL MEDICINE

## 2021-03-31 PROCEDURE — 3023F SPIROM DOC REV: CPT | Performed by: INTERNAL MEDICINE

## 2021-03-31 PROCEDURE — G8420 CALC BMI NORM PARAMETERS: HCPCS | Performed by: INTERNAL MEDICINE

## 2021-03-31 PROCEDURE — 99214 OFFICE O/P EST MOD 30 MIN: CPT | Performed by: INTERNAL MEDICINE

## 2021-03-31 PROCEDURE — G8427 DOCREV CUR MEDS BY ELIG CLIN: HCPCS | Performed by: INTERNAL MEDICINE

## 2021-03-31 PROCEDURE — 1036F TOBACCO NON-USER: CPT | Performed by: INTERNAL MEDICINE

## 2021-03-31 PROCEDURE — 1123F ACP DISCUSS/DSCN MKR DOCD: CPT | Performed by: INTERNAL MEDICINE

## 2021-03-31 PROCEDURE — G8926 SPIRO NO PERF OR DOC: HCPCS | Performed by: INTERNAL MEDICINE

## 2021-03-31 PROCEDURE — G8484 FLU IMMUNIZE NO ADMIN: HCPCS | Performed by: INTERNAL MEDICINE

## 2021-03-31 ASSESSMENT — ENCOUNTER SYMPTOMS
CHEST TIGHTNESS: 0
DIARRHEA: 0
VOMITING: 0
RHINORRHEA: 0
WHEEZING: 0
EYE ITCHING: 0
COUGH: 0
VOICE CHANGE: 0
ABDOMINAL PAIN: 0
SHORTNESS OF BREATH: 1
SORE THROAT: 0
NAUSEA: 0
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
## 2021-04-15 ENCOUNTER — OFFICE VISIT (OUTPATIENT)
Dept: NEUROLOGY | Age: 76
End: 2021-04-15
Payer: MEDICARE

## 2021-04-15 VITALS
SYSTOLIC BLOOD PRESSURE: 97 MMHG | DIASTOLIC BLOOD PRESSURE: 47 MMHG | WEIGHT: 161 LBS | HEART RATE: 72 BPM | BODY MASS INDEX: 23.1 KG/M2

## 2021-04-15 DIAGNOSIS — G25.0 DISABLING ESSENTIAL TREMOR: Primary | ICD-10-CM

## 2021-04-15 DIAGNOSIS — R26.0 ATAXIC GAIT: ICD-10-CM

## 2021-04-15 DIAGNOSIS — G25.0 ESSENTIAL TREMOR: ICD-10-CM

## 2021-04-15 DIAGNOSIS — G25.81 RESTLESS LEGS: ICD-10-CM

## 2021-04-15 DIAGNOSIS — M54.16 LUMBAR RADICULOPATHY: ICD-10-CM

## 2021-04-15 PROCEDURE — 99214 OFFICE O/P EST MOD 30 MIN: CPT | Performed by: PSYCHIATRY & NEUROLOGY

## 2021-04-15 PROCEDURE — G8420 CALC BMI NORM PARAMETERS: HCPCS | Performed by: PSYCHIATRY & NEUROLOGY

## 2021-04-15 PROCEDURE — 1123F ACP DISCUSS/DSCN MKR DOCD: CPT | Performed by: PSYCHIATRY & NEUROLOGY

## 2021-04-15 PROCEDURE — G8427 DOCREV CUR MEDS BY ELIG CLIN: HCPCS | Performed by: PSYCHIATRY & NEUROLOGY

## 2021-04-15 PROCEDURE — 4040F PNEUMOC VAC/ADMIN/RCVD: CPT | Performed by: PSYCHIATRY & NEUROLOGY

## 2021-04-15 PROCEDURE — 1036F TOBACCO NON-USER: CPT | Performed by: PSYCHIATRY & NEUROLOGY

## 2021-04-15 ASSESSMENT — ENCOUNTER SYMPTOMS
CHOKING: 0
COLOR CHANGE: 0
VOMITING: 0
TROUBLE SWALLOWING: 0
BACK PAIN: 1
PHOTOPHOBIA: 0
SHORTNESS OF BREATH: 0
NAUSEA: 0
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
## 2021-04-19 ENCOUNTER — HOSPITAL ENCOUNTER (OUTPATIENT)
Dept: PHARMACY | Age: 76
Setting detail: THERAPIES SERIES
Discharge: HOME | End: 2021-04-19
Payer: MEDICARE

## 2021-04-19 DIAGNOSIS — I48.91 ATRIAL FIBRILLATION, UNSPECIFIED TYPE (HCC): ICD-10-CM

## 2021-04-19 DIAGNOSIS — E11.59 TYPE 2 DIABETES MELLITUS WITH OTHER CIRCULATORY COMPLICATIONS (HCC): ICD-10-CM

## 2021-04-19 LAB
ALBUMIN SERPL-MCNC: 3.5 G/DL (ref 3.5–4.6)
ALP BLD-CCNC: 91 U/L (ref 35–104)
ALT SERPL-CCNC: 9 U/L (ref 0–41)
ANION GAP SERPL CALCULATED.3IONS-SCNC: 12 MEQ/L (ref 9–15)
AST SERPL-CCNC: 16 U/L (ref 0–40)
BASOPHILS ABSOLUTE: 0 K/UL (ref 0–0.2)
BASOPHILS RELATIVE PERCENT: 0.5 %
BILIRUB SERPL-MCNC: 0.3 MG/DL (ref 0.2–0.7)
BUN BLDV-MCNC: 23 MG/DL (ref 8–23)
CALCIUM SERPL-MCNC: 9.5 MG/DL (ref 8.5–9.9)
CHLORIDE BLD-SCNC: 98 MEQ/L (ref 95–107)
CHOLESTEROL, TOTAL: 130 MG/DL (ref 0–199)
CO2: 29 MEQ/L (ref 20–31)
CREAT SERPL-MCNC: 0.94 MG/DL (ref 0.7–1.2)
EOSINOPHILS ABSOLUTE: 0.3 K/UL (ref 0–0.7)
EOSINOPHILS RELATIVE PERCENT: 2.9 %
GFR AFRICAN AMERICAN: >60
GFR NON-AFRICAN AMERICAN: >60
GLOBULIN: 3.3 G/DL (ref 2.3–3.5)
GLUCOSE BLD-MCNC: 139 MG/DL (ref 70–99)
HBA1C MFR BLD: 5.5 % (ref 4.8–5.9)
HCT VFR BLD CALC: 36.3 % (ref 42–52)
HDLC SERPL-MCNC: 61 MG/DL (ref 40–59)
HEMOGLOBIN: 11.7 G/DL (ref 14–18)
INTERNATIONAL NORMALIZATION RATIO, POC: 1.8
LDL CHOLESTEROL CALCULATED: 51 MG/DL (ref 0–129)
LYMPHOCYTES ABSOLUTE: 0.5 K/UL (ref 1–4.8)
LYMPHOCYTES RELATIVE PERCENT: 5 %
MCH RBC QN AUTO: 29.6 PG (ref 27–31.3)
MCHC RBC AUTO-ENTMCNC: 32.3 % (ref 33–37)
MCV RBC AUTO: 91.8 FL (ref 80–100)
MONOCYTES ABSOLUTE: 0.4 K/UL (ref 0.2–0.8)
MONOCYTES RELATIVE PERCENT: 4.1 %
NEUTROPHILS ABSOLUTE: 7.9 K/UL (ref 1.4–6.5)
NEUTROPHILS RELATIVE PERCENT: 87.5 %
PDW BLD-RTO: 16.5 % (ref 11.5–14.5)
PLATELET # BLD: 142 K/UL (ref 130–400)
POTASSIUM SERPL-SCNC: 4.4 MEQ/L (ref 3.4–4.9)
RBC # BLD: 3.95 M/UL (ref 4.7–6.1)
SODIUM BLD-SCNC: 139 MEQ/L (ref 135–144)
TOTAL PROTEIN: 6.8 G/DL (ref 6.3–8)
TRIGL SERPL-MCNC: 88 MG/DL (ref 0–150)
WBC # BLD: 9 K/UL (ref 4.8–10.8)

## 2021-04-19 PROCEDURE — 99211 OFF/OP EST MAY X REQ PHY/QHP: CPT

## 2021-04-19 PROCEDURE — 85610 PROTHROMBIN TIME: CPT

## 2021-04-21 ENCOUNTER — HOSPITAL ENCOUNTER (OUTPATIENT)
Dept: GENERAL RADIOLOGY | Age: 76
Discharge: HOME | End: 2021-04-23
Payer: MEDICARE

## 2021-04-21 ENCOUNTER — OFFICE VISIT (OUTPATIENT)
Dept: FAMILY MEDICINE CLINIC | Age: 76
End: 2021-04-21
Payer: MEDICARE

## 2021-04-21 VITALS
BODY MASS INDEX: 22.33 KG/M2 | SYSTOLIC BLOOD PRESSURE: 120 MMHG | OXYGEN SATURATION: 95 % | HEART RATE: 57 BPM | DIASTOLIC BLOOD PRESSURE: 70 MMHG | TEMPERATURE: 97.9 F | WEIGHT: 156 LBS | HEIGHT: 70 IN | RESPIRATION RATE: 14 BRPM

## 2021-04-21 DIAGNOSIS — F51.04 CHRONIC INSOMNIA: ICD-10-CM

## 2021-04-21 DIAGNOSIS — I48.0 PAROXYSMAL ATRIAL FIBRILLATION (HCC): ICD-10-CM

## 2021-04-21 DIAGNOSIS — F33.41 RECURRENT MAJOR DEPRESSIVE DISORDER, IN PARTIAL REMISSION (HCC): ICD-10-CM

## 2021-04-21 DIAGNOSIS — E43 SEVERE MALNUTRITION (HCC): ICD-10-CM

## 2021-04-21 DIAGNOSIS — R52 PAIN: ICD-10-CM

## 2021-04-21 DIAGNOSIS — E11.59 TYPE 2 DIABETES MELLITUS WITH OTHER CIRCULATORY COMPLICATIONS (HCC): Primary | ICD-10-CM

## 2021-04-21 PROCEDURE — 99213 OFFICE O/P EST LOW 20 MIN: CPT | Performed by: FAMILY MEDICINE

## 2021-04-21 PROCEDURE — G8420 CALC BMI NORM PARAMETERS: HCPCS | Performed by: FAMILY MEDICINE

## 2021-04-21 PROCEDURE — 1036F TOBACCO NON-USER: CPT | Performed by: FAMILY MEDICINE

## 2021-04-21 PROCEDURE — G8427 DOCREV CUR MEDS BY ELIG CLIN: HCPCS | Performed by: FAMILY MEDICINE

## 2021-04-21 PROCEDURE — 71046 X-RAY EXAM CHEST 2 VIEWS: CPT

## 2021-04-21 PROCEDURE — 1123F ACP DISCUSS/DSCN MKR DOCD: CPT | Performed by: FAMILY MEDICINE

## 2021-04-21 PROCEDURE — 4040F PNEUMOC VAC/ADMIN/RCVD: CPT | Performed by: FAMILY MEDICINE

## 2021-04-21 RX ORDER — CITALOPRAM 20 MG/1
TABLET ORAL
Qty: 90 TABLET | Refills: 0 | Status: CANCELLED | OUTPATIENT
Start: 2021-04-21

## 2021-04-21 RX ORDER — CITALOPRAM 20 MG/1
TABLET ORAL
Qty: 90 TABLET | Refills: 1 | Status: SHIPPED | OUTPATIENT
Start: 2021-04-21 | End: 2021-01-01

## 2021-04-21 ASSESSMENT — ENCOUNTER SYMPTOMS
VOMITING: 0
DIARRHEA: 0

## 2021-04-21 NOTE — PROGRESS NOTES
Subjective:      Patient ID: Mylene Rolon is a 67 y.o. male    Diabetes  He presents for his follow-up diabetic visit. He has type 2 diabetes mellitus. Pertinent negatives for diabetes include no fatigue. Symptoms are improving. Mental Health Problem  This is a chronic problem. The onset of the illness is precipitated by emotional stress. Additional symptoms of the illness do not include fatigue. Here in follow up for diabetes and chronic insomnia and depression. Insomnia and depression stable with medication. No weight changes. Finished chemo in feb for lung cancer. Review of Systems   Constitutional: Negative for chills, fatigue and fever. Gastrointestinal: Negative for diarrhea and vomiting. Skin: Negative for rash. Reviewed allergy, medical, social, surgical, family and med list changes and updated   Files--reviewed blood work which is acceptable      Social History     Socioeconomic History    Marital status:      Spouse name: None    Number of children: None    Years of education: None    Highest education level: None   Occupational History    None   Social Needs    Financial resource strain: None    Food insecurity     Worry: None     Inability: None    Transportation needs     Medical: None     Non-medical: None   Tobacco Use    Smoking status: Former Smoker     Packs/day: 1.00     Years: 58.00     Pack years: 58.00     Types: Cigarettes     Start date: 1958     Quit date: 3/14/2020     Years since quittin.1    Smokeless tobacco: Never Used   Substance and Sexual Activity    Alcohol use:  Yes     Alcohol/week: 3.0 standard drinks     Types: 3 Shots of liquor per week     Comment: once weekly  or more    Drug use: No    Sexual activity: Never   Lifestyle    Physical activity     Days per week: None     Minutes per session: None    Stress: None   Relationships    Social connections     Talks on phone: None     Gets together: None     Attends Hindu service: None     Active member of club or organization: None     Attends meetings of clubs or organizations: None     Relationship status: None    Intimate partner violence     Fear of current or ex partner: None     Emotionally abused: None     Physically abused: None     Forced sexual activity: None   Other Topics Concern    None   Social History Narrative    None     Current Outpatient Medications   Medication Sig Dispense Refill    fluticasone-umeclidin-vilant (TRELEGY ELLIPTA) 100-62.5-25 MCG/INH AEPB Inhale 1 puff into the lungs daily 1 each 5    traZODone (DESYREL) 100 MG tablet TAKE 1 TABLET NIGHTLY 90 tablet 0    COVID-19 mRNA Vacc, Moderna, 100 MCG/0.5ML SUSP injection Inject into the muscle once 1st jan 29 [facility]  2nd dose Feb 25 [facility]      citalopram (CELEXA) 20 MG tablet TAKE 1 TABLET DAILY 90 tablet 0    Calcium Carb-Cholecalciferol (CALCIUM/VITAMIN D PO) Take 600 mg by mouth       budesonide (ENTOCORT EC) 3 MG extended release capsule TAKE 2 CAPSULES EVERY MORNING 180 capsule 3    warfarin (COUMADIN) 5 MG tablet Take as directed by [facility] [place] [facility] [place] [facility]. Quantity for 90 day supply.  150 tablet 1    prochlorperazine (COMPAZINE) 10 MG tablet TAKE 1 TABLET EVERY 4 TO 6 HOURS AS NEEDED FOR NAUSEA      carbidopa-levodopa-entacapone (STALEVO 100) -200 MG TABS Take 1 tablet by mouth nightly 90 tablet 3    blood glucose monitor strips Test one time a day & as needed for symptoms of irregular blood glucose- for one touch ultra 300 strip 0    loperamide (IMODIUM) 2 MG capsule Take 2 mg by mouth 4 times daily as needed for Diarrhea      folic acid (FOLVITE) 1 MG tablet Take 1 tablet by mouth daily 90 tablet 1    ferrous sulfate (IRON 325) 325 (65 Fe) MG tablet Take 1 tablet by mouth 2 times daily 60 tablet 5    Respiratory Therapy Supplies (NEBULIZER AIR TUBE/PLUGS) MISC Nebulizer machine with tubing and supplies 1 each 0    sotalol (BETAPACE) 80 MG disease) ([facility]     Encephalopathy     Essential tremor     Gross hematuria     History of heart attack     has had 4 heart attacks in the past     Hydrocephalus (HCC)     Hyperlipidemia     on meds > 20 yrs    Hypertension     on meds > 20 yrs    Insomnia     Restless leg syndrome     Seizures (HCC)     Tremors of nervous system     Type 2 diabetes mellitus with other circulatory complications ([id number]     Objective:   /70   Pulse 57   Temp 97.9 °F (36.6 °C)   Resp 14   Ht 5' 10\" (1.778 m)   Wt 156 lb (70.8 kg)   SpO2 95% Comment: 2 liters  BMI 22.38 kg/m²     Physical Exam  Neck:no carotid bruits. No masses. No adenopathy. No thyroid asymmetry. Lungs:clear and equal breath sounds. No wheezes or rales. Heart:rate reg. 1-2/6 syst murmur along llsb No gallops   Pulses:Radials 2+ equal               Poster tib 1+ equal  Extremities:no edema in either leg  Gen: In no acute distress  Abdomen; B.S present. Soft  Non tender. No hepatosplenomegaly. No masses         Dorsalis pedis and posterior tibial pulses are symmetric. No fissures between the toes. No open sores on the feet. Tactile sensation intact   Assessment:       Diagnosis Orders   1. Type 2 diabetes mellitus with other circulatory complications ([facility]     2. Recurrent major depressive disorder, in partial remission ([facility]     3. Chronic insomnia     4.  Severe malnutrition (HCC)     5. Paroxysmal atrial fibrillation (HCC)           Plan:    diabetes stable -continue current meds -follow labs  Major depression stable-continue current tx   paf stable and followed by cardiology-continue current tx  Severe malnutrition -slightly worse-off chemo now-continue to monitor  Diabetes stable-continue current tx   Orders Placed This Encounter   Medications    citalopram (CELEXA) 20 MG tablet     Si po q day     Dispense:  90 tablet     Refill:  1     Orders Placed This Encounter   Procedures    Hemoglobin A1C     Standing Status:   Future     Standing Expiration Date:   4/21/2022     F/u after non fasting blood work in august

## 2021-04-28 ENCOUNTER — TELEPHONE (OUTPATIENT)
Dept: PHARMACY | Age: 76
End: 2021-04-28

## 2021-04-28 DIAGNOSIS — I48.91 ATRIAL FIBRILLATION, UNSPECIFIED TYPE (HCC): ICD-10-CM

## 2021-04-28 NOTE — TELEPHONE ENCOUNTER
Patient's wife called stating that patient had hematuria starting last Saturday. Patient's chart indicates that this is a recurring problem due to prostate cancer. She stated that she stopped his warfarin for 3 days and the problem resolved. She restarted his warfarin yesterday and called to see if she should keep his appt for Monday 5/3/2021. Patient was instructed to keep appt as scheduled. Patient's INR goal range is 2-3 but because of persistent bleeding episodes the lower end of the goal range is associated with fewer bleeding problems. Patient's last 2 INR results were 1.8 and 2.0    Jerson Barnes, LEONEL. Ph. 4/28/2021 3:27 PM

## 2021-05-03 ENCOUNTER — HOSPITAL ENCOUNTER (OUTPATIENT)
Dept: PHARMACY | Age: 76
Setting detail: THERAPIES SERIES
Discharge: HOME | End: 2021-05-03
Payer: MEDICARE

## 2021-05-03 DIAGNOSIS — I48.20 CHRONIC ATRIAL FIBRILLATION (HCC): ICD-10-CM

## 2021-05-03 LAB — INTERNATIONAL NORMALIZATION RATIO, POC: 1.5

## 2021-05-03 PROCEDURE — 99211 OFF/OP EST MAY X REQ PHY/QHP: CPT | Performed by: PHARMACIST

## 2021-05-03 PROCEDURE — 85610 PROTHROMBIN TIME: CPT | Performed by: PHARMACIST

## 2021-05-03 NOTE — PROGRESS NOTES
Mr. La Bell is a 67 y.o. y/o male with history of Afib who presents today for anticoagulation monitoring and adjustment. INR 1.5 is subtherapeutic for this patient (goal range 2-3) and is reflective of 47.5 mg TWD  Patient verifies current dosing regimen, patient able to verbally recall dose  Patient reports four  missed doses since last INR . Pt held warfarin for hematuria -21 (has been chronic/ intermittent problem per patient). Patient denies s/sx clotting and/or stroke  Patient denies epistaxis, rectal bleeding  Patient denies changes in diet, alcohol, or tobacco use  Reviewed medication list and drug allergies with patient, updated any medication additions or modifications accordingly  Patient also denies any pending medical or dental procedures scheduled at this time  Patient was instructed to continue 47.5mg TWD and RTC 2 weeks. Pt desires lower goal range for INR and was advised to check / [facility] since clinic consult is for goal 2-3.     For Pharmacy Admin Tracking Only     Intervention Detail: Adherence Monitorin   Total # of Interventions Recommended: 2   Total # of Interventions Accepted: 2   Time Spent (min): 30

## 2021-05-17 ENCOUNTER — HOSPITAL ENCOUNTER (OUTPATIENT)
Dept: PHARMACY | Age: 76
Setting detail: THERAPIES SERIES
Discharge: HOME | End: 2021-05-17
Payer: MEDICARE

## 2021-05-17 DIAGNOSIS — I48.91 ATRIAL FIBRILLATION, UNSPECIFIED TYPE (HCC): Primary | ICD-10-CM

## 2021-05-17 LAB — INTERNATIONAL NORMALIZATION RATIO, POC: 2.8

## 2021-05-17 PROCEDURE — 85610 PROTHROMBIN TIME: CPT | Performed by: PHARMACIST

## 2021-05-17 PROCEDURE — 99211 OFF/OP EST MAY X REQ PHY/QHP: CPT | Performed by: PHARMACIST

## 2021-05-17 NOTE — PROGRESS NOTES
Mr. Pedro Pablo Rodriguez is a 67 y.o. y/o male with history of Afib who presents today for anticoagulation monitoring and adjustment. INR 2.8 is therapeutic for this patient (goal range 2-3) and is reflective of 47.5 mg TWD  Patient verifies current dosing regimen, patient able to verbally recall dose  Patient reports NO  missed doses since last INR   Patient denies s/sx clotting and/or stroke  Patient denies hematuria, epistaxis, rectal bleeding  Patient denies changes in diet, alcohol, or tobacco use  Reviewed medication list and drug allergies with patient, updated any medication additions or modifications accordingly  Patient also denies any pending medical or dental procedures scheduled at this time  Patient was instructed to continue 47.5 mg TWD and RTC 4 weeks  Patient reports no UTI or hematuria since last appointment.     For Pharmacy Admin Tracking Only     Intervention Detail: Adherence Monitorin   Total # of Interventions Recommended: 1   Total # of Interventions Accepted: 1   Time Spent (min): 15

## 2021-06-14 NOTE — PROGRESS NOTES
Mr. Genia Garcia is a 67 y.o. y/o male with history of Afib who presents today for anticoagulation monitoring and adjustment.   INR 2.5 is therapeutic for this patient (goal range 2-3) and is reflective of 47.5 mg TWD  Patient verifies current dosing regimen, patient able to verbally recall dose  Patient reports 0 missed doses since last INR   Patient denies s/sx clotting and/or stroke  Patient denies hematuria, epistaxis, rectal bleeding  Patient denies changes in diet, alcohol, or tobacco use  Reviewed medication list and drug allergies with patient, updated any medication additions or modifications accordingly  Patient also denies any pending medical or dental procedures scheduled at this time  Patient was instructed to continue 47.5 mgTWD and RTC 4 weeks    For Pharmacy Admin Tracking Only     Intervention Detail: Adherence Monitorin   Total # of Interventions Recommended: 0   Total # of Interventions Accepted: 0   Time Spent (min): 15

## 2021-07-12 NOTE — PROGRESS NOTES
Mr. Modesto Stockton is a 67 y.o. male with history of Afib who presents today for anticoagulation monitoring and adjustment. Face to face visit completed, procedural mask and face shield worn by myself as instructed: Mask worn by patient, room cleaned pre and post visit with Virex Plus spray  INR 1.4 is subtherapeutic for this patient (goal range 2-3) and is reflective of 47.5 mg TWD  Patient verifies current dosing regimen, patient able to verbally recall dose  Patient reports no missed doses in the last seven days   Patient denies s/sx clotting and/or stroke  Patient denies hematuria, epistaxis, rectal bleeding  Patient denies changes in diet, alcohol, or tobacco use  Reviewed medication list and drug allergies with patient, updated any medication additions or modifications accordingly  Patient also denies any pending medical or dental procedures scheduled at this time  There is no apparent reason for the decreased INR other than the patient states he has been more ambulatory in the last few weeks. Patient was instructed to take an extra 2.5mg today and tomorrow then continue his current stable 47.5mg TWD and RTC in 2 weeks    For Pharmacy Admin Tracking Only     Intervention Detail: Adherence Monitorin   Total # of Interventions Recommended: 1   Total # of Interventions Accepted: 1   Time Spent (min): 20      LEONEL Kam. Ph. 2021 10:59 AM

## 2021-07-26 NOTE — PROGRESS NOTES
Mr. Oliver Jorge is a 67 y.o. male with history of Afib who presents today for anticoagulation monitoring and adjustment. Face to face visit completed, procedural mask and face shield worn by myself as instructed: Mask worn by patient, room cleaned pre and post visit with Virex Plus spray  INR 2.9 is therapeutic for this patient (goal range 2-3) and is reflective of 47.5 mg TWD  Patient verifies current dosing regimen, patient able to verbally recall dose  Patient reports no missed doses in the last seven days   Patient denies s/sx clotting and/or stroke  Patient denies hematuria, epistaxis, rectal bleeding  Patient denies changes in diet, alcohol, or tobacco use  Reviewed medication list and drug allergies with patient, updated any medication additions or modifications accordingly  Patient also denies any pending medical or dental procedures scheduled at this time  Patient was instructed to continue 47.5mg and RTC in 3 weeks    For Pharmacy Admin Tracking Only     Intervention Detail: Adherence Monitorin   Total # of Interventions Recommended: 1   Total # of Interventions Accepted: 1   Time Spent (min): 20      LEONEL Kam. Ph. 2021 10:54 AM
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
## 2021-08-16 NOTE — PROGRESS NOTES
Mr. Woody Rueda is a 67 y.o. y/o male with history of Afib who presents today for anticoagulation monitoring and adjustment. Face to face visit completed, procedural mask worn by myself as instructed; procedural mask worn by patient and caregiver, room cleaned pre and post visit with Virex Plus spray and/anti-viral wipe.      INR 1.3 is sub-therapeutic for this patient (goal range 2-3) and is reflective of 47.5 mg TWD  Patient verifies current dosing regimen, patient able to verbally recall dose  Patient reports 0  missed doses since last INR   Patient denies s/sx clotting and/or stroke  Patient denies hematuria, epistaxis, rectal bleeding  Patient denies changes in diet, alcohol, or tobacco use - patient stated he has Brussel's sprouts and green beans over the past couple days (moderate amounts)   Reviewed medication list and drug allergies with patient, updated any medication additions or modifications accordingly  Patient also denies any pending medical or dental procedures scheduled at this time  Patient was instructed to boost with warfarin 10mg today and tomorrow only, boost with warfarin 7.5mg tomorrow only, then continue with 47.5mg TWD and RTC 10 days    For Pharmacy Admin Tracking Only     Intervention Detail: Adherence Monitorin, Dose Adjustment: 1, reason: Therapy Optimization and Lab(s) Ordered   Total # of Interventions Recommended: 3   Total # of Interventions Accepted: 3   Time Spent (min): 1 [facility] [place] [place] Thao Barrera, PharmD   2021 10:38 AM

## 2021-08-19 NOTE — PROGRESS NOTES
Social Gatherings with Friends and Family:     Attends Yarsanism Services:     Active Member of Clubs or Organizations:     Attends Club or Organization Meetings:     Marital Status:    Intimate Partner Violence:     Fear of Current or Ex-Partner:     Emotionally Abused:     Physically Abused:     Sexually Abused:      Current Outpatient Medications   Medication Sig Dispense Refill    primidone (MYSOLINE) 250 MG tablet TAKE 1 TABLET NIGHTLY 90 tablet 3    traZODone (DESYREL) 100 MG tablet TAKE 1 TABLET NIGHTLY 90 tablet 0    citalopram (CELEXA) 20 MG tablet 1 po q day 90 tablet 1    fluticasone-umeclidin-vilant (TRELEGY ELLIPTA) 100-62.5-25 MCG/INH AEPB Inhale 1 puff into the lungs daily 1 each 5    COVID-19 mRNA Vacc, Moderna, 100 MCG/0.5ML SUSP injection Inject into the muscle once 1st jan 29 [facility]  2nd dose Feb 25 [facility]      Calcium Carb-Cholecalciferol (CALCIUM/VITAMIN D PO) Take 600 mg by mouth       budesonide (ENTOCORT EC) 3 MG extended release capsule TAKE 2 CAPSULES EVERY MORNING 180 capsule 3    warfarin (COUMADIN) 5 MG tablet Take as directed by [facility] [place] AT [place] [facility]. Quantity for 90 day supply.  150 tablet 1    prochlorperazine (COMPAZINE) 10 MG tablet TAKE 1 TABLET EVERY 4 TO 6 HOURS AS NEEDED FOR NAUSEA      carbidopa-levodopa-entacapone (STALEVO 100) -200 MG TABS Take 1 tablet by mouth nightly 90 tablet 3    blood glucose monitor strips Test one time a day & as needed for symptoms of irregular blood glucose- for one touch ultra 300 strip 0    loperamide (IMODIUM) 2 MG capsule Take 2 mg by mouth 4 times daily as needed for Diarrhea      folic acid (FOLVITE) 1 MG tablet Take 1 tablet by mouth daily 90 tablet 1    ferrous sulfate (IRON 325) 325 (65 Fe) MG tablet Take 1 tablet by mouth 2 times daily 60 tablet 5    Respiratory Therapy Supplies (NEBULIZER AIR TUBE/PLUGS) MISC Nebulizer machine with tubing and supplies 1 each 0    sotalol Gross hematuria     History of heart attack     has had 4 heart attacks in the past     Hydrocephalus (HCC)     Hyperlipidemia     on meds > 20 yrs    Hypertension     on meds > 20 yrs    Insomnia     Restless leg syndrome     Seizures (HCC)     Tremors of nervous system     Type 2 diabetes mellitus with other circulatory complications ([facility] 1/79/9447     Objective:   /60   Pulse 50   Temp 98 °F (36.7 °C)   Resp 14   Ht 5' 10\" (1.778 m)   Wt 168 lb (76.2 kg)   BMI 24.11 kg/m²     Physical Exam    Lungs: decreased breath sounds at bases. No wheezes or rales. Heart:rate reg. 1/6 syst murmur across precordium . No gallops                  D.P 1+ equal  Extremities:no edema in either leg  Gen: In no acute distress        Dorsalis pedis and posterior tibial pulses are symmetric. No fissures between the toes. No open sores on the feet. Tactile sensation intact   Along lateral aspect of left great toe is slight swelling and serosanguinous drainage. Mild tenderness there    no swelling of joint or foot   Assessment:       Diagnosis Orders   1. Type 2 diabetes mellitus with other circulatory complications ([facility]     2.  Ingrown nail of great toe of left foot           Plan:      Orders Placed This Encounter   Medications    doxycycline hyclate (VIBRA-TABS) 100 MG tablet     Sig: Take 1 tablet by mouth 2 times daily for 10 days     Dispense:  20 tablet     Refill:  0   continue diet and exercise   Non fasting blood work in 3 months and f/u after done

## 2021-08-26 NOTE — PROGRESS NOTES
Mr. Jesus Olivares is a 67 y.o. y/o male with history of Afib who presents today for anticoagulation monitoring and adjustment. Face to face visit completed, procedural mask worn by myself as instructed; procedural mask worn by patient, room cleaned pre and post visit with Virex Plus spray and/anti-viral wipe.    INR 3.2 is supratherapeutic for this patient (goal range 2-3) and is reflective of 47.5 mg TWD  Patient verifies current dosing regimen, patient able to verbally recall dose  Patient reports 0  missed doses since last INR   Patient denies s/sx clotting and/or stroke  Patient denies hematuria, epistaxis, rectal bleeding  Patient denies changes in diet, alcohol, or tobacco use  Reviewed medication list and drug allergies with patient, updated any medication additions or modifications accordingly  Patient also denies any pending medical or dental procedures scheduled at this time  Patient is to start taking doxycycline today and will be taking it for ten days  Patient was instructed to decrease to 5 mg for today  and then continue 47.5 mg TWD and RTC 2 weeks    For Pharmacy Admin Tracking Only     Intervention Detail: Dose Adjustment: 1, reason: Therapy Optimization and Lab(s) Ordered   Total # of Interventions Recommended: 1   Total # of Interventions Accepted: 1   Time Spent (min): 30    Shukri Pulido PharmD  PGY-1 Pharmacy Resident   8/26/2021 10:35 AM
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
## 2021-09-21 NOTE — PROGRESS NOTES
Mr. Gavin Hamilton is a 67 y.o. y/o male with history of Afib who presents today for anticoagulation monitoring and adjustment.   INR 2.9 is therapeutic for this patient (goal range 2-3) and is reflective of 45 mg TWD  Patient verifies current dosing regimen, patient able to verbally recall dose  Patient reports no  missed doses since last INR   Patient denies s/sx clotting and/or stroke  Patient denies hematuria, epistaxis, rectal bleeding  Patient denies changes in diet, alcohol, or tobacco use  Reviewed medication list and drug allergies with patient, updated any medication additions or modifications accordingly    Discussed consistency with diet and greens    Patient also denies any pending medical or dental procedures scheduled at this time  Patient was instructed to continue current regimen of 45 mg TWD and RTC 3 weeks    For Pharmacy Admin Tracking Only     Intervention Detail: Lab(s) Ordered   Total # of Interventions Recommended: 1   Total # of Interventions Accepted: 1   Time Spent (min): 30    Abhi FelderD, [facility], [facility]  Staff Pharmacist  9/21/2021 11:11 AM

## 2021-10-12 PROBLEM — R01.1 CARDIAC MURMUR, UNSPECIFIED: Status: ACTIVE | Noted: 2018-09-18

## 2021-10-12 PROBLEM — F32.9 MAJOR DEPRESSIVE DISORDER, SINGLE EPISODE, UNSPECIFIED: Status: ACTIVE | Noted: 2019-01-28

## 2021-10-12 PROBLEM — R94.31 ABNORMAL ELECTROCARDIOGRAM: Status: ACTIVE | Noted: 2019-01-28

## 2021-10-12 PROBLEM — F17.210 NICOTINE DEPENDENCE, CIGARETTES, UNCOMPLICATED: Status: ACTIVE | Noted: 2018-09-18

## 2021-10-12 PROBLEM — I25.10 CORONARY ARTERIOSCLEROSIS IN NATIVE ARTERY: Status: ACTIVE | Noted: 2018-08-13

## 2021-10-12 PROBLEM — Z72.0 TOBACCO USER: Status: RESOLVED | Noted: 2019-01-24 | Resolved: 2021-01-01

## 2021-10-12 PROBLEM — I49.9 CARDIAC ARRHYTHMIA: Status: ACTIVE | Noted: 2018-09-18

## 2021-10-12 PROBLEM — I65.23 OCCLUSION AND STENOSIS OF BILATERAL CAROTID ARTERIES: Status: ACTIVE | Noted: 2018-08-13

## 2021-10-14 NOTE — PROGRESS NOTES
Mr. Pnakaj Baldwin is a 67 y.o. y/o male with history of Afib who presents today for anticoagulation monitoring and adjustment.   INR 2.2 is therapeutic for this patient (goal range 2-3) and is reflective of 45 mg TWD  Patient verifies current dosing regimen, patient able to verbally recall dose  Patient reports 0 missed doses since last INR   Patient denies s/sx clotting and/or stroke  Patient denies hematuria, epistaxis, rectal bleeding  Patient denies changes in diet, alcohol, or tobacco use  Reviewed medication list and drug allergies with patient, updated any medication additions or modifications accordingly  Patient also denies any pending medical or dental procedures scheduled at this time    Patient was instructed to continue 45 mg TWD and RTC 3 weeks    For Pharmacy Admin Tracking Only     Intervention Detail: Adherence Monitorin and Lab(s) Ordered   Total # of Interventions Recommended: 1   Total # of Interventions Accepted: 1   Time Spent (min): 30    Abhi SommersD  PGY-1 Pharmacy Resident  10/14/2021 1:57 PM
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
## 2021-11-04 NOTE — PROGRESS NOTES
Mr. Nicky Laurent is a 67 y.o. y/o male with history of Afib who presents today for anticoagulation monitoring and adjustment.   INR 2.3 is therapeutic for this patient (goal range 2-3) and is reflective of 45 mg TWD  Patient verifies current dosing regimen, patient able to verbally recall dose  Patient reports 0 missed doses since last INR   Patient denies s/sx clotting and/or stroke  Patient denies hematuria, epistaxis, rectal bleeding  Patient denies changes in diet, alcohol, or tobacco use  Reviewed medication list and drug allergies with patient, updated any medication additions or modifications accordingly  Patient also denies any pending medical or dental procedures scheduled at this time    Patient was instructed to continue current 45 mg TWD and RTC 4 weeks    For Pharmacy Admin Tracking Only     Intervention Detail: Adherence Monitorin and Lab(s) Ordered   Total # of Interventions Recommended: 1   Total # of Interventions Accepted: 1   Time Spent (min): 20    Abhi RogresD  PGY-1 Pharmacy Resident  2021 10:16 AM

## 2021-12-02 NOTE — PROGRESS NOTES
Mr. Chery Estrada is a 67 y.o. y/o male with history of Afib who presents today for anticoagulation monitoring and adjustment.   INR 3.4 is supra-therapeutic for this patient (goal range 2-3) and is reflective of 45 mg TWD  Patient verifies current dosing regimen, patient able to verbally recall dose  Patient reports 0  missed doses since last INR   Patient denies s/sx clotting and/or stroke  Patient denies hematuria, epistaxis, rectal bleeding  Patient denies changes in diet, alcohol, or tobacco use  Reviewed medication list and drug allergies with patient, updated any medication additions or modifications accordingly  Patient also denies any pending medical or dental procedures scheduled at this time  Patient was instructed to hold today, then resume 45 mg TWD and RTC 4 weeks    For Pharmacy Admin Tracking Only     Intervention Detail:    Total # of Interventions Recommended: 1   Total # of Interventions Accepted: 1   Time Spent (min): 20

## 2021-12-07 PROBLEM — R09.02 HYPOXIA: Status: ACTIVE | Noted: 2021-01-01
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
## 2021-12-22 NOTE — PROGRESS NOTES
Subjective:      Patient ID: Chris Egan is a 67 y.o. male    HPI  Here in follow up for diabetes and blood work. Fasting glucose 130's   Weight up few pounds since last time. activity limited by cold weather. Does need handicap placard renewal    Review of Systems   Constitutional: Negative for chills and fever. Cardiovascular: Negative for chest pain. Neurological: Negative for weakness. Chris Egan is a 67 y.o. male evaluated via telephone on 12/22/2021. Consent:  He and/or health care decision maker is aware that that he may receive a bill for this telephone service, depending on his insurance coverage, and has provided verbal consent to proceed: Yes      Documentation:  I communicated with the patient and/or health care decision maker about . Details of this discussion including any medical advice provided: This visit was a telephone encounter. Patient was located at their home. Provider was located at their ___ home or        __x__ office. I affirm this is a Patient Initiated Episode with an Established Patient who has not had a related appointment within my department in the past 7 days or scheduled within the next 24 hours.     Total Time: minutes: 11-20 minutes    Note: not billable if this call serves to triage the patient into an appointment for the relevant concern      Harvy Duane, MD   Reviewed allergy, medical, social, surgical, family and med list changes and updated   Files--reviewed blood work with minimally elevated alk phos and improving anemia      Social History     Socioeconomic History    Marital status:      Spouse name: Not on file    Number of children: Not on file    Years of education: Not on file    Highest education level: Not on file   Occupational History    Not on file   Tobacco Use    Smoking status: Former Smoker     Packs/day: 1.00     Years: 58.00     Pack years: 58.00     Types: Cigarettes     Start date: 9/21/1958     Quit date: 3/14/2020     Years since quittin.7    Smokeless tobacco: Never Used   Vaping Use    Vaping Use: Never used   Substance and Sexual Activity    Alcohol use: Yes     Alcohol/week: 3.0 standard drinks     Types: 3 Shots of liquor per week     Comment: once weekly  or more    Drug use: No    Sexual activity: Never   Other Topics Concern    Not on file   Social History Narrative    Not on file     Social Determinants of Health     Financial Resource Strain:     Difficulty of Paying Living Expenses: Not on file   Food Insecurity:     Worried About Running Out of Food in the Last Year: Not on file    John of Food in the Last Year: Not on file   Transportation Needs:     Lack of Transportation (Medical): Not on file    Lack of Transportation (Non-Medical):  Not on file   Physical Activity:     Days of Exercise per Week: Not on file    Minutes of Exercise per Session: Not on file   Stress:     Feeling of Stress : Not on file   Social Connections:     Frequency of Communication with Friends and Family: Not on file    Frequency of Social Gatherings with Friends and Family: Not on file    Attends [employer] Services: Not on file    Active Member of [address] [place] [address] or Organizations: Not on file    Attends Club or Organization Meetings: Not on file    Marital Status: Not on file   Intimate Partner Violence:     Fear of Current or Ex-Partner: Not on file    Emotionally Abused: Not on file    Physically Abused: Not on file    Sexually Abused: Not on file   Housing Stability:     Unable to Pay for Housing in the Last Year: Not on file    Number of [place] in the Last Year: Not on file    Unstable Housing in the Last Year: Not on file     Current Outpatient Medications   Medication Sig Dispense Refill    sotalol AF (BETAPACE AF) 80 MG TABS Take by mouth 2 times daily      carbidopa-levodopa-entacapone (STALEVO 100) -200 MG TABS TAKE 1 TABLET NIGHTLY 90 tablet 3    traZODone (DESYREL) 100 MG tablet TAKE 1 TABLET NIGHTLY 90 tablet 0    citalopram (CELEXA) 20 MG tablet TAKE 1 TABLET DAILY 90 tablet 0    ipratropium-albuterol (DUONEB) 0.5-2.5 (3) MG/3ML SOLN nebulizer solution Inhale 3 mLs into the lungs 4 times daily 360 mL 5    warfarin (COUMADIN) 5 MG tablet Take as directed by [facility] [place] [facility]. Quantity for 90 day supply.  150 tablet 1    primidone (MYSOLINE) 250 MG tablet TAKE 1 TABLET NIGHTLY 90 tablet 3    fluticasone-umeclidin-vilant (TRELEGY ELLIPTA) 100-62.5-25 MCG/INH AEPB Inhale 1 puff into the lungs daily 1 each 5    COVID-19 mRNA Vacc, Moderna, 100 MCG/0.5ML SUSP injection Inject into the muscle once 1st jan 29 [facility]  2nd dose Feb 25 [facility]      Calcium Carb-Cholecalciferol (CALCIUM/VITAMIN D PO) Take 600 mg by mouth       budesonide (ENTOCORT EC) 3 MG extended release capsule TAKE 2 CAPSULES EVERY MORNING 180 capsule 3    prochlorperazine (COMPAZINE) 10 MG tablet TAKE 1 TABLET EVERY 4 TO 6 HOURS AS NEEDED FOR NAUSEA      blood glucose monitor strips Test one time a day & as needed for symptoms of irregular blood glucose- for one touch ultra 300 strip 0    loperamide (IMODIUM) 2 MG capsule Take 2 mg by mouth 4 times daily as needed for Diarrhea      folic acid (FOLVITE) 1 MG tablet Take 1 tablet by mouth daily 90 tablet 1    ferrous sulfate (IRON 325) 325 (65 Fe) MG tablet Take 1 tablet by mouth 2 times daily 60 tablet 5    Respiratory Therapy Supplies (NEBULIZER AIR TUBE/PLUGS) MISC Nebulizer machine with tubing and supplies 1 each 0    hydrochlorothiazide (HYDRODIURIL) 12.5 MG tablet Take 1 tablet by mouth every other day (Patient taking differently: Take 12.5 mg by mouth daily as needed (for edema) ) 30 tablet 3    albuterol sulfate HFA (VENTOLIN HFA) 108 (90 Base) MCG/ACT inhaler Inhale 2 puffs into the lungs 4 times daily as needed for Wheezing 3 Inhaler 1    Leuprolide Acetate (LUPRON IJ) Inject as directed every 6 months      blood glucose monitor kit and supplies Test one time a day & as needed for symptoms of irregular blood glucose. 1 kit 0    Lancets MISC 1 each by Does not apply route daily 300 each 1    isosorbide mononitrate (IMDUR) 60 MG extended release tablet Take 30 mg by mouth daily       Handicap Placard MISC by Does not apply route Good for 5 years. Good from 1/31/2017 through 1/31/2022. 1 each 0    atorvastatin (LIPITOR) 80 MG tablet Take 80 mg by mouth daily.  aspirin 81 MG tablet Take 81 mg by mouth daily.  Omega-3 Fatty Acids (FISH OIL) 1200 MG CPDR Take by mouth 2 times daily       nitroGLYCERIN (NITROSTAT) 0.4 MG SL tablet Place 0.4 mg under the tongue every 5 minutes as needed for Chest pain        No current facility-administered medications for this visit. Family History   Problem Relation Age of Onset    Other Mother         liver scerosis   [facility] Other Father         did not have a father    Other Sister         does not know sister   Cici Adkins         brain cancer    Other Son         unsure of medical problems    Other Daughter         unsure of medical problems     Past Medical History:   Diagnosis Date    CAD (coronary artery disease)     has 16 cardiac stents    Cancer ([address]     COPD (chronic obstructive pulmonary disease) ([address]     Encephalopathy     Essential tremor     Gross hematuria     History of heart attack     has had 4 heart attacks in the past     Hydrocephalus ([address]     Hyperlipidemia     on meds > 20 yrs    Hypertension     on meds > 20 yrs    Insomnia     Restless leg syndrome     Seizures ([address]     Tremors of nervous system     Type 2 diabetes mellitus with other circulatory complications ([address] 7/97/9504     Objective: There were no vitals taken for this visit. Physical Exam  No exam done -  Assessment:       Diagnosis Orders   1. Type 2 diabetes mellitus with other circulatory complications (HCC)  Hemoglobin A1C   2.  Elevated liver enzymes Comprehensive Metabolic Panel   3. Anemia, unspecified type  CBC Auto Differential   4.  Chronic obstructive pulmonary disease, unspecified COPD type ([facility]           Plan:      Orders Placed This Encounter   Procedures    Comprehensive Metabolic Panel     Standing Status:   Future     Standing Expiration Date:   12/22/2022    Hemoglobin A1C     Standing Status:   Future     Standing Expiration Date:   12/22/2022    CBC Auto Differential     Standing Status:   Future     Standing Expiration Date:   6/22/2022     Continue current medications   Non fasting blood work in  3months

## 2021-12-30 NOTE — PROGRESS NOTES
Mr. Sonali Colorado is a 67 y.o. y/o male with history of Afib who presents today for anticoagulation monitoring and adjustment.   INR 2.2 is therapeutic for this patient (goal range 2-3) and is reflective of 45 mg TWD  Patient verifies current dosing regimen, patient able to verbally recall dose  Patient reports no  missed doses since last INR   Patient denies s/sx clotting and/or stroke  Patient denies hematuria, epistaxis, rectal bleeding  Patient denies changes in diet, alcohol, or tobacco use  Reviewed medication list and drug allergies with patient, updated any medication additions or modifications accordingly  Patient also denies any pending medical or dental procedures scheduled at this time  Patient was instructed to continue 45mg TWD and RTC 4 weeks    For Pharmacy Admin Tracking Only     Intervention Detail: Adherence Monitorin   Total # of Interventions Recommended: 2   Total # of Interventions Accepted: 2   Time Spent (min): 30

## 2022-01-01 ENCOUNTER — HOSPITAL ENCOUNTER (OUTPATIENT)
Dept: PHARMACY | Age: 77
Setting detail: THERAPIES SERIES
Discharge: HOME | End: 2022-04-19
Payer: MEDICARE

## 2022-01-01 ENCOUNTER — APPOINTMENT (OUTPATIENT)
Dept: CT IMAGING | Age: 77
End: 2022-01-01
Payer: MEDICARE

## 2022-01-01 ENCOUNTER — TELEMEDICINE (OUTPATIENT)
Dept: UROLOGY | Age: 77
End: 2022-01-01
Payer: MEDICARE

## 2022-01-01 ENCOUNTER — HOSPITAL ENCOUNTER (OUTPATIENT)
Dept: PHARMACY | Age: 77
Setting detail: THERAPIES SERIES
Discharge: HOME | End: 2022-03-08
Payer: MEDICARE

## 2022-01-01 ENCOUNTER — OFFICE VISIT (OUTPATIENT)
Dept: PULMONOLOGY | Age: 77
End: 2022-01-01
Payer: MEDICARE

## 2022-01-01 ENCOUNTER — OFFICE VISIT (OUTPATIENT)
Dept: FAMILY MEDICINE CLINIC | Age: 77
End: 2022-01-01
Payer: MEDICARE

## 2022-01-01 ENCOUNTER — OFFICE VISIT (OUTPATIENT)
Dept: NEUROLOGY | Age: 77
End: 2022-01-01
Payer: MEDICARE

## 2022-01-01 ENCOUNTER — HOSPITAL ENCOUNTER (OUTPATIENT)
Dept: PHARMACY | Age: 77
Setting detail: THERAPIES SERIES
Discharge: HOME | End: 2022-01-27
Payer: MEDICARE

## 2022-01-01 ENCOUNTER — HOSPITAL ENCOUNTER (EMERGENCY)
Age: 77
End: 2022-05-31
Attending: EMERGENCY MEDICINE
Payer: MEDICARE

## 2022-01-01 ENCOUNTER — APPOINTMENT (OUTPATIENT)
Dept: GENERAL RADIOLOGY | Age: 77
End: 2022-01-01
Payer: MEDICARE

## 2022-01-01 VITALS
OXYGEN SATURATION: 16 % | RESPIRATION RATE: 7 BRPM | HEIGHT: 70 IN | DIASTOLIC BLOOD PRESSURE: 102 MMHG | SYSTOLIC BLOOD PRESSURE: 201 MMHG | HEART RATE: 32 BPM | TEMPERATURE: 98.5 F | BODY MASS INDEX: 25.25 KG/M2

## 2022-01-01 VITALS
OXYGEN SATURATION: 92 % | DIASTOLIC BLOOD PRESSURE: 70 MMHG | WEIGHT: 177.8 LBS | RESPIRATION RATE: 14 BRPM | BODY MASS INDEX: 25.45 KG/M2 | HEART RATE: 52 BPM | SYSTOLIC BLOOD PRESSURE: 120 MMHG | TEMPERATURE: 97.4 F | HEIGHT: 70 IN

## 2022-01-01 VITALS
SYSTOLIC BLOOD PRESSURE: 97 MMHG | DIASTOLIC BLOOD PRESSURE: 65 MMHG | BODY MASS INDEX: 25.05 KG/M2 | HEART RATE: 86 BPM | WEIGHT: 175 LBS | OXYGEN SATURATION: 84 % | HEIGHT: 70 IN

## 2022-01-01 VITALS
HEART RATE: 55 BPM | BODY MASS INDEX: 25.25 KG/M2 | DIASTOLIC BLOOD PRESSURE: 66 MMHG | SYSTOLIC BLOOD PRESSURE: 98 MMHG | WEIGHT: 176 LBS

## 2022-01-01 DIAGNOSIS — J96.00 ACUTE RESPIRATORY FAILURE, UNSPECIFIED WHETHER WITH HYPOXIA OR HYPERCAPNIA (HCC): ICD-10-CM

## 2022-01-01 DIAGNOSIS — C34.92 CARCINOMA OF LEFT LUNG (HCC): ICD-10-CM

## 2022-01-01 DIAGNOSIS — C61 MALIGNANT NEOPLASM OF PROSTATE (HCC): Primary | ICD-10-CM

## 2022-01-01 DIAGNOSIS — E11.59 TYPE 2 DIABETES MELLITUS WITH OTHER CIRCULATORY COMPLICATIONS (HCC): Primary | ICD-10-CM

## 2022-01-01 DIAGNOSIS — D64.9 ANEMIA, UNSPECIFIED TYPE: ICD-10-CM

## 2022-01-01 DIAGNOSIS — R09.02 HYPOXIA: ICD-10-CM

## 2022-01-01 DIAGNOSIS — E11.59 TYPE 2 DIABETES MELLITUS WITH OTHER CIRCULATORY COMPLICATIONS (HCC): ICD-10-CM

## 2022-01-01 DIAGNOSIS — G25.0 ESSENTIAL TREMOR: ICD-10-CM

## 2022-01-01 DIAGNOSIS — R74.8 ELEVATED LIVER ENZYMES: ICD-10-CM

## 2022-01-01 DIAGNOSIS — I48.91 ATRIAL FIBRILLATION, UNSPECIFIED TYPE (HCC): Primary | ICD-10-CM

## 2022-01-01 DIAGNOSIS — I65.21 CAROTID STENOSIS, RIGHT: ICD-10-CM

## 2022-01-01 DIAGNOSIS — C61 MALIGNANT NEOPLASM OF PROSTATE (HCC): ICD-10-CM

## 2022-01-01 DIAGNOSIS — M54.16 LUMBAR RADICULOPATHY: ICD-10-CM

## 2022-01-01 DIAGNOSIS — R79.89 CREATININE ELEVATION: ICD-10-CM

## 2022-01-01 DIAGNOSIS — F51.04 CHRONIC INSOMNIA: ICD-10-CM

## 2022-01-01 DIAGNOSIS — G20 PD (PARKINSON'S DISEASE) (HCC): ICD-10-CM

## 2022-01-01 DIAGNOSIS — G25.0 DISABLING ESSENTIAL TREMOR: Primary | ICD-10-CM

## 2022-01-01 DIAGNOSIS — F33.41 RECURRENT MAJOR DEPRESSIVE DISORDER, IN PARTIAL REMISSION (HCC): ICD-10-CM

## 2022-01-01 DIAGNOSIS — G57.11 MERALGIA PARESTHETICA OF RIGHT SIDE: ICD-10-CM

## 2022-01-01 DIAGNOSIS — I48.0 PAROXYSMAL ATRIAL FIBRILLATION (HCC): ICD-10-CM

## 2022-01-01 DIAGNOSIS — I61.9 HEMORRHAGIC CEREBROVASCULAR ACCIDENT (CVA) (HCC): Primary | ICD-10-CM

## 2022-01-01 DIAGNOSIS — I48.0 PAROXYSMAL ATRIAL FIBRILLATION (HCC): Primary | ICD-10-CM

## 2022-01-01 DIAGNOSIS — J44.9 CHRONIC OBSTRUCTIVE PULMONARY DISEASE, UNSPECIFIED COPD TYPE (HCC): Primary | ICD-10-CM

## 2022-01-01 LAB
ALBUMIN SERPL-MCNC: 3.8 G/DL (ref 3.5–4.6)
ALBUMIN SERPL-MCNC: 3.8 G/DL (ref 3.5–4.6)
ALP BLD-CCNC: 104 U/L (ref 35–104)
ALP BLD-CCNC: 111 U/L (ref 35–104)
ALT SERPL-CCNC: 14 U/L (ref 0–41)
ALT SERPL-CCNC: 18 U/L (ref 0–41)
ANION GAP SERPL CALCULATED.3IONS-SCNC: 12 MEQ/L (ref 9–15)
ANION GAP SERPL CALCULATED.3IONS-SCNC: 13 MEQ/L (ref 9–15)
APTT: 30.2 SEC (ref 24.4–36.8)
AST SERPL-CCNC: 25 U/L (ref 0–40)
AST SERPL-CCNC: 25 U/L (ref 0–40)
BASE EXCESS ARTERIAL: 2 (ref -3–3)
BASOPHILS ABSOLUTE: 0.1 K/UL (ref 0–0.2)
BASOPHILS ABSOLUTE: 0.1 K/UL (ref 0–0.2)
BASOPHILS RELATIVE PERCENT: 0.8 %
BASOPHILS RELATIVE PERCENT: 0.8 %
BILIRUB SERPL-MCNC: 0.3 MG/DL (ref 0.2–0.7)
BILIRUB SERPL-MCNC: 0.4 MG/DL (ref 0.2–0.7)
BUN BLDV-MCNC: 25 MG/DL (ref 8–23)
BUN BLDV-MCNC: 31 MG/DL (ref 8–23)
CALCIUM IONIZED: 1.15 MMOL/L (ref 1.12–1.32)
CALCIUM SERPL-MCNC: 8.7 MG/DL (ref 8.5–9.9)
CALCIUM SERPL-MCNC: 9.3 MG/DL (ref 8.5–9.9)
CHLORIDE BLD-SCNC: 100 MEQ/L (ref 95–107)
CHLORIDE BLD-SCNC: 96 MEQ/L (ref 95–107)
CO2: 24 MEQ/L (ref 20–31)
CO2: 26 MEQ/L (ref 20–31)
CREAT SERPL-MCNC: 0.93 MG/DL (ref 0.7–1.2)
CREAT SERPL-MCNC: 1.21 MG/DL (ref 0.7–1.2)
EOSINOPHILS ABSOLUTE: 0.4 K/UL (ref 0–0.7)
EOSINOPHILS ABSOLUTE: 0.4 K/UL (ref 0–0.7)
EOSINOPHILS RELATIVE PERCENT: 3.2 %
EOSINOPHILS RELATIVE PERCENT: 4.7 %
ETHANOL PERCENT: NORMAL G/DL
ETHANOL: <10 MG/DL (ref 0–0.08)
GFR AFRICAN AMERICAN: >60
GFR NON-AFRICAN AMERICAN: 53
GFR NON-AFRICAN AMERICAN: 58
GFR NON-AFRICAN AMERICAN: >60
GLOBULIN: 2.8 G/DL (ref 2.3–3.5)
GLOBULIN: 3.2 G/DL (ref 2.3–3.5)
GLUCOSE BLD-MCNC: 139 MG/DL (ref 70–99)
GLUCOSE BLD-MCNC: 194 MG/DL (ref 70–99)
GLUCOSE BLD-MCNC: 198 MG/DL (ref 70–99)
HBA1C MFR BLD: 6.5 % (ref 4.8–5.9)
HCO3 ARTERIAL: 26.9 MMOL/L (ref 21–29)
HCT VFR BLD CALC: 36 % (ref 42–52)
HCT VFR BLD CALC: 39.4 % (ref 42–52)
HEMOGLOBIN: 11.4 GM/DL (ref 13.5–17.5)
HEMOGLOBIN: 11.5 G/DL (ref 14–18)
HEMOGLOBIN: 12.8 G/DL (ref 14–18)
INR BLD: 2
INTERNATIONAL NORMALIZATION RATIO, POC: 2.2
INTERNATIONAL NORMALIZATION RATIO, POC: 2.5
INTERNATIONAL NORMALIZATION RATIO, POC: 3
LACTATE: 1.82 MMOL/L (ref 0.4–2)
LYMPHOCYTES ABSOLUTE: 0.6 K/UL (ref 1–4.8)
LYMPHOCYTES ABSOLUTE: 0.8 K/UL (ref 1–4.8)
LYMPHOCYTES RELATIVE PERCENT: 6.3 %
LYMPHOCYTES RELATIVE PERCENT: 6.9 %
MAGNESIUM: 1.9 MG/DL (ref 1.7–2.4)
MCH RBC QN AUTO: 28.7 PG (ref 27–31.3)
MCH RBC QN AUTO: 29 PG (ref 27–31.3)
MCHC RBC AUTO-ENTMCNC: 32 % (ref 33–37)
MCHC RBC AUTO-ENTMCNC: 32.5 % (ref 33–37)
MCV RBC AUTO: 88.3 FL (ref 80–100)
MCV RBC AUTO: 90.7 FL (ref 80–100)
MONOCYTES ABSOLUTE: 0.5 K/UL (ref 0.2–0.8)
MONOCYTES ABSOLUTE: 0.9 K/UL (ref 0.2–0.8)
MONOCYTES RELATIVE PERCENT: 6.5 %
MONOCYTES RELATIVE PERCENT: 6.7 %
NEUTROPHILS ABSOLUTE: 10.7 K/UL (ref 1.4–6.5)
NEUTROPHILS ABSOLUTE: 6.5 K/UL (ref 1.4–6.5)
NEUTROPHILS RELATIVE PERCENT: 81.1 %
NEUTROPHILS RELATIVE PERCENT: 83 %
O2 SAT, ARTERIAL: 100 % (ref 93–100)
PCO2 ARTERIAL: 43 MM HG (ref 35–45)
PDW BLD-RTO: 17 % (ref 11.5–14.5)
PDW BLD-RTO: 17.7 % (ref 11.5–14.5)
PERFORMED ON: ABNORMAL
PH ARTERIAL: 7.4 (ref 7.35–7.45)
PLATELET # BLD: 128 K/UL (ref 130–400)
PLATELET # BLD: 151 K/UL (ref 130–400)
PO2 ARTERIAL: 484 MM HG (ref 75–108)
POC CHLORIDE: 101 MEQ/L (ref 99–110)
POC CREATININE: 1.3 MG/DL (ref 0.8–1.3)
POC HEMATOCRIT: 34 % (ref 41–53)
POC POTASSIUM: 3.8 MEQ/L (ref 3.5–5.1)
POC SAMPLE TYPE: ABNORMAL
POC SODIUM: 138 MEQ/L (ref 136–145)
POTASSIUM SERPL-SCNC: 3.8 MEQ/L (ref 3.4–4.9)
POTASSIUM SERPL-SCNC: 5.2 MEQ/L (ref 3.4–4.9)
PROSTATE SPECIFIC ANTIGEN: 0.06 NG/ML (ref 0–4)
PROTHROMBIN TIME: 22.6 SEC (ref 12.3–14.9)
RBC # BLD: 3.97 M/UL (ref 4.7–6.1)
RBC # BLD: 4.46 M/UL (ref 4.7–6.1)
SODIUM BLD-SCNC: 132 MEQ/L (ref 135–144)
SODIUM BLD-SCNC: 139 MEQ/L (ref 135–144)
TCO2 ARTERIAL: 28 MMOL/L (ref 21–32)
TOTAL PROTEIN: 6.6 G/DL (ref 6.3–8)
TOTAL PROTEIN: 7 G/DL (ref 6.3–8)
TROPONIN: <0.01 NG/ML (ref 0–0.01)
WBC # BLD: 12.9 K/UL (ref 4.8–10.8)
WBC # BLD: 8 K/UL (ref 4.8–10.8)

## 2022-01-01 PROCEDURE — 85014 HEMATOCRIT: CPT

## 2022-01-01 PROCEDURE — 83605 ASSAY OF LACTIC ACID: CPT

## 2022-01-01 PROCEDURE — 99211 OFF/OP EST MAY X REQ PHY/QHP: CPT

## 2022-01-01 PROCEDURE — 51702 INSERT TEMP BLADDER CATH: CPT

## 2022-01-01 PROCEDURE — 82077 ASSAY SPEC XCP UR&BREATH IA: CPT

## 2022-01-01 PROCEDURE — 84132 ASSAY OF SERUM POTASSIUM: CPT

## 2022-01-01 PROCEDURE — G8427 DOCREV CUR MEDS BY ELIG CLIN: HCPCS | Performed by: FAMILY MEDICINE

## 2022-01-01 PROCEDURE — 70450 CT HEAD/BRAIN W/O DYE: CPT

## 2022-01-01 PROCEDURE — G8427 DOCREV CUR MEDS BY ELIG CLIN: HCPCS | Performed by: PSYCHIATRY & NEUROLOGY

## 2022-01-01 PROCEDURE — 1036F TOBACCO NON-USER: CPT | Performed by: FAMILY MEDICINE

## 2022-01-01 PROCEDURE — 84295 ASSAY OF SERUM SODIUM: CPT

## 2022-01-01 PROCEDURE — 1036F TOBACCO NON-USER: CPT | Performed by: INTERNAL MEDICINE

## 2022-01-01 PROCEDURE — 82565 ASSAY OF CREATININE: CPT

## 2022-01-01 PROCEDURE — 85610 PROTHROMBIN TIME: CPT

## 2022-01-01 PROCEDURE — 1123F ACP DISCUSS/DSCN MKR DOCD: CPT | Performed by: INTERNAL MEDICINE

## 2022-01-01 PROCEDURE — G8427 DOCREV CUR MEDS BY ELIG CLIN: HCPCS | Performed by: INTERNAL MEDICINE

## 2022-01-01 PROCEDURE — G8417 CALC BMI ABV UP PARAM F/U: HCPCS | Performed by: INTERNAL MEDICINE

## 2022-01-01 PROCEDURE — 3044F HG A1C LEVEL LT 7.0%: CPT | Performed by: FAMILY MEDICINE

## 2022-01-01 PROCEDURE — 84484 ASSAY OF TROPONIN QUANT: CPT

## 2022-01-01 PROCEDURE — 36415 COLL VENOUS BLD VENIPUNCTURE: CPT

## 2022-01-01 PROCEDURE — 85610 PROTHROMBIN TIME: CPT | Performed by: PHARMACIST

## 2022-01-01 PROCEDURE — 36600 WITHDRAWAL OF ARTERIAL BLOOD: CPT

## 2022-01-01 PROCEDURE — G8417 CALC BMI ABV UP PARAM F/U: HCPCS | Performed by: FAMILY MEDICINE

## 2022-01-01 PROCEDURE — 3023F SPIROM DOC REV: CPT | Performed by: INTERNAL MEDICINE

## 2022-01-01 PROCEDURE — 4040F PNEUMOC VAC/ADMIN/RCVD: CPT | Performed by: FAMILY MEDICINE

## 2022-01-01 PROCEDURE — 85730 THROMBOPLASTIN TIME PARTIAL: CPT

## 2022-01-01 PROCEDURE — 99214 OFFICE O/P EST MOD 30 MIN: CPT | Performed by: INTERNAL MEDICINE

## 2022-01-01 PROCEDURE — 85025 COMPLETE CBC W/AUTO DIFF WBC: CPT

## 2022-01-01 PROCEDURE — 82803 BLOOD GASES ANY COMBINATION: CPT

## 2022-01-01 PROCEDURE — 2500000003 HC RX 250 WO HCPCS

## 2022-01-01 PROCEDURE — 99284 EMERGENCY DEPT VISIT MOD MDM: CPT

## 2022-01-01 PROCEDURE — 99214 OFFICE O/P EST MOD 30 MIN: CPT | Performed by: PSYCHIATRY & NEUROLOGY

## 2022-01-01 PROCEDURE — 1036F TOBACCO NON-USER: CPT | Performed by: PSYCHIATRY & NEUROLOGY

## 2022-01-01 PROCEDURE — 4040F PNEUMOC VAC/ADMIN/RCVD: CPT | Performed by: PSYCHIATRY & NEUROLOGY

## 2022-01-01 PROCEDURE — 99214 OFFICE O/P EST MOD 30 MIN: CPT | Performed by: FAMILY MEDICINE

## 2022-01-01 PROCEDURE — 1123F ACP DISCUSS/DSCN MKR DOCD: CPT | Performed by: FAMILY MEDICINE

## 2022-01-01 PROCEDURE — 83735 ASSAY OF MAGNESIUM: CPT

## 2022-01-01 PROCEDURE — 99211 OFF/OP EST MAY X REQ PHY/QHP: CPT | Performed by: PHARMACIST

## 2022-01-01 PROCEDURE — 82330 ASSAY OF CALCIUM: CPT

## 2022-01-01 PROCEDURE — 82435 ASSAY OF BLOOD CHLORIDE: CPT

## 2022-01-01 PROCEDURE — 80053 COMPREHEN METABOLIC PANEL: CPT

## 2022-01-01 PROCEDURE — 1123F ACP DISCUSS/DSCN MKR DOCD: CPT | Performed by: PSYCHIATRY & NEUROLOGY

## 2022-01-01 PROCEDURE — 4040F PNEUMOC VAC/ADMIN/RCVD: CPT | Performed by: INTERNAL MEDICINE

## 2022-01-01 PROCEDURE — 31500 INSERT EMERGENCY AIRWAY: CPT

## 2022-01-01 PROCEDURE — G8417 CALC BMI ABV UP PARAM F/U: HCPCS | Performed by: PSYCHIATRY & NEUROLOGY

## 2022-01-01 PROCEDURE — 99421 OL DIG E/M SVC 5-10 MIN: CPT | Performed by: UROLOGY

## 2022-01-01 PROCEDURE — G8484 FLU IMMUNIZE NO ADMIN: HCPCS | Performed by: FAMILY MEDICINE

## 2022-01-01 RX ORDER — ROCURONIUM BROMIDE 10 MG/ML
INJECTION, SOLUTION INTRAVENOUS
Status: COMPLETED
Start: 2022-01-01 | End: 2022-01-01

## 2022-01-01 RX ORDER — TRAZODONE HYDROCHLORIDE 100 MG/1
TABLET ORAL
Qty: 90 TABLET | Refills: 0 | Status: SHIPPED | OUTPATIENT
Start: 2022-01-01 | End: 2022-01-01

## 2022-01-01 RX ORDER — SODIUM CHLORIDE 9 MG/ML
50 INJECTION, SOLUTION INTRAVENOUS ONCE
Status: DISCONTINUED | OUTPATIENT
Start: 2022-01-01 | End: 2022-01-01

## 2022-01-01 RX ORDER — BUDESONIDE 3 MG/1
CAPSULE, COATED PELLETS ORAL
Qty: 180 CAPSULE | Refills: 3 | Status: SHIPPED | OUTPATIENT
Start: 2022-01-01

## 2022-01-01 RX ORDER — MANNITOL 250 MG/ML
50 INJECTION, SOLUTION INTRAVENOUS ONCE
Status: DISCONTINUED | OUTPATIENT
Start: 2022-01-01 | End: 2022-01-01

## 2022-01-01 RX ORDER — WARFARIN SODIUM 5 MG/1
TABLET ORAL
Qty: 150 TABLET | Refills: 1 | Status: SHIPPED | OUTPATIENT
Start: 2022-01-01

## 2022-01-01 RX ORDER — CITALOPRAM 20 MG/1
TABLET ORAL
Qty: 90 TABLET | Refills: 1 | Status: SHIPPED | OUTPATIENT
Start: 2022-01-01

## 2022-01-01 RX ORDER — TRAZODONE HYDROCHLORIDE 100 MG/1
TABLET ORAL
Qty: 90 TABLET | Refills: 0 | Status: SHIPPED | OUTPATIENT
Start: 2022-01-01

## 2022-01-01 RX ORDER — 0.9 % SODIUM CHLORIDE 0.9 %
1000 INTRAVENOUS SOLUTION INTRAVENOUS ONCE
Status: DISCONTINUED | OUTPATIENT
Start: 2022-01-01 | End: 2022-01-01

## 2022-01-01 RX ORDER — ETOMIDATE 2 MG/ML
INJECTION INTRAVENOUS
Status: COMPLETED
Start: 2022-01-01 | End: 2022-01-01

## 2022-01-01 RX ADMIN — ETOMIDATE INJECTION: 2 SOLUTION INTRAVENOUS at 17:59

## 2022-01-01 RX ADMIN — ROCURONIUM BROMIDE: 50 INJECTION INTRAVENOUS at 17:59

## 2022-01-01 SDOH — ECONOMIC STABILITY: FOOD INSECURITY: WITHIN THE PAST 12 MONTHS, THE FOOD YOU BOUGHT JUST DIDN'T LAST AND YOU DIDN'T HAVE MONEY TO GET MORE.: NEVER TRUE

## 2022-01-01 SDOH — ECONOMIC STABILITY: FOOD INSECURITY: WITHIN THE PAST 12 MONTHS, YOU WORRIED THAT YOUR FOOD WOULD RUN OUT BEFORE YOU GOT MONEY TO BUY MORE.: NEVER TRUE

## 2022-01-01 ASSESSMENT — ENCOUNTER SYMPTOMS
SHORTNESS OF BREATH: 1
BACK PAIN: 0
COUGH: 0
BACK PAIN: 0
EYE ITCHING: 0
ABDOMINAL PAIN: 0
RHINORRHEA: 0
VOMITING: 1
DIARRHEA: 0
SORE THROAT: 0
VOMITING: 0
NAUSEA: 1
SHORTNESS OF BREATH: 0
DIARRHEA: 0
TROUBLE SWALLOWING: 0
VOICE CHANGE: 0
CHEST TIGHTNESS: 0
COLOR CHANGE: 0
VOMITING: 0
PHOTOPHOBIA: 0
NAUSEA: 0
NAUSEA: 0
SORE THROAT: 0
SHORTNESS OF BREATH: 0
ABDOMINAL PAIN: 0
DIARRHEA: 0
VOMITING: 0
COUGH: 0
WHEEZING: 1
CHOKING: 0

## 2022-01-01 ASSESSMENT — PULMONARY FUNCTION TESTS: PIF_VALUE: 34

## 2022-01-01 ASSESSMENT — PAIN - FUNCTIONAL ASSESSMENT: PAIN_FUNCTIONAL_ASSESSMENT: 0-10

## 2022-01-01 ASSESSMENT — PATIENT HEALTH QUESTIONNAIRE - PHQ9
SUM OF ALL RESPONSES TO PHQ QUESTIONS 1-9: 2
SUM OF ALL RESPONSES TO PHQ9 QUESTIONS 1 & 2: 2
SUM OF ALL RESPONSES TO PHQ QUESTIONS 1-9: 2
2. FEELING DOWN, DEPRESSED OR HOPELESS: 1
SUM OF ALL RESPONSES TO PHQ QUESTIONS 1-9: 2
SUM OF ALL RESPONSES TO PHQ QUESTIONS 1-9: 2
1. LITTLE INTEREST OR PLEASURE IN DOING THINGS: 1

## 2022-01-01 ASSESSMENT — PAIN SCALES - GENERAL
PAINLEVEL_OUTOF10: 8
PAINLEVEL_OUTOF10: 8

## 2022-01-01 ASSESSMENT — PAIN DESCRIPTION - LOCATION: LOCATION: HEAD

## 2022-01-01 ASSESSMENT — SOCIAL DETERMINANTS OF HEALTH (SDOH): HOW HARD IS IT FOR YOU TO PAY FOR THE VERY BASICS LIKE FOOD, HOUSING, MEDICAL CARE, AND HEATING?: NOT HARD AT ALL

## 2022-01-27 NOTE — PROGRESS NOTES
Mr. Puja Collado is a 67 y.o. y/o male with history of Afib who presents today for anticoagulation monitoring and adjustment. Due to COVID protocol, extensive cleaning with Virex performed before and after visit. Masks worn by both parties the entire visit.    INR 2.5 is therapeutic for this patient (goal range 2-3) and is reflective of 45 mg TWD  Patient verifies current dosing regimen, patient able to verbally recall dose  Patient reports 0  missed doses since last INR   Patient denies s/sx clotting and/or stroke  Patient denies hematuria, epistaxis, rectal bleeding  Patient denies changes in diet, alcohol, or tobacco use  Reviewed medication list and drug allergies with patient, updated any medication additions or modifications accordingly  Patient also denies any pending medical or dental procedures scheduled at this time  Patient was instructed to continue 45 mg TWD and RTC 6 weeks    For Pharmacy Admin Tracking Only     Intervention Detail: Adherence Monitorin   Total # of Interventions Recommended: 0   Total # of Interventions Accepted: 0   Time Spent (min): 15

## 2022-03-07 NOTE — PROGRESS NOTES
Urology  Virtual visit over telephone  Patient has history of prostate cancer underwent 2 years of hormonal therapy along with radiation therapy  PSA remains at 0  No issues with voiding  PSA 6 months with virtual visit  Greater than 5 less than 10-minute virtual visit over telephone  Daniel Floyd MD

## 2022-03-08 NOTE — PROGRESS NOTES
Mr. Anabela Lucero is a 67 y.o. y/o male with history of Afib who presents today for anticoagulation monitoring and adjustment.   INR 3.0 is therapeutic for this patient (goal range 2-3) and is reflective of 45 mg TWD  Patient verifies current dosing regimen, patient able to verbally recall dose  Patient reports 0  missed doses since last INR   Patient denies s/sx clotting and/or stroke  Patient denies hematuria, epistaxis, rectal bleeding  Patient denies changes in diet, alcohol, or tobacco use  Reviewed medication list and drug allergies with patient, updated any medication additions or modifications accordingly  Patient also denies any pending medical or dental procedures scheduled at this time  Patient was instructed to continue 45 mg TWD and RTC 6 weeks      For Pharmacy Admin Tracking Only     Intervention Detail:    Total # of Interventions Recommended: 0   Total # of Interventions Accepted: 0   Time Spent (min): 15

## 2022-03-31 NOTE — PROGRESS NOTES
Subjective:      Patient ID: Ketan Colmenares is a 67 y.o. male    Diabetes  He presents for his follow-up diabetic visit. He has type 2 diabetes mellitus. His disease course has been stable. Pertinent negatives for diabetes include no weakness. Mental Health Problem  The primary symptoms do not include dysphoric mood. Here in follow up for labs and diabetes and depression. Review of Systems   Constitutional: Negative for chills and fever. Gastrointestinal: Negative for diarrhea and vomiting. Neurological: Negative for weakness. Psychiatric/Behavioral: Negative for dysphoric mood and sleep disturbance. Reviewed allergy, medical, social, surgical, family and med list changes and updated   Files-reviewed blood work with slightly elevated creatinine and improving anemia      Social History     Socioeconomic History    Marital status:      Spouse name: None    Number of children: None    Years of education: None    Highest education level: None   Occupational History    None   Tobacco Use    Smoking status: Former Smoker     Packs/day: 1.00     Years: 58.00     Pack years: 58.00     Types: Cigarettes     Start date: 1958     Quit date: 3/14/2020     Years since quittin.0    Smokeless tobacco: Never Used   Vaping Use    Vaping Use: Never used   Substance and Sexual Activity    Alcohol use: Yes     Alcohol/week: 3.0 standard drinks     Types: 3 Shots of liquor per week     Comment: once weekly  or more    Drug use: No    Sexual activity: Never   Other Topics Concern    None   Social History Narrative    None     Social Determinants of Health     Financial Resource Strain: Low Risk     Difficulty of Paying Living Expenses: Not hard at all   Food Insecurity: No Food Insecurity    Worried About Running Out of Food in the Last Year: Never true    John of Food in the Last Year: Never true   Transportation Needs:     Lack of Transportation (Medical):  Not on file    Lack of Transportation (Non-Medical): Not on file   Physical Activity:     Days of Exercise per Week: Not on file    Minutes of Exercise per Session: Not on file   Stress:     Feeling of Stress : Not on file   Social Connections:     Frequency of Communication with Friends and Family: Not on file    Frequency of Social Gatherings with Friends and Family: Not on file    Attends [employer] Services: Not on file    Active Member of [address] [place] [address] or Organizations: Not on file    Attends Club or Organization Meetings: Not on file    Marital Status: Not on file   Intimate Partner Violence:     Fear of Current or Ex-Partner: Not on file    Emotionally Abused: Not on file    Physically Abused: Not on file    Sexually Abused: Not on file   Housing Stability:     Unable to Pay for Housing in the Last Year: Not on file    Number of [place] in the Last Year: Not on file    Unstable Housing in the Last Year: Not on file     Current Outpatient Medications   Medication Sig Dispense Refill    traZODone (DESYREL) 100 MG tablet TAKE 1 TABLET NIGHTLY 90 tablet 0    budesonide (ENTOCORT EC) 3 MG extended release capsule TAKE 2 CAPSULES EVERY MORNING 180 capsule 3    citalopram (CELEXA) 20 MG tablet TAKE 1 TABLET DAILY 90 tablet 0    Handicap Placard MISC by Does not apply route X 2 years  Starting 12-30-21 to 12-30-23 2 each 0    Handicap Placard MISC by Does not apply route X 2 years 1 each 0    blood glucose monitor strips Test one time a day & as needed for symptoms of irregular blood glucose- for one touch ultra 300 strip 0    sotalol AF (BETAPACE AF) 80 MG TABS Take by mouth 2 times daily      carbidopa-levodopa-entacapone (STALEVO 100) -200 MG TABS TAKE 1 TABLET NIGHTLY 90 tablet 3    warfarin (COUMADIN) 5 MG tablet Take as directed by [facility] [place] [facility]. Quantity for 90 day supply.  150 tablet 1    primidone (MYSOLINE) 250 MG tablet TAKE 1 TABLET NIGHTLY 90 tablet 3    fluticasone-umeclidin-vilant (TRELEGY ELLIPTA) 100-62.5-25 MCG/INH AEPB Inhale 1 puff into the lungs daily 1 each 5    COVID-19 mRNA Vacc, Moderna, 100 MCG/0.5ML SUSP injection Inject into the muscle once 1st jan 29 [facility]  2nd dose Feb 25 [facility]      Calcium Carb-Cholecalciferol (CALCIUM/VITAMIN D PO) Take 600 mg by mouth       loperamide (IMODIUM) 2 MG capsule Take 2 mg by mouth 4 times daily as needed for Diarrhea      folic acid (FOLVITE) 1 MG tablet Take 1 tablet by mouth daily 90 tablet 1    ferrous sulfate (IRON 325) 325 (65 Fe) MG tablet Take 1 tablet by mouth 2 times daily 60 tablet 5    Respiratory Therapy Supplies (NEBULIZER AIR TUBE/PLUGS) MISC Nebulizer machine with tubing and supplies 1 each 0    hydrochlorothiazide (HYDRODIURIL) 12.5 MG tablet Take 1 tablet by mouth every other day (Patient taking differently: Take 12.5 mg by mouth daily as needed (for edema) ) 30 tablet 3    albuterol sulfate HFA (VENTOLIN HFA) 108 (90 Base) MCG/ACT inhaler Inhale 2 puffs into the lungs 4 times daily as needed for Wheezing 3 Inhaler 1    blood glucose monitor kit and supplies Test one time a day & as needed for symptoms of irregular blood glucose. 1 kit 0    Lancets MISC 1 each by Does not apply route daily 300 each 1    isosorbide mononitrate (IMDUR) 60 MG extended release tablet Take 30 mg by mouth daily       Handicap Placard [facility] by Does not apply route Good for 5 years. Good from 1/31/2017 through 1/31/2022. 1 each 0    atorvastatin (LIPITOR) 80 MG tablet Take 80 mg by mouth daily.  aspirin 81 MG tablet Take 81 mg by mouth daily.       Omega-3 Fatty Acids (FISH OIL) 1200 MG CPDR Take by mouth 2 times daily       nitroGLYCERIN (NITROSTAT) 0.4 MG SL tablet Place 0.4 mg under the tongue every 5 minutes as needed for Chest pain       ipratropium-albuterol (DUONEB) 0.5-2.5 (3) MG/3ML SOLN nebulizer solution Inhale 3 mLs into the lungs 4 times daily 360 mL 5     No current facility-administered medications for this visit. Family History   Problem Relation Age of Onset    Other Mother         liver scerosis   Patel Other Father         did not have a father    Other Sister         does not know sister   Dorma Ards         brain cancer    Other Son         unsure of medical problems    Other Daughter         unsure of medical problems     Past Medical History:   Diagnosis Date    CAD (coronary artery disease)     has 16 cardiac stents    [facility]     COPD (chronic obstructive pulmonary disease) ([facility]     Encephalopathy     Essential tremor     Gross hematuria     History of heart attack     has had 4 heart attacks in the past     Hydrocephalus ([address]     Hyperlipidemia     on meds > 20 yrs    Hypertension     on meds > 20 yrs    Insomnia     Restless leg syndrome     Seizures ([facility]     Tremors of nervous system     Type 2 diabetes mellitus with other circulatory complications ([facility] 8/45/7031     Objective:   /70   Pulse 52   Temp 97.4 °F (36.3 °C)   Resp 14   Ht 5' 10\" (1.778 m)   Wt 177 lb 12.8 oz (80.6 kg)   SpO2 92%   BMI 25.51 kg/m²     Physical Exam      Lungs:clear and equal breath sounds. No wheezes or rales. Heart:rate reg. 1/6 syst murmur along llsb. No gallops   Pulses:Radials 2+ equal               Poster tib 1+ equal  Extremities:no edema in either leg  Gen: In no acute distress  Patient with appropriate affect. Alert  Thought content appropriate  Good eye contact    Assessment:       Diagnosis Orders   1. Type 2 diabetes mellitus with other circulatory complications (HCC)  Hemoglobin A1C    Comprehensive Metabolic Panel   2. Recurrent major depressive disorder, in partial remission ([facility]     3. Chronic insomnia     4. Creatinine elevation  Comprehensive Metabolic Panel   5.  Carotid stenosis, right           Plan:      Orders Placed This Encounter   Procedures    Hemoglobin A1C     Standing Status:   Future     Standing Expiration Date:   3/31/2023    Comprehensive Metabolic Panel     Standing Status:   Future     Standing Expiration Date:   3/31/2023    [facility] - Elsy Bolton MD, Vascular Surgery, [facility]     Referral Priority:   Routine     Referral Type:   Eval and Treat     Referral Reason:   Specialty Services Required     Referred to Provider:   Michelle Velazquez MD     Requested Specialty:   Vascular Surgery     Number of Visits Requested:   1     Continue current medications   Non fasting blood work in 3 months and f/u after done

## 2022-03-31 NOTE — PROGRESS NOTES
Subjective:      Patient ID: Monisha Lopez is a 67 y.o. male. HPI    Review of Systems  Treatment Adherence:   Medication compliance:  {Desc; compliance:5303::\"compliant most of the time\"}  Diet compliance:  {Desc; compliance:5303::\"compliant most of the time\"}  Weight trend: {INCREASING/DECREASING/STABLE:03524}  Current exercise: {EXERCISE TMQA:734003682}  What might prevent you from meeting your goal?: {Barriers to success:56967}  Patient plan for overcoming barriers: {COMMENT/NA:369916635}     Patient Confidence: {NUMBERS 1-10:45859}/10      Diabetes Mellitus Type 2: Current symptoms/problems include {Symptoms; diabetes:94257::\"none\"}. Home blood sugar records:  {diabetes glucometry results:76286}  Any episodes of hypoglycemia? {yes***/no:27181}  Eye exam current (within one year): {yes/no/unknown:74}  Tobacco history: He  reports that he quit smoking about 2 years ago. His smoking use included cigarettes. He started smoking about 63 years ago. He has a 58.00 pack-year smoking history. He has never used smokeless tobacco.   Daily Aspirin? {yes no:619737::\"Yes\"}  Known diabetic complications: {diabetes complications:1215}    Hypertension:  Home blood pressure monitoring: {NO/YES:3390529275::\"No\"}. He {is/is not:9024} adherent to a low sodium diet. Patient {denies/complains:76791} {Symptoms of Hypertension, Denies:08897}. Antihypertensive medication side effects: {Hypertension med side effects:5728::\"no medication side effects noted\"}. Use of agents associated with hypertension: {bp agents assoc with hypertension:511::\"none\"}. Hyperlipidemia:  No new myalgias or GI upset on {RP HYPERLIPIDEMIA MEDS:16056}.        Lab Results   Component Value Date    LABA1C 6.5 (H) 03/29/2022    LABA1C 6.0 (H) 12/02/2021    LABA1C 5.9 08/16/2021     Lab Results   Component Value Date    LABMICR 3.90 (H) 09/08/2016    CREATININE 1.21 (H) 03/29/2022     Lab Results   Component Value Date    ALT 14 03/29/2022    AST 25 03/29/2022     Lab Results   Component Value Date    CHOL 144 12/02/2021    TRIG 91 12/02/2021    HDL 58 12/02/2021    LDLCALC 68 12/02/2021        Objective:   Physical Exam    Assessment / Plan:
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
## 2022-04-08 PROBLEM — D69.6 THROMBOCYTOPENIA (HCC): Status: ACTIVE | Noted: 2022-01-01

## 2022-04-08 PROBLEM — I25.2 PAST MYOCARDIAL INFARCTION: Status: ACTIVE | Noted: 2022-01-01

## 2022-04-08 PROBLEM — D49.1 NEOPLASM OF LUNG: Status: ACTIVE | Noted: 2020-10-15

## 2022-04-08 PROBLEM — H61.20 IMPACTED CERUMEN: Status: ACTIVE | Noted: 2022-01-01

## 2022-04-08 PROBLEM — R25.1 TREMOR: Status: ACTIVE | Noted: 2022-01-01

## 2022-04-08 PROBLEM — F32.A DEPRESSIVE DISORDER: Status: ACTIVE | Noted: 2022-01-01

## 2022-04-08 PROBLEM — R60.9 EDEMA, PITTING: Status: ACTIVE | Noted: 2022-01-01

## 2022-04-08 PROBLEM — E13.3299 MILD NONPROLIFERATIVE RETINOPATHY DUE TO SECONDARY DIABETES (HCC): Status: ACTIVE | Noted: 2022-01-01

## 2022-04-08 PROBLEM — E87.5 HYPERKALEMIA: Status: ACTIVE | Noted: 2022-01-01

## 2022-04-08 PROBLEM — H25.13 NUCLEAR SCLEROTIC CATARACT OF BOTH EYES: Status: ACTIVE | Noted: 2020-03-29

## 2022-04-08 PROBLEM — I20.9 ANGINA, CLASS II (HCC): Status: ACTIVE | Noted: 2022-01-01

## 2022-04-08 PROBLEM — R06.02 SHORTNESS OF BREATH: Status: ACTIVE | Noted: 2019-01-28

## 2022-04-08 PROBLEM — I70.1 RENAL ARTERY STENOSIS (HCC): Status: ACTIVE | Noted: 2022-01-01

## 2022-04-08 PROBLEM — E66.3 OVERWEIGHT: Status: ACTIVE | Noted: 2022-01-01

## 2022-04-08 PROBLEM — R80.9 PROTEINURIA: Status: ACTIVE | Noted: 2022-01-01

## 2022-04-08 PROBLEM — J44.9 CHRONIC OBSTRUCTIVE PULMONARY DISEASE (HCC): Status: ACTIVE | Noted: 2019-01-28

## 2022-04-08 PROBLEM — D64.9 ANEMIA: Status: ACTIVE | Noted: 2022-01-01

## 2022-04-08 PROBLEM — E78.5 HYPERLIPIDEMIA: Status: ACTIVE | Noted: 2019-01-28

## 2022-04-08 PROBLEM — R63.0 LOSS OF APPETITE: Status: ACTIVE | Noted: 2022-01-01

## 2022-04-08 PROBLEM — R09.89 BRUIT: Status: ACTIVE | Noted: 2022-01-01

## 2022-04-08 PROBLEM — M65.20 CALCIFIC TENDINITIS: Status: ACTIVE | Noted: 2022-01-01

## 2022-04-08 PROBLEM — I48.0 PAROXYSMAL ATRIAL FIBRILLATION (HCC): Status: ACTIVE | Noted: 2017-04-25

## 2022-04-08 PROBLEM — Z90.49 S/P SMALL BOWEL RESECTION: Status: ACTIVE | Noted: 2022-01-01

## 2022-04-08 PROBLEM — G57.10 MERALGIA PARESTHETICA: Status: ACTIVE | Noted: 2022-01-01

## 2022-04-08 PROBLEM — H43.11 VITREOUS HEMORRHAGE, RIGHT EYE (HCC): Status: ACTIVE | Noted: 2021-01-01

## 2022-04-08 PROBLEM — I48.92 PAROXYSMAL ATRIAL FLUTTER (HCC): Status: ACTIVE | Noted: 2018-09-18

## 2022-04-08 PROBLEM — R01.1 SYSTOLIC MURMUR: Status: ACTIVE | Noted: 2018-09-18

## 2022-04-08 PROBLEM — I73.9 INTERMITTENT CLAUDICATION (HCC): Status: ACTIVE | Noted: 2022-01-01

## 2022-04-08 PROBLEM — I77.9 DISORDER OF CAROTID ARTERY (HCC): Status: ACTIVE | Noted: 2018-08-13

## 2022-04-14 PROBLEM — G20 PD (PARKINSON'S DISEASE) (HCC): Status: ACTIVE | Noted: 2022-01-01

## 2022-04-14 PROBLEM — G20.A1 PD (PARKINSON'S DISEASE): Status: ACTIVE | Noted: 2022-01-01
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
## 2022-04-19 NOTE — PROGRESS NOTES
Mr. Yared Mccallum is a 67 y.o. y/o male with history of Afib who presents today for anticoagulation monitoring and adjustment.   INR 2.2 is therapeutic for this patient (goal range 2-3) and is reflective of 45 mg TWD  Patient verifies current dosing regimen, patient able to verbally recall dose  Patient reports no  missed doses since last INR   Patient denies s/sx clotting and/or stroke  Patient denies hematuria, epistaxis, rectal bleeding  Patient denies changes in diet, alcohol, or tobacco use  Reviewed medication list and drug allergies with patient, updated any medication additions or modifications accordingly  Patient also denies any pending medical or dental procedures scheduled at this time  Patient was instructed to continue 45mg TWD and RTC 6 weeks      For Pharmacy Admin Tracking Only     Intervention Detail: Adherence Monitorin   Total # of Interventions Recommended: 2   Total # of Interventions Accepted: 2   Time Spent (min): 30

## 2022-05-03 NOTE — TELEPHONE ENCOUNTER
Future Appointments    Encounter Information    Provider Department Appt Notes   2022 [address] [facility] Medication Management    2022 Elin Rivera MD [facility] 3 winter follow up    2022 Edson Apgar, [address] [facility] Pulmonology 4M F/U COPD HYPOXIA    2022 Jeaneth Fernandes [address] [facility] Urology VV - 6 month PSA   10/19/2022 Josie Farr MD [facility] Neurology 6 mos fup       Past Visits    Date Provider Specialty Visit Type Primary Dx   2022 Josie Farr MD Neurology Office Visit Disabling essential tremor   2022 Edson Apgar, MD Pulmonology Office Visit Chronic obstructive pulmonary disease, unspecified COPD type ([address]   2022 Elin Rivera MD Family Medicine Office Visit Type 2 diabetes mellitus with other circulatory complications ([address]
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
## 2022-05-31 NOTE — ED NOTES
Per the doctor put a redmond in and get pt to CT. Removed pt's underwear and pt had clots and pads in his underwear. Doctor was notified of all the bleeding. After doctor was notified about the bleeding. Pt started slurring his words and staring to the right. Doctor was notified and nurse was directed to get 20 mg of etomidate and 100 mg of Rocuronium.       Carole Loyola, RN  05/31/22 [address], RN  05/31/22 0437

## 2022-05-31 NOTE — ED NOTES
Respiratory at bedside with Doctor. Family at bedside. Pt extubated.       Alisson Baig RN  05/31/22 1928
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
## 2022-05-31 NOTE — ED NOTES
Pt was intubated with an ETT 8 Belgian. 24 at the lip (color change). Pt diminished bilaterally in the lungs.       Estefania Arora RN  05/31/22 8572

## 2022-06-08 ENCOUNTER — TELEPHONE (OUTPATIENT)
Dept: FAMILY MEDICINE CLINIC | Age: 77
End: 2022-06-08

## 2022-06-08 NOTE — TELEPHONE ENCOUNTER
Amor Gale from [facility] called to ask if you will sign the death certificate for this patient. He passed away on 5/31 at [facility] ED. Please advise.

## 2022-06-10 NOTE — TELEPHONE ENCOUNTER
Stacy from [address] called checking on status of death certificate. She asked if you can fax it to [phone] when it is ready. Thank you!

## 2024-12-10 NOTE — FLOWSHEET NOTE
0155-1415  Pt received from surgery s/p thoracotomy. Dr Maite Killian managed sedation and bp control. Cxr done on arrival to unit. Chest tube at 210 out when arrived to ICU and end of shift for me total was 290. Hand off of care at bedside.  Chest tubes checked and pt repositioned
0730 - assisted patient up to chair - tolerated fairly well - very weak - states that he is SOB - on 4L NC sat 100%. 8745 - patient hypotensive - call placed to anesthesia - CRNA ordered epidural rate to be turned down to 6ml/hr; patient started on levophed. 4233 - call placed to Dr Ramiro Markham  1024 - levo stopped - patient given digoxin to control HR and 50g of albumin infusing  1030 - Dr Ramiro Markham at bedside - clarified his order to resume coumadin tonight - patient still has epidural in place - per order from anesthesia - no anticoagulants. Dr Ramiro Markham said to hold coumadin until epideral is out.      [place] catheter removed    6655 - patient up from chair and back to bed - tolerated well; O2 NC turned down to 3L    1800 - chest tube suction d/c    1845 - patient had a BM
1910:  Received bedside report from [facility], pt in bed with no pain, epidural in place and assessed. VSS, on 2L O2NC. Pt was up to chair prior to my shift and is refusing repositioning at this time, will try again a little later. Will continue to monitor and medicate per [facility]    2350:  Pt c/o increased SOB, Dr Jayce machado served with request for lasix, BL lung sound Rhonchi. Respiratory called to room for breathing treatment. 2359:  Lasix 40mg one time IV given, will continue to monitor. 0700:  Bedside report given to day shift RN with handoff of care. No other acute changes in pt status.
Called Nicholas Das CRNA - notified him that epidural is scheduled to D/C at 0900 today.
Patient SOB - labored breathing, using accessory muscles - repositioned, sat patient up; NC at 4L; lung sounds - rhonchi/dim with occasional wheeze    Not able to get patient up to chair for breakfast - he is SOB and states he can't    0904 - epidural D/C    1015 - patient up to chair with 2 RN assist; tolerated fairly well - SOB using accessory muscles; on 4L O2 NC; sat 98% respirations 35      Albumin held - Dr Joe Gregory and Dr Natalia Huffman aware - order to D/c Albumin and give 60 lasix per Dr Natalia Huffman    1500 - patient ambulated from chair to toilet three times - tolerated well; had 3 BM    1645 - patient ambulated with walker, 6L NC from room 4 to ICU room 7 in hallway and then back to 4. Tolerated well; denies any pain; SOB when back in chair - placed back on high flow O2    1715 - patient up to toilet - back in chair - SOB respirations 39, on high flow 35%    1800 - patient stand and pivot from chair to bed - patient is SOB on highflow - sat at 94, respirations at 38, using accessory muscles; patient asking for a pain pill - given;     1850 - perfect serve sent to Dr Linda Nicholas regarding patient status - orders received    1855 - page placed to Dr Ena Larry regarding patient status - spoke with Dr Ena Larry - made him aware of patient status; call placed to Dr Joe Gregory - wants xray reviewed and call him back with results - Dr Klarissa Chavez at bedside reviewing xray. [employer] - Dr Rosaleen Mcburney reviewed chest xray - ordered 40 lasix; he spoke with Dr Joe Gregory on phone - if patient needs more than high flow then RN to call.
Shift Summary 7p - 7a    Report received from offgoing nursing staff. Assessment performed and is as noted. Medications administered per MAR. Vital signs as documented in flow sheet. Patient ambulated approximately 60 feet in hallway with walker. Patient tolerated well. Patient also ambulated to bathroom several times. Moderate amount of red - serosanguineous noted from patients chest tube. Patient denies any pain throughout shift. In AM, patient assisted to recliner without incident. Shift was uneventful. Report given to oncoming bedside nursing staff without incident.     Electronically signed by Estuardo Enciso RN on 10/21/2020 at 6:48 AM
26.5

## (undated) DEVICE — DRAPE EQUIP TRNSPRT CONTAINMENT FOR BK TAB

## (undated) DEVICE — GAUZE,SPONGE,4"X4",16PLY,XRAY,STRL,LF: Brand: MEDLINE

## (undated) DEVICE — TOWEL,OR,DSP,ST,BLUE,STD,4/PK,20PK/CS: Brand: MEDLINE

## (undated) DEVICE — Z DISCONTINUED NO SUB IDED TAPE UMB W1/8XL36IN WHT COT STRND NONRADIOPAQUE

## (undated) DEVICE — Z INACTIVE NO SUPPLIER IDENTIFIED STOPCOCK EZFLO STRL 3WAY

## (undated) DEVICE — GOWN,AURORA,NONREINFORCED,LARGE: Brand: MEDLINE

## (undated) DEVICE — TUBING, SUCTION, 1/4" X 10', STRAIGHT: Brand: MEDLINE

## (undated) DEVICE — MAGNETIC INSTR DRAPE 20X16: Brand: MEDLINE INDUSTRIES, INC.

## (undated) DEVICE — E-Z CLEAN, NON-STICK, PTFE COATED, ELECTROSURGICAL BLADE ELECTRODE, 6.5 INCH (16.5 CM): Brand: MEGADYNE

## (undated) DEVICE — SHUNT CV L30CM DIA3X5MM SIL EXT LOOP FULL SPR REINF SUNDT

## (undated) DEVICE — COVER,TABLE,44X90,STERILE: Brand: MEDLINE

## (undated) DEVICE — STAPLER INT AD L55MM DIA5MM THCK TISS TI LIN 2 ROW STR

## (undated) DEVICE — PIN SFTY LG ST

## (undated) DEVICE — GLOVE SURG SZ 8 L12IN FNGR THK83MIL CRM POLYISOPRENE

## (undated) DEVICE — NON-WOVEN SPONGES,4 PLY: Brand: DERMACEA

## (undated) DEVICE — GOWN,SIRUS,POLYRNF,BRTHSLV,XLN/XL,20/CS: Brand: MEDLINE

## (undated) DEVICE — LABEL MED MINI W/ MARKER

## (undated) DEVICE — CYSTO/BLADDER IRRIGATION SET, REGULATING CLAMP

## (undated) DEVICE — LUBRICANT SURG JELLY ST BACTER TUBE 4.25OZ

## (undated) DEVICE — Z DISCONTINUED USE 2271144 DRAIN SURG W0.25XL18IN PRECUT UNIF WALL THICKNESS PENROSE

## (undated) DEVICE — SHEET,DRAPE,53X77,STERILE: Brand: MEDLINE

## (undated) DEVICE — SPONGE,LAP,18"X18",DLX,XR,ST,5/PK,40/PK: Brand: MEDLINE

## (undated) DEVICE — MAX-CORE® DISPOSABLE CORE BIOPSY INSTRUMENT, 18G X 20CM: Brand: MAX-CORE

## (undated) DEVICE — 3M™ STERI-STRIP™ REINFORCED ADHESIVE SKIN CLOSURES, R1547, 1/2 IN X 4 IN (12 MM X 100 MM), 6 STRIPS/ENVELOPE: Brand: 3M™ STERI-STRIP™

## (undated) DEVICE — COUNTER NDL 40 COUNT HLD 70 FOAM BLK ADH W/ MAG

## (undated) DEVICE — LOOP VES W13MM THK09MM MINI RED SIL FLD REPELLENT

## (undated) DEVICE — 3M™ STERI-DRAPE™ INSTRUMENT POUCH 1018: Brand: STERI-DRAPE™

## (undated) DEVICE — INTENDED FOR TISSUE SEPARATION, AND OTHER PROCEDURES THAT REQUIRE A SHARP SURGICAL BLADE TO PUNCTURE OR CUT.: Brand: BARD-PARKER ® CARBON RIB-BACK BLADES

## (undated) DEVICE — TUBE SET 96 MM 64 MM H2O PERISTALTIC STD AUX CHANNEL

## (undated) DEVICE — SUTURE PROL SZ 4-0 L36IN NONABSORBABLE BLU L26MM SH 1/2 CIR 8521H

## (undated) DEVICE — SUTURE PERMAHAND SZ 0 L30IN NONABSORBABLE BLK SILK BRAID A306H

## (undated) DEVICE — SAFESECURE,SECUREMENT,FOLEY CATH,STERILE: Brand: MEDLINE

## (undated) DEVICE — Z DISCONTINUED W/NO SUB ELECTRODE PT RET L9FT HI MOIST COND ADH HYDRGEL CORDED

## (undated) DEVICE — SYRINGE IRRIG 60ML SFT PLIABLE BLB EZ TO GRP 1 HND USE W/

## (undated) DEVICE — SNARE ENDOSCP AD L240CM LOOP W10MM SHTH DIA2.4MM RND INSUL

## (undated) DEVICE — BRUSH ENDO CLN L90.5IN SHTH DIA1.7MM BRIST DIA5-7MM 2-6MM

## (undated) DEVICE — MEDI-VAC YANKAUER SUCTION HANDLE W/BULBOUS TIP: Brand: CARDINAL HEALTH

## (undated) DEVICE — GLOVE SURG SZ 85 L12IN FNGR THK94MIL TRNSLUC YEL LTX

## (undated) DEVICE — SUTURE VCRL SZ 0 L36IN ABSRB UD L36MM CT-1 1/2 CIR J946H

## (undated) DEVICE — LOOP VES L12IN DIA0.066IN WHT SIL THN WALL AIR CUSH

## (undated) DEVICE — SYRINGE MED 10ML LUERLOCK TIP W/O SFTY DISP

## (undated) DEVICE — SUTURE PERMAHAND SZ 3-0 L18IN NONABSORBABLE BLK SILK BRAID A184H

## (undated) DEVICE — SUTURE PRONOVA DBL ARMED 6-0 BV-1 24IN N ABSRB MFIL BLU PN3805H

## (undated) DEVICE — 35 ML SYRINGE LUER-LOCK TIP: Brand: MONOJECT

## (undated) DEVICE — TIP APPL L16CM TISS GLUE EXT SPR FOR PLEUR AIR LEAK PROGEL

## (undated) DEVICE — COVER,MAYO STAND,STERILE: Brand: MEDLINE

## (undated) DEVICE — 3M™ STERI-DRAPE™ INCISE DRAPE 1050 (60CM X 45CM): Brand: STERI-DRAPE™

## (undated) DEVICE — TRAP POLYP BALEEN

## (undated) DEVICE — APPLIER CLP L9.38IN M LIG TI DISP STR RNG HNDL LIGACLP

## (undated) DEVICE — CANNULA PERF L2IN BLNT TIP 3MM VES CLR RADPQ BODY FEM LUER D

## (undated) DEVICE — ENDO CARRY-ON PROCEDURE KIT: Brand: ENDO CARRY-ON PROCEDURE KIT

## (undated) DEVICE — CHLORAPREP 26ML ORANGE

## (undated) DEVICE — ELECTRODE PT RET AD L9FT HI MOIST COND ADH HYDRGEL CORDED

## (undated) DEVICE — PENCIL SMOKE EVAC PUSH BUTTON COATED

## (undated) DEVICE — SET IV ADMINISTRATION WING 21GA 12IN LF

## (undated) DEVICE — PACK,LAPAROTOMY,NO GOWNS: Brand: MEDLINE

## (undated) DEVICE — TRAY PREP DRY W/ PREM GLV 2 APPL 6 SPNG 2 UNDPD 1 OVERWRAP

## (undated) DEVICE — SUTURE VCRL SZ 4-0 L18IN ABSRB UD L19MM PS-2 3/8 CIR PRIM J496H

## (undated) DEVICE — 3M™ TEGADERM™ TRANSPARENT FILM DRESSING FRAME STYLE, 1626W, 4 IN X 4-3/4 IN (10 CM X 12 CM), 50/CT 4CT/CASE: Brand: 3M™ TEGADERM™

## (undated) DEVICE — MARKER SURG SKIN GENTIAN VLT REG TIP W/ 6IN RUL

## (undated) DEVICE — SUCKER CARD L9IN 0.25IN CONN MINI RIG SFT TIP W/ SIDE PRT

## (undated) DEVICE — 3M™ STERI-DRAPE™ INCISE DRAPE, XL 1051: Brand: STERI-DRAPE™

## (undated) DEVICE — SUTURE PERMAHAND SZ 2-0 L30IN NONABSORBABLE BLK SILK W/O A305H

## (undated) DEVICE — APPLIER CLP L9.375IN APER 2.1MM CLS L3.8MM 20 SM TI CLP

## (undated) DEVICE — NEEDLE HYPO 25GA L1.5IN BLU POLYPR HUB S STL REG BVL STR

## (undated) DEVICE — INTENDED USED TO PROTECT, TAG AND HELP LOCATED SUTURES DURING SURGERY: Brand: STERION®SUTURE AID BOOTIES

## (undated) DEVICE — COVER LT HNDL BLU PLAS

## (undated) DEVICE — APPLIER CLP L L13IN TI MULT RNG HNDL 20 CLP STR LIGACLP

## (undated) DEVICE — SECTO® DISSECTOR, KITTNER, 5/16 IN DIAMETER, (5 EA/POUCH, 24 POUCHES/PK, 4 PK/BX): Brand: SYMMETRY SURGICAL

## (undated) DEVICE — Device

## (undated) DEVICE — ADAPTER FLSH PMP FLD MGMT GI IRRIG OFP 2 DISPOSABLE

## (undated) DEVICE — CATHETER,SUCTION,14FR,WHISTL,STR PK,CAL: Brand: MEDLINE

## (undated) DEVICE — 4-PORT MANIFOLD: Brand: NEPTUNE 2

## (undated) DEVICE — BRONCHOSCOPE EXAM L23.6IN OD0.15IN ID0.047IN DISP 4 BRONCH 065476001000] TRI ANIM HEALTH SERVICES]

## (undated) DEVICE — SUTURE PERMA-HAND SZ 3-0 L30IN NONABSORBABLE BLK L17MM RB-1 K872H

## (undated) DEVICE — NEPTUNE E-SEP 165MM SUCTION SLEEVE: Brand: NEPTUNE E-SEP

## (undated) DEVICE — DRAPE C ARM W41XL74IN UNIV MOB W RUBBERBAND CLP

## (undated) DEVICE — APPLICATOR MEDICATED 26 CC SOLUTION HI LT ORNG CHLORAPREP

## (undated) DEVICE — UNIT DRNGE SGL COLL W/ INLINE CONN EXPR

## (undated) DEVICE — CONMED SCOPE SAVER BITE BLOCK, 20X27 MM: Brand: SCOPE SAVER

## (undated) DEVICE — SUTURE VCRL SZ 2-0 L27IN ABSRB UD L26MM SH 1/2 CIR J417H

## (undated) DEVICE — PAD,NON-ADHERENT,3X8,STERILE,LF,1/PK: Brand: MEDLINE

## (undated) DEVICE — PATIENT RETURN ELECTRODE, SINGLE-USE, CONTACT QUALITY MONITORING, ADULT, WITH 9FT CORD, FOR PATIENTS WEIGING OVER 33LBS. (15KG): Brand: MEGADYNE

## (undated) DEVICE — SUTURE 2 L60IN ABSRB TP-1 CHROMIC GUT CTRL REL MFIL GL31G

## (undated) DEVICE — YANKAUER,BULB TIP,W/O VENT,RIGID,STERILE: Brand: MEDLINE

## (undated) DEVICE — SUTURE NONABSORBABLE MONOFILAMENT 4-0 RB-1 36 IN BLU PROLENE 8557H

## (undated) DEVICE — APPLIER LIG CLP M L11IN TI STR RNG HNDL FOR 20 CLP DISP

## (undated) DEVICE — APPLICATOR PVP IOD SWABSTK MED NS LF

## (undated) DEVICE — Device: Brand: ENDO SMARTCAP

## (undated) DEVICE — FOGARTY - HYDRAGRIP SURGICAL - CLAMP INSERTS: Brand: FOGARTY SAFEJAW

## (undated) DEVICE — ELECTROSURGICAL PENCIL BUTTON SWITCH E-Z CLEAN COATED BLADE ELECTRODE 10 FT (3 M) CORD HOLSTER: Brand: MEGADYNE

## (undated) DEVICE — HYPODERMIC SAFETY NEEDLE: Brand: MAGELLAN

## (undated) DEVICE — SUTURE PERMA-HAND SZ 2-0 L30IN NONABSORBABLE BLK L26MM SH K833H

## (undated) DEVICE — FLEXIBLE ENDOSCOPE: Brand: ASCOPE™ 4 BRONCHO REGULAR 5.0/2.2

## (undated) DEVICE — FORCEPS BX L240CM JAW DIA2.8MM L CAP W/ NDL MIC MESH TOOTH

## (undated) DEVICE — SYRINGE MED 3ML CLR PLAS STD N CTRL LUERLOCK TIP DISP

## (undated) DEVICE — GLOVE ORANGE PI 8 1/2   MSG9085

## (undated) DEVICE — DBD-PACK,CYSTOSCOPY,PK VI,AURORA: Brand: MEDLINE

## (undated) DEVICE — I.V. DRAIN SPONGES: Brand: DERMACEA

## (undated) DEVICE — 3M™ TEGADERM™ I.V. ADVANCED SECUREMENT DRESSING, 1683, 2-1/2 IN X 2-3/4 IN, 6.5 CM X 7 CM, 100/CT 4CT/CASE: Brand: 3M™ TEGADERM™

## (undated) DEVICE — TOTAL TRAY, 16FR 10ML SIL FOLEY, URN: Brand: MEDLINE

## (undated) DEVICE — SUTURE PERMA-HAND SZ 2-0 L30IN NONABSORBABLE BLK L30MM FSL 679H

## (undated) DEVICE — SUTURE NABSORBABLE PRONOVA L24IN SZ 5-0 BLU C-1 L13MM 3/8 CIR REV PN3725H

## (undated) DEVICE — PAD N ADH W3XL4IN POLY COT SFT PERF FLM EASILY CUT ABSRB

## (undated) DEVICE — SUTURE VCRL SZ 2-0 L36IN ABSRB UD L36MM CT-1 1/2 CIR J945H